# Patient Record
Sex: FEMALE | Race: WHITE | NOT HISPANIC OR LATINO | Employment: OTHER | URBAN - METROPOLITAN AREA
[De-identification: names, ages, dates, MRNs, and addresses within clinical notes are randomized per-mention and may not be internally consistent; named-entity substitution may affect disease eponyms.]

---

## 2017-01-06 ENCOUNTER — OFFICE VISIT (OUTPATIENT)
Dept: MEDICAL GROUP | Facility: MEDICAL CENTER | Age: 67
End: 2017-01-06
Payer: MEDICARE

## 2017-01-06 VITALS
BODY MASS INDEX: 27.46 KG/M2 | OXYGEN SATURATION: 94 % | HEIGHT: 63 IN | HEART RATE: 68 BPM | SYSTOLIC BLOOD PRESSURE: 132 MMHG | RESPIRATION RATE: 14 BRPM | WEIGHT: 155 LBS | TEMPERATURE: 98.2 F | DIASTOLIC BLOOD PRESSURE: 72 MMHG

## 2017-01-06 DIAGNOSIS — F32.89 OTHER DEPRESSION: ICD-10-CM

## 2017-01-06 DIAGNOSIS — E11.9 CONTROLLED TYPE 2 DIABETES MELLITUS WITHOUT COMPLICATION, WITHOUT LONG-TERM CURRENT USE OF INSULIN (HCC): ICD-10-CM

## 2017-01-06 DIAGNOSIS — E78.5 HYPERLIPIDEMIA, UNSPECIFIED HYPERLIPIDEMIA TYPE: ICD-10-CM

## 2017-01-06 DIAGNOSIS — K44.9 HIATAL HERNIA: ICD-10-CM

## 2017-01-06 DIAGNOSIS — Z00.00 HEALTH CARE MAINTENANCE: ICD-10-CM

## 2017-01-06 DIAGNOSIS — G35 MULTIPLE SCLEROSIS (HCC): ICD-10-CM

## 2017-01-06 DIAGNOSIS — K22.2 SCHATZKI'S RING: ICD-10-CM

## 2017-01-06 PROBLEM — F32.A DEPRESSION: Status: ACTIVE | Noted: 2017-01-06

## 2017-01-06 LAB
HBA1C MFR BLD: 6.1 % (ref ?–5.8)
INT CON NEG: NORMAL
INT CON POS: NORMAL

## 2017-01-06 PROCEDURE — 83036 HEMOGLOBIN GLYCOSYLATED A1C: CPT | Performed by: FAMILY MEDICINE

## 2017-01-06 PROCEDURE — 99204 OFFICE O/P NEW MOD 45 MIN: CPT | Mod: 25 | Performed by: FAMILY MEDICINE

## 2017-01-06 RX ORDER — CYANOCOBALAMIN (VITAMIN B-12) 2000 MCG
1 TABLET ORAL DAILY
COMMUNITY
End: 2022-05-20

## 2017-01-06 RX ORDER — LANCETS
1 EACH MISCELLANEOUS DAILY
COMMUNITY
End: 2017-02-02 | Stop reason: SDUPTHER

## 2017-01-06 RX ORDER — SPIRONOLACT/HYDROCHLOROTHIAZID 25 MG-25MG
1 TABLET ORAL DAILY
COMMUNITY
End: 2022-05-20

## 2017-01-06 RX ORDER — PAROXETINE 10 MG/1
10 TABLET, FILM COATED ORAL DAILY
COMMUNITY
End: 2017-02-02 | Stop reason: SDUPTHER

## 2017-01-06 RX ORDER — GLATIRAMER 40 MG/ML
1 INJECTION, SOLUTION SUBCUTANEOUS
COMMUNITY
End: 2021-09-22

## 2017-01-06 RX ORDER — SIMVASTATIN 40 MG
40 TABLET ORAL NIGHTLY
COMMUNITY
End: 2017-02-02 | Stop reason: SDUPTHER

## 2017-01-06 ASSESSMENT — PATIENT HEALTH QUESTIONNAIRE - PHQ9: CLINICAL INTERPRETATION OF PHQ2 SCORE: 3

## 2017-01-06 NOTE — PROGRESS NOTES
CC: Establish care    HPI:  Melody presents today to establish a new primary care relationship. Was a patient of Dr Dona Vela.    Lives alone, active, and independent with all ADLs. Has the following medical issues:    Type 2 diabetes mellitus , has been adequately controlled. A1C today is 6.1( was 6.9 in the past).Denies excessive urination, and drinking of water. Has been on metformin 500 mg bid. No side effects.    Hyperlipidemia, unspece has been tolerating the statin. Denies muscle pain LFTs has been normal  Continue on Simvastatin 40 mg daily.    Multiple sclerosis , was diagnosed in 2006. Has been stable.and physical active. However has been complaining of chronic fatigability, has been trying the vit b12 but has not been working well.. Discussed with the patient that she should try Amantadine for that but she have to talk to her neurologist Dr Oral Guzman first.She is currently on disease modifying drug, Capaxone injection 40 mg 3 times weekly.Has had double vision on and off, last eye exam was 10/31/2016 was normal.    Depression, which is probably MS induced.Has been generally stable. No suicidal ideation.However has been having an episodes of bad moods.Has been on Paxil to 10 mg daily. No side effects.    Patient  had EGD and colonoscopy in 2/9/2016.EGD showed both Schatzki's ring, and Hiatal hernia, however has been asymptomatic.    Pneumonia vaccine, flu shot, and colonoscopy are UTD.Last mammogram was in 2/2014, due for one, and due for bone density, will talk about them next visit.      Patient Active Problem List    Diagnosis Date Noted   • Controlled type 2 diabetes mellitus without complication, without long-term current use of insulin (HCC) 01/06/2017   • Hyperlipidemia 01/06/2017   • Multiple sclerosis (HCC) 01/06/2017   • Depression 01/06/2017   • Schatzki's ring 01/06/2017   • Hiatal hernia 01/06/2017       Current Outpatient Prescriptions   Medication Sig Dispense Refill   •  "Glatiramer Acetate 40 MG/ML Solution Prefilled Syringe Inject 1 Dose as instructed. 3 times a week     • metformin (GLUCOPHAGE) 500 MG Tab Take 500 mg by mouth 2 times a day, with meals.     • paroxetine (PAXIL) 10 MG Tab Take 10 mg by mouth every day.     • simvastatin (ZOCOR) 40 MG Tab Take 40 mg by mouth every evening.     • ACCU-CHEK SOFTCLIX LANCETS Misc 1 Units by Does not apply route every day.     • glucose blood strip 1 Each by Other route every day. vadim plus test strips     • aspirin 81 MG tablet Take 81 mg by mouth every day.     • Cyanocobalamin (B-12) 2000 MCG Tab Take 1 Tab by mouth every day.     • Calcium Citrate-Vitamin D (CALCIUM + D PO) Take 1 Tab by mouth 2 times a day.     • Coenzyme Q10 (CO Q-10) 300 MG Cap Take 1 Cap by mouth every day.     • Cholecalciferol (D3 ADULT PO) Take 2 Tabs by mouth every day.       No current facility-administered medications for this visit.         Allergies as of 01/06/2017   • (No Known Allergies)        Social History     Social History   • Marital Status:      Spouse Name: N/A   • Number of Children: N/A   • Years of Education: N/A     Occupational History   • Not on file.     Social History Main Topics   • Smoking status: Never Smoker    • Smokeless tobacco: Never Used   • Alcohol Use: Yes      Comment: ocassionally   • Drug Use: No   • Sexual Activity: Not on file     Other Topics Concern   • Not on file     Social History Narrative   • No narrative on file       Family History   Problem Relation Age of Onset   • Cancer Mother    • Heart Disease Father        History reviewed. No pertinent past surgical history.    ROS:  Denies any Headache, Blurred Vision, Confusion Chest pain,  Shortness of breath,  Abdominal pain, Changes of bowel or bladder, Lower ext edema, Fevers, Nights sweats, Weight Changes, Focal weakness or numbness.  All other systems are negative.    /72 mmHg  Pulse 68  Temp(Src) 36.8 °C (98.2 °F)  Resp 14  Ht 1.6 m (5' 2.99\") "  Wt 70.308 kg (155 lb)  BMI 27.46 kg/m2  SpO2 94%    Physical Exam:  Gen:         Alert and oriented, No apparent distress.  HEENT:   Perrla, TM clear,  Oralpharynx no erythema or exudates.  Neck:       No Jugular venous distension, Lymphadenopathy, Thyromegaly, Bruits.  Lungs:     Clear to auscultation bilaterally  CV:          Regular rate and rhythm. No murmurs, rubs or gallops.  Abd:         Soft non tender, non distended. Normal active bowel sounds. No hepatosplenomegaly, No pulsatile masses.  Ext:          No clubbing, cyanosis, edema.      Assessment and Plan.   66 y.o. female     1. Controlled type 2 diabetes mellitus without complication, without long-term current use of insulin (HCC)  Adequately controlled. A1C today is 6.1.  Continue on metformin 500 mg bid.    - POCT Hemoglobin A1c    2. Hyperlipidemia, unspecified hyperlipidemia type  He has been tolerating the statin. Denies muscle pain LFTs has been normal  Continue on Simvastatin 40 mg daily.    3. Multiple sclerosis (HCC)  Diagnosed in [address]. Has been stable.However has been complaining of chronic fatigability. I advised patient to try Amantadine for that but tod tim to talk to her neurologist Dr Oral Guzman.  Continue on Capaxone injection 40 mg 3 times weekly.    - REFERRAL TO NEUROLOGY    4. Depression  Probably MS induced.Has been generally stable. No suicidal ideation.However has been having an episodes of bad moods. Advised to increase Paxil to 20 mg daily  Advised to     5. Schatzki's ring/ Hiatal hernia  Has had EGD and colonoscopy in 2/9/2016.  Stable and asymptomatic.    7. Health care maintenance  Pneumonia vaccine, flu shot, and colonoscopy are UTD.  Last mammogram was in 2/2014, due for one, and due for bone density, will talk about them next visit.

## 2017-01-06 NOTE — PATIENT INSTRUCTIONS
Diabetes Education    • Take your Diabetes medicine as directed, as well as all other prescribed medication, even if you feel good. Tell your provider if you cannot afford your medications or if you are experiencing any side effects.  • Keep simple sugars or glucose tabs with you at all times in the event of low blood sugar. Carry or wear a medical alert card or bracelet/necklace with your diabetic information.   • Check your blood sugar levels daily and according to your providers' recommendations. Keep a log of your blood sugar levels and bring with you to all your appointments. Follow the correctional scale prescribed by your provider, if you were instructed to use rapid acting insulin before meals and before bed. Be sure to account for the carbs in your meal as directed by your provider. Rapid acting insulin: Humulog (lispro), Novolog (aspart), Apidra (glulisine).  • Schedule and keep follow up appointments as indicated by your provider for: diabetes follow ups (bring your glucose meter and blood sugar log), annual dilated eye exam with a qualified ophthalmologist/optometrist, lab tests as requested by your provider (includes blood work and urine test) and semi-annual dental exam.  • Check your feet using a hand held mirror every day for cuts, cracks, sores, bumps, and or red spots. Avoid walking on bare feet. Only use clippers for toenails. See a podiatrist for callous care. Call or schedule an appointment with your provider if you notice sores that are not healing.  • Eat a small meal or healthy snack every 3 - 4 hours. Don't skip meals. Include protein with carbohydrate at most meals. Drink water, plain or flavored with fresh fruit, instead of juice or soda. An ideal plate is made up of half non-starchy vegetables, one quarter protein and one quarter starch.  • Stay or get to a healthy weight by using your meal plan and moving more. Set a goal to be more active most days of the week. Start slow by taking 10  minute walks 3 times a day. Take the stairs instead of an elevator or escalator. Twice a week, work to increase your muscle strength. Use stretch bands, do yoga, heavy gardening (digging and planting with tools), or use dumb bells to strengthen your arms.  • When to call your Endocrinologist or Primary Care Provider   -If your blood glucose stays over 300 mg/dL for 24 hours    -If you are having frequent low blood sugars    -If you experience any one or all of the following symptoms: blurry    vision, fast breathing, weakness, dizziness or shakiness,     headache, rapid heartbeat, confusion, or irritability.

## 2017-01-06 NOTE — MR AVS SNAPSHOT
"        Melody Rodriguez   2017 9:00 AM   Office Visit   MRN: [id number]    Department:  [address] [place] [address]   Dept Phone:  [phone]    Description:  Female : 1950   Provider:  Laya Cornelius M.D.           Reason for Visit     New Patient establish      Allergies as of 2017     No Known Allergies      You were diagnosed with     Controlled type 2 diabetes mellitus without complication, without long-term current use of insulin (HCC)   [2772584]       Hyperlipidemia, unspecified hyperlipidemia type   [0525275]       Multiple sclerosis (HCC)   [340.ICD-9-CM]       Other depression   [3683875]       Schatzki's ring   [499690]       Hiatal hernia   [563947]       Health care maintenance   [536750]         Vital Signs     Blood Pressure Pulse Temperature Respirations Height Weight    132/72 mmHg 68 36.8 °C (98.2 °F) 14 1.6 m (5' 2.99\") 70.308 kg (155 lb)    Body Mass Index Oxygen Saturation Smoking Status             27.46 kg/m2 94% Never Smoker          Basic Information     Date Of Birth Sex Race Ethnicity Preferred Language    1950 Female White Non- English      Problem List              ICD-10-CM Priority Class Noted - Resolved    Controlled type 2 diabetes mellitus without complication, without long-term current use of insulin (HCC) E11.9   2017 - Present    Hyperlipidemia E78.5   2017 - Present    Multiple sclerosis (HCC) G35   2017 - Present    Depression F32.9   2017 - Present    Schatzki's ring K22.2   2017 - Present    Hiatal hernia K44.9   2017 - Present      Health Maintenance        Date Due Completion Dates    A1C SCREENING 1950 ---    DIABETES MONOFILAMTENT / LE EXAM 1950 ---    FASTING LIPID PROFILE 1968 ---    URINE ACR / MICROALBUMIN 1968 ---    SERUM CREATININE 1968 ---    IMM DTaP/Tdap/Td Vaccine (1 - Tdap) 1969 ---    PAP SMEAR 1971 ---    MAMMOGRAM 2015 (Done)    Override on 2014: " Done    BONE DENSITY 2/25/2015 ---    RETINAL SCREENING 10/31/2017 10/31/2016 (Done)    Override on 10/31/2016: Done    IMM PNEUMOCOCCAL 65+ (ADULT) LOW/MEDIUM RISK SERIES (2 of 2 - PPSV23) 1/1/2020 9/8/2016, 1/1/2015    COLONOSCOPY 2/9/2026 2/9/2016 (Done)    Override on 2/9/2016: Done            Results     POCT Hemoglobin A1c      Component    Glycohemoglobin    6.1    Internal Control Negative    Internal Control Positive                        Current Immunizations     13-VALENT PCV PREVNAR 9/8/2016    Influenza TIV (IM) 9/8/2016    Pneumococcal polysaccharide vaccine (PPSV-23) 1/1/2015    SHINGLES VACCINE 1/1/2014      Below and/or attached are the medications your provider expects you to take. Review all of your home medications and newly ordered medications with your provider and/or pharmacist. Follow medication instructions as directed by your provider and/or pharmacist. Please keep your medication list with you and share with your provider. Update the information when medications are discontinued, doses are changed, or new medications (including over-the-counter products) are added; and carry medication information at all times in the event of emergency situations     Allergies:  No Known Allergies          Medications  Valid as of: January 06, 2017 - 10:03 AM    Generic Name Brand Name Tablet Size Instructions for use    Aspirin (Tab) aspirin 81 MG Take 81 mg by mouth every day.        Calcium Citrate-Vitamin D   Take 1 Tab by mouth 2 times a day.        Cholecalciferol   Take 2 Tabs by mouth every day.        Coenzyme Q10 (Cap) Co Q-10 300 MG Take 1 Cap by mouth every day.        Cyanocobalamin (Tab) B-12 2000 MCG Take 1 Tab by mouth every day.        Glatiramer Acetate (Solution Prefilled Syringe) Glatiramer Acetate 40 MG/ML Inject 1 Dose as instructed. 3 times a week        Glucose Blood (Strip) glucose blood  1 Each by Other route every day. vadim plus test strips        Lancets (Misc) ACCU-CHEK  SOFTCLIX LANCETS  1 Units by Does not apply route every day.        MetFORMIN HCl (Tab) GLUCOPHAGE 500 MG Take 500 mg by mouth 2 times a day, with meals.        PARoxetine HCl (Tab) PAXIL 10 MG Take 10 mg by mouth every day.        Simvastatin (Tab) ZOCOR 40 MG Take 40 mg by mouth every evening.        .                 Medicines prescribed today were sent to:     [facility] - [place], [place] - [address]    [address] [place] [address]    Phone: [phone] Fax: [phone]    Open 24 Hours?: No      Medication refill instructions:       If your prescription bottle indicates you have medication refills left, it is not necessary to call your provider’s office. Please contact your pharmacy and they will refill your medication.    If your prescription bottle indicates you do not have any refills left, you may request refills at any time through one of the following ways: The online [employer] system (except Urgent Care), by calling your provider’s office, or by asking your pharmacy to contact your provider’s office with a refill request. Medication refills are processed only during regular business hours and may not be available until the next business day. Your provider may request additional information or to have a follow-up visit with you prior to refilling your medication.   *Please Note: Medication refills are assigned a new Rx number when refilled electronically. Your pharmacy may indicate that no refills were authorized even though a new prescription for the same medication is available at the pharmacy. Please request the medicine by name with the pharmacy before contacting your provider for a refill.        Referral     A referral request has been sent to our patient care coordination department. Please allow 3-5 business days for us to process this request and contact you either by phone or mail. If you do not hear from us by the 5th business day, please call us at [phone].        Instructions           Diabetes Education    • Take your Diabetes medicine as directed, as well as all other prescribed medication, even if you feel good. Tell your provider if you cannot afford your medications or if you are experiencing any side effects.  • Keep simple sugars or glucose tabs with you at all times in the event of low blood sugar. Carry or wear a medical alert card or bracelet/necklace with your diabetic information.   • Check your blood sugar levels daily and according to your providers' recommendations. Keep a log of your blood sugar levels and bring with you to all your appointments. Follow the correctional scale prescribed by your provider, if you were instructed to use rapid acting insulin before meals and before bed. Be sure to account for the carbs in your meal as directed by your provider. Rapid acting insulin: Humulog (lispro), Novolog (aspart), Apidra (glulisine).  • Schedule and keep follow up appointments as indicated by your provider for: diabetes follow ups (bring your glucose meter and blood sugar log), annual dilated eye exam with a qualified ophthalmologist/optometrist, lab tests as requested by your provider (includes blood work and urine test) and semi-annual dental exam.  • Check your feet using a hand held mirror every day for cuts, cracks, sores, bumps, and or red spots. Avoid walking on bare feet. Only use clippers for toenails. See a podiatrist for callous care. Call or schedule an appointment with your provider if you notice sores that are not healing.  • Eat a small meal or healthy snack every 3 - 4 hours. Don't skip meals. Include protein with carbohydrate at most meals. Drink water, plain or flavored with fresh fruit, instead of juice or soda. An ideal plate is made up of half non-starchy vegetables, one quarter protein and one quarter starch.  • Stay or get to a healthy weight by using your meal plan and moving more. Set a goal to be more active most days of the week. Start slow by taking  10 minute walks 3 times a day. Take the stairs instead of an elevator or escalator. Twice a week, work to increase your muscle strength. Use stretch bands, do yoga, heavy gardening (digging and planting with tools), or use dumb bells to strengthen your arms.  • When to call your Endocrinologist or Primary Care Provider   -If your blood glucose stays over 300 mg/dL for 24 hours    -If you are having frequent low blood sugars    -If you experience any one or all of the following symptoms: blurry    vision, fast breathing, weakness, dizziness or shakiness,     headache, rapid heartbeat, confusion, or irritability.                     [employer] Access Code: Activation code not generated  Current [employer] Status: Active

## 2017-02-02 RX ORDER — PAROXETINE HYDROCHLORIDE 20 MG/1
20 TABLET, FILM COATED ORAL DAILY
Qty: 90 TAB | Refills: 1 | Status: SHIPPED | OUTPATIENT
Start: 2017-02-02 | End: 2017-09-04 | Stop reason: SDUPTHER

## 2017-02-02 RX ORDER — LANCETS
1 EACH MISCELLANEOUS DAILY
Qty: 90 EACH | Refills: 2 | Status: SHIPPED | OUTPATIENT
Start: 2017-02-02 | End: 2018-01-25

## 2017-02-02 RX ORDER — SIMVASTATIN 40 MG
40 TABLET ORAL EVERY EVENING
Qty: 90 TAB | Refills: 1 | Status: SHIPPED | OUTPATIENT
Start: 2017-02-02 | End: 2017-10-16 | Stop reason: SDUPTHER

## 2017-05-18 ENCOUNTER — PATIENT MESSAGE (OUTPATIENT)
Dept: MEDICAL GROUP | Facility: MEDICAL CENTER | Age: 67
End: 2017-05-18

## 2017-05-18 DIAGNOSIS — E11.9 CONTROLLED TYPE 2 DIABETES MELLITUS WITHOUT COMPLICATION, WITHOUT LONG-TERM CURRENT USE OF INSULIN (HCC): ICD-10-CM

## 2017-05-18 RX ORDER — LANCETS 30 GAUGE
EACH MISCELLANEOUS
Qty: 100 EACH | Refills: 1 | Status: SHIPPED | OUTPATIENT
Start: 2017-05-18 | End: 2018-11-20 | Stop reason: SDUPTHER

## 2017-05-18 NOTE — TELEPHONE ENCOUNTER
From: Melody Rodriguez  To: Laya Cornelius M.D.  Sent: 5/18/2017 9:17 AM PDT  Subject: Prescription Question    My [employer] plan does not cover the diabetes testing meter I have been using. They require the use of the [employer] Freestyle Lite meter and testing strips. Could you please issue a new prescription for the meter, testing strips and lancets? These are only carried by [facility] and [facility] so I'd appreciate it if you could send the new Rx to the [facility] pharmacy at [address].    Thanks for the help.    Nishi Rodriguez

## 2017-09-05 RX ORDER — PAROXETINE HYDROCHLORIDE 20 MG/1
TABLET, FILM COATED ORAL
Qty: 90 TAB | Refills: 3 | Status: SHIPPED
Start: 2017-09-05 | End: 2020-01-29

## 2017-10-12 ENCOUNTER — HOSPITAL ENCOUNTER (OUTPATIENT)
Dept: LAB | Facility: MEDICAL CENTER | Age: 67
End: 2017-10-12
Attending: PSYCHIATRY & NEUROLOGY
Payer: MEDICARE

## 2017-10-12 LAB
25(OH)D3 SERPL-MCNC: 36 NG/ML (ref 30–100)
ALBUMIN SERPL BCP-MCNC: 4.1 G/DL (ref 3.2–4.9)
ALBUMIN/GLOB SERPL: 1.4 G/DL
ALP SERPL-CCNC: 90 U/L (ref 30–99)
ALT SERPL-CCNC: 17 U/L (ref 2–50)
ANION GAP SERPL CALC-SCNC: 6 MMOL/L (ref 0–11.9)
AST SERPL-CCNC: 19 U/L (ref 12–45)
BASOPHILS # BLD AUTO: 0.8 % (ref 0–1.8)
BASOPHILS # BLD: 0.06 K/UL (ref 0–0.12)
BILIRUB SERPL-MCNC: 0.3 MG/DL (ref 0.1–1.5)
BUN SERPL-MCNC: 10 MG/DL (ref 8–22)
CALCIUM SERPL-MCNC: 9.6 MG/DL (ref 8.5–10.5)
CHLORIDE SERPL-SCNC: 105 MMOL/L (ref 96–112)
CO2 SERPL-SCNC: 26 MMOL/L (ref 20–33)
CREAT SERPL-MCNC: 0.83 MG/DL (ref 0.5–1.4)
EOSINOPHIL # BLD AUTO: 0.42 K/UL (ref 0–0.51)
EOSINOPHIL NFR BLD: 5.6 % (ref 0–6.9)
ERYTHROCYTE [DISTWIDTH] IN BLOOD BY AUTOMATED COUNT: 43.2 FL (ref 35.9–50)
GFR SERPL CREATININE-BSD FRML MDRD: >60 ML/MIN/1.73 M 2
GLOBULIN SER CALC-MCNC: 2.9 G/DL (ref 1.9–3.5)
GLUCOSE SERPL-MCNC: 100 MG/DL (ref 65–99)
HCT VFR BLD AUTO: 43.3 % (ref 37–47)
HGB BLD-MCNC: 14.1 G/DL (ref 12–16)
IMM GRANULOCYTES # BLD AUTO: 0.06 K/UL (ref 0–0.11)
IMM GRANULOCYTES NFR BLD AUTO: 0.8 % (ref 0–0.9)
LYMPHOCYTES # BLD AUTO: 1.74 K/UL (ref 1–4.8)
LYMPHOCYTES NFR BLD: 23 % (ref 22–41)
MCH RBC QN AUTO: 30.4 PG (ref 27–33)
MCHC RBC AUTO-ENTMCNC: 32.6 G/DL (ref 33.6–35)
MCV RBC AUTO: 93.3 FL (ref 81.4–97.8)
MONOCYTES # BLD AUTO: 0.53 K/UL (ref 0–0.85)
MONOCYTES NFR BLD AUTO: 7 % (ref 0–13.4)
NEUTROPHILS # BLD AUTO: 4.75 K/UL (ref 2–7.15)
NEUTROPHILS NFR BLD: 62.8 % (ref 44–72)
NRBC # BLD AUTO: 0 K/UL
NRBC BLD AUTO-RTO: 0 /100 WBC
PLATELET # BLD AUTO: 252 K/UL (ref 164–446)
PMV BLD AUTO: 9.5 FL (ref 9–12.9)
POTASSIUM SERPL-SCNC: 4.1 MMOL/L (ref 3.6–5.5)
PROT SERPL-MCNC: 7 G/DL (ref 6–8.2)
RBC # BLD AUTO: 4.64 M/UL (ref 4.2–5.4)
SODIUM SERPL-SCNC: 137 MMOL/L (ref 135–145)
T3 SERPL-MCNC: 111.8 NG/DL (ref 60–181)
T4 FREE SERPL-MCNC: 0.79 NG/DL (ref 0.53–1.43)
T4 SERPL-MCNC: 6.6 UG/DL (ref 4–12)
TSH SERPL DL<=0.005 MIU/L-ACNC: 3.05 UIU/ML (ref 0.3–3.7)
WBC # BLD AUTO: 7.6 K/UL (ref 4.8–10.8)

## 2017-10-12 PROCEDURE — 84480 ASSAY TRIIODOTHYRONINE (T3): CPT

## 2017-10-12 PROCEDURE — 36415 COLL VENOUS BLD VENIPUNCTURE: CPT

## 2017-10-12 PROCEDURE — 85025 COMPLETE CBC W/AUTO DIFF WBC: CPT

## 2017-10-12 PROCEDURE — 80053 COMPREHEN METABOLIC PANEL: CPT

## 2017-10-12 PROCEDURE — 82306 VITAMIN D 25 HYDROXY: CPT

## 2017-10-12 PROCEDURE — 84443 ASSAY THYROID STIM HORMONE: CPT

## 2017-10-12 PROCEDURE — 84439 ASSAY OF FREE THYROXINE: CPT

## 2017-10-17 RX ORDER — SIMVASTATIN 40 MG
TABLET ORAL
Qty: 90 TAB | Refills: 3 | Status: SHIPPED | OUTPATIENT
Start: 2017-10-17 | End: 2018-10-25 | Stop reason: SDUPTHER

## 2017-12-17 ENCOUNTER — PATIENT MESSAGE (OUTPATIENT)
Dept: MEDICAL GROUP | Facility: MEDICAL CENTER | Age: 67
End: 2017-12-17

## 2018-01-11 ENCOUNTER — TELEPHONE (OUTPATIENT)
Dept: HEALTH INFORMATION MANAGEMENT | Facility: OTHER | Age: 68
End: 2018-01-11

## 2018-01-11 ENCOUNTER — PATIENT OUTREACH (OUTPATIENT)
Dept: HEALTH INFORMATION MANAGEMENT | Facility: OTHER | Age: 68
End: 2018-01-11

## 2018-01-11 NOTE — PROGRESS NOTES
1. Attempt #: 1    2. [facility] Verified: yes    3. Verify PCP: yes    4. Care Team Updated:       •   DME Company (gait device, O2, CPAP, etc.): YES       •   Other Specialists (eye doctor, derm, GYN, cardiology, endo, etc): YES    5.  Reviewed/Updated the following with patient:       •   Communication Preference Obtained? YES       •   Preferred Pharmacy? YES       •   Preferred Lab? YES       •   Family History (document living status of immediate family members and if + hx of cancer, diabetes, hypertension, hyperlipidemia, heart attack, stroke) YES. Was Abstract Encounter opened and chart updated? YES    6. [employer] Activation: already active    7. [employer] Kaden: no    8. Annual Wellness Visit Scheduling  Scheduling Status:Scheduled      9. Care Gap Scheduling (Attempt to Schedule EACH Overdue Care Gap!)     Health Maintenance Due   Topic Date Due   • Annual Wellness Visit  1950   • DIABETES MONOFILAMENT / LE EXAM  1950   • FASTING LIPID PROFILE  02/25/1968   • URINE ACR / MICROALBUMIN  02/25/1968   • IMM DTaP/Tdap/Td Vaccine (1 - Tdap) 02/25/1969   • PAP SMEAR  02/25/1971   • MAMMOGRAM  02/12/2015   • BONE DENSITY  02/25/2015   • A1C SCREENING  07/06/2017   • IMM INFLUENZA (1) 09/01/2017   • RETINAL SCREENING  10/31/2017        Scheduled patient for Annual Wellness Visit/ DM/MAMMO/  TO DISCUSS RETINAL/ LAB ORDERS REQUESTED/ COMPLETED DEXA-LOCATION UNKNOWN      10. Patient was advised: “This is a free wellness visit. The provider will screen for medical conditions to help you stay healthy. If you have other concerns to address you may be asked to discuss these at a separate visit or there may be an additional fee.”     11. Patient was informed to arrive 15 min prior to their scheduled appointment and bring in their medication bottles.

## 2018-01-18 ENCOUNTER — HOSPITAL ENCOUNTER (OUTPATIENT)
Dept: RADIOLOGY | Facility: MEDICAL CENTER | Age: 68
End: 2018-01-18
Attending: FAMILY MEDICINE
Payer: MEDICARE

## 2018-01-18 DIAGNOSIS — Z12.31 SCREENING MAMMOGRAM, ENCOUNTER FOR: ICD-10-CM

## 2018-01-18 PROCEDURE — 77067 SCR MAMMO BI INCL CAD: CPT

## 2018-01-25 ENCOUNTER — OFFICE VISIT (OUTPATIENT)
Dept: MEDICAL GROUP | Facility: MEDICAL CENTER | Age: 68
End: 2018-01-25
Payer: MEDICARE

## 2018-01-25 VITALS
HEART RATE: 75 BPM | WEIGHT: 158 LBS | SYSTOLIC BLOOD PRESSURE: 118 MMHG | TEMPERATURE: 97.4 F | OXYGEN SATURATION: 95 % | DIASTOLIC BLOOD PRESSURE: 70 MMHG | HEIGHT: 63 IN | BODY MASS INDEX: 28 KG/M2

## 2018-01-25 DIAGNOSIS — E11.9 CONTROLLED TYPE 2 DIABETES MELLITUS WITHOUT COMPLICATION, WITHOUT LONG-TERM CURRENT USE OF INSULIN (HCC): ICD-10-CM

## 2018-01-25 DIAGNOSIS — K22.2 SCHATZKI'S RING: ICD-10-CM

## 2018-01-25 DIAGNOSIS — F32.0 MILD SINGLE CURRENT EPISODE OF MAJOR DEPRESSIVE DISORDER (HCC): ICD-10-CM

## 2018-01-25 DIAGNOSIS — E78.2 MIXED HYPERLIPIDEMIA: ICD-10-CM

## 2018-01-25 DIAGNOSIS — G35 MULTIPLE SCLEROSIS (HCC): ICD-10-CM

## 2018-01-25 DIAGNOSIS — R01.1 HEART MURMUR: ICD-10-CM

## 2018-01-25 LAB
HBA1C MFR BLD: 6.3 % (ref ?–5.8)
INT CON NEG: NORMAL
INT CON POS: NORMAL

## 2018-01-25 PROCEDURE — 83036 HEMOGLOBIN GLYCOSYLATED A1C: CPT | Performed by: FAMILY MEDICINE

## 2018-01-25 PROCEDURE — 99215 OFFICE O/P EST HI 40 MIN: CPT | Performed by: FAMILY MEDICINE

## 2018-01-25 NOTE — PROGRESS NOTES
RN-CDE Note  Type 2 Diabetes  Subjective:     Health changes since last visit/interval Hx: States health good except having fatigue.    Medications (including changes made today)  Current Outpatient Prescriptions   Medication Sig Dispense Refill   • B Complex Vitamins (B COMPLEX PO) Take  by mouth. Stress formulary     • simvastatin (ZOCOR) 40 MG Tab TAKE ONE TABLET BY MOUTH IN THE EVENING  90 Tab 3   • paroxetine (PAXIL) 20 MG Tab TAKE ONE TABLET BY MOUTH ONE TIME DAILY  90 Tab 3   • Blood Glucose Monitoring Suppl SUPPLIES Misc Test strips order: Test strips for Abott freestyle lite meter. Sig: use 3 times daily and prn ssx high or low sugar #100 RF x 3 100 Each 1   • Lancets Misc Lancets order: Lancets for [employer]ott freestyle liter meter. Sig: use 3 times daily and prn ssx high or low sugar. #100 RF x 3 100 Each 1   • metformin (GLUCOPHAGE) 500 MG Tab Take 1 Tab by mouth 2 times a day, with meals. 180 Tab 2   • Glatiramer Acetate 40 MG/ML Solution Prefilled Syringe Inject 1 Dose as instructed. 3 times a week     • aspirin 81 MG tablet Take 81 mg by mouth every day.     • Cyanocobalamin (B-12) 2000 MCG Tab Take 1 Tab by mouth every day.     • Calcium Citrate-Vitamin D (CALCIUM + D PO) Take 1 Tab by mouth 2 times a day.     • Coenzyme Q10 (CO Q-10) 300 MG Cap Take 1 Cap by mouth every day.     • Cholecalciferol (D3 ADULT PO) Take 2 Tabs by mouth every day.       No current facility-administered medications for this visit.          Taking daily ASA: Yes  Taking above medications as prescribed: Yes   Patient Denies side effects of medication.    Exercise: no regular exercise, sedentary  Diet:  Decreased appetite Tea in the morning.  Lunch is cheese burger.  Dinner is meat, vegetable, and starch.    Health Maintenance:   Health Maintenance Topics with due status: Overdue       Topic Date Due    Annual Wellness Visit 1950    DIABETES MONOFILAMENT / LE EXAM 1950    FASTING LIPID PROFILE 02/25/1968    URINE ACR /  MICROALBUMIN 02/25/1968    IMM DTaP/Tdap/Td Vaccine 02/25/1969    PAP SMEAR 02/25/1971    MAMMOGRAM 02/12/2015    BONE DENSITY 02/25/2015    A1C SCREENING 07/06/2017    IMM INFLUENZA 09/01/2017    RETINAL SCREENING 10/31/2017         DM:   Last A1c:   Lab Results   Component Value Date/Time    HBA1C 6.1 01/06/2017 09:24 AM      A1c goal: < 6.5    Glucose monitoring frequency: Testing in the morning  trend: low 100's  Hypoglycemic episodes: no     Last Retinal Exam: a little over a year agp Provider: Does not remember  Daily Foot Exam: yes  Routine Dental Exams: yes    No results found for: MICROALBCALC, MALBCRT, MALBEXCR, WTFZHP50, MICROALBUR, MICRALB, UMICROALBUM, MICROALBTIM     ACR Albumin/Creatinine Ratio goal <30     Diabetic complications: none    HTN:   Blood pressure goal <140/<80 .   Currently Rx ACE/ARB: NO    Dyslipidemia:    No results found for: CHOLSTRLTOT, LDL, HDL, TRIGLYCERIDE    Lab Results   Component Value Date/Time    SODIUM 137 10/12/2017 10:54 AM    POTASSIUM 4.1 10/12/2017 10:54 AM    CHLORIDE 105 10/12/2017 10:54 AM    CO2 26 10/12/2017 10:54 AM    GLUCOSE 100 (H) 10/12/2017 10:54 AM    BUN 10 10/12/2017 10:54 AM    CREATININE 0.83 10/12/2017 10:54 AM     Lab Results   Component Value Date/Time    ALKPHOSPHAT 90 10/12/2017 10:54 AM    ASTSGOT 19 10/12/2017 10:54 AM    ALTSGPT 17 10/12/2017 10:54 AM    TBILIRUBIN 0.3 10/12/2017 10:54 AM        Currently Rx Statin: Yes      She  reports that she has never smoked. She has never used smokeless tobacco.    Objective:     Exam:  Monofilament: done  Monofilament testing with a 10 gram force: sensation intact: intact bilaterally  Visual Inspection: Feet without maceration, ulcers, fissures.  Pedal pulses: intact bilaterally      Plan:     - Diabetic diet discussed in detail-plate method.  - Home glucose monitoring.  - She will test and log.    - She will walk for 20-30 minutes daily as tolerated.   - Reviewed medications and advised to take  metformin after meals to decrease   G.I.upset.   - Discussed importance of immunizations and yearly eye exams.    -Educational material distributed.   - Advised daily foot exams. Educated on signs of infection.       Recommended medication changes: No changes in diabetes medications.  She would like a referral to a cardiologist to follow up on a heart murmur she has.      CC: Diabetes, others    HPI:   Melody presents today to discuss diabetes and other problem:    Controlled type 2 diabetes mellitus without complication, without long-term current use of insulin (CMS-HCC)  Has been adequately controlled. A1C today is 6.3.has been on metformin 500 mg bid.Was seen today with diabetic nurse . I agree with her recommendstion    Multiple sclerosis (CMS-HCC)  Was diagnosed in 2006. Has been stable.However has been complaining of chronic fatigability.Advised to take amntadine, but it is not covered by her insurance. She has been on Capaxone injection 40 mg 3 times a week.She follows up periodically with neurologist Dr Oral Guzman.     Mixed hyperlipidemia  He has been tolerating the statin. Denies muscle pain LFTs has been normal, has been on e on Simvastatin 40 mg daily.    Mild single current episode of major depressive disorder (CMS-HCC)  Probably MS induced.Has been generally stable. No suicidal ideation.Has been on Paxil to 20 mg daily    Schatzki's ring/ Hiatal hernia  Patient has had EGD and colonoscopy in 2/9/2016. Has been asymptomaticsince then.    Patient Active Problem List    Diagnosis Date Noted   • Controlled type 2 diabetes mellitus without complication, without long-term current use of insulin (CMS-HCC) 01/06/2017   • Hyperlipidemia 01/06/2017   • Multiple sclerosis (CMS-HCC) 01/06/2017   • Depression 01/06/2017   • Schatzki's ring 01/06/2017   • Hiatal hernia 01/06/2017       Current Outpatient Prescriptions   Medication Sig Dispense Refill   • B Complex Vitamins (B COMPLEX PO) Take  by mouth.  "Stress formulary     • simvastatin (ZOCOR) 40 MG Tab TAKE ONE TABLET BY MOUTH IN THE EVENING  90 Tab 3   • paroxetine (PAXIL) 20 MG Tab TAKE ONE TABLET BY MOUTH ONE TIME DAILY  90 Tab 3   • Blood Glucose Monitoring Suppl SUPPLIES Misc Test strips order: Test strips for Abott freestyle lite meter. Sig: use 3 times daily and prn ssx high or low sugar #100 RF x 3 100 Each 1   • Lancets Misc Lancets order: Lancets for Abott freestyle liter meter. Sig: use 3 times daily and prn ssx high or low sugar. #100 RF x 3 100 Each 1   • metformin (GLUCOPHAGE) 500 MG Tab Take 1 Tab by mouth 2 times a day, with meals. 180 Tab 2   • Glatiramer Acetate 40 MG/ML Solution Prefilled Syringe Inject 1 Dose as instructed. 3 times a week     • aspirin 81 MG tablet Take 81 mg by mouth every day.     • Cyanocobalamin (B-12) 2000 MCG Tab Take 1 Tab by mouth every day.     • Calcium Citrate-Vitamin D (CALCIUM + D PO) Take 1 Tab by mouth 2 times a day.     • Coenzyme Q10 (CO Q-10) 300 MG Cap Take 1 Cap by mouth every day.     • Cholecalciferol (D3 ADULT PO) Take 2 Tabs by mouth every day.       No current facility-administered medications for this visit.          Allergies as of 01/25/2018   • (No Known Allergies)        ROS: Denies any chest pain, Shortness of breath, Changes bowel or bladder, Lower extremity edema.    Physical Exam:  /70   Pulse 75   Temp 36.3 °C (97.4 °F)   Ht 1.6 m (5' 3\")   Wt 71.7 kg (158 lb)   SpO2 95%   BMI 27.99 kg/m²   Gen.: Well-developed, well-nourished, no apparent distress,pleasant and cooperative with the examination  Skin:  Warm and dry with good turgor. No rashes or suspicious lesions in visible areas  HEENT:Sinuses nontender with palpation, TMs clear, nares patent with pink mucosa and clear rhinorrhea,no septal deviation ,polyps or lesions. lips without lesions, oropharynx clear.  Neck: Trachea midline,no masses or adenopathy. No JVD.  Cor: Regular rate and rhythm without murmur, gallop or " rub.  Lungs: Respirations unlabored.Clear to auscultation with equal breath sounds bilaterally. No wheezes, rhonchi.  Extremities: No cyanosis, clubbing or edema.        Assessment and Plan.   67 y.o. female     1. Controlled type 2 diabetes mellitus without complication, without long-term current use of insulin (CMS-HCC)  Adequately controlled. A1C today is 6.3.  Continue on metformin 500 mg bid.    - Diabetic Monofilament LE Exam  - POCT Hemoglobin A1C    2. Multiple sclerosis (CMS-HCC)  Diagnosed in 2006. Has been stable.However has been complaining of chronic fatigability.  Continue follow up neurologist Dr Oral Guzman.  Continue on Capaxone injection 40 mg 3 times a week.    3. Mixed hyperlipidemia  He has been tolerating the statin. Denies muscle pain LFTs has been normal  Continue on Simvastatin 40 mg daily.    4. Mild single current episode of major depressive disorder (CMS-HCC)  Probably MS induced.Has been generally stable. No suicidal ideation.However has been having an episodes of bad moods.   Continue on Paxil to 20 mg daily    5. Schatzki's ring/ Hiatal hernia  Has had EGD and colonoscopy in 2/9/2016.   Stable and asymptomatic.    6. Heart murmur ( systolic murmur)  Asymptomatic.  Concerned wants to see any progression in her leaking valves.    - ECHOCARDIOGRAM-COMP W/ CONT; Future      I spent 40 minutes with patient, at least half of this time was spent in counseling and education.

## 2018-02-08 ENCOUNTER — HOSPITAL ENCOUNTER (OUTPATIENT)
Dept: RADIOLOGY | Facility: MEDICAL CENTER | Age: 68
End: 2018-02-08

## 2018-02-26 ENCOUNTER — OFFICE VISIT (OUTPATIENT)
Dept: URGENT CARE | Facility: CLINIC | Age: 68
End: 2018-02-26
Payer: MEDICARE

## 2018-02-26 VITALS
HEIGHT: 63 IN | WEIGHT: 151.01 LBS | OXYGEN SATURATION: 97 % | TEMPERATURE: 97.4 F | BODY MASS INDEX: 26.76 KG/M2 | HEART RATE: 109 BPM | SYSTOLIC BLOOD PRESSURE: 110 MMHG | DIASTOLIC BLOOD PRESSURE: 80 MMHG

## 2018-02-26 DIAGNOSIS — J20.9 ACUTE BRONCHITIS, UNSPECIFIED ORGANISM: ICD-10-CM

## 2018-02-26 DIAGNOSIS — L73.9 FOLLICULITIS: ICD-10-CM

## 2018-02-26 DIAGNOSIS — E11.9 CONTROLLED TYPE 2 DIABETES MELLITUS WITHOUT COMPLICATION, WITHOUT LONG-TERM CURRENT USE OF INSULIN (HCC): ICD-10-CM

## 2018-02-26 LAB — GLUCOSE BLD-MCNC: 169 MG/DL (ref 70–100)

## 2018-02-26 PROCEDURE — 82962 GLUCOSE BLOOD TEST: CPT | Performed by: FAMILY MEDICINE

## 2018-02-26 PROCEDURE — 99204 OFFICE O/P NEW MOD 45 MIN: CPT | Performed by: FAMILY MEDICINE

## 2018-02-26 RX ORDER — DOXYCYCLINE HYCLATE 100 MG
100 TABLET ORAL 2 TIMES DAILY
Qty: 20 TAB | Refills: 0 | Status: SHIPPED | OUTPATIENT
Start: 2018-02-26 | End: 2018-03-08

## 2018-02-26 ASSESSMENT — ENCOUNTER SYMPTOMS
SHORTNESS OF BREATH: 0
SORE THROAT: 1
EYE PAIN: 0
EYE DISCHARGE: 0
NAUSEA: 0
CHILLS: 1
WHEEZING: 0
PSYCHIATRIC NEGATIVE: 1
BRUISES/BLEEDS EASILY: 0
FEVER: 1
SPUTUM PRODUCTION: 1
PALPITATIONS: 0
COUGH: 1
HEADACHES: 1
EYE REDNESS: 0
MYALGIAS: 1
DIZZINESS: 0
VOMITING: 0

## 2018-02-26 NOTE — PROGRESS NOTES
"Subjective:      Melody Rodriguez is a 68 y.o. female who presents with Cough (\"chest congestion,rash on chest,back pain\"x6days )    Patient presents urgent care with a week and a half of a productive cough fevers chills body aches no nausea vomiting or diarrhea. She did receive a flu vaccination this season she has history of diabetes mellitus type 2 is on oral medication and states that she has not been eating much the past several days. She suspended in bed. Also states that she has developed this itchy rash on her chest wall. That started yesterday. Denies feeling lightheaded or faint. He does have malaise and fatigue    No antipyretics today or yesterday  HPI  Review of Systems   Constitutional: Positive for chills and fever.   HENT: Positive for congestion and sore throat.    Eyes: Negative for pain, discharge and redness.   Respiratory: Positive for cough and sputum production. Negative for shortness of breath and wheezing.    Cardiovascular: Negative for chest pain and palpitations.   Gastrointestinal: Negative for nausea and vomiting.   Genitourinary: Negative.    Musculoskeletal: Positive for myalgias.   Skin: Positive for itching and rash.   Neurological: Positive for headaches. Negative for dizziness.   Endo/Heme/Allergies: Does not bruise/bleed easily.   Psychiatric/Behavioral: Negative.      PMH:  has a past medical history of Depression; Diabetes (CMS-[facility]); Hyperlipidemia; and Muscle disorder.  MEDS:   Current Outpatient Prescriptions:   •  doxycycline (VIBRAMYCIN) 100 MG Tab, Take 1 Tab by mouth 2 times a day for 10 days., Disp: 20 Tab, Rfl: 0  •  Hydrocod Polst-CPM Polst ER (TUSSIONEX) 10-8 MG/5ML Suspension Extended Release, Take 5 mL by mouth 2 times a day as needed for Cough or Rhinitis for up to 6 days. Will cause sedation, avoid driving, operating heavy machinery, and drinking alcohol, Disp: 60 mL, Rfl: 0  •  metformin (GLUCOPHAGE) 500 MG Tab, TAKE ONE TABLET BY MOUTH TWICE DAILY WITH MEALS , " "Disp: 180 Tab, Rfl: 3  •  B Complex Vitamins (B COMPLEX PO), Take  by mouth. Stress formulary, Disp: , Rfl:   •  simvastatin (ZOCOR) 40 MG Tab, TAKE ONE TABLET BY MOUTH IN THE EVENING , Disp: 90 Tab, Rfl: 3  •  paroxetine (PAXIL) 20 MG Tab, TAKE ONE TABLET BY MOUTH ONE TIME DAILY , Disp: 90 Tab, Rfl: 3  •  Blood Glucose Monitoring Suppl SUPPLIES Misc, Test strips order: Test strips for Abott freestyle lite meter. Sig: use 3 times daily and prn ssx high or low sugar #100 RF x 3, Disp: 100 Each, Rfl: 1  •  Lancets Misc, Lancets order: Lancets for Abott freestyle liter meter. Sig: use 3 times daily and prn ssx high or low sugar. #100 RF x 3, Disp: 100 Each, Rfl: 1  •  Glatiramer Acetate 40 MG/ML Solution Prefilled Syringe, Inject 1 Dose as instructed. 3 times a week, Disp: , Rfl:   •  aspirin 81 MG tablet, Take 81 mg by mouth every day., Disp: , Rfl:   •  Cyanocobalamin (B-12) 2000 MCG Tab, Take 1 Tab by mouth every day., Disp: , Rfl:   •  Calcium Citrate-Vitamin D (CALCIUM + D PO), Take 1 Tab by mouth 2 times a day., Disp: , Rfl:   •  Coenzyme Q10 (CO Q-10) 300 MG Cap, Take 1 Cap by mouth every day., Disp: , Rfl:   •  Cholecalciferol (D3 ADULT PO), Take 2 Tabs by mouth every day., Disp: , Rfl:   ALLERGIES: No Known Allergies  SURGHX: No past surgical history on file.  SOCHX:  reports that she has never smoked. She has never used smokeless tobacco. She reports that she drinks alcohol. She reports that she does not use drugs.  FH: Family history was reviewed, no pertinent findings to report     Objective:     /80   Pulse (!) 109   Temp 36.3 °C (97.4 °F)   Ht 1.6 m (5' 3\")   Wt 68.5 kg (151 lb 0.2 oz)   SpO2 97%   BMI 26.75 kg/m²      Physical Exam   Constitutional: She is oriented to person, place, and time. She appears well-developed and well-nourished. No distress.   HENT:   Mouth/Throat: Oropharynx is clear and moist.   Eyes: Conjunctivae are normal.   Neck: Normal range of motion.   Cardiovascular: " Regular rhythm.  Exam reveals no gallop.    No murmur heard.  Mildly tachy   Pulmonary/Chest: Effort normal. No respiratory distress. She has no wheezes.   ronchi are present, deep cough   Abdominal: Soft.   Musculoskeletal: Normal range of motion.   Neurological: She is alert and oriented to person, place, and time.   Skin: Skin is warm and dry. Rash noted. Rash is pustular. She is not diaphoretic.        Discrete pustular lesions on chest wall characteristic of follilculitis   Psychiatric: She has a normal mood and affect. Her behavior is normal. Judgment and thought content normal.         Diagnostics:   fsbs 116  Assessment/Plan:     1. Controlled type 2 diabetes mellitus without complication, without long-term current use of insulin (CMS-[facility])  con't crhonic meds as this is stable  - POCT glucose    2. Acute bronchitis, unspecified organism    - doxycycline (VIBRAMYCIN) 100 MG Tab; Take 1 Tab by mouth 2 times a day for 10 days.  Dispense: 20 Tab; Refill: 0  - Hydrocod Polst-CPM Polst ER (TUSSIONEX) 10-8 MG/5ML Suspension Extended Release; Take 5 mL by mouth 2 times a day as needed for Cough or Rhinitis for up to 6 days. Will cause sedation, avoid driving, operating heavy machinery, and drinking alcohol  Dispense: 60 mL; Refill: 0    3. Folliculitis    - doxycycline (VIBRAMYCIN) 100 MG Tab; Take 1 Tab by mouth 2 times a day for 10 days.  Dispense: 20 Tab; Refill: 0

## 2018-05-28 ENCOUNTER — TELEPHONE (OUTPATIENT)
Dept: MEDICAL GROUP | Facility: MEDICAL CENTER | Age: 68
End: 2018-05-28

## 2018-05-28 DIAGNOSIS — E11.9 CONTROLLED TYPE 2 DIABETES MELLITUS WITHOUT COMPLICATION, WITHOUT LONG-TERM CURRENT USE OF INSULIN (HCC): ICD-10-CM

## 2018-05-28 NOTE — PROGRESS NOTES
Attempt #:Final  [facility] Verified: yes  Verify PCP: yes  Annual Wellness Visit Scheduling  1. Scheduling Status:Scheduled // Pt already completed pre-planning.      Care Gap Scheduling (Attempt to Schedule EACH Overdue Care Gap!) // Requested orders for labs  Health Maintenance Due   Topic Date Due   • Annual Wellness Visit  1950   • FASTING LIPID PROFILE  02/25/1968   • URINE ACR / MICROALBUMIN  02/25/1968   • IMM DTaP/Tdap/Td Vaccine (1 - Tdap) 02/25/1969   • PAP SMEAR  02/25/1971 // Declined   • BONE DENSITY  02/25/2015 / Will schedule later    RETINAL SCREENING  10/31/2017 // Will schedule at later time     MyChart Activation: already active

## 2018-06-04 ENCOUNTER — PATIENT MESSAGE (OUTPATIENT)
Dept: MEDICAL GROUP | Facility: MEDICAL CENTER | Age: 68
End: 2018-06-04

## 2018-06-04 DIAGNOSIS — E11.9 CONTROLLED TYPE 2 DIABETES MELLITUS WITHOUT COMPLICATION, WITHOUT LONG-TERM CURRENT USE OF INSULIN (HCC): ICD-10-CM

## 2018-06-05 ENCOUNTER — PATIENT MESSAGE (OUTPATIENT)
Dept: MEDICAL GROUP | Facility: MEDICAL CENTER | Age: 68
End: 2018-06-05

## 2018-06-05 NOTE — TELEPHONE ENCOUNTER
From: Melody Rodriguez  To: Laya Cornelius M.D.  Sent: 6/5/2018 11:47 AM PDT  Subject: Test Result Question    Not really a question. I just wanted to advise you that I had a bone density exam on 10/31/16 through [facility], [id number] [id number] -- tel:[phone]. I checked my old chart through [facility] but there is nothing in there. I previously signed a health records release so they should have sent the info to you but who knows.    The only thing I was told was that I had osteopenia and they recommended increased calcium and Vitamin D.    Nishi Rodriguez

## 2018-06-11 ENCOUNTER — HOSPITAL ENCOUNTER (OUTPATIENT)
Dept: LAB | Facility: MEDICAL CENTER | Age: 68
End: 2018-06-11
Attending: FAMILY MEDICINE
Payer: MEDICARE

## 2018-06-11 DIAGNOSIS — E11.9 CONTROLLED TYPE 2 DIABETES MELLITUS WITHOUT COMPLICATION, WITHOUT LONG-TERM CURRENT USE OF INSULIN (HCC): ICD-10-CM

## 2018-06-11 LAB
CHOLEST SERPL-MCNC: 150 MG/DL (ref 100–199)
CREAT UR-MCNC: 62.8 MG/DL
EST. AVERAGE GLUCOSE BLD GHB EST-MCNC: 143 MG/DL
HBA1C MFR BLD: 6.6 % (ref 0–5.6)
HDLC SERPL-MCNC: 39 MG/DL
LDLC SERPL CALC-MCNC: 80 MG/DL
MICROALBUMIN UR-MCNC: <0.7 MG/DL
MICROALBUMIN/CREAT UR: NORMAL MG/G (ref 0–30)
TRIGL SERPL-MCNC: 155 MG/DL (ref 0–149)

## 2018-06-11 PROCEDURE — 82570 ASSAY OF URINE CREATININE: CPT

## 2018-06-11 PROCEDURE — 36415 COLL VENOUS BLD VENIPUNCTURE: CPT

## 2018-06-11 PROCEDURE — 82043 UR ALBUMIN QUANTITATIVE: CPT

## 2018-06-11 PROCEDURE — 80061 LIPID PANEL: CPT

## 2018-06-11 PROCEDURE — 83036 HEMOGLOBIN GLYCOSYLATED A1C: CPT

## 2018-06-14 ENCOUNTER — HOSPITAL ENCOUNTER (OUTPATIENT)
Dept: CARDIOLOGY | Facility: MEDICAL CENTER | Age: 68
End: 2018-06-14
Attending: FAMILY MEDICINE
Payer: MEDICARE

## 2018-06-14 DIAGNOSIS — R01.1 HEART MURMUR: ICD-10-CM

## 2018-06-14 LAB
LV EJECT FRACT  99904: 60
LV EJECT FRACT MOD 2C 99903: 70.16
LV EJECT FRACT MOD 4C 99902: 61.2
LV EJECT FRACT MOD BP 99901: 65.55

## 2018-06-14 PROCEDURE — 93306 TTE W/DOPPLER COMPLETE: CPT | Mod: 26 | Performed by: INTERNAL MEDICINE

## 2018-06-14 PROCEDURE — 93306 TTE W/DOPPLER COMPLETE: CPT

## 2018-06-26 ENCOUNTER — OFFICE VISIT (OUTPATIENT)
Dept: MEDICAL GROUP | Facility: MEDICAL CENTER | Age: 68
End: 2018-06-26
Payer: MEDICARE

## 2018-06-26 VITALS
OXYGEN SATURATION: 93 % | DIASTOLIC BLOOD PRESSURE: 80 MMHG | BODY MASS INDEX: 29.83 KG/M2 | RESPIRATION RATE: 16 BRPM | TEMPERATURE: 96.8 F | SYSTOLIC BLOOD PRESSURE: 120 MMHG | HEIGHT: 61 IN | HEART RATE: 82 BPM | WEIGHT: 158 LBS

## 2018-06-26 DIAGNOSIS — K44.9 HIATAL HERNIA: ICD-10-CM

## 2018-06-26 DIAGNOSIS — Z23 NEED FOR VACCINATION: ICD-10-CM

## 2018-06-26 DIAGNOSIS — G35 MULTIPLE SCLEROSIS (HCC): ICD-10-CM

## 2018-06-26 DIAGNOSIS — E78.2 MIXED HYPERLIPIDEMIA: ICD-10-CM

## 2018-06-26 DIAGNOSIS — F32.4 MAJOR DEPRESSIVE DISORDER WITH SINGLE EPISODE, IN PARTIAL REMISSION (HCC): ICD-10-CM

## 2018-06-26 DIAGNOSIS — E11.9 CONTROLLED TYPE 2 DIABETES MELLITUS WITHOUT COMPLICATION, WITHOUT LONG-TERM CURRENT USE OF INSULIN (HCC): ICD-10-CM

## 2018-06-26 DIAGNOSIS — K22.2 SCHATZKI'S RING: ICD-10-CM

## 2018-06-26 PROCEDURE — G0438 PPPS, INITIAL VISIT: HCPCS | Performed by: FAMILY MEDICINE

## 2018-06-26 PROCEDURE — 90750 HZV VACC RECOMBINANT IM: CPT | Performed by: FAMILY MEDICINE

## 2018-06-26 PROCEDURE — 90472 IMMUNIZATION ADMIN EACH ADD: CPT | Performed by: FAMILY MEDICINE

## 2018-06-26 PROCEDURE — 90471 IMMUNIZATION ADMIN: CPT | Performed by: FAMILY MEDICINE

## 2018-06-26 PROCEDURE — 90715 TDAP VACCINE 7 YRS/> IM: CPT | Performed by: FAMILY MEDICINE

## 2018-06-26 PROCEDURE — 99999 PR NO CHARGE: CPT | Performed by: FAMILY MEDICINE

## 2018-06-26 RX ORDER — PAROXETINE HYDROCHLORIDE 40 MG/1
40 TABLET, FILM COATED ORAL DAILY
Qty: 30 TAB | Refills: 3 | Status: SHIPPED | OUTPATIENT
Start: 2018-06-26 | End: 2022-06-19 | Stop reason: SDUPTHER

## 2018-06-26 ASSESSMENT — ACTIVITIES OF DAILY LIVING (ADL): BATHING_REQUIRES_ASSISTANCE: 0

## 2018-06-26 ASSESSMENT — PATIENT HEALTH QUESTIONNAIRE - PHQ9
CLINICAL INTERPRETATION OF PHQ2 SCORE: 6
5. POOR APPETITE OR OVEREATING: 3 - NEARLY EVERY DAY
SUM OF ALL RESPONSES TO PHQ QUESTIONS 1-9: 18

## 2018-06-26 ASSESSMENT — ENCOUNTER SYMPTOMS: GENERAL WELL-BEING: GOOD

## 2018-06-26 NOTE — PROGRESS NOTES
Chief Complaint   Patient presents with   • Annual Wellness Visit         HPI:  Melody is a 68 y.o. here for Medicare Annual Wellness Visit    Type 2 diabetes mellitus ,  It has been adequately controlled. Last A1C was 6.6.Denies excessive urination, and drinking of water. Has been on metformin 500 mg bid. No side effects.Due for eye exam has eye exam scheduled on 7/24/2018.     Hyperlipidemia,   She has been tolerating the statin. Denies muscle pain LFTs has been normal, she is on Simvastatin 40 mg daily.Last LDL was 80     Multiple sclerosis ,   She was diagnosed in 2006. Has been stable.and physical active. She has been following up with neurologist Dr Oral souza.She is currently on disease modifying drug, Capaxone injection 40 mg 3 times weekly, but she stated that she will be taking it only for the coming one and a half month, after that she has to pay form her pocket ( 6000 a year), and she can not afford this.Has had double vision on and off, last eye exam was 10/31/2016 was normal, has eye exam scheduled on 7/24/2018     Depression,   Patient stated that Paxil 20 mg has been helping her anxiety but not the depression, she has feels that her mood has been fluctuating and bad, however denies suicidal ideation. She has been on Paxil to 20 mg daily. Wants to see a psychiatrist.    Patient  had EGD and colonoscopy in 2/9/2016.EGD showed both Schatzki's ring, and Hiatal hernia, however has been asymptomatic.    Patient Active Problem List    Diagnosis Date Noted   • Controlled type 2 diabetes mellitus without complication, without long-term current use of insulin ([facility]) 01/06/2017   • Hyperlipidemia 01/06/2017   • Multiple sclerosis ([facility]) 01/06/2017   • Depression 01/06/2017   • Schatzki's ring 01/06/2017   • Hiatal hernia 01/06/2017       Current Outpatient Prescriptions   Medication Sig Dispense Refill   • metformin (GLUCOPHAGE) 500 MG Tab TAKE ONE TABLET BY MOUTH TWICE DAILY WITH MEALS  180 Tab 3    • B Complex Vitamins (B COMPLEX PO) Take  by mouth. Stress formulary     • simvastatin (ZOCOR) 40 MG Tab TAKE ONE TABLET BY MOUTH IN THE EVENING  90 Tab 3   • paroxetine (PAXIL) 20 MG Tab TAKE ONE TABLET BY MOUTH ONE TIME DAILY  90 Tab 3   • Blood Glucose Monitoring Suppl SUPPLIES Misc Test strips order: Test strips for Abott freestyle lite meter. Sig: use 3 times daily and prn ssx high or low sugar #100 RF x 3 100 Each 1   • Lancets Misc Lancets order: Lancets for [employer]ott freestyle liter meter. Sig: use 3 times daily and prn ssx high or low sugar. #100 RF x 3 100 Each 1   • Glatiramer Acetate 40 MG/ML Solution Prefilled Syringe Inject 1 Dose as instructed. 3 times a week     • Calcium Citrate-Vitamin D (CALCIUM + D PO) Take 1 Tab by mouth 2 times a day.     • Coenzyme Q10 (CO Q-10) 300 MG Cap Take 1 Cap by mouth every day.     • Cholecalciferol (D3 ADULT PO) Take 2 Tabs by mouth every day.     • aspirin 81 MG tablet Take 81 mg by mouth every day.     • Cyanocobalamin (B-12) 2000 MCG Tab Take 1 Tab by mouth every day.       No current facility-administered medications for this visit.         Patient is taking medications as noted in medication list.  Current supplements as per medication list.     Allergies: Patient has no known allergies.     Current social contact/activities: Visit with family and friend, Stanley for [employer] league 1-2x per wk      Is patient current with immunizations? No, due for TDAP and SHINGRIX (Shingles). Patient is interested in receiving TDAP and ZOSTAVAX (Shingles) today.    She  reports that she has never smoked. She has never used smokeless tobacco. She reports that she drinks about 0.6 - 1.2 oz of alcohol per week . She reports that she does not use drugs.  Counseling given: Not Answered        DPA/Advanced directive: Patient does not have an Advanced Directive.  A packet and workshop information was given on Advanced Directives.    ROS:    Gait: Uses no assistive device   Ostomy:  no   Other tubes: no   Amputations: no   Chronic oxygen use no   Last eye exam 1.5 years ago had an appt for 07/2018   Wears hearing aids: no   : Reports urinary leakage during the last 6 months that has interfered a lot with their daily activities or sleep.      Screening:    DIABETES    Has patient ever had diabetes education? Yes, and patient is interested in more. Referral pended.    Depression Screening  DEPRESSION     Is patient seeing a counselor, psychiatrist or other healthcare provider regarding their mental health? No, but interested in referral. Referral pended.       Little interest or pleasure in doing things?  3 - nearly every day  Feeling down, depressed, or hopeless? 3 - nearly every day  Trouble falling or staying asleep, or sleeping too much?  3 - nearly every day  Feeling tired or having little energy?  3 - nearly every day  Poor appetite or overeating?  3 - nearly every day  Feeling bad about yourself - or that you are a failure or have let yourself or your family down? 0 - not at all  Trouble concentrating on things, such as reading the newspaper or watching television? 3 - nearly every day  Moving or speaking so slowly that other people could have noticed.  Or the opposite - being so fidgety or restless that you have been moving around a lot more than usual?  0 - not at all  Thoughts that you would be better off dead, or of hurting yourself?  0 - not at all  Patient Health Questionnaire Score: 18      If depressive symptoms identified deferred to follow up visit unless specifically addressed in assessment and plan.    Depression Screen (PHQ-2/PHQ-9) 1/6/2017 6/26/2018   PHQ-2 Total Score 3 6   PHQ-9 Total Score - 18       Interpretation of PHQ-9 Total Score   Score Severity   1-4 No Depression   5-9 Mild Depression   10-14 Moderate Depression   15-19 Moderately Severe Depression   20-27 Severe Depression  Screening for Cognitive Impairment    Three Minute Recall (leader, season, table)  3/3     Draw clock face with all 12 numbers and set the hands to show 10 past 11.  Yes 4/5  If cognitive concerns identified, deferred for follow up unless specifically addressed in assessment and plan.    Fall Risk Assessment    Has the patient had two or more falls in the last year or any fall with injury in the last year?  No  If fall risk identified, deferred for follow up unless specifically addressed in assessment and plan.      Safety Assessment    Throw rugs on floor.  Yes  Handrails on all stairs.  Yes  Good lighting in all hallways.  Yes  Difficulty hearing.  No  Patient counseled about all safety risks that were identified.    Functional Assessment ADLs    Are there any barriers preventing you from cooking for yourself or meeting nutritional needs?  No.    Are there any barriers preventing you from driving safely or obtaining transportation?  No.    Are there any barriers preventing you from using a telephone or calling for help?  No.    Are there any barriers preventing you from shopping?  No.    Are there any barriers preventing you from taking care of your own finances?  No.    Are there any barriers preventing you from managing your medications?    No.    Are there any barriers preventing you from showering, bathing or dressing yourself?  No.    Are you currently engaging in any exercise or physical activity?  No.     What is your perception of your health?  Good.    Health Maintenance Summary                Annual Wellness Visit Overdue 1950     IMM DTaP/Tdap/Td Vaccine Overdue 2/25/1969     PAP SMEAR Overdue 2/25/1971     BONE DENSITY Overdue 2/25/2015     RETINAL SCREENING Overdue 10/31/2017      Done 10/31/2016     SERUM CREATININE Next Due 10/12/2018      Done 10/12/2017 COMP METABOLIC PANEL     A1C SCREENING Next Due 12/11/2018      Done 6/11/2018 HEMOGLOBIN A1C      Patient has more history with this topic...    MAMMOGRAM Next Due 1/18/2019      Done 1/18/2018 MA-MAMMO SCREENING BILAT  "W/TOMOSYNTHESIS W/CAD     Patient has more history with this topic...    DIABETES MONOFILAMENT / LE EXAM Next Due 1/25/2019      Done 1/25/2018 AMB DIABETIC MONOFILAMENT LOWER EXTREMITY EXAM    FASTING LIPID PROFILE Next Due 6/11/2019      Done 6/11/2018 LIPID PROFILE     URINE ACR / MICROALBUMIN Next Due 6/11/2019      Done 6/11/2018 MICROALBUMIN CREAT RATIO URINE    IMM PNEUMOCOCCAL 65+ (ADULT) LOW/MEDIUM RISK SERIES Next Due 1/1/2020      Done 9/8/2016 Imm Admin: Pneumococcal Conjugate Vaccine (Prevnar/PCV-13)     Patient has more history with this topic...    COLONOSCOPY Next Due 2/9/2026      Done 2/9/2016           Patient Care Team:  Laya Cornelius M.D. as PCP - General (Geriatrics)  Oral Guzman M.D. as Consulting Physician (Neurology)      Social History   Substance Use Topics   • Smoking status: Never Smoker   • Smokeless tobacco: Never Used   • Alcohol use 0.6 - 1.2 oz/week     1 - 2 Glasses of wine per week     Family History   Problem Relation Age of Onset   • Colon Cancer Mother    • Heart Disease Mother      Cardiomyopathy   • Heart Attack Father    • Heart Disease Father    • No Known Problems Sister    • Cancer Brother      Skin   • Other Brother      Gaulbladder issues   • Cancer Maternal Grandmother      GI cancer   • No Known Problems Maternal Grandfather    • No Known Problems Paternal Grandmother    • No Known Problems Paternal Grandfather    • Other Daughter      Fibromyalgia   • Bipolar disorder Daughter    • ADHD Son    • Depression Son    • Hypertension Son      She  has a past medical history of Depression; Diabetes (HCC); Hyperlipidemia; and Muscle disorder.   History reviewed. No pertinent surgical history.      Exam:     Blood pressure 120/80, pulse 82, temperature 36 °C (96.8 °F), resp. rate 16, height 1.56 m (5' 1.42\"), weight 71.7 kg (158 lb), SpO2 93 %. Body mass index is 29.45 kg/m².    Hearing, good .    Dentition, good   Alert, oriented in no acute distress.  Eye contact " is good, speech goal directed, affect calm      Assessment and Plan. The following treatment and monitoring plan is recommended:    68 y.o. female     1. Need for vaccination  Tdap and shingrix are given.    - TDAP VACCINE =>6YO IM  - SHINGLES VACCINE (SHINGRIX)  - Initial Wellness Visit - Includes PPPS ()    2. Controlled type 2 diabetes mellitus without complication, without long-term current use of insulin (HCC)  Adequately controlled. Last A1C was 6.6  Continue on metformin 500 mg bid.    - Initial Wellness Visit - Includes PPPS ()  - POCT Retinal Eye Exam    3. Multiple sclerosis (HCC)  She was diagnosed in 2006. Has been stable.and physical active. She has been following up with neurologist Dr Oral souza.She is currently on disease modifying drug, Capaxone injection 40 mg 3 times weekly, but she stated that she will be taking it only for the coming one and a half month, after that she has to pay form her pocket ( 6000 a year), and she can not afford this.   .     - Initial Wellness Visit - Includes PPPS ()    4. Mixed hyperlipidemia  He has been tolerating the statin. Denies muscle pain LFTs has been normal  Continue on Simvastatin 40 mg daily.     - Initial Wellness Visit - Includes PPPS ()    5. Major depressive disorder with single episode, in partial remission ([facility])  PHQ-9 score is 18,  Probably MS induced.Patient stated that Paxil 20 mg has been affecting her anxiety but not the depression, she feels that her mood has been fluctuated and bad, however denies suicidal ideation. Advised to increase Paxil to 40 mg daily  Will refer to psychiatry.    - Patient has been identified as being depressed and appropriate orders and counseling have been given  - paroxetine (PAXIL) 40 MG tablet; Take 1 Tab by mouth every day.  Dispense: 30 Tab; Refill: 3  - REFERRAL TO PSYCHIATRY    6. Schatzki's ring/ Hiatal hernia  Has had EGD and colonoscopy in 2/9/2016.  Stable and asymptomatic.    -  Initial Wellness Visit - Includes PPPS ()          Services suggested:   Health Care Screening recommendations as per orders if indicated.  Referrals offered: PT/OT/Nutrition counseling/Behavioral Health/Smoking cessation as per orders if indicated.    Discussion today about general wellness and lifestyle habits:    · Prevent falls and reduce trip hazards; Cautioned about securing or removing rugs.  · Have a working fire alarm and carbon monoxide detector;   · Engage in regular physical activity and social activities       Follow-up: No Follow-up on file.

## 2018-06-26 NOTE — LETTER
[facility]  Laya Cornelius M.D.  [address]  [place] [address]  Fax: [phone]   Authorization for Release/Disclosure of   Protected Health Information   Name: NEREYDA RODRIGUEZ : 1950 SSN: [id number]   Address: [address] [place] [address]  [place] [address] Phone:    [phone] (home)    I authorize the entity listed below to release/disclose the PHI below to:   [facility]/Laya Cornelius M.D. and Laya Cornelius M.D.   Provider or Entity Name: John Yost M.D.   Address   City, [facility]   Phone:    Fax: [phone]   Reason for request: continuity of care   Information to be released:    [  ] LAST COLONOSCOPY,  including any PATH REPORT and follow-up  [  ] LAST FIT/COLOGUARD RESULT [  ] LAST DEXA  [  ] LAST MAMMOGRAM  [  ] LAST PAP  [  ] LAST LABS [x] RETINA EXAM REPORT  [  ] IMMUNIZATION RECORDS  [  ] Release all info      [  ] Check here and initial the line next to each item to release ALL health information INCLUDING  _____ Care and treatment for drug and / or alcohol abuse  _____ HIV testing, infection status, or AIDS  _____ Genetic Testing    DATES OF SERVICE OR TIME PERIOD TO BE DISCLOSED: _____________  I understand and acknowledge that:  * This Authorization may be revoked at any time by you in writing, except if your health information has already been used or disclosed.  * Your health information that will be used or disclosed as a result of you signing this authorization could be re-disclosed by the recipient. If this occurs, your re-disclosed health information may no longer be protected by State or Federal laws.  * You may refuse to sign this Authorization. Your refusal will not affect your ability to obtain treatment.  * This Authorization becomes effective upon signing and will  on (date) __________.      If no date is indicated, this Authorization will  one (1) year from the signature date.    Name: Nereyda Rodriguez    Signature:             Date: 6/26/2018       PLEASE FAX REQUESTED RECORDS BACK TO: [phone]

## 2018-06-27 ENCOUNTER — TELEPHONE (OUTPATIENT)
Dept: MEDICAL GROUP | Facility: MEDICAL CENTER | Age: 68
End: 2018-06-27

## 2018-06-27 DIAGNOSIS — F32.1 CURRENT MODERATE EPISODE OF MAJOR DEPRESSIVE DISORDER WITHOUT PRIOR EPISODE (HCC): ICD-10-CM

## 2018-06-27 DIAGNOSIS — G35 MULTIPLE SCLEROSIS (HCC): ICD-10-CM

## 2018-06-27 NOTE — TELEPHONE ENCOUNTER
1. Caller Name: Melody Rodriguez                                           Call Back Number: [phone] (home)         Patient approves a detailed voicemail message: yes    2. SPECIFIC Action To Be Taken: Would like a referral for Dr Yost eye doctor for an eye exam and also if her psychiatrist referral to dr. baptiste    3. Diagnosis/Clinical Reason for Request: Eye exam and psychiatrist     4. Specialty & Provider Name/Lab/Imaging Location: [facility]    5. Is appointment scheduled for requested order/referral: yes -     Patient was informed they will receive a return phone call from the office ONLY if there are any questions before processing their request. Advised to call back if they haven't received a call from the referral department in 5 days.

## 2018-10-12 ENCOUNTER — HOSPITAL ENCOUNTER (OUTPATIENT)
Facility: MEDICAL CENTER | Age: 68
End: 2018-10-12
Payer: MEDICARE

## 2018-10-12 PROCEDURE — 83036 HEMOGLOBIN GLYCOSYLATED A1C: CPT

## 2018-10-13 LAB
EST. AVERAGE GLUCOSE BLD GHB EST-MCNC: 160 MG/DL
HBA1C MFR BLD: 7.2 % (ref 0–5.6)

## 2018-10-29 RX ORDER — SIMVASTATIN 40 MG
TABLET ORAL
Qty: 90 TAB | Refills: 3 | Status: SHIPPED | OUTPATIENT
Start: 2018-10-29 | End: 2019-11-27 | Stop reason: SDUPTHER

## 2018-11-08 ENCOUNTER — IMMUNIZATION (OUTPATIENT)
Dept: SOCIAL WORK | Facility: CLINIC | Age: 68
End: 2018-11-08
Payer: MEDICARE

## 2018-11-08 ENCOUNTER — HOSPITAL ENCOUNTER (OUTPATIENT)
Dept: LAB | Facility: MEDICAL CENTER | Age: 68
End: 2018-11-08
Attending: PSYCHIATRY & NEUROLOGY
Payer: MEDICARE

## 2018-11-08 DIAGNOSIS — Z23 NEED FOR VACCINATION: ICD-10-CM

## 2018-11-08 LAB
ALBUMIN SERPL BCP-MCNC: 4.7 G/DL (ref 3.2–4.9)
ALBUMIN/GLOB SERPL: 1.8 G/DL
ALP SERPL-CCNC: 98 U/L (ref 30–99)
ALT SERPL-CCNC: 19 U/L (ref 2–50)
ANION GAP SERPL CALC-SCNC: 7 MMOL/L (ref 0–11.9)
AST SERPL-CCNC: 17 U/L (ref 12–45)
BILIRUB SERPL-MCNC: 0.8 MG/DL (ref 0.1–1.5)
BUN SERPL-MCNC: 15 MG/DL (ref 8–22)
CALCIUM SERPL-MCNC: 9.7 MG/DL (ref 8.5–10.5)
CHLORIDE SERPL-SCNC: 104 MMOL/L (ref 96–112)
CO2 SERPL-SCNC: 28 MMOL/L (ref 20–33)
CREAT SERPL-MCNC: 1.02 MG/DL (ref 0.5–1.4)
FASTING STATUS PATIENT QL REPORTED: NORMAL
GLOBULIN SER CALC-MCNC: 2.6 G/DL (ref 1.9–3.5)
GLUCOSE SERPL-MCNC: 124 MG/DL (ref 65–99)
POTASSIUM SERPL-SCNC: 4.1 MMOL/L (ref 3.6–5.5)
PROT SERPL-MCNC: 7.3 G/DL (ref 6–8.2)
SODIUM SERPL-SCNC: 139 MMOL/L (ref 135–145)

## 2018-11-08 PROCEDURE — 80053 COMPREHEN METABOLIC PANEL: CPT

## 2018-11-08 PROCEDURE — G0008 ADMIN INFLUENZA VIRUS VAC: HCPCS | Performed by: REGISTERED NURSE

## 2018-11-08 PROCEDURE — 36415 COLL VENOUS BLD VENIPUNCTURE: CPT

## 2018-11-08 PROCEDURE — 90662 IIV NO PRSV INCREASED AG IM: CPT | Performed by: REGISTERED NURSE

## 2018-11-11 ENCOUNTER — HOSPITAL ENCOUNTER (OUTPATIENT)
Dept: RADIOLOGY | Facility: MEDICAL CENTER | Age: 68
End: 2018-11-11
Attending: PSYCHIATRY & NEUROLOGY
Payer: MEDICARE

## 2018-11-11 DIAGNOSIS — G35 MULTIPLE SCLEROSIS (HCC): ICD-10-CM

## 2018-11-11 PROCEDURE — A9585 GADOBUTROL INJECTION: HCPCS | Performed by: PSYCHIATRY & NEUROLOGY

## 2018-11-11 PROCEDURE — 72156 MRI NECK SPINE W/O & W/DYE: CPT

## 2018-11-11 PROCEDURE — 700117 HCHG RX CONTRAST REV CODE 255: Performed by: PSYCHIATRY & NEUROLOGY

## 2018-11-11 PROCEDURE — 70553 MRI BRAIN STEM W/O & W/DYE: CPT

## 2018-11-11 RX ORDER — GADOBUTROL 604.72 MG/ML
7.5 INJECTION INTRAVENOUS ONCE
Status: COMPLETED | OUTPATIENT
Start: 2018-11-11 | End: 2018-11-11

## 2018-11-11 RX ADMIN — GADOBUTROL 7.5 ML: 604.72 INJECTION INTRAVENOUS at 13:58

## 2018-11-20 DIAGNOSIS — E11.9 CONTROLLED TYPE 2 DIABETES MELLITUS WITHOUT COMPLICATION, WITHOUT LONG-TERM CURRENT USE OF INSULIN (HCC): ICD-10-CM

## 2018-11-20 RX ORDER — LANCETS 28 GAUGE
EACH MISCELLANEOUS
Qty: 100 EACH | Refills: 3 | Status: SHIPPED | OUTPATIENT
Start: 2018-11-20 | End: 2020-04-30 | Stop reason: SDUPTHER

## 2018-11-20 RX ORDER — BLOOD SUGAR DIAGNOSTIC
STRIP MISCELLANEOUS
Qty: 100 STRIP | Refills: 0 | Status: SHIPPED | OUTPATIENT
Start: 2018-11-20 | End: 2018-12-27 | Stop reason: SDUPTHER

## 2018-11-21 ENCOUNTER — HOSPITAL ENCOUNTER (OUTPATIENT)
Dept: RADIOLOGY | Facility: MEDICAL CENTER | Age: 68
End: 2018-11-21
Attending: PSYCHIATRY & NEUROLOGY
Payer: MEDICARE

## 2018-11-21 DIAGNOSIS — E04.1 NONTOXIC UNINODULAR GOITER: ICD-10-CM

## 2018-11-21 PROCEDURE — 76536 US EXAM OF HEAD AND NECK: CPT

## 2018-12-27 DIAGNOSIS — E11.9 CONTROLLED TYPE 2 DIABETES MELLITUS WITHOUT COMPLICATION, WITHOUT LONG-TERM CURRENT USE OF INSULIN (HCC): ICD-10-CM

## 2018-12-27 RX ORDER — BLOOD SUGAR DIAGNOSTIC
STRIP MISCELLANEOUS
Qty: 100 STRIP | Refills: 0 | Status: SHIPPED | OUTPATIENT
Start: 2018-12-27 | End: 2020-06-13 | Stop reason: SDUPTHER

## 2019-03-28 ENCOUNTER — OFFICE VISIT (OUTPATIENT)
Dept: ENDOCRINOLOGY | Facility: MEDICAL CENTER | Age: 69
End: 2019-03-28
Payer: MEDICARE

## 2019-03-28 VITALS
BODY MASS INDEX: 28 KG/M2 | SYSTOLIC BLOOD PRESSURE: 110 MMHG | HEIGHT: 63 IN | HEART RATE: 87 BPM | OXYGEN SATURATION: 91 % | DIASTOLIC BLOOD PRESSURE: 60 MMHG | WEIGHT: 158 LBS

## 2019-03-28 DIAGNOSIS — E11.9 CONTROLLED TYPE 2 DIABETES MELLITUS WITHOUT COMPLICATION, WITHOUT LONG-TERM CURRENT USE OF INSULIN (HCC): ICD-10-CM

## 2019-03-28 DIAGNOSIS — E04.2 MULTIPLE THYROID NODULES: ICD-10-CM

## 2019-03-28 DIAGNOSIS — R13.10 DYSPHAGIA, UNSPECIFIED TYPE: ICD-10-CM

## 2019-03-28 DIAGNOSIS — E78.5 DYSLIPIDEMIA: ICD-10-CM

## 2019-03-28 LAB
HBA1C MFR BLD: 6.6 % (ref 0–5.6)
INT CON NEG: NEGATIVE
INT CON POS: POSITIVE

## 2019-03-28 PROCEDURE — 83036 HEMOGLOBIN GLYCOSYLATED A1C: CPT | Performed by: PHYSICIAN ASSISTANT

## 2019-03-28 PROCEDURE — 99204 OFFICE O/P NEW MOD 45 MIN: CPT | Performed by: PHYSICIAN ASSISTANT

## 2019-03-28 RX ORDER — LAMOTRIGINE 100 MG/1
200 TABLET ORAL DAILY
COMMUNITY
End: 2022-11-23

## 2019-03-28 NOTE — PROGRESS NOTES
"New Patient Consult Note  Referred by: Laya Cornelius M.D.    HPI:  Melody Rodriguez is a  69 y.o. old patient who comes in today for evaluation and management of the followin. Controlled type 2 diabetes mellitus without complication, without long-term current use of insulin (HCC)  Dx:    Metformin 500 mg BID - no side effects     Preventative care:   Podiatry: no numbness tinglng no cane   Ophthalmology: glasses 10/2018- no retinopathy   Diabetes education:  Previously .     Check Blood sugars 2 times a month.   BG      No hypoglycemia     Labs:    Ref. Range 2018 09:40   Glucose Latest Ref Range: 65 - 99 mg/dL 124 (H)        Ref. Range 2018 09:40   GFR If Non  Latest Ref Range: >60 mL/min/1.73 m 2 54 (A)        Ref. Range 2018 09:33 10/12/2018 13:57 2018 09:40 2018 09:42 3/28/2019 16:07   Glycohemoglobin Latest Ref Range: 0.0 - 5.6 % 6.6 (H) 7.2 (H)   6.6 (A)     2. Multiple thyroid nodules  Patient 2006 had MRI secondary to radicular pain in the hands. Radiologist commented on thyroid nodules.   Repeat MRI again displayed thyroid nodules and she was underwent thyroid US and refer to endocrinology.     Patients history is difficult to differentiate symptoms secondary to hx of MS, Shatski ring, etc.     She complains of difficulty swallowing but has had this for years. She follows with GI. Last evaluation was  2 years ago but states symptoms are becoming more apparent over the last 2 months.     Patient denies symptoms of sensation of a throat mass, voice changes, hoarseness, neck pain,  dysphonia, cough, enlarged cervical lymph nodes, tremors.      She has \"terrible\" heat intolerance related to MS and this unchanged for years. She admits to having a difficulty with temperature regulation.       Patient denies any history of child head or neck radiation, No family history of  thryroid cancer      Thyroid Ultrasound 2018- "   Circumscribed mixed solid/cystic circumscribed thyroid mass in the midportion of the thyroid gland occupying the majority the right lobe measuring 4.0 x 2.8 x 2.5 cm.    2.  Mixed circumscribed solid and cystic complex nodule in the lower pole the left lobe of the thyroid gland measuring 1.7 x 1.7 x 1.2 cm.    3.  No extrathyroid mass is present.    4.  Correlation with thyroid uptake and scan may be of value.    MRI of the neck: 11/11/2018-  2.2 x 2.8 semierect thyroid mass for which dedicated thyroid ultrasound is recommended.    3. Dyslipidemia - stable   Patient is on Zocor since 2014.   Takes at night   No side effects.   Patient also takes Co Q 10 to prevent muscle cramping      Ref. Range 6/11/2018 09:33   Cholesterol,Tot Latest Ref Range: 100 - 199 mg/dL 150   Triglycerides Latest Ref Range: 0 - 149 mg/dL 155 (H)   HDL Latest Ref Range: >=40 mg/dL 39 (A)   LDL Latest Ref Range: <100 mg/dL 80       ROS:  Constitutional: No weight loss or gain,  fever  HEENT: see above   Cardiac: No chest pain, palpitations, or racing heart  Resp: No shortness of breath  GI: No abdominal pain, nausea, vomiting, or diarrhea   Neuro: No numbness or tinging in feet  Endo: No polyuria or polydipsia  All other systems were reviewed and were negative.    Past Medical History:  Patient Active Problem List    Diagnosis Date Noted   • Controlled type 2 diabetes mellitus without complication, without long-term current use of insulin (HCC) 01/06/2017   • Hyperlipidemia 01/06/2017   • Multiple sclerosis (HCC) 01/06/2017   • Depression 01/06/2017   • Schatzki's ring 01/06/2017   • Hiatal hernia 01/06/2017       Past Surgical History:  History reviewed. No pertinent surgical history.    Allergies:  Patient has no known allergies.    Social History:  Social History     Social History   • Marital status:      Spouse name: N/A   • Number of children: N/A   • Years of education: N/A     Occupational History   • Not on file.     Social  History Main Topics   • Smoking status: Never Smoker   • Smokeless tobacco: Never Used   • Alcohol use 0.6 - 1.2 oz/week     1 - 2 Glasses of wine per week   • Drug use: No      Comment: CBD stick no THC   • Sexual activity: No     Other Topics Concern   • Not on file     Social History Narrative   • No narrative on file       Family History:  Family History   Problem Relation Age of Onset   • Colon Cancer Mother    • Heart Disease Mother         Cardiomyopathy   • Heart Attack Father    • Heart Disease Father    • No Known Problems Sister    • Cancer Brother         Skin   • Other Brother         Gaulbladder issues   • Cancer Maternal Grandmother         GI cancer   • No Known Problems Maternal Grandfather    • No Known Problems Paternal Grandmother    • No Known Problems Paternal Grandfather    • Other Daughter         Fibromyalgia   • Bipolar disorder Daughter    • ADHD Son    • Depression Son    • Hypertension Son        Medications:    Current Outpatient Prescriptions:   •  lamoTRIgine (LAMICTAL) 100 MG Tab, Take 100 mg by mouth every day., Disp: , Rfl:   •  FREESTYLE TEST STRIPS strip, USE 3 TIMES A DAY AS NEEDED FOR SYMMPTOMS OF HIGH OR LOW BLOOD SUGAR, Disp: 100 Strip, Rfl: 0  •  FREESTYLE LANCETS Misc, USE 3 TIMES A DAY AS NEEDED FOR SYMPTOMS OF HIGH OR LOW BLOOD SUGAR, Disp: 100 Each, Rfl: 3  •  simvastatin (ZOCOR) 40 MG Tab, TAKE ONE TABLET BY MOUTH IN THE EVENING , Disp: 90 Tab, Rfl: 3  •  paroxetine (PAXIL) 40 MG tablet, Take 1 Tab by mouth every day., Disp: 30 Tab, Rfl: 3  •  metformin (GLUCOPHAGE) 500 MG Tab, TAKE ONE TABLET BY MOUTH TWICE DAILY WITH MEALS , Disp: 180 Tab, Rfl: 3  •  B Complex Vitamins (B COMPLEX PO), Take  by mouth. Stress formulary, Disp: , Rfl:   •  Glatiramer Acetate 40 MG/ML Solution Prefilled Syringe, Inject 1 Dose as instructed. 3 times a week, Disp: , Rfl:   •  aspirin 81 MG tablet, Take 81 mg by mouth every day., Disp: , Rfl:   •  Cyanocobalamin (B-12) 2000 MCG Tab, Take 1  "Tab by mouth every day., Disp: , Rfl:   •  Calcium Citrate-Vitamin D (CALCIUM + D PO), Take 1 Tab by mouth 2 times a day., Disp: , Rfl:   •  Coenzyme Q10 (CO Q-10) 300 MG Cap, Take 1 Cap by mouth every day., Disp: , Rfl:   •  Cholecalciferol (D3 ADULT PO), Take 2 Tabs by mouth every day., Disp: , Rfl:   •  paroxetine (PAXIL) 20 MG Tab, TAKE ONE TABLET BY MOUTH ONE TIME DAILY  (Patient not taking: Reported on 3/28/2019), Disp: 90 Tab, Rfl: 3    Labs: Reviewed (as above)     Physical Examination:  Vital signs: /60 (BP Location: Right arm, Patient Position: Sitting, BP Cuff Size: Adult)   Pulse 87   Ht 1.588 m (5' 2.5\")   Wt 71.7 kg (158 lb)   SpO2 91%   BMI 28.44 kg/m²  Body mass index is 28.44 kg/m².  General: No apparent distress, cooperative,     Eyes: No scleral icterus or discharge, no nystagmus  ENMT: Normal on external inspection of nose, lips, enlarged with nodules bilateral on  thyroid exam  Neck: No abnormal masses on inspection or palpations. no bruits noted   Resp: Normal effort, clear to auscultation bilaterally without wheezes, rales or rhonchi  CVS: Regular rate and rhythm, S1 S2 normal,+2/6 murmur without radiation   Extremities: No edema.   Abdomen: abdominal soft and nontender  Neuro: Alert and oriented  Skin: No rash  Psych: Normal mood and affect, intact memory and able to make informed decisions    Assessment and Plan:  1. Controlled type 2 diabetes mellitus without complication, without long-term current use of insulin (HCC)  Discussed patient is well controlled on current regimen continue Metformin   Stable/chronic    - HEMOGLOBIN A1C; Future  - MICROALBUMIN CREAT RATIO URINE; Future  - Comp Metabolic Panel; Future  - POCT Hemoglobin A1C    2. Multiple thyroid nodules - chronic but unstable. Will continue to follow   Discussed etiology and pathophysiology associated with thyroid nodules.  We do not have previous ultrasound for comparison.    We discussed options re: repeat US/biopsy. "   Patient would like to wait and US in 6 months.     - TSH; Future  - FREE THYROXINE; Future  - US-SOFT TISSUES OF HEAD - NECK; Future    3. Dyslipidemia  Stable. Managed with current medical regimen   Continue Zocor   - Lipid Profile; Future  - Comp Metabolic Panel; Future    4. Dysphagia: encouraged patient to follow up with GI as symptoms are worsening.     Return in about 4 months (around 7/28/2019).     Thank you for allowing me to participate in the care of this patient.  If you have any questions or concerns please do not hesitate to contact me.    Soledad Chan P.A.-C.  03/28/19    CC:   Laya Cornelius M.D.    This note was created using voice recognition software (Dragon). The accuracy of the dictation is limited by the abilities of the software. I have reviewed the note prior to signing, however some errors in grammar and context are still possible. If you have any questions related to this note please do not hesitate to contact our office.

## 2019-03-29 PROBLEM — R13.10 DYSPHAGIA: Status: ACTIVE | Noted: 2019-03-29

## 2019-04-09 DIAGNOSIS — R53.83 FATIGUE, UNSPECIFIED TYPE: ICD-10-CM

## 2019-04-19 ENCOUNTER — PATIENT MESSAGE (OUTPATIENT)
Dept: MEDICAL GROUP | Facility: MEDICAL CENTER | Age: 69
End: 2019-04-19

## 2019-04-22 DIAGNOSIS — K22.2 SCHATZKI'S RING: ICD-10-CM

## 2019-04-22 NOTE — TELEPHONE ENCOUNTER
From: Melody Rodriguez  To: Laya Cornelius M.D.  Sent: 4/19/2019 4:53 PM PDT  Subject: Non-Urgent Medical Question    In 2014, when I was with [facility], I saw Dr. Darrell Ramirez of [facility] for a colonoscopy and endoscopy. I was diagnosed with a Schatzki ring, which was expanded during the procedure. I am again having difficulty with swallowing and food sticking in my esophagus. I would like to make an appointment to see Dr. Ramirez to decide what I should do at this point. Dr. Ramirez is a [facility] provider as well as a [facility] provider.     I was hoping you could provide a referral so that I could schedule an appointment as soon as possible. His information is below:    Dr. Darrell Ramirez  [facility]  [address] [place] [id number]    [phone]  [email or web address]    Thanks,  Nishi Rodriguez

## 2019-04-23 ENCOUNTER — PATIENT OUTREACH (OUTPATIENT)
Dept: HEALTH INFORMATION MANAGEMENT | Facility: OTHER | Age: 69
End: 2019-04-23

## 2019-04-23 NOTE — PROGRESS NOTES
1. HealthConnect Verified: yes    2. Verify PCP: yes    3. Review and add  to Care Team: yes    4. WebIZ Checked & Epic Updated: Yes  WebIZ Recommendations: Patient is up to date on all vaccines  Is patient due for Tdap? NO  Is patient due for Shingles? NO    5. Reviewed/Updated the following with patient:       •   Communication Preference Obtained? YES  • [employer] Activation: already active       •   E-Mail Address Obtained? YES       •   Appointment Day and Time Preferences? YES       •   Preferred Pharmacy? YES       •   Preferred Lab? YES    6. Care Gap Scheduling (Attempt to Schedule EACH Overdue Care Gap!)

## 2019-04-23 NOTE — PROGRESS NOTES
Outcome: Left Message    Please transfer to Patient Outreach Team at [phone] when patient returns call.    WebIZ Checked & Epic Updated:  yes    HealthConnect Verified: yes    Attempt # 1

## 2019-06-12 ENCOUNTER — HOSPITAL ENCOUNTER (OUTPATIENT)
Dept: LAB | Facility: MEDICAL CENTER | Age: 69
End: 2019-06-12
Attending: PHYSICIAN ASSISTANT
Payer: MEDICARE

## 2019-06-12 DIAGNOSIS — E78.5 DYSLIPIDEMIA: ICD-10-CM

## 2019-06-12 DIAGNOSIS — E11.9 CONTROLLED TYPE 2 DIABETES MELLITUS WITHOUT COMPLICATION, WITHOUT LONG-TERM CURRENT USE OF INSULIN (HCC): ICD-10-CM

## 2019-06-12 DIAGNOSIS — R53.83 FATIGUE, UNSPECIFIED TYPE: ICD-10-CM

## 2019-06-12 DIAGNOSIS — E04.2 MULTIPLE THYROID NODULES: ICD-10-CM

## 2019-06-12 LAB
ALBUMIN SERPL BCP-MCNC: 4.5 G/DL (ref 3.2–4.9)
ALBUMIN/GLOB SERPL: 1.5 G/DL
ALP SERPL-CCNC: 133 U/L (ref 30–99)
ALT SERPL-CCNC: 19 U/L (ref 2–50)
ANION GAP SERPL CALC-SCNC: 8 MMOL/L (ref 0–11.9)
AST SERPL-CCNC: 17 U/L (ref 12–45)
BILIRUB SERPL-MCNC: 0.7 MG/DL (ref 0.1–1.5)
BUN SERPL-MCNC: 16 MG/DL (ref 8–22)
CALCIUM SERPL-MCNC: 9.8 MG/DL (ref 8.5–10.5)
CHLORIDE SERPL-SCNC: 106 MMOL/L (ref 96–112)
CHOLEST SERPL-MCNC: 179 MG/DL (ref 100–199)
CO2 SERPL-SCNC: 26 MMOL/L (ref 20–33)
CREAT SERPL-MCNC: 1.01 MG/DL (ref 0.5–1.4)
CREAT UR-MCNC: 63.8 MG/DL
EST. AVERAGE GLUCOSE BLD GHB EST-MCNC: 151 MG/DL
FASTING STATUS PATIENT QL REPORTED: NORMAL
GLOBULIN SER CALC-MCNC: 3 G/DL (ref 1.9–3.5)
GLUCOSE SERPL-MCNC: 154 MG/DL (ref 65–99)
HBA1C MFR BLD: 6.9 % (ref 0–5.6)
HDLC SERPL-MCNC: 39 MG/DL
LDLC SERPL CALC-MCNC: 111 MG/DL
MICROALBUMIN UR-MCNC: <0.7 MG/DL
MICROALBUMIN/CREAT UR: NORMAL MG/G (ref 0–30)
POTASSIUM SERPL-SCNC: 4.1 MMOL/L (ref 3.6–5.5)
PROT SERPL-MCNC: 7.5 G/DL (ref 6–8.2)
SODIUM SERPL-SCNC: 140 MMOL/L (ref 135–145)
TRIGL SERPL-MCNC: 145 MG/DL (ref 0–149)

## 2019-06-12 PROCEDURE — 84439 ASSAY OF FREE THYROXINE: CPT

## 2019-06-12 PROCEDURE — 36415 COLL VENOUS BLD VENIPUNCTURE: CPT

## 2019-06-12 PROCEDURE — 83036 HEMOGLOBIN GLYCOSYLATED A1C: CPT

## 2019-06-12 PROCEDURE — 82043 UR ALBUMIN QUANTITATIVE: CPT

## 2019-06-12 PROCEDURE — 82607 VITAMIN B-12: CPT

## 2019-06-12 PROCEDURE — 80061 LIPID PANEL: CPT

## 2019-06-12 PROCEDURE — 82306 VITAMIN D 25 HYDROXY: CPT

## 2019-06-12 PROCEDURE — 82570 ASSAY OF URINE CREATININE: CPT

## 2019-06-12 PROCEDURE — 80053 COMPREHEN METABOLIC PANEL: CPT

## 2019-06-12 PROCEDURE — 84443 ASSAY THYROID STIM HORMONE: CPT

## 2019-06-13 LAB
25(OH)D3 SERPL-MCNC: 21 NG/ML (ref 30–100)
T4 FREE SERPL-MCNC: 0.7 NG/DL (ref 0.53–1.43)
TSH SERPL DL<=0.005 MIU/L-ACNC: 3.68 UIU/ML (ref 0.38–5.33)
VIT B12 SERPL-MCNC: 327 PG/ML (ref 211–911)

## 2019-06-17 ENCOUNTER — HOSPITAL ENCOUNTER (OUTPATIENT)
Dept: RADIOLOGY | Facility: MEDICAL CENTER | Age: 69
End: 2019-06-17
Attending: PHYSICIAN ASSISTANT
Payer: MEDICARE

## 2019-06-17 DIAGNOSIS — E04.2 MULTIPLE THYROID NODULES: ICD-10-CM

## 2019-06-17 PROCEDURE — 76536 US EXAM OF HEAD AND NECK: CPT

## 2019-07-01 NOTE — PROGRESS NOTES
Outcome: Left Message    Please transfer to Patient Outreach Team at [phone]  when patient returns call.    Attempt # 3

## 2019-07-02 ENCOUNTER — TELEPHONE (OUTPATIENT)
Dept: ENDOCRINOLOGY | Facility: MEDICAL CENTER | Age: 69
End: 2019-07-02

## 2019-07-02 NOTE — TELEPHONE ENCOUNTER
Phone Number Called: [phone] (home)       Call outcome: spoke to patient regarding message below    Message: Was able to get patient in for sooner apt

## 2019-07-02 NOTE — TELEPHONE ENCOUNTER
----- Message from Soledad Chan P.A.-C. sent at 7/2/2019 12:43 AM PDT -----  Please contact the patient abnormal labs. Please make sure they have upcoming appointment to review ultrasound

## 2019-07-03 ENCOUNTER — OFFICE VISIT (OUTPATIENT)
Dept: ENDOCRINOLOGY | Facility: MEDICAL CENTER | Age: 69
End: 2019-07-03
Payer: MEDICARE

## 2019-07-03 VITALS
HEART RATE: 103 BPM | SYSTOLIC BLOOD PRESSURE: 130 MMHG | BODY MASS INDEX: 28.17 KG/M2 | DIASTOLIC BLOOD PRESSURE: 62 MMHG | HEIGHT: 63 IN | WEIGHT: 159 LBS | OXYGEN SATURATION: 97 %

## 2019-07-03 DIAGNOSIS — E53.8 VITAMIN B 12 DEFICIENCY: ICD-10-CM

## 2019-07-03 DIAGNOSIS — E78.2 MIXED HYPERLIPIDEMIA: ICD-10-CM

## 2019-07-03 DIAGNOSIS — E04.2 MULTIPLE THYROID NODULES: ICD-10-CM

## 2019-07-03 DIAGNOSIS — E55.9 VITAMIN D DEFICIENCY: ICD-10-CM

## 2019-07-03 DIAGNOSIS — E11.10 DM (DIABETES MELLITUS) TYPE 2, UNCONTROLLED, WITH KETOACIDOSIS (HCC): ICD-10-CM

## 2019-07-03 DIAGNOSIS — E03.8 SUBCLINICAL HYPOTHYROIDISM: ICD-10-CM

## 2019-07-03 PROCEDURE — 99214 OFFICE O/P EST MOD 30 MIN: CPT | Performed by: PHYSICIAN ASSISTANT

## 2019-07-03 NOTE — PROGRESS NOTES
"New Patient Consult Note  Referred by: Laya Cornelius M.D.    HPI:  Melody Rodriguez is a  69 y.o. old patient who comes in today for evaluation and management of the followin. Controlled type 2 diabetes mellitus without complication, without long-term current use of insulin (HCC)  Dx:    Metformin 500 mg BID - no side effects   2019- A1c 6.9     Patietn has been \"less than stellar\" she has been taking care of a grandchild at home and \"eating\" what she has been able to find.     Preventative care:   Podiatry: no numbness tinglng no cane   Ophthalmology: glasses 10/2018- no retinopathy   Diabetes education:  Previously .     Check Blood sugars 2 times a month.   BG      No hypoglycemia     Labs:   19- Glucose 154, EGFR 54, A1c 6.9,     2. Multiple thyroid nodules  Patient had ultrasound and here to review.     2019- Nodule #1  Location:  Right  mid  Size:  4.02 x 2.36 x 2.87 cm, previous measurements were 4.0 x 2.8 x 2.5 cm.  Composition:  Mixed-1  Echogenicity:  Isoechoic-1  Shape:  Wider than tall-0  Margins:  Irregular-2  Echogenic Foci:  None-0    ACR TIRADS points/category:  4 - TR4 - Moderately Suspicious    Nodule #2  Location:  Left  mid  Size:  1.85 x 1.0 x 0.68 cm  Composition:  Mixed-1  Echogenicity:  Hypoechoic-2  Shape:  Wider than tall-0  Margins:  Ill defined-0  Echogenic Foci:  None-0    ACR TIRADS points/category:  3 - TR3 - Mildly Suspicious    Nodule #3  Location:  Left  lower  Size:  1.92 x 0.98 x 1.45 cm, previous measurements were 1.7 x 1.7 x 1.2 cm.  Composition:  Mixed-1  Echogenicity:  Isoechoic-1  Shape:  Wider than tall-0  Margins:  Ill defined-0  Echogenic Foci:  None-0    ACR TIRADS points/category:  3 - TR3 - Mildly Suspicious    She continues to have difficulty swallowing but has had this for years. She is waiting to see GI till September when she is less busy with taking care of her grandchild.     Patient denies symptoms of sensation " of a throat mass, voice changes, hoarseness, neck pain,  dysphonia, cough, enlarged cervical lymph nodes, tremors.      She continues with heat intolerancebut attributes this to her  MS.     3. Dyslipidemia - stable   Patient is on Zocor since 2014.   Takes at night   No side effects.   Patient also takes Co Q 10 to prevent muscle cramping      Ref. Range 6/11/2018 09:33   Cholesterol,Tot Latest Ref Range: 100 - 199 mg/dL 150   Triglycerides Latest Ref Range: 0 - 149 mg/dL 155 (H)   HDL Latest Ref Range: >=40 mg/dL 39 (A)   LDL Latest Ref Range: <100 mg/dL 80     Vitamin D -   6/12/2019- 21 noncompliant with vitamin D supplementation      ROS:  Constitutional: No weight loss or gain,  fever  HEENT: see above   Cardiac: No chest pain, palpitations, or racing heart  Resp: No shortness of breath  GI: No abdominal pain, nausea, vomiting, or diarrhea   Neuro: No numbness or tinging in feet  Endo: No polyuria or polydipsia  All other systems were reviewed and were negative.    Past Medical History:  Patient Active Problem List    Diagnosis Date Noted   • Dysphagia 03/29/2019   • Controlled type 2 diabetes mellitus without complication, without long-term current use of insulin (HCC) 01/06/2017   • Hyperlipidemia 01/06/2017   • Multiple sclerosis (HCC) 01/06/2017   • Depression 01/06/2017   • Schatzki's ring 01/06/2017   • Hiatal hernia 01/06/2017       Past Surgical History:  History reviewed. No pertinent surgical history.    Allergies:  Patient has no known allergies.    Social History:  Social History     Social History   • Marital status:      Spouse name: N/A   • Number of children: N/A   • Years of education: N/A     Occupational History   • Not on file.     Social History Main Topics   • Smoking status: Never Smoker   • Smokeless tobacco: Never Used   • Alcohol use 0.6 - 1.2 oz/week     1 - 2 Glasses of wine per week   • Drug use: No      Comment: CBD stick no THC   • Sexual activity: No     Other Topics  Concern   • Not on file     Social History Narrative   • No narrative on file       Family History:  Family History   Problem Relation Age of Onset   • Colon Cancer Mother    • Heart Disease Mother         Cardiomyopathy   • Heart Attack Father    • Heart Disease Father    • No Known Problems Sister    • Cancer Brother         Skin   • Other Brother         Gaulbladder issues   • Cancer Maternal Grandmother         GI cancer   • No Known Problems Maternal Grandfather    • No Known Problems Paternal Grandmother    • No Known Problems Paternal Grandfather    • Other Daughter         Fibromyalgia   • Bipolar disorder Daughter    • ADHD Son    • Depression Son    • Hypertension Son        Medications:    Current Outpatient Prescriptions:   •  Empagliflozin-metFORMIN HCl (SYNJARDY) 12.5-1000 MG Tab, Take 1 tablet by mouth every day., Disp: 30 Tab, Rfl: 3  •  lamoTRIgine (LAMICTAL) 100 MG Tab, Take 100 mg by mouth every day., Disp: , Rfl:   •  FREESTYLE TEST STRIPS strip, USE 3 TIMES A DAY AS NEEDED FOR SYMMPTOMS OF HIGH OR LOW BLOOD SUGAR, Disp: 100 Strip, Rfl: 0  •  FREESTYLE LANCETS Misc, USE 3 TIMES A DAY AS NEEDED FOR SYMPTOMS OF HIGH OR LOW BLOOD SUGAR, Disp: 100 Each, Rfl: 3  •  simvastatin (ZOCOR) 40 MG Tab, TAKE ONE TABLET BY MOUTH IN THE EVENING , Disp: 90 Tab, Rfl: 3  •  paroxetine (PAXIL) 40 MG tablet, Take 1 Tab by mouth every day., Disp: 30 Tab, Rfl: 3  •  B Complex Vitamins (B COMPLEX PO), Take  by mouth. Stress formulary, Disp: , Rfl:   •  paroxetine (PAXIL) 20 MG Tab, TAKE ONE TABLET BY MOUTH ONE TIME DAILY , Disp: 90 Tab, Rfl: 3  •  Glatiramer Acetate 40 MG/ML Solution Prefilled Syringe, Inject 1 Dose as instructed. 3 times a week, Disp: , Rfl:   •  aspirin 81 MG tablet, Take 81 mg by mouth every day., Disp: , Rfl:   •  Cyanocobalamin (B-12) 2000 MCG Tab, Take 1 Tab by mouth every day., Disp: , Rfl:   •  Calcium Citrate-Vitamin D (CALCIUM + D PO), Take 1 Tab by mouth 2 times a day., Disp: , Rfl:   •   "Coenzyme Q10 (CO Q-10) 300 MG Cap, Take 1 Cap by mouth every day., Disp: , Rfl:   •  Cholecalciferol (D3 ADULT PO), Take 2 Tabs by mouth every day., Disp: , Rfl:     Labs: Reviewed (as above)     Physical Examination:  Vital signs: /62 (BP Location: Right arm, Patient Position: Sitting, BP Cuff Size: Adult)   Pulse (!) 103   Ht 1.588 m (5' 2.5\")   Wt 72.1 kg (159 lb)   SpO2 97%   BMI 28.62 kg/m²  Body mass index is 28.62 kg/m².  General: No apparent distress, cooperative,     Eyes: No scleral icterus or discharge, no nystagmus  ENMT: Normal on external inspection of nose, lips, enlarged  bilateral on  thyroid exam negative bruits  Neck: No abnormal masses on inspection or palpations. no bruits noted   Resp: Normal effort, clear to auscultation bilaterally without wheezes, rales or rhonchi  CVS: Regular rate and rhythm, S1 S2 normal,+2/6 murmur without radiation   Extremities: No edema.   Neuro: Alert and oriented  Skin: No rash  Psych: Normal mood and affect, intact memory and able to make informed decisions    Assessment and Plan:  1. DM (diabetes mellitus) type 1, uncontrolled, with ketoacidosis (HCC)  Start synjardy 12.5 mg/1000 mg daily   Stop Metformin.   Discussed side effect profile including but not limited to dehydration, polyuria, candidiasis etc Patient is agreeable to start new regimen as her A1c has been getting worse over the last couple of months.     - FRUCTOSAMINE; Future  - Empagliflozin-metFORMIN HCl (SYNJARDY) 12.5-1000 MG Tab; Take 1 tablet by mouth every day.  Dispense: 30 Tab; Refill: 3    2. Mixed hyperlipidemia  Chronic stable condition managed with current medical regimen   Continue current regimen     3. Vitamin D deficiency  Start 5000 IU daily with meal    4. Vitamin B 12 deficiency  Start 2000 mg daily     5. Subclinical hypothyroidism  Discussed and reviewed labs   Discussed potential start of thyroid replacement in the future if labs continue to be consistent   - TSH; " Future  - FREE THYROXINE; Future    6. Multiple thyroid nodules  Will refer for bx and reflux to [facility]   - TSH; Future  - FREE THYROXINE; Future  - US-NEEDLE BX-THYROID; Future      Return in about 3 months (around 10/3/2019).     Thank you for allowing me to participate in the care of this patient.  If you have any questions or concerns please do not hesitate to contact me.    Soledad Chan P.A.-C.  03/28/19    CC:   Laya Cornelius M.D.    This note was created using voice recognition software (Dragon). The accuracy of the dictation is limited by the abilities of the software. I have reviewed the note prior to signing, however some errors in grammar and context are still possible. If you have any questions related to this note please do not hesitate to contact our office.

## 2019-07-03 NOTE — PATIENT INSTRUCTIONS
1. DM (diabetes mellitus) type 1, uncontrolled, with ketoacidosis (HCC)  Start synjardy 12.5 mg/1000 mg daily     - FRUCTOSAMINE; Future  - Empagliflozin-metFORMIN HCl (SYNJARDY) 12.5-1000 MG Tab; Take 1 tablet by mouth every day.  Dispense: 30 Tab; Refill: 3    2. Mixed hyperlipidemia  Continue to monitor       3. Vitamin D deficiency  Start 5000 IU daily with meal    4. Vitamin B 12 deficiency  Start 2000 mg daily     5. Subclinical hypothyroidism  Discussed and reviewed labs   Discussed potential start of thyroid replacement in the future   - TSH; Future  - FREE THYROXINE; Future    6. Multiple thyroid nodules  Will refer for bx and reflux to [facility]   - TSH; Future  - FREE THYROXINE; Future  - US-NEEDLE BX-THYROID; Future

## 2019-07-11 ENCOUNTER — HOSPITAL ENCOUNTER (OUTPATIENT)
Dept: RADIOLOGY | Facility: MEDICAL CENTER | Age: 69
End: 2019-07-11
Attending: PHYSICIAN ASSISTANT | Admitting: RADIOLOGY
Payer: MEDICARE

## 2019-07-11 DIAGNOSIS — E04.2 MULTIPLE THYROID NODULES: ICD-10-CM

## 2019-07-11 LAB — CYTOLOGY REG CYTOL: NORMAL

## 2019-07-11 PROCEDURE — 88112 CYTOPATH CELL ENHANCE TECH: CPT

## 2019-07-11 PROCEDURE — 10006 FNA BX W/US GDN EA ADDL: CPT

## 2019-07-11 NOTE — PROGRESS NOTES
"Patient given [facility] \"Preventing the Spread of Infection\" brochure upon arrival.     US guided Bilateral thyroid nodule fine needle aspiration done by Dr. Briones; left and right anterior aspect of neck access site; x 2 jar of cytolyt obtained and sent to pathology lab; pt tolerated the procedure well; pt hemodynamically stable pre/intra/post procedure; all questions and concerns answered prior to being d/c; patient provided with appropriate education for procedure; pt d/c home.  "

## 2019-07-12 ENCOUNTER — OFFICE VISIT (OUTPATIENT)
Dept: MEDICAL GROUP | Facility: MEDICAL CENTER | Age: 69
End: 2019-07-12
Payer: MEDICARE

## 2019-07-12 VITALS
HEIGHT: 63 IN | OXYGEN SATURATION: 96 % | BODY MASS INDEX: 28.12 KG/M2 | DIASTOLIC BLOOD PRESSURE: 68 MMHG | HEART RATE: 94 BPM | TEMPERATURE: 96.7 F | SYSTOLIC BLOOD PRESSURE: 134 MMHG | WEIGHT: 158.73 LBS

## 2019-07-12 DIAGNOSIS — E11.9 CONTROLLED TYPE 2 DIABETES MELLITUS WITHOUT COMPLICATION, WITHOUT LONG-TERM CURRENT USE OF INSULIN (HCC): ICD-10-CM

## 2019-07-12 DIAGNOSIS — Z12.39 BREAST CANCER SCREENING: ICD-10-CM

## 2019-07-12 DIAGNOSIS — K44.9 HIATAL HERNIA: ICD-10-CM

## 2019-07-12 DIAGNOSIS — E78.2 MIXED HYPERLIPIDEMIA: ICD-10-CM

## 2019-07-12 DIAGNOSIS — G35 MULTIPLE SCLEROSIS (HCC): ICD-10-CM

## 2019-07-12 DIAGNOSIS — Z00.00 HEALTH CARE MAINTENANCE: ICD-10-CM

## 2019-07-12 DIAGNOSIS — H91.93 HEARING DIFFICULTY OF BOTH EARS: ICD-10-CM

## 2019-07-12 DIAGNOSIS — K21.9 GASTROESOPHAGEAL REFLUX DISEASE WITHOUT ESOPHAGITIS: ICD-10-CM

## 2019-07-12 DIAGNOSIS — Z91.81 RISK FOR FALLS: ICD-10-CM

## 2019-07-12 DIAGNOSIS — K22.2 SCHATZKI'S RING: ICD-10-CM

## 2019-07-12 DIAGNOSIS — Z00.00 MEDICARE ANNUAL WELLNESS VISIT, SUBSEQUENT: ICD-10-CM

## 2019-07-12 DIAGNOSIS — F32.4 MAJOR DEPRESSIVE DISORDER WITH SINGLE EPISODE, IN PARTIAL REMISSION (HCC): ICD-10-CM

## 2019-07-12 DIAGNOSIS — M81.0 POSTMENOPAUSAL BONE LOSS: ICD-10-CM

## 2019-07-12 PROCEDURE — G0439 PPPS, SUBSEQ VISIT: HCPCS | Performed by: FAMILY MEDICINE

## 2019-07-12 RX ORDER — RANITIDINE 150 MG/1
150 TABLET ORAL 2 TIMES DAILY
Qty: 180 TAB | Refills: 3 | Status: SHIPPED
Start: 2019-07-12 | End: 2020-01-29

## 2019-07-12 ASSESSMENT — PATIENT HEALTH QUESTIONNAIRE - PHQ9
6. FEELING BAD ABOUT YOURSELF - OR THAT YOU ARE A FAILURE OR HAVE LET YOURSELF OR YOUR FAMILY DOWN: NOT AL ALL
SUM OF ALL RESPONSES TO PHQ9 QUESTIONS 1 AND 2: 2
1. LITTLE INTEREST OR PLEASURE IN DOING THINGS: NOT AT ALL
SUM OF ALL RESPONSES TO PHQ QUESTIONS 1-9: 10
4. FEELING TIRED OR HAVING LITTLE ENERGY: NEARLY EVERY DAY
2. FEELING DOWN, DEPRESSED, IRRITABLE, OR HOPELESS: MORE THAN HALF THE DAYS
5. POOR APPETITE OR OVEREATING: 3 - NEARLY EVERY DAY
SUM OF ALL RESPONSES TO PHQ QUESTIONS 1-9: 8
CLINICAL INTERPRETATION OF PHQ2 SCORE: 1
1. LITTLE INTEREST OR PLEASURE IN DOING THINGS: NOT AT ALL
4. FEELING TIRED OR HAVING LITTLE ENERGY: NEARLY EVERY DAY
SUM OF ALL RESPONSES TO PHQ9 QUESTIONS 1 AND 2: 2
7. TROUBLE CONCENTRATING ON THINGS, SUCH AS READING THE NEWSPAPER OR WATCHING TELEVISION: NOT AT ALL
5. POOR APPETITE OR OVEREATING: NEARLY EVERY DAY
3. TROUBLE FALLING OR STAYING ASLEEP OR SLEEPING TOO MUCH: MORE THAN HALF THE DAYS
2. FEELING DOWN, DEPRESSED, IRRITABLE, OR HOPELESS: MORE THAN HALF THE DAYS
9. THOUGHTS THAT YOU WOULD BE BETTER OFF DEAD, OR OF HURTING YOURSELF: NOT AT ALL
3. TROUBLE FALLING OR STAYING ASLEEP OR SLEEPING TOO MUCH: MORE THAN HALF THE DAYS
8. MOVING OR SPEAKING SO SLOWLY THAT OTHER PEOPLE COULD HAVE NOTICED. OR THE OPPOSITE, BEING SO FIGETY OR RESTLESS THAT YOU HAVE BEEN MOVING AROUND A LOT MORE THAN USUAL: NOT AT ALL

## 2019-07-12 ASSESSMENT — ENCOUNTER SYMPTOMS: GENERAL WELL-BEING: GOOD

## 2019-07-12 ASSESSMENT — ACTIVITIES OF DAILY LIVING (ADL): BATHING_REQUIRES_ASSISTANCE: 0

## 2019-07-12 NOTE — PROGRESS NOTES
Chief Complaint   Patient presents with   • Annual Wellness Visit         HPI:  Melody is a 69 y.o. here for Medicare Annual Wellness Visit    Medicare annual wellness visit, subsequent  Preventive measures and chronic medical issues reviewed.    Controlled type 2 diabetes mellitus without complication, without long-term current use of insulin ([facility])  Adequately controlled.  Last A1c was 6.9.  Denies polyuria and polydipsia, and hypoglycemic episodes..  She has been on Synjardy 12.5- 1000 mg daily.    Mixed hyperlipidemia  She has been tolerating the statin. Denies muscle pain LFTs has been normal, currently on simvastatin 40 mg daily.  No side effects or complication.    Multiple sclerosis (HCC)  Patient was diagnosed in 2006.  She has been physically active. She has been following up with neurologist Dr Oral souza.She is currently on disease modifying drug, Capaxone injection 40 mg 3 times weekly    Major depressive disorder with single episode, in partial remission ([facility])  Patient stated her mood has been fluctuating, but she has been doing fine on Paxil 40 mg daily, no suicidal ideation.    Schatzki's ring/ Gastroesophageal reflux disease without esophagitis/hiatal hernia  Patient has history of Schatzki 's ring, and hiatal hernia, has been having intermittent symptoms of acid reflux in the form of heartburn, and sometimes nausea.  She has been taking omeprazole as needed.  Discussed side effects of PPI issues for long-term.  Patient advised to try ranitidine 150 mg twice a day.    Risk for falls  Discussed with patient falls precautions.  Advised to try using a cane.  Patient stated that she does not think that she needs to use a cane her balance is not that bad he can manage it without using assistive device for ambulation.    Hearing difficulty of both ears  Patient lately has been having a problem with hearing.  Denies earache, or discharge.  Denies any trauma to the ears.  Denies any dizziness,  nausea, or vomiting.      Pneumonia shot and colonoscopy are up-to-date.  She is due for breast cancer screening, and bone density scan.    Patient Active Problem List    Diagnosis Date Noted   • Dysphagia 03/29/2019   • Controlled type 2 diabetes mellitus without complication, without long-term current use of insulin ([facility]) 01/06/2017   • Hyperlipidemia 01/06/2017   • Multiple sclerosis (HCC) 01/06/2017   • Depression 01/06/2017   • Schatzki's ring 01/06/2017   • Hiatal hernia 01/06/2017       Current Outpatient Prescriptions   Medication Sig Dispense Refill   • Empagliflozin-metFORMIN HCl (SYNJARDY) 12.5-1000 MG Tab Take 1 tablet by mouth every day. 30 Tab 3   • lamoTRIgine (LAMICTAL) 100 MG Tab Take 100 mg by mouth every day.     • FREESTYLE TEST STRIPS strip USE 3 TIMES A DAY AS NEEDED FOR SYMMPTOMS OF HIGH OR LOW BLOOD SUGAR 100 Strip 0   • FREESTYLE LANCETS Misc USE 3 TIMES A DAY AS NEEDED FOR SYMPTOMS OF HIGH OR LOW BLOOD SUGAR 100 Each 3   • simvastatin (ZOCOR) 40 MG Tab TAKE ONE TABLET BY MOUTH IN THE EVENING  90 Tab 3   • paroxetine (PAXIL) 40 MG tablet Take 1 Tab by mouth every day. 30 Tab 3   • Glatiramer Acetate 40 MG/ML Solution Prefilled Syringe Inject 1 Dose as instructed. 3 times a week     • B Complex Vitamins (B COMPLEX PO) Take  by mouth. Stress formulary     • paroxetine (PAXIL) 20 MG Tab TAKE ONE TABLET BY MOUTH ONE TIME DAILY  (Patient not taking: Reported on 7/12/2019) 90 Tab 3   • aspirin 81 MG tablet Take 81 mg by mouth every day.     • Cyanocobalamin (B-12) 2000 MCG Tab Take 1 Tab by mouth every day.     • Calcium Citrate-Vitamin D (CALCIUM + D PO) Take 1 Tab by mouth 2 times a day.     • Coenzyme Q10 (CO Q-10) 300 MG Cap Take 1 Cap by mouth every day.     • Cholecalciferol (D3 ADULT PO) Take 2 Tabs by mouth every day.       No current facility-administered medications for this visit.         Patient is taking medications as noted in medication list.  Current supplements as per medication  list.     Allergies: Patient has no known allergies.    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     She  reports that she has never smoked. She has never used smokeless tobacco. She reports that she drinks about 0.6 - 1.2 oz of alcohol per week . She reports that she does not use drugs.  Counseling given: Not Answered        DPA/Advanced directive: Patient does not have an Advanced Directive.  A packet and workshop information was given on Advanced Directives.    ROS:    Gait: Uses no assistive device   Ostomy: No   Other tubes: No   Amputations: No   Chronic oxygen use No   Last eye exam 1 year ago   Wears hearing aids: No   : Reports urinary leakage during the last 6 months that has somewhat interfered with their daily activities or sleep.    Screening:    DIABETES    Has patient ever had diabetes education? Yes, and is NOT interested in more at this time.    DEPRESSION     Is patient seeing a counselor, psychiatrist or other healthcare provider regarding their mental health? Yes, CareTeam was updated.    Depression Screen (PHQ-2/PHQ-9) 6/26/2018 7/12/2019 7/12/2019   PHQ-2 Total Score - 2 2   PHQ-2 Total Score 6 1 -   PHQ-9 Total Score - - 10   PHQ-9 Total Score 18 8 -     Little interest or pleasure in doing things?  0 - not at all  Feeling down, depressed, or hopeless? 1 - several days  Trouble falling or staying asleep, or sleeping too much?  1 - several days  Feeling tired or having little energy?  3 - nearly every day  Poor appetite or overeating?  3 - nearly every day  Feeling bad about yourself - or that you are a failure or have let yourself or your family down? 0 - not at all  Trouble concentrating on things, such as reading the newspaper or watching television? 0 - not at all  Moving or speaking so  slowly that other people could have noticed.  Or the opposite - being so fidgety or restless that you have been moving around a lot more than usual?  0 - not at all  Thoughts that you would be better off dead, or of hurting yourself?  0 - not at all  Patient Health Questionnaire Score: 8      If depressive symptoms identified deferred to follow up visit unless specifically addressed in assessment and plan.    Interpretation of PHQ-9 Total Score   Score Severity   1-4 No Depression   5-9 Mild Depression   10-14 Moderate Depression   15-19 Moderately Severe Depression   20-27 Severe Depression      Screening for Cognitive Impairment    Three Minute Recall (village, kitchen, baby)  3/3    Draw clock face with all 12 numbers and set the hands to show 10 past 10.  Yes 5/5  If cognitive concerns identified, deferred for follow up unless specifically addressed in assessment and plan.    Fall Risk Assessment    Has the patient had two or more falls in the last year or any fall with injury in the last year?  Yes  If fall risk identified, deferred for follow up unless specifically addressed in assessment and plan.      Safety Assessment    Throw rugs on floor.  No  Handrails on all stairs.  Yes  Good lighting in all hallways.  Yes  Difficulty hearing.  No  Patient counseled about all safety risks that were identified.    Functional Assessment ADLs    Are there any barriers preventing you from cooking for yourself or meeting nutritional needs?  No.    Are there any barriers preventing you from driving safely or obtaining transportation?  No.    Are there any barriers preventing you from using a telephone or calling for help?  No.    Are there any barriers preventing you from shopping?  No.    Are there any barriers preventing you from taking care of your own finances?  No.    Are there any barriers preventing you from managing your medications?    No.    Are there any barriers preventing you from showering, bathing or dressing  yourself?  No.    Are you currently engaging in any exercise or physical activity?  No.     What is your perception of your health?  Good.    Health Maintenance Summary                Annual Wellness Visit Overdue 1950     HEPATITIS C SCREENING Overdue 1950     IMM HEP B VACCINE Overdue 2/25/1969     PAP SMEAR Overdue 2/25/1971     BONE DENSITY Overdue 2/25/2015     IMM ZOSTER VACCINES Overdue 8/21/2018      Done 6/26/2018 Imm Admin: Recombinant Zoster Vaccine (RZV) (Shing[employer])    MAMMOGRAM Overdue 1/18/2019      Done 1/18/2018 MA-MAMMO SCREENING BILAT W/TOMOSYNTHESIS W/CAD     Patient has more history with this topic...    DIABETES MONOFILAMENT / LE EXAM Overdue 1/25/2019      Done 1/25/2018 AMB DIABETIC MONOFILAMENT LOWER EXTREMITY EXAM    RETINAL SCREENING Next Due 7/24/2019      Done 7/24/2018 REFERRAL FOR RETINAL SCREENING EXAM    IMM INFLUENZA Next Due 9/1/2019      Done 11/8/2018 Imm Admin: Influenza Vaccine Adult HD     Patient has more history with this topic...    A1C SCREENING Next Due 12/12/2019      Done 6/12/2019 HEMOGLOBIN A1C      Patient has more history with this topic...    FASTING LIPID PROFILE Next Due 6/12/2020      Done 6/12/2019 LIPID PROFILE      Patient has more history with this topic...    URINE ACR / MICROALBUMIN Next Due 6/12/2020      Done 6/12/2019 MICROALBUMIN CREAT RATIO URINE     Patient has more history with this topic...    SERUM CREATININE Next Due 6/12/2020      Done 6/12/2019 COMP METABOLIC PANEL      Patient has more history with this topic...    COLONOSCOPY Next Due 2/9/2026      Done 2/9/2016     IMM DTaP/Tdap/Td Vaccine Next Due 6/26/2028      Done 6/26/2018 Imm Admin: Tdap Vaccine          Patient Care Team:  Laya Cornelius M.D. as PCP - General (Geriatrics)  Oral Guzman as Consulting Physician (Neurology)  John Yost M.D. as Consulting Physician (Ophthalmology)  Lux Mcintosh Ass't as   "      Social History   Substance Use Topics   • Smoking status: Never Smoker   • Smokeless tobacco: Never Used   • Alcohol use 0.6 - 1.2 oz/week     1 - 2 Glasses of wine per week     Family History   Problem Relation Age of Onset   • Colon Cancer Mother    • Heart Disease Mother         Cardiomyopathy   • Heart Attack Father    • Heart Disease Father    • No Known Problems Sister    • Cancer Brother         Skin   • Other Brother         Gaulbladder issues   • Cancer Maternal Grandmother         GI cancer   • No Known Problems Maternal Grandfather    • No Known Problems Paternal Grandmother    • No Known Problems Paternal Grandfather    • Other Daughter         Fibromyalgia   • Bipolar disorder Daughter    • ADHD Son    • Depression Son    • Hypertension Son      She  has a past medical history of Depression; Diabetes (HCC); Hyperlipidemia; and Muscle disorder.   History reviewed. No pertinent surgical history.      Exam:     /68   Pulse 94   Temp 35.9 °C (96.7 °F) (Temporal)   Ht 1.588 m (5' 2.5\")   Wt 72 kg (158 lb 11.7 oz)   SpO2 96%  Body mass index is 28.57 kg/m².    Hearing, she has difficulty hearing, requesting referral for evaluation.  Dentition, good  Alert, oriented in no acute distress.  Eye contact is good, speech goal directed, affect calm      Monofilament: done  Monofilament testing with a 10 gram force: sensation intact: intact bilaterally  Visual Inspection: Feet without maceration, ulcers, fissures.  Pedal pulses: intact bilaterally    Assessment and Plan. The following treatment and monitoring plan is recommended:   69 y.o. female     1. Medicare annual wellness visit, subsequent  Preventive measures and chronic medical issues reviewed.    - Subsequent Annual Wellness Visit - Includes PPPS ()    2. Controlled type 2 diabetes mellitus without complication, without long-term current use of insulin (HCC)  Adequately controlled.  Last A1c was 6.9.  Continue on Synjardy 12.5- 1000 mg " daily.    - Diabetic Monofilament Lower Extremity Exam  - Subsequent Annual Wellness Visit - Includes PPPS ()    3. Mixed hyperlipidemia  He has been tolerating the statin. Denies muscle pain LFTs has been normal  Continue on simvastatin 40 mg daily.    - Subsequent Annual Wellness Visit - Includes PPPS ()    4. Multiple sclerosis (HCC)  Patient was diagnosed in 2006.  Physically active.   She has been following up with neurologist Dr Oral souza.She is currently on disease modifying drug, Capaxone injection 40 mg 3 times weekly, but she stated that she will be taking it only for the coming one and a half month, after that she has to pay form her pocket ( 6000 a year), and she can not afford this.   .  - Subsequent Annual Wellness Visit - Includes PPPS ()    5. Major depressive disorder with single episode, in partial remission (HCC)  Patient has been doing fine on Paxil 40 mg daily, no suicidal ideation.    - Subsequent Annual Wellness Visit - Includes PPPS ()    6. Schatzki's ring/ 11. Gastroesophageal reflux disease without esophagitis      - raNITidine (ZANTAC) 150 MG Tab; Take 1 Tab by mouth 2 times a day.  Dispense: 180 Tab; Refill: 3  - Subsequent Annual Wellness Visit - Includes PPPS ()    7. Hiatal hernia/ GERD  Stable.  Asymptomatic.    - Subsequent Annual Wellness Visit - Includes PPPS ()    8. Health care maintenance    - Subsequent Annual Wellness Visit - Includes PPPS ()    9. Risk for falls  Falls precautions discussed.    - Patient identified as fall risk.  Appropriate orders and counseling given.  - Subsequent Annual Wellness Visit - Includes PPPS ()    10. Breast cancer screening    - MA-SCREEN MAMMO W/CAD-BILAT; Future  - Subsequent Annual Wellness Visit - Includes PPPS ()    12. Postmenopausal bone loss    - DS-BONE DENSITY STUDY (DEXA); Future  - Subsequent Annual Wellness Visit - Includes PPPS ()    13. Hearing difficulty of both  ears    - REFERRAL TO AUDIOLOGY          Services suggested:   Health Care Screening recommendations as per orders if indicated.  Referrals offered: PT/OT/Nutrition counseling/Behavioral Health/Smoking cessation as per orders if indicated.    Discussion today about general wellness and lifestyle habits:    · Prevent falls and reduce trip hazards; Cautioned about securing or removing rugs.  · Have a working fire alarm and carbon monoxide detector;   · Engage in regular physical activity and social activities       Follow-up: No Follow-up on file.

## 2019-07-15 NOTE — PROGRESS NOTES
Discharge call placed on 7/15/2019 at 1101. Spoke with patient, there are no questions or concerns at this time. Phone number provided if patient would like to call at a later time.

## 2019-07-16 ENCOUNTER — HOSPITAL ENCOUNTER (OUTPATIENT)
Dept: RADIOLOGY | Facility: MEDICAL CENTER | Age: 69
End: 2019-07-16

## 2019-07-16 ENCOUNTER — HOSPITAL ENCOUNTER (OUTPATIENT)
Dept: RADIOLOGY | Facility: MEDICAL CENTER | Age: 69
End: 2019-07-16
Attending: FAMILY MEDICINE
Payer: MEDICARE

## 2019-07-16 DIAGNOSIS — Z12.31 VISIT FOR SCREENING MAMMOGRAM: ICD-10-CM

## 2019-07-16 PROCEDURE — 77063 BREAST TOMOSYNTHESIS BI: CPT

## 2019-07-17 ENCOUNTER — HOSPITAL ENCOUNTER (OUTPATIENT)
Dept: RADIOLOGY | Facility: MEDICAL CENTER | Age: 69
End: 2019-07-17
Attending: FAMILY MEDICINE
Payer: MEDICARE

## 2019-07-17 DIAGNOSIS — M81.0 POSTMENOPAUSAL BONE LOSS: ICD-10-CM

## 2019-07-17 PROCEDURE — 77080 DXA BONE DENSITY AXIAL: CPT

## 2019-09-09 ENCOUNTER — TELEPHONE (OUTPATIENT)
Dept: ENDOCRINOLOGY | Facility: MEDICAL CENTER | Age: 69
End: 2019-09-09

## 2019-09-10 NOTE — TELEPHONE ENCOUNTER
1. Caller Name: Nawaf                                             Call Back Number: [phone]      Patient approves a detailed voicemail message: N\A    Nawaf called and left 2 VM asking for a call back to discuss 2 medications for patient ordered by Soledad Chan PA-C.     I called [facility] back and spoke with Meka who said patient has a script for Synjardy & Metformin and wants to know which once she is to be on. I let her know she is on the Synjardy and can d/c the Metformin.

## 2019-10-08 ENCOUNTER — APPOINTMENT (OUTPATIENT)
Dept: ENDOCRINOLOGY | Facility: MEDICAL CENTER | Age: 69
End: 2019-10-08
Payer: MEDICARE

## 2019-10-22 DIAGNOSIS — E11.10 DM (DIABETES MELLITUS) TYPE 2, UNCONTROLLED, WITH KETOACIDOSIS (HCC): ICD-10-CM

## 2019-10-22 NOTE — TELEPHONE ENCOUNTER
Was the patient seen in the last year in this department? Yes    Does patient have an active prescription for medications requested? No     Received Request Via: Pharmacy       Last seen 07/03/2019

## 2019-10-23 ENCOUNTER — OFFICE VISIT (OUTPATIENT)
Dept: MEDICAL GROUP | Facility: MEDICAL CENTER | Age: 69
End: 2019-10-23
Payer: MEDICARE

## 2019-10-23 VITALS
SYSTOLIC BLOOD PRESSURE: 146 MMHG | HEART RATE: 87 BPM | OXYGEN SATURATION: 99 % | DIASTOLIC BLOOD PRESSURE: 80 MMHG | HEIGHT: 62 IN | BODY MASS INDEX: 27.05 KG/M2 | RESPIRATION RATE: 16 BRPM | WEIGHT: 147 LBS | TEMPERATURE: 97.2 F

## 2019-10-23 DIAGNOSIS — J01.40 ACUTE NON-RECURRENT PANSINUSITIS: ICD-10-CM

## 2019-10-23 DIAGNOSIS — R11.2 NON-INTRACTABLE VOMITING WITH NAUSEA, UNSPECIFIED VOMITING TYPE: ICD-10-CM

## 2019-10-23 LAB
FLUAV+FLUBV AG SPEC QL IA: NEGATIVE
INT CON NEG: NEGATIVE
INT CON POS: POSITIVE

## 2019-10-23 PROCEDURE — 99214 OFFICE O/P EST MOD 30 MIN: CPT | Performed by: FAMILY MEDICINE

## 2019-10-23 PROCEDURE — 87804 INFLUENZA ASSAY W/OPTIC: CPT | Performed by: FAMILY MEDICINE

## 2019-10-23 RX ORDER — AMOXICILLIN AND CLAVULANATE POTASSIUM 875; 125 MG/1; MG/1
1 TABLET, FILM COATED ORAL 2 TIMES DAILY
Qty: 14 TAB | Refills: 0 | Status: SHIPPED | OUTPATIENT
Start: 2019-10-23 | End: 2020-10-21

## 2019-10-23 RX ORDER — EMPAGLIFLOZIN AND METFORMIN HYDROCHLORIDE 12.5; 1 MG/1; MG/1
TABLET ORAL
Qty: 30 TAB | Refills: 3 | Status: SHIPPED | OUTPATIENT
Start: 2019-10-23 | End: 2020-09-21 | Stop reason: SDUPTHER

## 2019-10-23 RX ORDER — ONDANSETRON 4 MG/1
4 TABLET, FILM COATED ORAL EVERY 4 HOURS PRN
Qty: 20 TAB | Refills: 0 | Status: SHIPPED | OUTPATIENT
Start: 2019-10-23 | End: 2020-09-21

## 2019-10-24 NOTE — PROGRESS NOTES
CC: Headache, cough, congested nose, nausea/vomiting    HPI:   Melody presents today because she has been having headache, nasal congestion, cough, that started 3 weeks ago, diarrhea gradually gets better but for the past 3 days it has worsened again, it has been associated with postnasal drip, nausea, and vomiting.  Patient stated that she has not been able to eat or drink anything however she has been trying to drink as possible as she can but very little at a time to avoid vomiting.  Denies any abdominal pain however sometimes she has discomfort.  Denies any fever, chills, chest pain, or shortness of breath.        Patient Active Problem List    Diagnosis Date Noted   • Risk for falls 07/12/2019   • Dysphagia 03/29/2019   • Controlled type 2 diabetes mellitus without complication, without long-term current use of insulin ([facility]) 01/06/2017   • Hyperlipidemia 01/06/2017   • Multiple sclerosis ([facility]) 01/06/2017   • Depression 01/06/2017   • Schatzki's ring 01/06/2017   • Hiatal hernia 01/06/2017       Current Outpatient Medications   Medication Sig Dispense Refill   • amoxicillin-clavulanate (AUGMENTIN) 875-125 MG Tab Take 1 Tab by mouth 2 times a day. 14 Tab 0   • ondansetron (ZOFRAN) 4 MG Tab tablet Take 1 Tab by mouth every four hours as needed for Nausea/Vomiting. 20 Tab 0   • SYNJARDY 12.5-1000 MG Tab TAKE ONE TABLET BY MOUTH ONE TIME DAILY  30 Tab 3   • raNITidine (ZANTAC) 150 MG Tab Take 1 Tab by mouth 2 times a day. 180 Tab 3   • lamoTRIgine (LAMICTAL) 100 MG Tab Take 100 mg by mouth every day.     • FREESTYLE TEST STRIPS strip USE 3 TIMES A DAY AS NEEDED FOR SYMMPTOMS OF HIGH OR LOW BLOOD SUGAR 100 Strip 0   • FREESTYLE LANCETS Misc USE 3 TIMES A DAY AS NEEDED FOR SYMPTOMS OF HIGH OR LOW BLOOD SUGAR 100 Each 3   • simvastatin (ZOCOR) 40 MG Tab TAKE ONE TABLET BY MOUTH IN THE EVENING  90 Tab 3   • paroxetine (PAXIL) 40 MG tablet Take 1 Tab by mouth every day. 30 Tab 3   • B Complex Vitamins (B COMPLEX PO)  "Take  by mouth. Stress formulary     • paroxetine (PAXIL) 20 MG Tab TAKE ONE TABLET BY MOUTH ONE TIME DAILY  (Patient not taking: Reported on 7/12/2019) 90 Tab 3   • Glatiramer Acetate 40 MG/ML Solution Prefilled Syringe Inject 1 Dose as instructed. 3 times a week     • aspirin 81 MG tablet Take 81 mg by mouth every day.     • Cyanocobalamin (B-12) 2000 MCG Tab Take 1 Tab by mouth every day.     • Calcium Citrate-Vitamin D (CALCIUM + D PO) Take 1 Tab by mouth 2 times a day.     • Coenzyme Q10 (CO Q-10) 300 MG Cap Take 1 Cap by mouth every day.     • Cholecalciferol (D3 ADULT PO) Take 2 Tabs by mouth every day.       No current facility-administered medications for this visit.          Allergies as of 10/23/2019   • (No Known Allergies)        ROS: Denies any chest pain, Shortness of breath, Changes bowel or bladder, Lower extremity edema.    Physical Exam:  /80 (BP Location: Right arm, Patient Position: Sitting, BP Cuff Size: Adult)   Pulse 87   Temp 36.2 °C (97.2 °F) (Temporal)   Resp 16   Ht 1.575 m (5' 2\")   Wt 66.7 kg (147 lb)   SpO2 99%   BMI 26.89 kg/m²   Gen.: Well-developed, well-nourished, no apparent distress,pleasant and cooperative with the examination  Skin: Dry skin, with abnormal turgor  HEENT:Sinuses nontender with palpation, TMs clear, nares patent with pink mucosa and clear rhinorrhea,no septal deviation ,polyps or lesions. lips without lesions, oropharynx clear.  Neck: Trachea midline,no masses or adenopathy. No JVD.  Cor: Regular rate and rhythm without murmur, gallop or rub.  Lungs: Respirations unlabored.Clear to auscultation with equal breath sounds bilaterally. No wheezes, rhonchi.  Extremities: No cyanosis, clubbing or edema.      Assessment and Plan.   69 y.o. female     1. Acute non-recurrent pansinusitis  Possibly viral sinusitis associated with secondary infection.  Will start patient on Augmentin twice a day for a week.  Patient is negative for influenza A/B virus    - " amoxicillin-clavulanate (AUGMENTIN) 875-125 MG Tab; Take 1 Tab by mouth 2 times a day.  Dispense: 14 Tab; Refill: 0  - POCT Influenza A/B    2. Non-intractable vomiting with nausea, unspecified vomiting type  Probably viral gastroenteritis.  Start patient on antinausea/vomiting medication.  Patient seemed dehydrated, however  advised to drink water small amount but more frequently, and if the condition continues advised to go to ER for IV hydration.    - ondansetron (ZOFRAN) 4 MG Tab tablet; Take 1 Tab by mouth every four hours as needed for Nausea/Vomiting.  Dispense: 20 Tab; Refill: 0  - POCT Influenza A/B

## 2019-11-27 RX ORDER — SIMVASTATIN 40 MG
TABLET ORAL
Qty: 90 TAB | Refills: 2 | Status: SHIPPED | OUTPATIENT
Start: 2019-11-27 | End: 2020-07-29 | Stop reason: SDUPTHER

## 2019-12-18 ENCOUNTER — IMMUNIZATION (OUTPATIENT)
Dept: SOCIAL WORK | Facility: CLINIC | Age: 69
End: 2019-12-18
Payer: MEDICARE

## 2019-12-18 ENCOUNTER — HOSPITAL ENCOUNTER (OUTPATIENT)
Facility: MEDICAL CENTER | Age: 69
End: 2019-12-18
Payer: COMMERCIAL

## 2019-12-18 DIAGNOSIS — Z23 NEED FOR VACCINATION: ICD-10-CM

## 2019-12-18 LAB
EST. AVERAGE GLUCOSE BLD GHB EST-MCNC: 140 MG/DL
HBA1C MFR BLD: 6.5 % (ref 0–5.6)

## 2019-12-18 PROCEDURE — G0008 ADMIN INFLUENZA VIRUS VAC: HCPCS | Performed by: REGISTERED NURSE

## 2019-12-18 PROCEDURE — 90662 IIV NO PRSV INCREASED AG IM: CPT | Performed by: REGISTERED NURSE

## 2020-01-28 ENCOUNTER — PATIENT MESSAGE (OUTPATIENT)
Dept: MEDICAL GROUP | Facility: MEDICAL CENTER | Age: 70
End: 2020-01-28

## 2020-01-28 DIAGNOSIS — L98.9 SKIN LESION: ICD-10-CM

## 2020-01-29 ENCOUNTER — OFFICE VISIT (OUTPATIENT)
Dept: ENDOCRINOLOGY | Facility: MEDICAL CENTER | Age: 70
End: 2020-01-29
Payer: MEDICARE

## 2020-01-29 ENCOUNTER — HOSPITAL ENCOUNTER (OUTPATIENT)
Dept: LAB | Facility: MEDICAL CENTER | Age: 70
End: 2020-01-29
Attending: INTERNAL MEDICINE
Payer: MEDICARE

## 2020-01-29 VITALS
HEIGHT: 63 IN | SYSTOLIC BLOOD PRESSURE: 136 MMHG | OXYGEN SATURATION: 95 % | DIASTOLIC BLOOD PRESSURE: 68 MMHG | BODY MASS INDEX: 26.33 KG/M2 | WEIGHT: 148.6 LBS | HEART RATE: 91 BPM

## 2020-01-29 DIAGNOSIS — E04.2 NON-TOXIC MULTINODULAR GOITER: ICD-10-CM

## 2020-01-29 DIAGNOSIS — E55.9 VITAMIN D DEFICIENCY: ICD-10-CM

## 2020-01-29 DIAGNOSIS — E11.9 CONTROLLED TYPE 2 DIABETES MELLITUS WITHOUT COMPLICATION, WITHOUT LONG-TERM CURRENT USE OF INSULIN (HCC): ICD-10-CM

## 2020-01-29 DIAGNOSIS — E78.2 MIXED HYPERLIPIDEMIA: ICD-10-CM

## 2020-01-29 LAB
25(OH)D3 SERPL-MCNC: 37 NG/ML (ref 30–100)
ALBUMIN SERPL BCP-MCNC: 4.7 G/DL (ref 3.2–4.9)
ALBUMIN/GLOB SERPL: 1.5 G/DL
ALP SERPL-CCNC: 128 U/L (ref 30–99)
ALT SERPL-CCNC: 18 U/L (ref 2–50)
ANION GAP SERPL CALC-SCNC: 8 MMOL/L (ref 0–11.9)
AST SERPL-CCNC: 17 U/L (ref 12–45)
BASOPHILS # BLD AUTO: 0.6 % (ref 0–1.8)
BASOPHILS # BLD: 0.05 K/UL (ref 0–0.12)
BILIRUB SERPL-MCNC: 0.8 MG/DL (ref 0.1–1.5)
BUN SERPL-MCNC: 17 MG/DL (ref 8–22)
CALCIUM SERPL-MCNC: 10.4 MG/DL (ref 8.5–10.5)
CHLORIDE SERPL-SCNC: 103 MMOL/L (ref 96–112)
CO2 SERPL-SCNC: 27 MMOL/L (ref 20–33)
CREAT SERPL-MCNC: 0.96 MG/DL (ref 0.5–1.4)
CREAT UR-MCNC: 24.4 MG/DL
EOSINOPHIL # BLD AUTO: 0.27 K/UL (ref 0–0.51)
EOSINOPHIL NFR BLD: 3.3 % (ref 0–6.9)
ERYTHROCYTE [DISTWIDTH] IN BLOOD BY AUTOMATED COUNT: 43.7 FL (ref 35.9–50)
FASTING STATUS PATIENT QL REPORTED: NORMAL
GLOBULIN SER CALC-MCNC: 3.1 G/DL (ref 1.9–3.5)
GLUCOSE SERPL-MCNC: 109 MG/DL (ref 65–99)
HCT VFR BLD AUTO: 45.9 % (ref 37–47)
HGB BLD-MCNC: 15.1 G/DL (ref 12–16)
IMM GRANULOCYTES # BLD AUTO: 0.05 K/UL (ref 0–0.11)
IMM GRANULOCYTES NFR BLD AUTO: 0.6 % (ref 0–0.9)
LYMPHOCYTES # BLD AUTO: 2.32 K/UL (ref 1–4.8)
LYMPHOCYTES NFR BLD: 28.2 % (ref 22–41)
MCH RBC QN AUTO: 30.2 PG (ref 27–33)
MCHC RBC AUTO-ENTMCNC: 32.9 G/DL (ref 33.6–35)
MCV RBC AUTO: 91.8 FL (ref 81.4–97.8)
MICROALBUMIN UR-MCNC: 1 MG/DL
MICROALBUMIN/CREAT UR: 41 MG/G (ref 0–30)
MONOCYTES # BLD AUTO: 0.61 K/UL (ref 0–0.85)
MONOCYTES NFR BLD AUTO: 7.4 % (ref 0–13.4)
NEUTROPHILS # BLD AUTO: 4.94 K/UL (ref 2–7.15)
NEUTROPHILS NFR BLD: 59.9 % (ref 44–72)
NRBC # BLD AUTO: 0.02 K/UL
NRBC BLD-RTO: 0.2 /100 WBC
PLATELET # BLD AUTO: 264 K/UL (ref 164–446)
PMV BLD AUTO: 9.2 FL (ref 9–12.9)
POTASSIUM SERPL-SCNC: 4.2 MMOL/L (ref 3.6–5.5)
PROT SERPL-MCNC: 7.8 G/DL (ref 6–8.2)
RBC # BLD AUTO: 5 M/UL (ref 4.2–5.4)
SODIUM SERPL-SCNC: 138 MMOL/L (ref 135–145)
WBC # BLD AUTO: 8.2 K/UL (ref 4.8–10.8)

## 2020-01-29 PROCEDURE — 99214 OFFICE O/P EST MOD 30 MIN: CPT | Performed by: INTERNAL MEDICINE

## 2020-01-29 PROCEDURE — 82043 UR ALBUMIN QUANTITATIVE: CPT

## 2020-01-29 PROCEDURE — 85025 COMPLETE CBC W/AUTO DIFF WBC: CPT

## 2020-01-29 PROCEDURE — 80053 COMPREHEN METABOLIC PANEL: CPT

## 2020-01-29 PROCEDURE — 36415 COLL VENOUS BLD VENIPUNCTURE: CPT

## 2020-01-29 PROCEDURE — 86376 MICROSOMAL ANTIBODY EACH: CPT

## 2020-01-29 PROCEDURE — 84439 ASSAY OF FREE THYROXINE: CPT

## 2020-01-29 PROCEDURE — 84443 ASSAY THYROID STIM HORMONE: CPT

## 2020-01-29 PROCEDURE — 82570 ASSAY OF URINE CREATININE: CPT

## 2020-01-29 PROCEDURE — 82306 VITAMIN D 25 HYDROXY: CPT

## 2020-01-29 RX ORDER — FLUCONAZOLE 150 MG/1
150 TABLET ORAL DAILY
Qty: 3 TAB | Refills: 2 | Status: SHIPPED | OUTPATIENT
Start: 2020-01-29 | End: 2021-03-24

## 2020-01-29 NOTE — PROGRESS NOTES
CHIEF COMPLAINT: Patient is here for follow up of Type 2 Diabetes Mellitus    HPI:     Melody Rodriguez is a 69 y.o. female with Type 2 Diabetes Mellitus here for follow up.    Labs from December 18, 2019 showed a well-controlled A1c of 6.5%.    She was previously a patient of KALEB Brock and this is my first time seeing her today    She is currently on Metformin 500 mg twice a day and she stopped taking her Synjardy 12.5/1000 mg twice a day pill because she developed a yeast infection.  She claims that she try to contact the office but could not get through.   She also read online about necrotizing fasciitis and got worried and scared about this.    She now reports that she is still experiencing some itching in the genital region but denies having any skin discoloration, pain, tenderness in the genital region, bullae formation, fever and other signs of severe infection.  She denies hyperglycemia and hypoglycemia.      She also has a history of nontoxic multinodular goiter with last ultrasound completed on June 17, 2019 showing:   A dominant 4 cm solid nodule in the right lower lobe and   2 additional solid nodules more measuring more than 1.5 cm located on the left mid lobe and left lower lobe TR 3    Biopsy of the dominant 4 cm right mid lobe nodule on July 11, 2019 came back as nondiagnostic due to inadequate cellularity  Biopsy of the left mid lobe on July 11, 2019 nodule came back as benign    Her thyroid function at baseline is normal last TSH was 3.6 with a normal free T4 0.70 on June 12, 2019.  Thyroid function tests have not been repeated.  She does report difficulty swallowing and occasional hoarseness.  She admits to a history of a Schatzi ring in her esophagus      She has hyperlipidemia and is taking simvastatin 40 mg daily.  She is tolerating this well denies muscle aches and cramps.  Last LDL cholesterol was suboptimal 111 on June 12, 2019.    She does have a history of vitamin D  deficiency and her levels have not been repeated.  She is taking unrecalled over-the-counter supplements      BG Diary:01/29/20  Patient did not bring glucose meter despite request to do so    Weight has been stable    Diabetes Complications   Retinopathy: No known retinopathy.  Last eye exam: She is overdue for an eye exam point-of-care retinal exam was completed in the office today  Neuropathy: Denies paresthesias or numbness in hands or feet. Denies any foot wounds.  Exercise: Minimal.  Diet: Fair.  Patient's medications, allergies, and social histories were reviewed and updated as appropriate.    ROS:     CONS:     No fever, no chills   EYES:     No diplopia, no blurry vision   CV:           No chest pain, no palpitations   PULM:     No SOB, no cough, no hemoptysis.   GI:            No nausea, no vomiting, no diarrhea, no constipation   ENDO:     No polyuria, no polydipsia, no heat intolerance, no cold intolerance       Past Medical History:  Problem List:  2020-01: Non-toxic multinodular goiter  2019-07: Risk for falls  2019-03: Dysphagia  2017-01: Controlled type 2 diabetes mellitus without complication,   without long-term current use of insulin ([facility])  2017-01: Hyperlipidemia  2017-01: Multiple sclerosis ([facility])  2017-01: Depression  2017-01: Schatzki's ring  2017-01: Hiatal hernia      Past Surgical History:  History reviewed. No pertinent surgical history.     Allergies:  Patient has no known allergies.     Social History:  Social History     Tobacco Use   • Smoking status: Never Smoker   • Smokeless tobacco: Never Used   Substance Use Topics   • Alcohol use: Yes     Alcohol/week: 0.6 - 1.2 oz     Types: 1 - 2 Glasses of wine per week   • Drug use: No     Comment: CBD stick no THC        Family History:   family history includes ADHD in her son; Bipolar disorder in her daughter; Cancer in her brother and maternal grandmother; Colon Cancer in her mother; Depression in her son; Heart Attack in her father; Heart  "Disease in her father and mother; Hypertension in her son; No Known Problems in her maternal grandfather, paternal grandfather, paternal grandmother, and sister; Other in her brother and daughter.      PHYSICAL EXAM:   OBJECTIVE:  Vital signs: /68 (BP Location: Left arm, Patient Position: Sitting)   Pulse 91   Ht 1.588 m (5' 2.5\")   Wt 67.4 kg (148 lb 9.6 oz)   SpO2 95%   BMI 26.75 kg/m²   GENERAL: Well-developed, well-nourished in no apparent distress.   EYE:  No ocular asymmetry, PERRLA  HENT: Pink, moist mucous membranes.    NECK: No thyromegaly.   CARDIOVASCULAR:  No murmurs  LUNGS: Clear breath sounds  ABDOMEN: Soft, nontender   EXTREMITIES: No clubbing, cyanosis, or edema.   NEUROLOGICAL: No gross focal motor abnormalities   LYMPH: No cervical adenopathy palpated.   SKIN: No rashes, lesions.     Labs:  Lab Results   Component Value Date/Time    HBA1C 6.5 (H) 12/18/2019 09:20 AM        Lab Results   Component Value Date/Time    WBC 7.6 10/12/2017 10:54 AM    RBC 4.64 10/12/2017 10:54 AM    HEMOGLOBIN 14.1 10/12/2017 10:54 AM    MCV 93.3 10/12/2017 10:54 AM    MCH 30.4 10/12/2017 10:54 AM    MCHC 32.6 (L) 10/12/2017 10:54 AM    RDW 43.2 10/12/2017 10:54 AM    MPV 9.5 10/12/2017 10:54 AM       Lab Results   Component Value Date/Time    SODIUM 140 06/12/2019 08:13 AM    POTASSIUM 4.1 06/12/2019 08:13 AM    CHLORIDE 106 06/12/2019 08:13 AM    CO2 26 06/12/2019 08:13 AM    ANION 8.0 06/12/2019 08:13 AM    GLUCOSE 154 (H) 06/12/2019 08:13 AM    BUN 16 06/12/2019 08:13 AM    CREATININE 1.01 06/12/2019 08:13 AM    CALCIUM 9.8 06/12/2019 08:13 AM    ASTSGOT 17 06/12/2019 08:13 AM    ALTSGPT 19 06/12/2019 08:13 AM    TBILIRUBIN 0.7 06/12/2019 08:13 AM    ALBUMIN 4.5 06/12/2019 08:13 AM    TOTPROTEIN 7.5 06/12/2019 08:13 AM    GLOBULIN 3.0 06/12/2019 08:13 AM    AGRATIO 1.5 06/12/2019 08:13 AM       Lab Results   Component Value Date/Time    CHOLSTRLTOT 179 06/12/2019 0813    TRIGLYCERIDE 145 06/12/2019 0813    " HDL 39 (A) 06/12/2019 0813     (H) 06/12/2019 0813       Lab Results   Component Value Date/Time    MALBCRT see below 06/12/2019 08:13 AM    MICROALBUR <0.7 06/12/2019 08:13 AM        Lab Results   Component Value Date/Time    TSHULTRASEN 3.680 06/12/2019 0813     No results found for: FREEDIR  No results found for: FREET3  No results found for: THYSTIMIG        ASSESSMENT/PLAN:     1. Controlled type 2 diabetes mellitus without complication, without long-term current use of insulin (HCC)  Well-controlled at baseline  Reassured patient about the low risk of complications with metformin plus SGLT2 inhibitor therapy  Explained the cardiovascular benefits of SGLT2 inhibitor therapy  I am giving her fluconazole for a yeast infection  She is willing to restart Synjardy XR 12.5/1000 mg twice a day  I recommended she call me if she has any complications from the medication    2. Non-toxic multinodular goiter  Unstable  She is reporting compressive symptoms such as dysphagia  Discussed thyroid surgery as a treatment and she is going to think about this  Previous biopsy of dominant nodule was nondiagnostic  She is going to think about repeat biopsy  Recommend checking thyroid function today and TSH levels to help reassure the thyroid function is normal and stable.    3. Mixed hyperlipidemia  Uncontrolled at baseline  Continue simvastatin  Follow low-fat diet  Repeat fasting lipids with next labs in 3 months    4. Vitamin D deficiency  Uncontrolled at baseline  Recommend she start vitamin D3 5000 units daily  We will repeat calcium 25-hydroxy vitamin D levels with next labs in 3 months      Return in about 3 months (around 4/29/2020).      This patient during there office visit today was started on a new medication.  Side effects of the new medication were discussed with the patient today in the office.     Thank you kindly for allowing me to participate in the diabetes care plan for this patient.    Swapnil Cat  MD, [facility], [facility], [facility]–1983  01/29/20    CC:   Laya Cornelius M.D.

## 2020-01-30 ENCOUNTER — TELEPHONE (OUTPATIENT)
Dept: ENDOCRINOLOGY | Facility: MEDICAL CENTER | Age: 70
End: 2020-01-30

## 2020-01-30 LAB
T4 FREE SERPL-MCNC: 1.06 NG/DL (ref 0.53–1.43)
THYROPEROXIDASE AB SERPL-ACNC: 12.7 IU/ML (ref 0–9)
TSH SERPL DL<=0.005 MIU/L-ACNC: 4.18 UIU/ML (ref 0.38–5.33)

## 2020-01-30 NOTE — TELEPHONE ENCOUNTER
Phone Number Called: [phone]    Call outcome: Left detailed message for patient. Informed to call back with any additional questions.    Message: Contacted patient to ensure patient had time to review the [employer] message Dr. Cat sent them. If they had any questions to please contact us via phone or [employer] message.

## 2020-01-30 NOTE — TELEPHONE ENCOUNTER
----- Message from Swapnil Cat M.D. sent at 1/30/2020 12:56 PM PST -----  To Ms. Rodriguez    Good news your thyroid labs are still normal     Your vitamin D is better.   I do want you to take vitamin D3 5000u daily to boost your levels    Please follow the plan we discussed regarding your diabetes    Follow up as planned    Thank you    Dr. Cat

## 2020-01-31 ENCOUNTER — TELEPHONE (OUTPATIENT)
Dept: ENDOCRINOLOGY | Facility: MEDICAL CENTER | Age: 70
End: 2020-01-31

## 2020-01-31 NOTE — TELEPHONE ENCOUNTER
Phone Number Called: [phone]    Call outcome: Left detailed message for patient. Informed to call back with any additional questions.    Message: Contacted patient to notify her that her retinal exam showed no diabetic retinopathy and to follow up in 12 mo. If she had any questions to contact us.

## 2020-04-29 ENCOUNTER — APPOINTMENT (OUTPATIENT)
Dept: ENDOCRINOLOGY | Facility: MEDICAL CENTER | Age: 70
End: 2020-04-29
Payer: MEDICARE

## 2020-04-30 DIAGNOSIS — E11.9 CONTROLLED TYPE 2 DIABETES MELLITUS WITHOUT COMPLICATION, WITHOUT LONG-TERM CURRENT USE OF INSULIN (HCC): ICD-10-CM

## 2020-04-30 RX ORDER — LANCETS 28 GAUGE
EACH MISCELLANEOUS
Qty: 100 EACH | Refills: 3 | Status: SHIPPED | OUTPATIENT
Start: 2020-04-30 | End: 2021-09-02 | Stop reason: SDUPTHER

## 2020-05-26 ENCOUNTER — OFFICE VISIT (OUTPATIENT)
Dept: MEDICAL GROUP | Facility: MEDICAL CENTER | Age: 70
End: 2020-05-26
Payer: MEDICARE

## 2020-05-26 VITALS
HEIGHT: 62 IN | BODY MASS INDEX: 27.05 KG/M2 | OXYGEN SATURATION: 96 % | DIASTOLIC BLOOD PRESSURE: 94 MMHG | SYSTOLIC BLOOD PRESSURE: 146 MMHG | HEART RATE: 84 BPM | WEIGHT: 147 LBS

## 2020-05-26 DIAGNOSIS — G35 MULTIPLE SCLEROSIS (HCC): ICD-10-CM

## 2020-05-26 DIAGNOSIS — F32.4 MAJOR DEPRESSIVE DISORDER WITH SINGLE EPISODE, IN PARTIAL REMISSION (HCC): ICD-10-CM

## 2020-05-26 DIAGNOSIS — E78.2 MIXED HYPERLIPIDEMIA: ICD-10-CM

## 2020-05-26 DIAGNOSIS — K44.9 HIATAL HERNIA: ICD-10-CM

## 2020-05-26 DIAGNOSIS — E11.9 CONTROLLED TYPE 2 DIABETES MELLITUS WITHOUT COMPLICATION, WITHOUT LONG-TERM CURRENT USE OF INSULIN (HCC): ICD-10-CM

## 2020-05-26 DIAGNOSIS — Z23 NEED FOR VACCINATION: ICD-10-CM

## 2020-05-26 DIAGNOSIS — Z00.00 MEDICARE ANNUAL WELLNESS VISIT, SUBSEQUENT: ICD-10-CM

## 2020-05-26 DIAGNOSIS — K22.2 SCHATZKI'S RING: ICD-10-CM

## 2020-05-26 DIAGNOSIS — Z00.00 HEALTHCARE MAINTENANCE: ICD-10-CM

## 2020-05-26 LAB
HBA1C MFR BLD: 6.3 % (ref 0–5.6)
INT CON NEG: NEGATIVE
INT CON POS: POSITIVE

## 2020-05-26 PROCEDURE — G0439 PPPS, SUBSEQ VISIT: HCPCS | Performed by: FAMILY MEDICINE

## 2020-05-26 PROCEDURE — 83036 HEMOGLOBIN GLYCOSYLATED A1C: CPT | Performed by: FAMILY MEDICINE

## 2020-05-26 PROCEDURE — 90471 IMMUNIZATION ADMIN: CPT | Performed by: FAMILY MEDICINE

## 2020-05-26 PROCEDURE — 90750 HZV VACC RECOMBINANT IM: CPT | Performed by: FAMILY MEDICINE

## 2020-05-26 SDOH — ECONOMIC STABILITY: TRANSPORTATION INSECURITY
IN THE PAST 12 MONTHS, HAS LACK OF TRANSPORTATION KEPT YOU FROM MEETINGS, WORK, OR FROM GETTING THINGS NEEDED FOR DAILY LIVING?: NO

## 2020-05-26 SDOH — HEALTH STABILITY: MENTAL HEALTH: HOW OFTEN DO YOU HAVE 6 OR MORE DRINKS ON ONE OCCASION?: NEVER

## 2020-05-26 SDOH — ECONOMIC STABILITY: INCOME INSECURITY: IN THE LAST 12 MONTHS, WAS THERE A TIME WHEN YOU WERE NOT ABLE TO PAY THE MORTGAGE OR RENT ON TIME?: NO

## 2020-05-26 SDOH — SOCIAL STABILITY: SOCIAL NETWORK: HOW OFTEN DO YOU ATTEND CHURCH OR RELIGIOUS SERVICES?: NEVER

## 2020-05-26 SDOH — ECONOMIC STABILITY: HOUSING INSECURITY
IN THE LAST 12 MONTHS, WAS THERE A TIME WHEN YOU DID NOT HAVE A STEADY PLACE TO SLEEP OR SLEPT IN A SHELTER (INCLUDING NOW)?: NO

## 2020-05-26 SDOH — ECONOMIC STABILITY: FOOD INSECURITY: WITHIN THE PAST 12 MONTHS, YOU WORRIED THAT YOUR FOOD WOULD RUN OUT BEFORE YOU GOT MONEY TO BUY MORE.: NEVER TRUE

## 2020-05-26 SDOH — ECONOMIC STABILITY: FOOD INSECURITY: WITHIN THE PAST 12 MONTHS, THE FOOD YOU BOUGHT JUST DIDN'T LAST AND YOU DIDN'T HAVE MONEY TO GET MORE.: NEVER TRUE

## 2020-05-26 SDOH — SOCIAL STABILITY: SOCIAL NETWORK: ARE YOU MARRIED, WIDOWED, DIVORCED, SEPARATED, NEVER MARRIED, OR LIVING WITH A PARTNER?: WIDOWED

## 2020-05-26 SDOH — SOCIAL STABILITY: SOCIAL NETWORK
DO YOU BELONG TO ANY CLUBS OR ORGANIZATIONS SUCH AS CHURCH GROUPS UNIONS, FRATERNAL OR ATHLETIC GROUPS, OR SCHOOL GROUPS?: YES

## 2020-05-26 SDOH — ECONOMIC STABILITY: TRANSPORTATION INSECURITY
IN THE PAST 12 MONTHS, HAS THE LACK OF TRANSPORTATION KEPT YOU FROM MEDICAL APPOINTMENTS OR FROM GETTING MEDICATIONS?: NO

## 2020-05-26 SDOH — ECONOMIC STABILITY: INCOME INSECURITY: HOW HARD IS IT FOR YOU TO PAY FOR THE VERY BASICS LIKE FOOD, HOUSING, MEDICAL CARE, AND HEATING?: NOT HARD AT ALL

## 2020-05-26 SDOH — ECONOMIC STABILITY: HOUSING INSECURITY: IN THE LAST 12 MONTHS, HOW MANY PLACES HAVE YOU LIVED?: 1

## 2020-05-26 SDOH — ECONOMIC STABILITY: TRANSPORTATION INSECURITY
IN THE PAST 12 MONTHS, HAS LACK OF RELIABLE TRANSPORTATION KEPT YOU FROM MEDICAL APPOINTMENTS, MEETINGS, WORK OR FROM GETTING THINGS NEEDED FOR DAILY LIVING?: NO

## 2020-05-26 SDOH — SOCIAL STABILITY: SOCIAL NETWORK
IN A TYPICAL WEEK, HOW MANY TIMES DO YOU TALK ON THE PHONE WITH FAMILY, FRIENDS, OR NEIGHBORS?: MORE THAN THREE TIMES A WEEK

## 2020-05-26 SDOH — HEALTH STABILITY: MENTAL HEALTH: HOW OFTEN DO YOU HAVE A DRINK CONTAINING ALCOHOL?: 2-4 TIMES A MONTH

## 2020-05-26 SDOH — HEALTH STABILITY: MENTAL HEALTH: HOW MANY STANDARD DRINKS CONTAINING ALCOHOL DO YOU HAVE ON A TYPICAL DAY?: 1 OR 2

## 2020-05-26 SDOH — SOCIAL STABILITY: SOCIAL NETWORK: HOW OFTEN DO YOU GET TOGETHER WITH FRIENDS OR RELATIVES?: MORE THAN THREE TIMES A WEEK

## 2020-05-26 SDOH — HEALTH STABILITY: PHYSICAL HEALTH: ON AVERAGE, HOW MANY DAYS PER WEEK DO YOU ENGAGE IN MODERATE TO STRENUOUS EXERCISE (LIKE A BRISK WALK)?: 0 DAYS

## 2020-05-26 SDOH — HEALTH STABILITY: MENTAL HEALTH
STRESS IS WHEN SOMEONE FEELS TENSE, NERVOUS, ANXIOUS, OR CAN'T SLEEP AT NIGHT BECAUSE THEIR MIND IS TROUBLED. HOW STRESSED ARE YOU?: TO SOME EXTENT

## 2020-05-26 SDOH — HEALTH STABILITY: PHYSICAL HEALTH: ON AVERAGE, HOW MANY MINUTES DO YOU ENGAGE IN EXERCISE AT THIS LEVEL?: 0 MINUTES

## 2020-05-26 SDOH — SOCIAL STABILITY: SOCIAL NETWORK: HOW OFTEN DO YOU ATTENT MEETINGS OF THE CLUB OR ORGANIZATION YOU BELONG TO?: MORE THAN 4 TIMES PER YEAR

## 2020-05-26 SDOH — HEALTH STABILITY: PHYSICAL HEALTH: ON AVERAGE, HOW MANY MINUTES DO YOU ENGAGE IN EXERCISE AT THIS LEVEL?: 0 MIN

## 2020-05-26 ASSESSMENT — ACTIVITIES OF DAILY LIVING (ADL): BATHING_REQUIRES_ASSISTANCE: 0

## 2020-05-26 ASSESSMENT — SOCIAL DETERMINANTS OF HEALTH (SDOH)
HOW OFTEN DO YOU GET TOGETHER WITH FRIENDS OR RELATIVES?: MORE THAN THREE TIMES A WEEK
HOW OFTEN DO YOU HAVE A DRINK CONTAINING ALCOHOL: 2-4 TIMES A MONTH
DO YOU BELONG TO ANY CLUBS OR ORGANIZATIONS SUCH AS CHURCH GROUPS UNIONS, FRATERNAL OR ATHLETIC GROUPS, OR SCHOOL GROUPS?: YES
HOW OFTEN DO YOU HAVE SIX OR MORE DRINKS ON ONE OCCASION: NEVER
IN A TYPICAL WEEK, HOW MANY TIMES DO YOU TALK ON THE PHONE WITH FAMILY, FRIENDS, OR NEIGHBORS?: MORE THAN THREE TIMES A WEEK
WITHIN THE PAST 12 MONTHS, YOU WORRIED THAT YOUR FOOD WOULD RUN OUT BEFORE YOU GOT THE MONEY TO BUY MORE: NEVER TRUE
HOW MANY DRINKS CONTAINING ALCOHOL DO YOU HAVE ON A TYPICAL DAY WHEN YOU ARE DRINKING: 1 OR 2
HOW OFTEN DO YOU ATTEND CHURCH OR RELIGIOUS SERVICES?: NEVER
WITHIN THE PAST 12 MONTHS, THE FOOD YOU BOUGHT JUST DIDN'T LAST AND YOU DIDN'T HAVE MONEY TO GET MORE: NEVER TRUE
HOW HARD IS IT FOR YOU TO PAY FOR THE VERY BASICS LIKE FOOD, HOUSING, MEDICAL CARE, AND HEATING?: NOT HARD AT ALL
HOW OFTEN DO YOU ATTENT MEETINGS OF THE CLUB OR ORGANIZATION YOU BELONG TO?: MORE THAN 4 TIMES PER YEAR

## 2020-05-26 ASSESSMENT — PATIENT HEALTH QUESTIONNAIRE - PHQ9: CLINICAL INTERPRETATION OF PHQ2 SCORE: 0

## 2020-05-26 ASSESSMENT — FIBROSIS 4 INDEX: FIB4 SCORE: 1.06

## 2020-05-26 ASSESSMENT — ENCOUNTER SYMPTOMS: GENERAL WELL-BEING: GOOD

## 2020-05-26 NOTE — PROGRESS NOTES
No chief complaint on file.        HPI:  RACHEL is a 70 y.o. here for Medicare Annual Wellness Visit    Medicare annual wellness visit, subsequent  Preventive measures and chronic medical issues reviewed.     Controlled type 2 diabetes mellitus without complication, without long-term current use of insulin ([facility])  Adequately controlled.  Last A1c was 6.9.  Denies polyuria and polydipsia, and hypoglycemic episodes..  She has been on Synjardy 12.5- 1000 mg daily.     Mixed hyperlipidemia  She has been tolerating the statin. Denies muscle pain LFTs has been normal, currently on simvastatin 40 mg daily.  No side effects or complication.     Multiple sclerosis ([facility])  Patient was diagnosed in 2006.  She has been physically active. She has been following up with neurologist Dr Oral souza.She is currently on disease modifying drug, Capaxone injection 40 mg 3 times weekly     Major depressive disorder with single episode, in partial remission ([facility])  Patient stated her mood has been fluctuating, but she has been doing fine on Paxil 40 mg daily, no suicidal ideation.     Schatzki's ring/ Gastroesophageal reflux disease without esophagitis/hiatal hernia  Patient has history of Schatzki 's ring, and hiatal hernia, has been having intermittent symptoms of acid reflux in the form of heartburn, and sometimes nausea.  She has been taking omeprazole 20 mg daily.No side effects.    Due for Shingles.    Patient Active Problem List    Diagnosis Date Noted   • Non-toxic multinodular goiter 01/29/2020   • Risk for falls 07/12/2019   • Dysphagia 03/29/2019   • Controlled type 2 diabetes mellitus without complication, without long-term current use of insulin ([facility]) 01/06/2017   • Hyperlipidemia 01/06/2017   • Multiple sclerosis ([facility]) 01/06/2017   • Depression 01/06/2017   • Schatzki's ring 01/06/2017   • Hiatal hernia 01/06/2017       Current Outpatient Medications   Medication Sig Dispense Refill   • FreeStyle Lancets Misc Use 3  times a day. 100 Each 3   • fluconazole (DIFLUCAN) 150 MG tablet Take 1 Tab by mouth every day. 3 Tab 2   • simvastatin (ZOCOR) 40 MG Tab take one tablet by mouth every evening  90 Tab 2   • lamoTRIgine (LAMICTAL) 100 MG Tab Take 100 mg by mouth every day.     • FREESTYLE TEST STRIPS strip USE 3 TIMES A DAY AS NEEDED FOR SYMMPTOMS OF HIGH OR LOW BLOOD SUGAR 100 Strip 0   • paroxetine (PAXIL) 40 MG tablet Take 1 Tab by mouth every day. 30 Tab 3   • B Complex Vitamins (B COMPLEX PO) Take  by mouth. Stress formulary     • Glatiramer Acetate 40 MG/ML Solution Prefilled Syringe Inject 1 Dose as instructed. 3 times a week     • aspirin 81 MG tablet Take 81 mg by mouth every day.     • Calcium Citrate-Vitamin D (CALCIUM + D PO) Take 1 Tab by mouth 2 times a day.     • Coenzyme Q10 (CO Q-10) 300 MG Cap Take 1 Cap by mouth every day.     • Cholecalciferol (D3 ADULT PO) Take 2 Tabs by mouth every day.     • metFORMIN (GLUCOPHAGE) 500 MG Tab Take 500 mg by mouth 2 times a day, with meals.     • SYNJARDY 12.5-1000 MG Tab TAKE ONE TABLET BY MOUTH ONE TIME DAILY  (Patient not taking: Reported on 1/29/2020) 30 Tab 3   • amoxicillin-clavulanate (AUGMENTIN) 875-125 MG Tab Take 1 Tab by mouth 2 times a day. 14 Tab 0   • ondansetron (ZOFRAN) 4 MG Tab tablet Take 1 Tab by mouth every four hours as needed for Nausea/Vomiting. 20 Tab 0   • Cyanocobalamin (B-12) 2000 MCG Tab Take 1 Tab by mouth every day.       No current facility-administered medications for this visit.         Patient is taking medications as noted in medication list.  Current supplements as per medication list.     Allergies: Patient has no known allergies.    Current social contact/activities:     Is patient current with immunizations? Yes.    She  reports that she has never smoked. She has never used smokeless tobacco. She reports current alcohol use of about 0.6 - 1.2 oz of alcohol per week. She reports that she does not use drugs.  Counseling given: Not  Answered        DPA/Advanced directive: Patient does not have an Advanced Directive.  A packet and workshop information was given on Advanced Directives.    ROS:    Gait: Uses no assistive device   Ostomy: No   Other tubes: No   Amputations: No   Chronic oxygen use No   Last eye exam January 2020  Wears hearing aids: No   : Reports urinary leakage during the last 6 months that has not interfered at all with their daily activities or sleep.      Screening:        Depression Screening    Little interest or pleasure in doing things?  0 - not at all  Feeling down, depressed, or hopeless? 0 - not at all  Patient Health Questionnaire Score: 0    If depressive symptoms identified deferred to follow up visit unless specifically addressed in assessment and plan.    Interpretation of PHQ-9 Total Score   Score Severity   1-4 No Depression   5-9 Mild Depression   10-14 Moderate Depression   15-19 Moderately Severe Depression   20-27 Severe Depression    Screening for Cognitive Impairment    Three Minute Recall (village, kitchen, baby)  3/3    Tobi clock face with all 12 numbers and set the hands to show 10 past 10.  Yes    If cognitive concerns identified, deferred for follow up unless specifically addressed in assessment and plan.    Fall Risk Assessment    Has the patient had two or more falls in the last year or any fall with injury in the last year?  No  If fall risk identified, deferred for follow up unless specifically addressed in assessment and plan.    Safety Assessment    Throw rugs on floor.  No  Handrails on all stairs.  Yes  Good lighting in all hallways.  Yes  Difficulty hearing.  No  Patient counseled about all safety risks that were identified.    Functional Assessment ADLs    Are there any barriers preventing you from cooking for yourself or meeting nutritional needs?  No.    Are there any barriers preventing you from driving safely or obtaining transportation?  No.    Are there any barriers preventing you  from using a telephone or calling for help?  No.    Are there any barriers preventing you from shopping?  No.    Are there any barriers preventing you from taking care of your own finances?  No.    Are there any barriers preventing you from managing your medications?  No.    Are there any barriers preventing you from showering, bathing or dressing yourself?  No.    Are you currently engaging in any exercise or physical activity?  No.     What is your perception of your health?  Good.    Health Maintenance Summary                HEPATITIS C SCREENING Overdue 1950     IMM HEP B VACCINE Overdue 2/25/1969     PAP SMEAR Overdue 2/25/1971     IMM ZOSTER VACCINES Overdue 8/21/2018      Done 6/26/2018 Imm Admin: Recombinant Zoster Vaccine (RZV) (Shingrix)    FASTING LIPID PROFILE Next Due 6/12/2020      Done 6/12/2019 LIPID PROFILE     Patient has more history with this topic...    A1C SCREENING Next Due 6/18/2020      Done 12/18/2019 HEMOGLOBIN A1C     Patient has more history with this topic...    Annual Wellness Visit Next Due 7/12/2020      Done 7/12/2019 SUBSEQUENT ANNUAL WELLNESS VISIT-INCLUDES PPPS ()     Patient has more history with this topic...    DIABETES MONOFILAMENT / LE EXAM Next Due 7/12/2020      Done 7/12/2019 AMB DIABETIC MONOFILAMENT LOWER EXTREMITY EXAM     Patient has more history with this topic...    MAMMOGRAM Next Due 7/16/2020      Done 7/16/2019 MA-SCREENING MAMMO BILAT W/TOMOSYNTHESIS W/CAD     Patient has more history with this topic...    URINE ACR / MICROALBUMIN Next Due 1/29/2021      Done 1/29/2020 MICROALBUMIN CREAT RATIO URINE     Patient has more history with this topic...    SERUM CREATININE Next Due 1/29/2021      Done 1/29/2020 COMP METABOLIC PANEL     Patient has more history with this topic...    RETINAL SCREENING Next Due 1/30/2021      Done 1/30/2020 REFERRAL FOR RETINAL SCREENING EXAM     Patient has more history with this topic...    BONE DENSITY Next Due 7/17/2024       "Done 7/17/2019 DS-BONE DENSITY STUDY (DEXA)    COLONOSCOPY Next Due 2/9/2026      Done 2/9/2016     IMM DTaP/Tdap/Td Vaccine Next Due 6/26/2028      Done 6/26/2018 Imm Admin: Tdap Vaccine          Patient Care Team:  Laya Cornelius M.D. as PCP - General (Geriatrics)  Oral Guzman M.D. as Consulting Physician (Neurology)  John Yost M.D. as Consulting Physician (Ophthalmology)  Zainab Ferreira [facility] as      Social History     Tobacco Use   • Smoking status: Never Smoker   • Smokeless tobacco: Never Used   Substance Use Topics   • Alcohol use: Yes     Alcohol/week: 0.6 - 1.2 oz     Types: 1 - 2 Glasses of wine per week     Frequency: 2-4 times a month     Drinks per session: 1 or 2     Binge frequency: Never   • Drug use: No     Comment: CBD stick no THC     Family History   Problem Relation Age of Onset   • Colon Cancer Mother    • Heart Disease Mother         Cardiomyopathy   • Heart Attack Father    • Heart Disease Father    • No Known Problems Sister    • Cancer Brother         Skin   • Other Brother         Gaulbladder issues   • Cancer Maternal Grandmother         GI cancer   • No Known Problems Maternal Grandfather    • No Known Problems Paternal Grandmother    • No Known Problems Paternal Grandfather    • Other Daughter         Fibromyalgia   • Bipolar disorder Daughter    • ADHD Son    • Depression Son    • Hypertension Son      She  has a past medical history of Depression, Diabetes (HCC), Hyperlipidemia, and Muscle disorder.   No past surgical history on file.        Exam:     /94 (BP Location: Right arm, Patient Position: Sitting, BP Cuff Size: Adult)   Pulse 84   Ht 1.575 m (5' 2\")   Wt 66.7 kg (147 lb)   SpO2 96%  Body mass index is 26.89 kg/m².    Hearing .    Dentition   Alert, oriented in no acute distress.  Eye contact is good, speech goal directed, affect calm      Assessment and Plan. The following treatment and monitoring " plan is recommended:    70 y.o. female     1. Medicare annual wellness visit, subsequent  Preventive measures and chronic medical issues reviewed.    - Subsequent Annual Wellness Visit - Includes PPPS ()    2. Controlled type 2 diabetes mellitus without complication, without long-term current use of insulin (HCC)  A1c today is 6.3  Continue on Synjardy 12.5- 1000 mg daily.    - POCT A1C  - Subsequent Annual Wellness Visit - Includes PPPS ()    3. Mixed hyperlipidemia  He has been tolerating the statin. Denies muscle pain LFTs has been normal  Continue on simvastatin 40 mg daily    - Subsequent Annual Wellness Visit - Includes PPPS ()    4. Multiple sclerosis ([facility])     Continue fu WITH neurologist Dr Oral Guzman .  on disease modifying drug, Capaxone injection 40 mg 3 times weekly.    - Subsequent Annual Wellness Visit - Includes PPPS ()    5. Major depressive disorder with single episode, in partial remission ([facility])  Continue on Wellbutrin 150 mg daily, Lamictal 100 mg daily, and Paxil 40 mg daily,    - Subsequent Annual Wellness Visit - Includes PPPS ()    6. Schatzki's ring  Stable, asymptomatic.  Continue fu with GI every year.    - Subsequent Annual Wellness Visit - Includes PPPS ()    7. Hiatal hernia  Has been doing fine on omeprazole.    - Subsequent Annual Wellness Visit - Includes PPPS ()    8. Healthcare maintenance  Due for Shingles.Given today.    - Subsequent Annual Wellness Visit - Includes PPPS ()  - Shingles Vaccine (Shingrix)    9. Need for vaccination    - Shingles Vaccine (Shingrix)        Services suggested: Health Care Screening recommendations as per orders if indicated.  Referrals offered: PT/OT/Nutrition counseling/Behavioral Health/Smoking cessation as per orders if indicated.    Discussion today about general wellness and lifestyle habits:    · Prevent falls and reduce trip hazards; Cautioned about securing or removing rugs.  · Have a working fire  alarm and carbon monoxide detector;   · Engage in regular physical activity and social activities       Follow-up: No follow-ups on file.

## 2020-06-13 DIAGNOSIS — E11.9 CONTROLLED TYPE 2 DIABETES MELLITUS WITHOUT COMPLICATION, WITHOUT LONG-TERM CURRENT USE OF INSULIN (HCC): ICD-10-CM

## 2020-06-15 RX ORDER — BLOOD SUGAR DIAGNOSTIC
STRIP MISCELLANEOUS
Qty: 100 STRIP | Refills: 2 | Status: SHIPPED | OUTPATIENT
Start: 2020-06-15 | End: 2021-09-02 | Stop reason: SDUPTHER

## 2020-07-30 RX ORDER — SIMVASTATIN 40 MG
TABLET ORAL
Qty: 100 TAB | Refills: 2 | Status: SHIPPED | OUTPATIENT
Start: 2020-07-30 | End: 2020-10-21 | Stop reason: SDUPTHER

## 2020-08-14 ENCOUNTER — PATIENT MESSAGE (OUTPATIENT)
Dept: MEDICAL GROUP | Facility: MEDICAL CENTER | Age: 70
End: 2020-08-14

## 2020-08-14 NOTE — TELEPHONE ENCOUNTER
From: Melody Rodriguez  To: Laya Cornelius M.D.  Sent: 8/14/2020 8:44 AM PDT  Subject: Non-Urgent Medical Question    Hi,    For years I've had problems with my right knee. Now it is swollen all the way around even in the back.     I was wondering if this is something I should see you for or if you'd prefer to refer me to someone else. My right foot is slightly swollen as well. Being diabetic I didn't want to let this go for too long.     Thanks for any information you can provide.     Nishi Rodriguez

## 2020-08-18 ENCOUNTER — OFFICE VISIT (OUTPATIENT)
Dept: MEDICAL GROUP | Facility: MEDICAL CENTER | Age: 70
End: 2020-08-18
Payer: MEDICARE

## 2020-08-18 ENCOUNTER — HOSPITAL ENCOUNTER (OUTPATIENT)
Dept: RADIOLOGY | Facility: MEDICAL CENTER | Age: 70
End: 2020-08-18
Attending: FAMILY MEDICINE
Payer: MEDICARE

## 2020-08-18 VITALS
BODY MASS INDEX: 28.52 KG/M2 | WEIGHT: 155 LBS | TEMPERATURE: 96.6 F | DIASTOLIC BLOOD PRESSURE: 60 MMHG | OXYGEN SATURATION: 95 % | HEIGHT: 62 IN | SYSTOLIC BLOOD PRESSURE: 126 MMHG | HEART RATE: 79 BPM | RESPIRATION RATE: 16 BRPM

## 2020-08-18 DIAGNOSIS — M25.561 CHRONIC PAIN OF RIGHT KNEE: ICD-10-CM

## 2020-08-18 DIAGNOSIS — G89.29 CHRONIC PAIN OF RIGHT KNEE: ICD-10-CM

## 2020-08-18 PROCEDURE — 99214 OFFICE O/P EST MOD 30 MIN: CPT | Performed by: FAMILY MEDICINE

## 2020-08-18 PROCEDURE — 73562 X-RAY EXAM OF KNEE 3: CPT | Mod: RT

## 2020-08-18 ASSESSMENT — PATIENT HEALTH QUESTIONNAIRE - PHQ9
2. FEELING DOWN, DEPRESSED, IRRITABLE, OR HOPELESS: NOT AT ALL
5. POOR APPETITE OR OVEREATING: NOT AT ALL
1. LITTLE INTEREST OR PLEASURE IN DOING THINGS: NOT AT ALL
SUM OF ALL RESPONSES TO PHQ9 QUESTIONS 1 AND 2: 0
4. FEELING TIRED OR HAVING LITTLE ENERGY: NOT AT ALL
6. FEELING BAD ABOUT YOURSELF - OR THAT YOU ARE A FAILURE OR HAVE LET YOURSELF OR YOUR FAMILY DOWN: NOT AL ALL
8. MOVING OR SPEAKING SO SLOWLY THAT OTHER PEOPLE COULD HAVE NOTICED. OR THE OPPOSITE, BEING SO FIGETY OR RESTLESS THAT YOU HAVE BEEN MOVING AROUND A LOT MORE THAN USUAL: NOT AT ALL
7. TROUBLE CONCENTRATING ON THINGS, SUCH AS READING THE NEWSPAPER OR WATCHING TELEVISION: NOT AT ALL
3. TROUBLE FALLING OR STAYING ASLEEP OR SLEEPING TOO MUCH: NOT AT ALL

## 2020-08-18 ASSESSMENT — FIBROSIS 4 INDEX: FIB4 SCORE: 1.06

## 2020-08-18 NOTE — PROGRESS NOTES
CC: Chronic right knee pain    HPI:   Melody presents today because she has been having chronic right knee pain.  The pain comes and goes, usually mild to moderate, but lately it has worsened and becomes more frequent.  Sometimes associated with knee swelling anteriorly and posteriorly.  Swelling usually resolves spontaneously.  Patient has been over-the-counter pain medication, sometimes does not help, most of the time does.      Patient Active Problem List    Diagnosis Date Noted   • Non-toxic multinodular goiter 01/29/2020   • Risk for falls 07/12/2019   • Dysphagia 03/29/2019   • Controlled type 2 diabetes mellitus without complication, without long-term current use of insulin ([facility]) 01/06/2017   • Hyperlipidemia 01/06/2017   • Multiple sclerosis (HCC) 01/06/2017   • Depression 01/06/2017   • Schatzki's ring 01/06/2017   • Hiatal hernia 01/06/2017       Current Outpatient Medications   Medication Sig Dispense Refill   • simvastatin (ZOCOR) 40 MG Tab take one tablet by mouth every evening 100 Tab 2   • glucose blood (FREESTYLE TEST STRIPS) strip Use 3 times daily 100 Strip 2   • FreeStyle Lancets Misc Use 3 times a day. 100 Each 3   • metFORMIN (GLUCOPHAGE) 500 MG Tab Take 500 mg by mouth 2 times a day, with meals.     • fluconazole (DIFLUCAN) 150 MG tablet Take 1 Tab by mouth every day. 3 Tab 2   • SYNJARDY 12.5-1000 MG Tab TAKE ONE TABLET BY MOUTH ONE TIME DAILY  (Patient not taking: Reported on 1/29/2020) 30 Tab 3   • amoxicillin-clavulanate (AUGMENTIN) 875-125 MG Tab Take 1 Tab by mouth 2 times a day. 14 Tab 0   • ondansetron (ZOFRAN) 4 MG Tab tablet Take 1 Tab by mouth every four hours as needed for Nausea/Vomiting. 20 Tab 0   • lamoTRIgine (LAMICTAL) 100 MG Tab Take 100 mg by mouth every day.     • paroxetine (PAXIL) 40 MG tablet Take 1 Tab by mouth every day. 30 Tab 3   • B Complex Vitamins (B COMPLEX PO) Take  by mouth. Stress formulary     • Glatiramer Acetate 40 MG/ML Solution Prefilled Syringe Inject  "1 Dose as instructed. 3 times a week     • aspirin 81 MG tablet Take 81 mg by mouth every day.     • Cyanocobalamin (B-12) 2000 MCG Tab Take 1 Tab by mouth every day.     • Calcium Citrate-Vitamin D (CALCIUM + D PO) Take 1 Tab by mouth 2 times a day.     • Coenzyme Q10 (CO Q-10) 300 MG Cap Take 1 Cap by mouth every day.     • Cholecalciferol (D3 ADULT PO) Take 2 Tabs by mouth every day.       No current facility-administered medications for this visit.          Allergies as of 08/18/2020   • (No Known Allergies)        ROS: Denies any chest pain, Shortness of breath, Changes bowel or bladder, Lower extremity edema.    Physical Exam:  /60 (BP Location: Right arm, Patient Position: Sitting, BP Cuff Size: Adult)   Pulse 79   Temp 35.9 °C (96.6 °F) (Temporal)   Resp 16   Ht 1.575 m (5' 2\")   Wt 70.3 kg (155 lb)   SpO2 95%   BMI 28.35 kg/m²   Gen.: Well-developed, well-nourished, no apparent distress,pleasant and cooperative with the examination  Skin: Skin over the right knee slightly red but no tenderness  Neck: Trachea midline,no masses or adenopathy. No JVD.  Cor: Regular rate and rhythm without murmur, gallop or rub.  Right knee:?  Effusion, restricted knee movement, no tenderness.      Assessment and Plan.   70 y.o. female     1. Chronic pain of right knee  Probably osteoarthritis.  Will send patient for an x-ray.  Will update the patient with the result  Continue over-the-counter pain medication as needed.    - DX-KNEE 3 VIEWS RIGHT; Future      "

## 2020-08-19 DIAGNOSIS — M17.11 PRIMARY OSTEOARTHRITIS OF RIGHT KNEE: ICD-10-CM

## 2020-09-21 DIAGNOSIS — E11.9 CONTROLLED TYPE 2 DIABETES MELLITUS WITHOUT COMPLICATION, WITHOUT LONG-TERM CURRENT USE OF INSULIN (HCC): ICD-10-CM

## 2020-09-21 DIAGNOSIS — E78.2 MIXED HYPERLIPIDEMIA: ICD-10-CM

## 2020-09-21 DIAGNOSIS — E11.10 DM (DIABETES MELLITUS) TYPE 2, UNCONTROLLED, WITH KETOACIDOSIS (HCC): ICD-10-CM

## 2020-09-21 DIAGNOSIS — E55.9 VITAMIN D DEFICIENCY: ICD-10-CM

## 2020-09-21 DIAGNOSIS — E04.2 NON-TOXIC MULTINODULAR GOITER: ICD-10-CM

## 2020-09-21 RX ORDER — EMPAGLIFLOZIN AND METFORMIN HYDROCHLORIDE 12.5; 1 MG/1; MG/1
1 TABLET ORAL 2 TIMES DAILY
Qty: 60 TAB | Refills: 3 | Status: SHIPPED | OUTPATIENT
Start: 2020-09-21 | End: 2020-10-21 | Stop reason: SDUPTHER

## 2020-10-01 ENCOUNTER — PATIENT MESSAGE (OUTPATIENT)
Dept: MEDICAL GROUP | Facility: MEDICAL CENTER | Age: 70
End: 2020-10-01

## 2020-10-01 ENCOUNTER — NON-PROVIDER VISIT (OUTPATIENT)
Dept: MEDICAL GROUP | Facility: MEDICAL CENTER | Age: 70
End: 2020-10-01
Payer: MEDICARE

## 2020-10-01 DIAGNOSIS — Z23 NEED FOR VACCINATION: ICD-10-CM

## 2020-10-01 PROCEDURE — 90662 IIV NO PRSV INCREASED AG IM: CPT | Performed by: NURSE PRACTITIONER

## 2020-10-01 PROCEDURE — G0008 ADMIN INFLUENZA VIRUS VAC: HCPCS | Performed by: NURSE PRACTITIONER

## 2020-10-03 ENCOUNTER — HOSPITAL ENCOUNTER (OUTPATIENT)
Dept: RADIOLOGY | Facility: MEDICAL CENTER | Age: 70
End: 2020-10-03
Attending: FAMILY MEDICINE
Payer: MEDICARE

## 2020-10-03 DIAGNOSIS — Z12.31 VISIT FOR SCREENING MAMMOGRAM: ICD-10-CM

## 2020-10-03 PROCEDURE — 77067 SCR MAMMO BI INCL CAD: CPT

## 2020-10-09 ENCOUNTER — HOSPITAL ENCOUNTER (OUTPATIENT)
Dept: LAB | Facility: MEDICAL CENTER | Age: 70
End: 2020-10-09
Attending: INTERNAL MEDICINE
Payer: MEDICARE

## 2020-10-09 DIAGNOSIS — E55.9 VITAMIN D DEFICIENCY: ICD-10-CM

## 2020-10-09 DIAGNOSIS — E04.2 NON-TOXIC MULTINODULAR GOITER: ICD-10-CM

## 2020-10-09 DIAGNOSIS — E11.9 CONTROLLED TYPE 2 DIABETES MELLITUS WITHOUT COMPLICATION, WITHOUT LONG-TERM CURRENT USE OF INSULIN (HCC): ICD-10-CM

## 2020-10-09 DIAGNOSIS — E78.2 MIXED HYPERLIPIDEMIA: ICD-10-CM

## 2020-10-09 PROCEDURE — 84439 ASSAY OF FREE THYROXINE: CPT

## 2020-10-09 PROCEDURE — 80061 LIPID PANEL: CPT

## 2020-10-09 PROCEDURE — 82043 UR ALBUMIN QUANTITATIVE: CPT

## 2020-10-09 PROCEDURE — 80053 COMPREHEN METABOLIC PANEL: CPT

## 2020-10-09 PROCEDURE — 82306 VITAMIN D 25 HYDROXY: CPT

## 2020-10-09 PROCEDURE — 84443 ASSAY THYROID STIM HORMONE: CPT

## 2020-10-09 PROCEDURE — 82570 ASSAY OF URINE CREATININE: CPT

## 2020-10-09 PROCEDURE — 83036 HEMOGLOBIN GLYCOSYLATED A1C: CPT

## 2020-10-09 PROCEDURE — 36415 COLL VENOUS BLD VENIPUNCTURE: CPT

## 2020-10-10 LAB
25(OH)D3 SERPL-MCNC: 32 NG/ML (ref 30–100)
ALBUMIN SERPL BCP-MCNC: 4.7 G/DL (ref 3.2–4.9)
ALBUMIN/GLOB SERPL: 1.9 G/DL
ALP SERPL-CCNC: 146 U/L (ref 30–99)
ALT SERPL-CCNC: 19 U/L (ref 2–50)
ANION GAP SERPL CALC-SCNC: 11 MMOL/L (ref 7–16)
AST SERPL-CCNC: 16 U/L (ref 12–45)
BILIRUB SERPL-MCNC: 0.7 MG/DL (ref 0.1–1.5)
BUN SERPL-MCNC: 23 MG/DL (ref 8–22)
CALCIUM SERPL-MCNC: 9.2 MG/DL (ref 8.5–10.5)
CHLORIDE SERPL-SCNC: 101 MMOL/L (ref 96–112)
CHOLEST SERPL-MCNC: 142 MG/DL (ref 100–199)
CO2 SERPL-SCNC: 25 MMOL/L (ref 20–33)
CREAT SERPL-MCNC: 1.06 MG/DL (ref 0.5–1.4)
CREAT UR-MCNC: 82.91 MG/DL
EST. AVERAGE GLUCOSE BLD GHB EST-MCNC: 148 MG/DL
FASTING STATUS PATIENT QL REPORTED: NORMAL
GLOBULIN SER CALC-MCNC: 2.5 G/DL (ref 1.9–3.5)
GLUCOSE SERPL-MCNC: 123 MG/DL (ref 65–99)
HBA1C MFR BLD: 6.8 % (ref 0–5.6)
HDLC SERPL-MCNC: 39 MG/DL
LDLC SERPL CALC-MCNC: 76 MG/DL
MICROALBUMIN UR-MCNC: <1.2 MG/DL
MICROALBUMIN/CREAT UR: NORMAL MG/G (ref 0–30)
POTASSIUM SERPL-SCNC: 4.4 MMOL/L (ref 3.6–5.5)
PROT SERPL-MCNC: 7.2 G/DL (ref 6–8.2)
SODIUM SERPL-SCNC: 137 MMOL/L (ref 135–145)
T4 FREE SERPL-MCNC: 1.24 NG/DL (ref 0.93–1.7)
TRIGL SERPL-MCNC: 137 MG/DL (ref 0–149)
TSH SERPL DL<=0.005 MIU/L-ACNC: 3.99 UIU/ML (ref 0.38–5.33)

## 2020-10-21 ENCOUNTER — TELEMEDICINE (OUTPATIENT)
Dept: ENDOCRINOLOGY | Facility: MEDICAL CENTER | Age: 70
End: 2020-10-21
Attending: INTERNAL MEDICINE
Payer: MEDICARE

## 2020-10-21 DIAGNOSIS — E04.2 NON-TOXIC MULTINODULAR GOITER: ICD-10-CM

## 2020-10-21 DIAGNOSIS — Z79.84 LONG TERM (CURRENT) USE OF ORAL HYPOGLYCEMIC DRUGS: ICD-10-CM

## 2020-10-21 DIAGNOSIS — E11.9 CONTROLLED TYPE 2 DIABETES MELLITUS WITHOUT COMPLICATION, WITHOUT LONG-TERM CURRENT USE OF INSULIN (HCC): ICD-10-CM

## 2020-10-21 DIAGNOSIS — E78.2 MIXED HYPERLIPIDEMIA: ICD-10-CM

## 2020-10-21 PROCEDURE — 99214 OFFICE O/P EST MOD 30 MIN: CPT | Mod: 95,CR | Performed by: INTERNAL MEDICINE

## 2020-10-21 RX ORDER — SIMVASTATIN 40 MG
TABLET ORAL
Qty: 100 TAB | Refills: 2 | Status: SHIPPED | OUTPATIENT
Start: 2020-10-21 | End: 2021-09-22 | Stop reason: SDUPTHER

## 2020-10-21 RX ORDER — EMPAGLIFLOZIN AND METFORMIN HYDROCHLORIDE 12.5; 1 MG/1; MG/1
1 TABLET ORAL 2 TIMES DAILY
Qty: 60 TAB | Refills: 6 | Status: SHIPPED | OUTPATIENT
Start: 2020-10-21 | End: 2021-02-22 | Stop reason: SDUPTHER

## 2020-10-22 NOTE — PROGRESS NOTES
CHIEF COMPLAINT: Patient is here for follow up of Type 2 Diabetes Mellitus.  Patient was presented for a telehealth consultation via secure and encrypted videoconferencing technology. This encounter was conducted via Zoom . Verbal consent was obtained. Patient's identity was verified.      HPI:     Melody Rodriguez is a 69 y.o. female with Type 2 Diabetes Mellitus here for follow up.    Labs from Oct 9, 2020 show a1c is 6.8%  Labs from December 18, 2019 showed a well-controlled A1c of 6.5%.    She was previously a patient of KALEB Brock and this is my first time seeing her today      She is currently on Synjardy 12.5/1000 mg twice a day   She denies hyperglycemia and hypoglycemia.  She is doing well.  She denies having any yeast infections.        She also has a history of nontoxic multinodular goiter with last ultrasound completed on June 17, 2019 showing:   A dominant 4 cm solid nodule in the right lower lobe and   2 additional solid nodules more measuring more than 1.5 cm located on the left mid lobe and left lower lobe TR 3    Biopsy of the dominant 4 cm right mid lobe nodule on July 11, 2019 came back as nondiagnostic due to inadequate cellularity  Biopsy of the left mid lobe on July 11, 2019 nodule came back as benign      Her thyroid function at baseline is normal.   Last TSH was 3.9 with a normal free T4 1.2 on Oct 2020.  She does report difficulty swallowing and occasional hoarseness.  She admits to a history of a Schatzi ring in her esophagus      She has hyperlipidemia and is taking simvastatin 40 mg daily.  She is tolerating this well denies muscle aches and cramps.  Last LDL cholesterol was controlled at 76 on Oct 2020        BG Diary:  Patient did not bring glucose meter despite request to do so    Weight has been stable    Diabetes Complications   Retinopathy: No known retinopathy.  Last eye exam:Jan 2020 POC eye exam  Neuropathy: Denies paresthesias or numbness in hands or feet. Denies  any foot wounds.  Exercise: Minimal.  Diet: Fair.  Patient's medications, allergies, and social histories were reviewed and updated as appropriate.    ROS:     CONS:     No fever, no chills   EYES:     No diplopia, no blurry vision   CV:           No chest pain, no palpitations   PULM:     No SOB, no cough, no hemoptysis.   GI:            No nausea, no vomiting, no diarrhea, no constipation   ENDO:     No polyuria, no polydipsia, no heat intolerance, no cold intolerance       Past Medical History:  Problem List:  2020-01: Non-toxic multinodular goiter  2019-07: Risk for falls  2019-03: Dysphagia  2017-01: Controlled type 2 diabetes mellitus without complication,   without long-term current use of insulin ([facility])  2017-01: Hyperlipidemia  2017-01: Multiple sclerosis ([facility])  2017-01: Depression  2017-01: Schatzki's ring  2017-01: Hiatal hernia      Past Surgical History:  No past surgical history on file.     Allergies:  Patient has no known allergies.     Social History:  Social History     Tobacco Use   • Smoking status: Never Smoker   • Smokeless tobacco: Never Used   Substance Use Topics   • Alcohol use: Yes     Alcohol/week: 0.6 - 1.2 oz     Types: 1 - 2 Glasses of wine per week     Frequency: 2-4 times a month     Drinks per session: 1 or 2     Binge frequency: Never   • Drug use: No     Comment: CBD stick no THC        Family History:   family history includes ADHD in her son; Bipolar disorder in her daughter; Cancer in her brother and maternal grandmother; Colon Cancer in her mother; Depression in her son; Heart Attack in her father; Heart Disease in her father and mother; Hypertension in her son; No Known Problems in her maternal grandfather, paternal grandfather, paternal grandmother, and sister; Other in her brother and daughter.      PHYSICAL EXAM:   OBJECTIVE:  Vital signs: There were no vitals taken for this visit.  GENERAL: Well-developed, well-nourished in no apparent distress.   EYE:  No ocular  asymmetry, PERRLA  HENT: Pink, moist mucous membranes.    NECK: No thyromegaly.   CARDIOVASCULAR:  No murmurs  LUNGS: Clear breath sounds  ABDOMEN: Soft, nontender   EXTREMITIES: No clubbing, cyanosis, or edema.   NEUROLOGICAL: No gross focal motor abnormalities   LYMPH: No cervical adenopathy seen  SKIN: No rashes, lesions.   Visual Inspection: Feet without maceration, ulcers, or fissures.    Labs:  Lab Results   Component Value Date/Time    HBA1C 6.8 (H) 10/09/2020 06:26 AM        Lab Results   Component Value Date/Time    WBC 8.2 01/29/2020 02:01 PM    RBC 5.00 01/29/2020 02:01 PM    HEMOGLOBIN 15.1 01/29/2020 02:01 PM    MCV 91.8 01/29/2020 02:01 PM    MCH 30.2 01/29/2020 02:01 PM    MCHC 32.9 (L) 01/29/2020 02:01 PM    RDW 43.7 01/29/2020 02:01 PM    MPV 9.2 01/29/2020 02:01 PM       Lab Results   Component Value Date/Time    SODIUM 137 10/09/2020 06:26 AM    POTASSIUM 4.4 10/09/2020 06:26 AM    CHLORIDE 101 10/09/2020 06:26 AM    CO2 25 10/09/2020 06:26 AM    ANION 11.0 10/09/2020 06:26 AM    GLUCOSE 123 (H) 10/09/2020 06:26 AM    BUN 23 (H) 10/09/2020 06:26 AM    CREATININE 1.06 10/09/2020 06:26 AM    CALCIUM 9.2 10/09/2020 06:26 AM    ASTSGOT 16 10/09/2020 06:26 AM    ALTSGPT 19 10/09/2020 06:26 AM    TBILIRUBIN 0.7 10/09/2020 06:26 AM    ALBUMIN 4.7 10/09/2020 06:26 AM    TOTPROTEIN 7.2 10/09/2020 06:26 AM    GLOBULIN 2.5 10/09/2020 06:26 AM    AGRATIO 1.9 10/09/2020 06:26 AM       Lab Results   Component Value Date/Time    CHOLSTRLTOT 179 06/12/2019 0813    TRIGLYCERIDE 145 06/12/2019 0813    HDL 39 (A) 06/12/2019 0813     (H) 06/12/2019 0813       Lab Results   Component Value Date/Time    MALBCRT see below 10/09/2020 06:26 AM    MICROALBUR <1.2 10/09/2020 06:26 AM        Lab Results   Component Value Date/Time    TSHULTRASEN 3.680 06/12/2019 0813     No results found for: FREEDIR  No results found for: FREET3  No results found for: THYSTIMIG        ASSESSMENT/PLAN:     1. Controlled type 2  diabetes mellitus without complication, without long-term current use of insulin (HCC)  Well-controlled at baseline  continue Synjardy XR 12.5/1000 mg twice a day  Follow low carb diet  Follow up in 3 months    2. Non-toxic multinodular goiter  Stable  She is still reporting intermittent compressive symptoms such as dysphagia  She did a nondiagnostic biopsy for the R lobe 4 cm nodule  I am referring for repeat US first  We will discuss test results on follow-up      3. Mixed hyperlipidemia  Controlled   Continue simvastatin  Follow low-fat diet  Repeat fasting lipids with next labs in 12 months      4. Long term (current) use of oral hypoglycemic drugs  Patient is oral agents for t2dm mgt      Return in about 3 months (around 1/21/2021).      Thank you kindly for allowing me to participate in the diabetes care plan for this patient.    Swapnil Cat MD, FACE, [facility]  01/29/20    CC:   Laya Cornelius M.D.

## 2020-11-21 ENCOUNTER — HOSPITAL ENCOUNTER (OUTPATIENT)
Dept: RADIOLOGY | Facility: MEDICAL CENTER | Age: 70
End: 2020-11-21
Attending: INTERNAL MEDICINE
Payer: MEDICARE

## 2020-11-21 DIAGNOSIS — E04.2 NON-TOXIC MULTINODULAR GOITER: ICD-10-CM

## 2020-11-21 PROCEDURE — 76536 US EXAM OF HEAD AND NECK: CPT

## 2020-11-25 ENCOUNTER — OFFICE VISIT (OUTPATIENT)
Dept: MEDICAL GROUP | Facility: MEDICAL CENTER | Age: 70
End: 2020-11-25
Payer: MEDICARE

## 2020-11-25 VITALS
BODY MASS INDEX: 27.42 KG/M2 | OXYGEN SATURATION: 96 % | SYSTOLIC BLOOD PRESSURE: 130 MMHG | HEART RATE: 80 BPM | WEIGHT: 149 LBS | HEIGHT: 62 IN | DIASTOLIC BLOOD PRESSURE: 70 MMHG | TEMPERATURE: 96.4 F | RESPIRATION RATE: 16 BRPM

## 2020-11-25 DIAGNOSIS — N18.31 STAGE 3A CHRONIC KIDNEY DISEASE: ICD-10-CM

## 2020-11-25 DIAGNOSIS — G35 MULTIPLE SCLEROSIS (HCC): ICD-10-CM

## 2020-11-25 DIAGNOSIS — E78.2 MIXED HYPERLIPIDEMIA: ICD-10-CM

## 2020-11-25 DIAGNOSIS — E11.9 CONTROLLED TYPE 2 DIABETES MELLITUS WITHOUT COMPLICATION, WITHOUT LONG-TERM CURRENT USE OF INSULIN (HCC): ICD-10-CM

## 2020-11-25 DIAGNOSIS — F33.41 RECURRENT MAJOR DEPRESSIVE DISORDER, IN PARTIAL REMISSION (HCC): ICD-10-CM

## 2020-11-25 PROCEDURE — 99214 OFFICE O/P EST MOD 30 MIN: CPT | Performed by: FAMILY MEDICINE

## 2020-11-25 ASSESSMENT — PATIENT HEALTH QUESTIONNAIRE - PHQ9
SUM OF ALL RESPONSES TO PHQ QUESTIONS 1-9: 0
6. FEELING BAD ABOUT YOURSELF - OR THAT YOU ARE A FAILURE OR HAVE LET YOURSELF OR YOUR FAMILY DOWN: NOT AL ALL
8. MOVING OR SPEAKING SO SLOWLY THAT OTHER PEOPLE COULD HAVE NOTICED. OR THE OPPOSITE, BEING SO FIGETY OR RESTLESS THAT YOU HAVE BEEN MOVING AROUND A LOT MORE THAN USUAL: NOT AT ALL
3. TROUBLE FALLING OR STAYING ASLEEP OR SLEEPING TOO MUCH: NOT AT ALL
SUM OF ALL RESPONSES TO PHQ9 QUESTIONS 1 AND 2: 0
4. FEELING TIRED OR HAVING LITTLE ENERGY: NOT AT ALL
9. THOUGHTS THAT YOU WOULD BE BETTER OFF DEAD, OR OF HURTING YOURSELF: NOT AT ALL
1. LITTLE INTEREST OR PLEASURE IN DOING THINGS: NOT AT ALL
2. FEELING DOWN, DEPRESSED, IRRITABLE, OR HOPELESS: NOT AT ALL
7. TROUBLE CONCENTRATING ON THINGS, SUCH AS READING THE NEWSPAPER OR WATCHING TELEVISION: NOT AT ALL
5. POOR APPETITE OR OVEREATING: NOT AT ALL

## 2020-11-25 ASSESSMENT — FIBROSIS 4 INDEX: FIB4 SCORE: 0.97

## 2020-11-25 NOTE — PROGRESS NOTES
CC: Diabetes, MS, hyperlipidemia, depression    HPI:   Melody presents today to discuss the following medical issues:    Controlled type 2 diabetes mellitus without complication, without long-term current use of insulin ([facility])  Adequately controlled.  Most recent A1c was 6.8.  Denies polyuria and polydipsia, and hypoglycemic episodes..  She has been on Synjardy 12.5- 1000 mg daily.     Multiple sclerosis (HCC)  Patient was diagnosed in 2006.  She has been physically active.Patient used to be on disease modifying drug, Capaxone injection 40 mg 3 times weekly, that currently she is not a good insurance does not cover it.She has been following up with neurologist Dr Oral Guzman .    Mixed hyperlipidemia  She has been tolerating the statin. Denies muscle pain LFTs has been normal, currently on simvastatin 40 mg daily.  No side effects or complication.     Major depressive disorder with single episode, in partial remission ([facility])  Patient stated her mood has been fluctuating, but she has been doing fine on Paxil 40 mg daily, no suicidal ideation.    Stage 3a chronic kidney disease  Patient has been asymptomatic.  GFR is 51, normal creatinine        Patient Active Problem List    Diagnosis Date Noted   • Long term (current) use of oral hypoglycemic drugs 10/21/2020   • Non-toxic multinodular goiter 01/29/2020   • Risk for falls 07/12/2019   • Dysphagia 03/29/2019   • Controlled type 2 diabetes mellitus without complication, without long-term current use of insulin ([facility]) 01/06/2017   • Mixed hyperlipidemia 01/06/2017   • Multiple sclerosis ([facility]) 01/06/2017   • Depression 01/06/2017   • Schatzki's ring 01/06/2017   • Hiatal hernia 01/06/2017       Current Outpatient Medications   Medication Sig Dispense Refill   • simvastatin (ZOCOR) 40 MG Tab take one tablet by mouth every evening 100 Tab 2   • glucose blood (FREESTYLE TEST STRIPS) strip Use 3 times daily 100 Strip 2   • FreeStyle Lancets Misc Use 3 times a day. 100  "Each 3   • fluconazole (DIFLUCAN) 150 MG tablet Take 1 Tab by mouth every day. 3 Tab 2   • lamoTRIgine (LAMICTAL) 100 MG Tab Take 100 mg by mouth every day.     • paroxetine (PAXIL) 40 MG tablet Take 1 Tab by mouth every day. 30 Tab 3   • B Complex Vitamins (B COMPLEX PO) Take  by mouth. Stress formulary     • Glatiramer Acetate 40 MG/ML Solution Prefilled Syringe Inject 1 Dose as instructed. 3 times a week     • aspirin 81 MG tablet Take 81 mg by mouth every day.     • Cyanocobalamin (B-12) 2000 MCG Tab Take 1 Tab by mouth every day.     • Calcium Citrate-Vitamin D (CALCIUM + D PO) Take 1 Tab by mouth 2 times a day.     • Coenzyme Q10 (CO Q-10) 300 MG Cap Take 1 Cap by mouth every day.     • Cholecalciferol (D3 ADULT PO) Take 2 Tabs by mouth every day.       No current facility-administered medications for this visit.          Allergies as of 11/25/2020   • (No Known Allergies)        ROS: Denies any chest pain, Shortness of breath, Changes bowel or bladder, Lower extremity edema.    Physical Exam:  /70 (BP Location: Right arm, Patient Position: Sitting, BP Cuff Size: Adult)   Pulse 80   Temp (!) 35.8 °C (96.4 °F) (Temporal)   Resp 16   Ht 1.575 m (5' 2\")   Wt 67.6 kg (149 lb)   SpO2 96%   BMI 27.25 kg/m²   Gen.: Well-developed, well-nourished, no apparent distress,pleasant and cooperative with the examination  Skin:  Warm and dry with good turgor. No rashes or suspicious lesions in visible areas  HEENT:Sinuses nontender with palpation, TMs clear, nares patent with pink mucosa and clear rhinorrhea,no septal deviation ,polyps or lesions. lips without lesions, oropharynx clear.  Neck: Trachea midline,no masses or adenopathy. No JVD.  Cor: Regular rate and rhythm without murmur, gallop or rub.  Lungs: Respirations unlabored.Clear to auscultation with equal breath sounds bilaterally. No wheezes, rhonchi.  Extremities: No cyanosis, clubbing or edema.        Assessment and Plan.   70 y.o. female     1. " Controlled type 2 diabetes mellitus without complication, without long-term current use of insulin (HCC)  Most recent A1c was 6.8  Continue on Synjardy 12.5-1000 mg daily    2. Multiple sclerosis (HCC)  Stable.  Asymptomatic.  Currently not on medication(used to take Copaxone injection 40 mg 3 times weekly but she stopped it because it is not covered by insurance)  Continue follow-up with neurology    3. Mixed hyperlipidemia  He has been tolerating the statin. Denies muscle pain LFTs has been normal  Continue on simvastatin 40 mg daily    4. Recurrent major depressive disorder, in partial remission (HCC)  Stable.  Continue on Paxil 40 mg daily.    5. Stage 3a chronic kidney disease  GFR is 51, normal creatinine  Recommend hydration and avoiding nephrotoxic medication  Continue to monitor kidney function

## 2020-11-30 ENCOUNTER — TELEPHONE (OUTPATIENT)
Dept: MEDICAL GROUP | Facility: MEDICAL CENTER | Age: 70
End: 2020-11-30

## 2020-11-30 DIAGNOSIS — M17.11 PRIMARY OSTEOARTHRITIS OF RIGHT KNEE: ICD-10-CM

## 2020-12-10 ENCOUNTER — PATIENT MESSAGE (OUTPATIENT)
Dept: ENDOCRINOLOGY | Facility: MEDICAL CENTER | Age: 70
End: 2020-12-10

## 2020-12-11 NOTE — TELEPHONE ENCOUNTER
From: Melody Rodriguez  To: Swapnil Cat M.D.  Sent: 12/10/2020 10:05 AM PST  Subject: Test Result Question    I called the office 10 days ago to schedule an appointment to review my latest thyroid ultrasound. No one has called back. The message says not to leave multiple messages but I think perhaps mine was lost.     My primary care physician told if we hadn't discussed the test within 2 weeks he would schedule a biopsy. I'm wondering if I should just have him do that.     [place]

## 2020-12-15 PROBLEM — N18.31 STAGE 3A CHRONIC KIDNEY DISEASE: Status: ACTIVE | Noted: 2020-12-15

## 2020-12-21 ENCOUNTER — APPOINTMENT (OUTPATIENT)
Dept: PHYSICAL THERAPY | Facility: MEDICAL CENTER | Age: 70
End: 2020-12-21
Attending: FAMILY MEDICINE
Payer: MEDICARE

## 2020-12-23 ENCOUNTER — APPOINTMENT (OUTPATIENT)
Dept: PHYSICAL THERAPY | Facility: MEDICAL CENTER | Age: 70
End: 2020-12-23
Payer: MEDICARE

## 2020-12-28 ENCOUNTER — APPOINTMENT (OUTPATIENT)
Dept: PHYSICAL THERAPY | Facility: MEDICAL CENTER | Age: 70
End: 2020-12-28
Attending: FAMILY MEDICINE
Payer: MEDICARE

## 2020-12-29 ENCOUNTER — TELEMEDICINE (OUTPATIENT)
Dept: ENDOCRINOLOGY | Facility: MEDICAL CENTER | Age: 70
End: 2020-12-29
Attending: INTERNAL MEDICINE
Payer: MEDICARE

## 2020-12-29 DIAGNOSIS — Z79.84 LONG TERM (CURRENT) USE OF ORAL HYPOGLYCEMIC DRUGS: ICD-10-CM

## 2020-12-29 DIAGNOSIS — E04.2 NON-TOXIC MULTINODULAR GOITER: ICD-10-CM

## 2020-12-29 DIAGNOSIS — E11.9 CONTROLLED TYPE 2 DIABETES MELLITUS WITHOUT COMPLICATION, WITHOUT LONG-TERM CURRENT USE OF INSULIN (HCC): ICD-10-CM

## 2020-12-29 DIAGNOSIS — E78.2 MIXED HYPERLIPIDEMIA: ICD-10-CM

## 2020-12-29 PROCEDURE — 99214 OFFICE O/P EST MOD 30 MIN: CPT | Mod: 95,CR | Performed by: INTERNAL MEDICINE

## 2020-12-29 PROCEDURE — 999999 HB NO CHARGE: Mod: 95 | Performed by: INTERNAL MEDICINE

## 2020-12-29 NOTE — PROGRESS NOTES
CHIEF COMPLAINT: Patient is here for follow up of Type 2 Diabetes Mellitus.  Patient was presented for a telehealth consultation via secure and encrypted videoconferencing technology. This encounter was conducted via Zoom . Verbal consent was obtained. Patient's identity was verified.      HPI:     Melody Rodriguez is a 69 y.o. female with Type 2 Diabetes Mellitus here for follow up.    Labs from Oct 9, 2020 show a1c is 6.8%  Labs from December 18, 2019 showed a well-controlled A1c of 6.5%.    She was previously a patient of KALEB Brock and this is my first time seeing her today    In summary the patient has well-controlled type 2 diabetes on Synjardy 12.5/1000 mg twice a day I was supposed to see her again next year but we schedule her appointment today to go over her thyroid ultrasound        She also has a history of nontoxic multinodular goiter with  A dominant 4 cm solid nodule in the right lower lobe and   2 additional solid nodules more measuring more than 1.5 cm located on the left mid lobe and left lower lobe TR 3    Biopsy of the dominant 4 cm right mid lobe nodule on July 11, 2019 came back as nondiagnostic due to inadequate cellularity  Biopsy of the left mid lobe on July 11, 2019 nodule came back as benign      Recent thyroid ultrasound on November 21, 2020 showed stability of the previously biopsied but nondiagnostic 4 cm right mid lobe nodule and there is an additional finding of a new 2 cm nodule in the left upper lobe that was not detected on the previous ultrasound        Her thyroid function at baseline is normal.   Last TSH was 3.9 with a normal free T4 1.2 on Oct 2020.  She does report difficulty swallowing and occasional hoarseness.  She admits to a history of a Schatzi ring in her esophagus      She has hyperlipidemia and is taking simvastatin 40 mg daily.  She is tolerating this well denies muscle aches and cramps.  Last LDL cholesterol was controlled at 76 on Oct 2020        BG  Diary:  Patient did not bring glucose meter despite request to do so    Weight has been stable    Diabetes Complications   Retinopathy: No known retinopathy.  Last eye exam:Jan 2020 POC eye exam  Neuropathy: Denies paresthesias or numbness in hands or feet. Denies any foot wounds.  Exercise: Minimal.  Diet: Fair.  Patient's medications, allergies, and social histories were reviewed and updated as appropriate.    ROS:     CONS:     No fever, no chills   EYES:     No diplopia, no blurry vision   CV:           No chest pain, no palpitations   PULM:     No SOB, no cough, no hemoptysis.   GI:            No nausea, no vomiting, no diarrhea, no constipation   ENDO:     No polyuria, no polydipsia, no heat intolerance, no cold intolerance       Past Medical History:  Problem List:  2020-12: Stage 3a chronic kidney disease  2020-10: Long term (current) use of oral hypoglycemic drugs  2020-01: Non-toxic multinodular goiter  2019-07: Risk for falls  2019-03: Dysphagia  2017-01: Controlled type 2 diabetes mellitus without complication,   without long-term current use of insulin ([facility])  2017-01: Mixed hyperlipidemia  2017-01: Multiple sclerosis (HCC)  2017-01: Depression  2017-01: Schatzki's ring  2017-01: Hiatal hernia      Past Surgical History:  No past surgical history on file.     Allergies:  Patient has no known allergies.     Social History:  Social History     Tobacco Use   • Smoking status: Never Smoker   • Smokeless tobacco: Never Used   Substance Use Topics   • Alcohol use: Yes     Alcohol/week: 0.6 - 1.2 oz     Types: 1 - 2 Glasses of wine per week     Frequency: 2-4 times a month     Drinks per session: 1 or 2     Binge frequency: Never   • Drug use: No     Comment: CBD stick no THC        Family History:   family history includes ADHD in her son; Bipolar disorder in her daughter; Cancer in her brother and maternal grandmother; Colon Cancer in her mother; Depression in her son; Heart Attack in her father; Heart  Disease in her father and mother; Hypertension in her son; No Known Problems in her maternal grandfather, paternal grandfather, paternal grandmother, and sister; Other in her brother and daughter.      PHYSICAL EXAM:   OBJECTIVE:  Vital signs: There were no vitals taken for this visit.  GENERAL: Well-developed, well-nourished in no apparent distress.   EYE:  No ocular asymmetry, PERRLA  HENT: Pink, moist mucous membranes.    NECK: No thyromegaly.   CARDIOVASCULAR:  No murmurs  LUNGS: Clear breath sounds  ABDOMEN: Soft, nontender   EXTREMITIES: No clubbing, cyanosis, or edema.   NEUROLOGICAL: No gross focal motor abnormalities   LYMPH: No cervical adenopathy seen  SKIN: No rashes, lesions.   Visual Inspection: Feet without maceration, ulcers, or fissures.    Labs:  Lab Results   Component Value Date/Time    HBA1C 6.8 (H) 10/09/2020 06:26 AM        Lab Results   Component Value Date/Time    WBC 8.2 01/29/2020 02:01 PM    RBC 5.00 01/29/2020 02:01 PM    HEMOGLOBIN 15.1 01/29/2020 02:01 PM    MCV 91.8 01/29/2020 02:01 PM    MCH 30.2 01/29/2020 02:01 PM    MCHC 32.9 (L) 01/29/2020 02:01 PM    RDW 43.7 01/29/2020 02:01 PM    MPV 9.2 01/29/2020 02:01 PM       Lab Results   Component Value Date/Time    SODIUM 137 10/09/2020 06:26 AM    POTASSIUM 4.4 10/09/2020 06:26 AM    CHLORIDE 101 10/09/2020 06:26 AM    CO2 25 10/09/2020 06:26 AM    ANION 11.0 10/09/2020 06:26 AM    GLUCOSE 123 (H) 10/09/2020 06:26 AM    BUN 23 (H) 10/09/2020 06:26 AM    CREATININE 1.06 10/09/2020 06:26 AM    CALCIUM 9.2 10/09/2020 06:26 AM    ASTSGOT 16 10/09/2020 06:26 AM    ALTSGPT 19 10/09/2020 06:26 AM    TBILIRUBIN 0.7 10/09/2020 06:26 AM    ALBUMIN 4.7 10/09/2020 06:26 AM    TOTPROTEIN 7.2 10/09/2020 06:26 AM    GLOBULIN 2.5 10/09/2020 06:26 AM    AGRATIO 1.9 10/09/2020 06:26 AM       Lab Results   Component Value Date/Time    CHOLSTRLTOT 179 06/12/2019 0813    TRIGLYCERIDE 145 06/12/2019 0813    HDL 39 (A) 06/12/2019 0813     (H)  06/12/2019 0813       Lab Results   Component Value Date/Time    MALBCRT see below 10/09/2020 06:26 AM    MICROALBUR <1.2 10/09/2020 06:26 AM        Lab Results   Component Value Date/Time    TSHULTRASEN 3.680 06/12/2019 0813     No results found for: FREEDIR  No results found for: FREET3  No results found for: THYSTIMIG        ASSESSMENT/PLAN:     1. Controlled type 2 diabetes mellitus without complication, without long-term current use of insulin (HCC)  Well-controlled at baseline  continue Synjardy XR 12.5/1000 mg twice a day  Follow low carb diet  Follow-up as scheduled next year for diabetes    2. Non-toxic multinodular goiter  Stable  She is still reporting intermittent compressive symptoms such as dysphagia  She did a nondiagnostic biopsy for the R lobe 4 cm nodule  I am scheduling her for repeat biopsy of the 4 cm nodule in the right lobe because it was nondiagnostic  I am also scheduling her for a biopsy of the 2 cm left upper lobe nodule which is a new finding on her new ultrasound  If her biopsy comes back as benign we will continue observation of her nontoxic multinodular goiter  If her biopsy comes back as malignant then I will schedule her for thyroid surgery and refer her to a good thyroid surgeon      3. Mixed hyperlipidemia  Controlled   Continue simvastatin  Follow low-fat diet  Repeat fasting lipids with next labs in 12 months      4. Long term (current) use of oral hypoglycemic drugs  Patient is oral agents for t2dm mgt      Return in about 3 months (around 3/29/2021).      Thank you kindly for allowing me to participate in the diabetes care plan for this patient.    Swapnil Cat MD, FACE, [facility]  01/29/20    CC:   Laya Cornelius M.D.

## 2020-12-30 ENCOUNTER — APPOINTMENT (OUTPATIENT)
Dept: PHYSICAL THERAPY | Facility: MEDICAL CENTER | Age: 70
End: 2020-12-30
Attending: FAMILY MEDICINE
Payer: MEDICARE

## 2021-01-05 ENCOUNTER — APPOINTMENT (OUTPATIENT)
Dept: PHYSICAL THERAPY | Facility: MEDICAL CENTER | Age: 71
End: 2021-01-05
Payer: MEDICARE

## 2021-01-12 ENCOUNTER — HOSPITAL ENCOUNTER (OUTPATIENT)
Dept: RADIOLOGY | Facility: MEDICAL CENTER | Age: 71
End: 2021-01-12
Attending: INTERNAL MEDICINE
Payer: MEDICARE

## 2021-01-12 LAB — CYTOLOGY REG CYTOL: NORMAL

## 2021-01-12 PROCEDURE — 10006 FNA BX W/US GDN EA ADDL: CPT

## 2021-01-12 PROCEDURE — 88112 CYTOPATH CELL ENHANCE TECH: CPT | Mod: 91

## 2021-01-12 NOTE — PROGRESS NOTES
IR RN note:     Bilateral thyroid FNA biopsy by MD Oliveros       No sedation given for procedure. Patient appeared to tolerate procedure, patient awake and talking post procedure.    All questions answered, pt discharged in stable condition.    Cytolyt x 2, afirma x2 sample sent to lab

## 2021-01-15 ENCOUNTER — TELEPHONE (OUTPATIENT)
Dept: ENDOCRINOLOGY | Facility: MEDICAL CENTER | Age: 71
End: 2021-01-15

## 2021-01-15 DIAGNOSIS — Z23 NEED FOR VACCINATION: ICD-10-CM

## 2021-01-15 NOTE — TELEPHONE ENCOUNTER
----- Message from Swapnil Cat M.D. sent at 1/14/2021  1:03 PM PST -----  Patient needs a follow-up in 3 months for diabetes management please contact patient

## 2021-01-15 NOTE — TELEPHONE ENCOUNTER
Phone Number Called: [phone] (home)     Call outcome: Spoke to patient regarding message below.    Message: Contacted patient and I scheduled her for the end of march for her 3mo follow up.

## 2021-01-23 ENCOUNTER — IMMUNIZATION (OUTPATIENT)
Dept: FAMILY PLANNING/WOMEN'S HEALTH CLINIC | Facility: IMMUNIZATION CENTER | Age: 71
End: 2021-01-23
Attending: INTERNAL MEDICINE
Payer: MEDICARE

## 2021-01-23 DIAGNOSIS — Z23 NEED FOR VACCINATION: ICD-10-CM

## 2021-01-23 DIAGNOSIS — Z23 ENCOUNTER FOR VACCINATION: Primary | ICD-10-CM

## 2021-01-23 PROCEDURE — 0001A PFIZER SARS-COV-2 VACCINE: CPT

## 2021-01-23 PROCEDURE — 91300 PFIZER SARS-COV-2 VACCINE: CPT

## 2021-02-13 ENCOUNTER — APPOINTMENT (OUTPATIENT)
Dept: FAMILY PLANNING/WOMEN'S HEALTH CLINIC | Facility: IMMUNIZATION CENTER | Age: 71
End: 2021-02-13
Attending: INTERNAL MEDICINE
Payer: MEDICARE

## 2021-02-13 ENCOUNTER — IMMUNIZATION (OUTPATIENT)
Dept: FAMILY PLANNING/WOMEN'S HEALTH CLINIC | Facility: IMMUNIZATION CENTER | Age: 71
End: 2021-02-13
Payer: MEDICARE

## 2021-02-13 DIAGNOSIS — Z23 ENCOUNTER FOR VACCINATION: Primary | ICD-10-CM

## 2021-02-13 PROCEDURE — 0002A PFIZER SARS-COV-2 VACCINE: CPT

## 2021-02-13 PROCEDURE — 91300 PFIZER SARS-COV-2 VACCINE: CPT

## 2021-02-22 DIAGNOSIS — E11.9 CONTROLLED TYPE 2 DIABETES MELLITUS WITHOUT COMPLICATION, WITHOUT LONG-TERM CURRENT USE OF INSULIN (HCC): ICD-10-CM

## 2021-02-22 RX ORDER — EMPAGLIFLOZIN AND METFORMIN HYDROCHLORIDE 12.5; 1 MG/1; MG/1
1 TABLET ORAL 2 TIMES DAILY
Qty: 200 TABLET | Refills: 3 | Status: SHIPPED | OUTPATIENT
Start: 2021-02-22 | End: 2021-03-24

## 2021-03-10 DIAGNOSIS — E11.9 CONTROLLED TYPE 2 DIABETES MELLITUS WITHOUT COMPLICATION, WITHOUT LONG-TERM CURRENT USE OF INSULIN (HCC): ICD-10-CM

## 2021-03-10 DIAGNOSIS — E04.2 NON-TOXIC MULTINODULAR GOITER: ICD-10-CM

## 2021-03-10 DIAGNOSIS — Z79.84 LONG TERM (CURRENT) USE OF ORAL HYPOGLYCEMIC DRUGS: ICD-10-CM

## 2021-03-10 DIAGNOSIS — E78.2 MIXED HYPERLIPIDEMIA: ICD-10-CM

## 2021-03-16 ENCOUNTER — HOSPITAL ENCOUNTER (OUTPATIENT)
Dept: LAB | Facility: MEDICAL CENTER | Age: 71
End: 2021-03-16
Attending: INTERNAL MEDICINE
Payer: MEDICARE

## 2021-03-16 DIAGNOSIS — E04.2 NON-TOXIC MULTINODULAR GOITER: ICD-10-CM

## 2021-03-16 DIAGNOSIS — Z79.84 LONG TERM (CURRENT) USE OF ORAL HYPOGLYCEMIC DRUGS: ICD-10-CM

## 2021-03-16 DIAGNOSIS — E78.2 MIXED HYPERLIPIDEMIA: ICD-10-CM

## 2021-03-16 DIAGNOSIS — E11.9 CONTROLLED TYPE 2 DIABETES MELLITUS WITHOUT COMPLICATION, WITHOUT LONG-TERM CURRENT USE OF INSULIN (HCC): ICD-10-CM

## 2021-03-16 LAB
ALBUMIN SERPL BCP-MCNC: 4.6 G/DL (ref 3.2–4.9)
ALBUMIN/GLOB SERPL: 1.6 G/DL
ALP SERPL-CCNC: 136 U/L (ref 30–99)
ALT SERPL-CCNC: 23 U/L (ref 2–50)
ANION GAP SERPL CALC-SCNC: 13 MMOL/L (ref 7–16)
AST SERPL-CCNC: 19 U/L (ref 12–45)
BASOPHILS # BLD AUTO: 0.5 % (ref 0–1.8)
BASOPHILS # BLD: 0.04 K/UL (ref 0–0.12)
BILIRUB SERPL-MCNC: 0.6 MG/DL (ref 0.1–1.5)
BUN SERPL-MCNC: 20 MG/DL (ref 8–22)
CALCIUM SERPL-MCNC: 9.3 MG/DL (ref 8.5–10.5)
CHLORIDE SERPL-SCNC: 102 MMOL/L (ref 96–112)
CHOLEST SERPL-MCNC: 152 MG/DL (ref 100–199)
CO2 SERPL-SCNC: 24 MMOL/L (ref 20–33)
CREAT SERPL-MCNC: 0.95 MG/DL (ref 0.5–1.4)
CREAT UR-MCNC: 113.61 MG/DL
EOSINOPHIL # BLD AUTO: 0.19 K/UL (ref 0–0.51)
EOSINOPHIL NFR BLD: 2.4 % (ref 0–6.9)
ERYTHROCYTE [DISTWIDTH] IN BLOOD BY AUTOMATED COUNT: 44.1 FL (ref 35.9–50)
EST. AVERAGE GLUCOSE BLD GHB EST-MCNC: 151 MG/DL
FASTING STATUS PATIENT QL REPORTED: NORMAL
GLOBULIN SER CALC-MCNC: 2.9 G/DL (ref 1.9–3.5)
GLUCOSE SERPL-MCNC: 137 MG/DL (ref 65–99)
HBA1C MFR BLD: 6.9 % (ref 4–5.6)
HCT VFR BLD AUTO: 48.9 % (ref 37–47)
HDLC SERPL-MCNC: 38 MG/DL
HGB BLD-MCNC: 16 G/DL (ref 12–16)
IMM GRANULOCYTES # BLD AUTO: 0.05 K/UL (ref 0–0.11)
IMM GRANULOCYTES NFR BLD AUTO: 0.6 % (ref 0–0.9)
LDLC SERPL CALC-MCNC: 86 MG/DL
LYMPHOCYTES # BLD AUTO: 1.83 K/UL (ref 1–4.8)
LYMPHOCYTES NFR BLD: 23.5 % (ref 22–41)
MCH RBC QN AUTO: 30.9 PG (ref 27–33)
MCHC RBC AUTO-ENTMCNC: 32.7 G/DL (ref 33.6–35)
MCV RBC AUTO: 94.4 FL (ref 81.4–97.8)
MICROALBUMIN UR-MCNC: <1.2 MG/DL
MICROALBUMIN/CREAT UR: NORMAL MG/G (ref 0–30)
MONOCYTES # BLD AUTO: 0.56 K/UL (ref 0–0.85)
MONOCYTES NFR BLD AUTO: 7.2 % (ref 0–13.4)
NEUTROPHILS # BLD AUTO: 5.13 K/UL (ref 2–7.15)
NEUTROPHILS NFR BLD: 65.8 % (ref 44–72)
NRBC # BLD AUTO: 0 K/UL
NRBC BLD-RTO: 0 /100 WBC
PLATELET # BLD AUTO: 270 K/UL (ref 164–446)
PMV BLD AUTO: 9.1 FL (ref 9–12.9)
POTASSIUM SERPL-SCNC: 4.2 MMOL/L (ref 3.6–5.5)
PROT SERPL-MCNC: 7.5 G/DL (ref 6–8.2)
RBC # BLD AUTO: 5.18 M/UL (ref 4.2–5.4)
SODIUM SERPL-SCNC: 139 MMOL/L (ref 135–145)
T4 FREE SERPL-MCNC: 1.04 NG/DL (ref 0.93–1.7)
TRIGL SERPL-MCNC: 140 MG/DL (ref 0–149)
TSH SERPL DL<=0.005 MIU/L-ACNC: 2.4 UIU/ML (ref 0.38–5.33)
WBC # BLD AUTO: 7.8 K/UL (ref 4.8–10.8)

## 2021-03-16 PROCEDURE — 82043 UR ALBUMIN QUANTITATIVE: CPT

## 2021-03-16 PROCEDURE — 80061 LIPID PANEL: CPT

## 2021-03-16 PROCEDURE — 84439 ASSAY OF FREE THYROXINE: CPT

## 2021-03-16 PROCEDURE — 84443 ASSAY THYROID STIM HORMONE: CPT

## 2021-03-16 PROCEDURE — 82570 ASSAY OF URINE CREATININE: CPT

## 2021-03-16 PROCEDURE — 85025 COMPLETE CBC W/AUTO DIFF WBC: CPT

## 2021-03-16 PROCEDURE — 86341 ISLET CELL ANTIBODY: CPT

## 2021-03-16 PROCEDURE — 80053 COMPREHEN METABOLIC PANEL: CPT

## 2021-03-16 PROCEDURE — 83036 HEMOGLOBIN GLYCOSYLATED A1C: CPT

## 2021-03-16 PROCEDURE — 36415 COLL VENOUS BLD VENIPUNCTURE: CPT

## 2021-03-16 PROCEDURE — 84681 ASSAY OF C-PEPTIDE: CPT

## 2021-03-18 LAB — C PEPTIDE SERPL-MCNC: 3.6 NG/ML (ref 0.8–3.5)

## 2021-03-20 LAB — GAD65 AB SER IA-ACNC: <5 IU/ML (ref 0–5)

## 2021-03-24 ENCOUNTER — OFFICE VISIT (OUTPATIENT)
Dept: ENDOCRINOLOGY | Facility: MEDICAL CENTER | Age: 71
End: 2021-03-24
Attending: INTERNAL MEDICINE
Payer: MEDICARE

## 2021-03-24 VITALS
HEART RATE: 83 BPM | BODY MASS INDEX: 26.83 KG/M2 | WEIGHT: 145.8 LBS | RESPIRATION RATE: 14 BRPM | DIASTOLIC BLOOD PRESSURE: 62 MMHG | OXYGEN SATURATION: 97 % | SYSTOLIC BLOOD PRESSURE: 108 MMHG | HEIGHT: 62 IN

## 2021-03-24 DIAGNOSIS — E11.9 CONTROLLED TYPE 2 DIABETES MELLITUS WITHOUT COMPLICATION, WITHOUT LONG-TERM CURRENT USE OF INSULIN (HCC): ICD-10-CM

## 2021-03-24 DIAGNOSIS — E04.2 NON-TOXIC MULTINODULAR GOITER: ICD-10-CM

## 2021-03-24 DIAGNOSIS — Z79.84 LONG TERM (CURRENT) USE OF ORAL HYPOGLYCEMIC DRUGS: ICD-10-CM

## 2021-03-24 DIAGNOSIS — E78.2 MIXED HYPERLIPIDEMIA: ICD-10-CM

## 2021-03-24 PROCEDURE — 99211 OFF/OP EST MAY X REQ PHY/QHP: CPT | Performed by: INTERNAL MEDICINE

## 2021-03-24 PROCEDURE — 99214 OFFICE O/P EST MOD 30 MIN: CPT | Performed by: INTERNAL MEDICINE

## 2021-03-24 RX ORDER — AMOXICILLIN 500 MG/1
TABLET, FILM COATED ORAL
COMMUNITY
Start: 2021-01-14 | End: 2021-03-24

## 2021-03-24 RX ORDER — OMEPRAZOLE 20 MG/1
20 CAPSULE, DELAYED RELEASE ORAL DAILY
COMMUNITY
Start: 2019-10-08 | End: 2022-07-19

## 2021-03-24 RX ORDER — BUPROPION HYDROCHLORIDE 150 MG/1
TABLET, EXTENDED RELEASE ORAL
COMMUNITY
Start: 2021-02-27 | End: 2022-02-23

## 2021-03-24 RX ORDER — BUPROPION HYDROCHLORIDE 150 MG/1
TABLET, EXTENDED RELEASE ORAL
COMMUNITY
End: 2021-03-24

## 2021-03-24 RX ORDER — ZOLPIDEM TARTRATE 5 MG/1
TABLET ORAL
COMMUNITY
Start: 2021-03-19 | End: 2022-02-23

## 2021-03-24 ASSESSMENT — FIBROSIS 4 INDEX: FIB4 SCORE: 1.04

## 2021-03-24 NOTE — PROGRESS NOTES
CHIEF COMPLAINT: Patient is here for follow up of Type 2 Diabetes Mellitus.  Patient was presented for a telehealth consultation via secure and encrypted videoconferencing technology. This encounter was conducted via Zoom . Verbal consent was obtained. Patient's identity was verified.      HPI:     Melody Rodriguez is a 69 y.o. female with Type 2 Diabetes Mellitus here for follow up.    Labs from March 16, 2021 show A1c is 6.9%  Labs from Oct 9, 2020 show a1c was 6.8%  Labs from December 18, 2019 show A1c was 6.5%.    She was previously a patient of [facility]      For her type 2 diabetes she remains on Synjardy 12.5/1000 mg twice a day which has been her medication for over 12 months.  Glucose control has been stable  She denies side effects with her medications and denies severe hyperglycemia and hypoglycemia        She also has a history of nontoxic multinodular goiter with a dominant 4 cm solid nodule in the right lower lobe;  a 1.5 cm solid nodule on the left mid lobe and a 2.0 cm solid nodule in the left upper lobe and another solid nodule in the left lower lobe measuring 1.6 cm    Last ultrasound was on November 21, 2020 showed stable nodules    She has been euthyroid on serial monitoring.    TSH was normal at 2.4 on March 16, 2021  She has reported difficulty with swallowing on occasion  but this is likely secondary to Schatzi ring in her esophagus that she independently reported      Biopsy of the dominant 4 cm RML nodule on July 11, 2019 was nondiagnostic. But repeat biopsy on January 12, 2021 came back benign    Biopsy of the LML on July 11, 2019 nodule came back as benign    Biopsy of the 2 cm ISA nodule on January 2021 was benign        She has hyperlipidemia and is taking simvastatin 40 mg daily.    She denies a history of coronary artery disease and cerebrovascular disease  She denies myalgias and cramps  LDL cholesterol was fair at 86 with triglycerides of 140 on March 16,  2021      She does not have signs of diabetic kidney disease her urine microalbumin was normal on March 16, 2021        BG Diary:  Patient did not bring glucose meter despite request to do so    Weight has been stable    Diabetes Complications   Retinopathy: No known retinopathy.  Last eye exam:June 2020 eye exam Dr. Jeanine Sanchez at [facility]  Neuropathy: Denies paresthesias or numbness in hands or feet. Denies any foot wounds.  Exercise: Minimal.  Diet: Fair.  Patient's medications, allergies, and social histories were reviewed and updated as appropriate.    ROS:     CONS:     No fever, no chills   EYES:     No diplopia, no blurry vision   CV:           No chest pain, no palpitations   PULM:     No SOB, no cough, no hemoptysis.   GI:            No nausea, no vomiting, no diarrhea, no constipation   ENDO:     No polyuria, no polydipsia, no heat intolerance, no cold intolerance       Past Medical History:  Problem List:  2020-12: Stage 3a chronic kidney disease  2020-10: Long term (current) use of oral hypoglycemic drugs  2020-01: Non-toxic multinodular goiter  2019-07: Risk for falls  2019-03: Dysphagia  2017-01: Controlled type 2 diabetes mellitus without complication,   without long-term current use of insulin (HCC)  2017-01: Mixed hyperlipidemia  2017-01: Multiple sclerosis (HCC)  2017-01: Depression  2017-01: Schatzki's ring  2017-01: Hiatal hernia      Past Surgical History:  No past surgical history on file.     Allergies:  Patient has no known allergies.     Social History:  Social History     Tobacco Use   • Smoking status: Never Smoker   • Smokeless tobacco: Never Used   Substance Use Topics   • Alcohol use: Yes     Alcohol/week: 0.6 - 1.2 oz     Types: 1 - 2 Glasses of wine per week   • Drug use: No     Comment: CBD stick no THC        Family History:   family history includes ADHD in her son; Bipolar disorder in her daughter; Cancer in her brother and maternal grandmother; Colon Cancer in her  "mother; Depression in her son; Heart Attack in her father; Heart Disease in her father and mother; Hypertension in her son; No Known Problems in her maternal grandfather, paternal grandfather, paternal grandmother, and sister; Other in her brother and daughter.      PHYSICAL EXAM:   OBJECTIVE:  Vital signs: /62 (BP Location: Left arm, Patient Position: Sitting, BP Cuff Size: Adult)   Pulse 83   Resp 14   Ht 1.575 m (5' 2\")   Wt 66.1 kg (145 lb 12.8 oz)   SpO2 97%   BMI 26.67 kg/m²   GENERAL: Well-developed, well-nourished in no apparent distress.   EYE:  No ocular asymmetry, PERRLA  HENT: Pink, moist mucous membranes.    NECK: No thyromegaly.   CARDIOVASCULAR:  No murmurs  LUNGS: Clear breath sounds  ABDOMEN: Soft, nontender   EXTREMITIES: No clubbing, cyanosis, or edema.   NEUROLOGICAL: No gross focal motor abnormalities   LYMPH: No cervical adenopathy seen  SKIN: No rashes, lesions.   Visual Inspection: Feet without maceration, ulcers, or fissures.    Labs:  Lab Results   Component Value Date/Time    HBA1C 6.9 (H) 03/16/2021 07:18 AM        Lab Results   Component Value Date/Time    WBC 7.8 03/16/2021 07:18 AM    RBC 5.18 03/16/2021 07:18 AM    HEMOGLOBIN 16.0 03/16/2021 07:18 AM    MCV 94.4 03/16/2021 07:18 AM    MCH 30.9 03/16/2021 07:18 AM    MCHC 32.7 (L) 03/16/2021 07:18 AM    RDW 44.1 03/16/2021 07:18 AM    MPV 9.1 03/16/2021 07:18 AM       Lab Results   Component Value Date/Time    SODIUM 139 03/16/2021 07:18 AM    POTASSIUM 4.2 03/16/2021 07:18 AM    CHLORIDE 102 03/16/2021 07:18 AM    CO2 24 03/16/2021 07:18 AM    ANION 13.0 03/16/2021 07:18 AM    GLUCOSE 137 (H) 03/16/2021 07:18 AM    BUN 20 03/16/2021 07:18 AM    CREATININE 0.95 03/16/2021 07:18 AM    CALCIUM 9.3 03/16/2021 07:18 AM    ASTSGOT 19 03/16/2021 07:18 AM    ALTSGPT 23 03/16/2021 07:18 AM    TBILIRUBIN 0.6 03/16/2021 07:18 AM    ALBUMIN 4.6 03/16/2021 07:18 AM    TOTPROTEIN 7.5 03/16/2021 07:18 AM    GLOBULIN 2.9 03/16/2021 07:18 " AM    AGRATIO 1.6 03/16/2021 07:18 AM       Lab Results   Component Value Date/Time    CHOLSTRLTOT 179 06/12/2019 0813    TRIGLYCERIDE 145 06/12/2019 0813    HDL 39 (A) 06/12/2019 0813     (H) 06/12/2019 0813       Lab Results   Component Value Date/Time    MALBCRT see below 03/16/2021 07:18 AM    MICROALBUR <1.2 03/16/2021 07:18 AM        Lab Results   Component Value Date/Time    TSHULTRASEN 3.680 06/12/2019 0813     No results found for: FREEDIR  No results found for: FREET3  No results found for: THYSTIMIG        ASSESSMENT/PLAN:     1. Controlled type 2 diabetes mellitus without complication, without long-term current use of insulin (HCC)  Stable controlled  Continue Synjardy XR 12.5/1000 mg twice a day  Follow low carb diet  I will see her again in 6 months with repeat of her A1c    2. Non-toxic multinodular goiter  Stable  Reviewed biopsy results in detail  Recommend observation  I plan to repeat her ultrasound later this year in the office    3. Mixed hyperlipidemia  Controlled   Continue simvastatin  Follow low-fat diet  Repeat fasting lipids with next labs in 12 months    4. Long term (current) use of oral hypoglycemic drugs  Patient is oral agents for t2dm mgt      Return in about 6 months (around 9/24/2021).      Thank you kindly for allowing me to participate in the diabetes care plan for this patient.    Swapnil Cat MD, FACE, [facility]  01/29/20    CC:   Laya Cornelius M.D.

## 2021-04-19 RX ORDER — EMPAGLIFLOZIN AND METFORMIN HYDROCHLORIDE 12.5; 1 MG/1; MG/1
TABLET ORAL
Qty: 60 TABLET | Refills: 3 | Status: SHIPPED | OUTPATIENT
Start: 2021-04-19 | End: 2021-08-01 | Stop reason: SDUPTHER

## 2021-06-15 ENCOUNTER — OFFICE VISIT (OUTPATIENT)
Dept: MEDICAL GROUP | Facility: PHYSICIAN GROUP | Age: 71
End: 2021-06-15
Payer: MEDICARE

## 2021-06-15 VITALS
OXYGEN SATURATION: 96 % | HEART RATE: 81 BPM | DIASTOLIC BLOOD PRESSURE: 62 MMHG | WEIGHT: 147.7 LBS | RESPIRATION RATE: 12 BRPM | HEIGHT: 62 IN | TEMPERATURE: 97.5 F | BODY MASS INDEX: 27.18 KG/M2 | SYSTOLIC BLOOD PRESSURE: 122 MMHG

## 2021-06-15 DIAGNOSIS — M17.11 OSTEOARTHRITIS OF RIGHT KNEE, UNSPECIFIED OSTEOARTHRITIS TYPE: ICD-10-CM

## 2021-06-15 DIAGNOSIS — N18.31 STAGE 3A CHRONIC KIDNEY DISEASE: ICD-10-CM

## 2021-06-15 DIAGNOSIS — E11.22 TYPE 2 DIABETES MELLITUS WITH STAGE 3 CHRONIC KIDNEY DISEASE, WITHOUT LONG-TERM CURRENT USE OF INSULIN, UNSPECIFIED WHETHER STAGE 3A OR 3B CKD (HCC): ICD-10-CM

## 2021-06-15 DIAGNOSIS — K44.9 HIATAL HERNIA: ICD-10-CM

## 2021-06-15 DIAGNOSIS — E04.2 NON-TOXIC MULTINODULAR GOITER: ICD-10-CM

## 2021-06-15 DIAGNOSIS — G35 MULTIPLE SCLEROSIS (HCC): ICD-10-CM

## 2021-06-15 DIAGNOSIS — N18.30 TYPE 2 DIABETES MELLITUS WITH STAGE 3 CHRONIC KIDNEY DISEASE, WITHOUT LONG-TERM CURRENT USE OF INSULIN, UNSPECIFIED WHETHER STAGE 3A OR 3B CKD (HCC): ICD-10-CM

## 2021-06-15 DIAGNOSIS — F32.0 CURRENT MILD EPISODE OF MAJOR DEPRESSIVE DISORDER, UNSPECIFIED WHETHER RECURRENT (HCC): ICD-10-CM

## 2021-06-15 PROCEDURE — 99204 OFFICE O/P NEW MOD 45 MIN: CPT | Performed by: INTERNAL MEDICINE

## 2021-06-15 ASSESSMENT — FIBROSIS 4 INDEX: FIB4 SCORE: 1.04

## 2021-06-15 NOTE — PROGRESS NOTES
CC: Establish medical care.  Previous patient of Dr. Cornelius.    HPI:  Melody presents with the following    1. Current mild episode of major depressive disorder, unspecified whether recurrent (HCC)  Chronic.  Stable.  Patient treated and followed by Dr. Casillas her psychiatrist.  Patient is very stable on Wellbutrin, Lamictal and Paxil.  Patient's  was an alcoholic, passed away 11 years ago.    2. Type 2 diabetes mellitus with stage 3 chronic kidney disease, without long-term current use of insulin, unspecified whether stage 3a or 3b CKD (HCC)  Stable.  Chronic.  Ongoing.  Patient is followed by endocrinology.  Patient treated with Synjardy.  Hemoglobin A1c 6.9 in March.  Patient is due for retinal exam.  Up-to-date with urine microalbumin, creatinine ratio.  Up-to-date with lipid panel.  Patient has an appointment with endocrinology in September.  Current GFR is 58.    3. Hiatal hernia  Patient sees a gastroenterologist only yearly.  Patient requesting that her Prilosec be filled through primary care.  Stable on Prilosec 20 mg tablets daily.    4. Multiple sclerosis (HCC)  Chronic.  Stable.  Patient's neurologist is Dr. Guzman.  Patient was last seen October 2019 before the pandemic.  Patient was treated with Copaxone however this was not covered by her insurance.  Her symptoms have quite improved over the last few months.  Patient's fatigue, balance, torso pain and incontinence have all improved.  Patient believes this is due to decreased stress of her  and his alcoholism.  On occasion, patient will treat her fatigue with modafinil 100 mg as needed    5. Non-toxic multinodular goiter  Chronic.  Followed by her endocrinologist.  Last ultrasound of the thyroid was 11/2020.  Biopsies of her thyroid nodules have been negative.    6. Osteoarthritis of right knee, unspecified osteoarthritis type  Patient has an ongoing chronic right knee pain with inflammation/swelling.  Intermittent pain that  she describes as throbbing.  Occasionally her right knee will give way.  Patient completed x-rays of her right knee which showed moderate medial compartment osteoarthritis.  Patient has not been seen by orthopedics.  Uses Tylenol arthritis over-the-counter and a knee brace.  No injury / no fall.      Patient Active Problem List    Diagnosis Date Noted   • Osteoarthritis of right knee 06/15/2021   • Type 2 diabetes mellitus with stage 3 chronic kidney disease ([facility]) 06/15/2021   • Stage 3a chronic kidney disease ([facility]) 12/15/2020   • Long term (current) use of oral hypoglycemic drugs 10/21/2020   • Non-toxic multinodular goiter 01/29/2020   • Risk for falls 07/12/2019   • Dysphagia 03/29/2019   • Controlled type 2 diabetes mellitus without complication, without long-term current use of insulin ([facility]) 01/06/2017   • Mixed hyperlipidemia 01/06/2017   • Multiple sclerosis ([facility]) 01/06/2017   • Depression 01/06/2017   • Schatzki's ring 01/06/2017   • Hiatal hernia 01/06/2017       Current Outpatient Medications   Medication Sig Dispense Refill   • SYNJARDY 12.5-1000 MG Tab TAKE 1 TABLET BY MOUTH TWICE DAILY 60 tablet 3   • omeprazole (PRILOSEC) 20 MG delayed-release capsule      • buPROPion SR (WELLBUTRIN-SR) 150 MG TABLET SR 12 HR sustained-release tablet      • zolpidem (AMBIEN) 5 MG Tab      • simvastatin (ZOCOR) 40 MG Tab take one tablet by mouth every evening 100 Tab 2   • glucose blood (FREESTYLE TEST STRIPS) strip Use 3 times daily 100 Strip 2   • FreeStyle Lancets Misc Use 3 times a day. 100 Each 3   • lamoTRIgine (LAMICTAL) 100 MG Tab Take 100 mg by mouth every day.     • paroxetine (PAXIL) 40 MG tablet Take 1 Tab by mouth every day. 30 Tab 3   • B Complex Vitamins (B COMPLEX PO) Take  by mouth. Stress formulary     • Glatiramer Acetate 40 MG/ML Solution Prefilled Syringe Inject 1 Dose as instructed. 3 times a week     • aspirin 81 MG tablet Take 81 mg by mouth every day.     • Cyanocobalamin (B-12) 2000 MCG Tab  Take 1 Tab by mouth every day.     • Calcium Citrate-Vitamin D (CALCIUM + D PO) Take 1 Tab by mouth 2 times a day.     • Coenzyme Q10 (CO Q-10) 300 MG Cap Take 1 Cap by mouth every day.     • Cholecalciferol (D3 ADULT PO) Take 2 Tabs by mouth every day.       No current facility-administered medications for this visit.         Allergies as of 06/15/2021   • (No Known Allergies)        Social History     Socioeconomic History   • Marital status:      Spouse name: Not on file   • Number of children: Not on file   • Years of education: Not on file   • Highest education level: 12th grade   Occupational History   • Not on file   Tobacco Use   • Smoking status: Never Smoker   • Smokeless tobacco: Never Used   Vaping Use   • Vaping Use: Never used   Substance and Sexual Activity   • Alcohol use: Yes     Alcohol/week: 0.6 - 1.2 oz     Types: 1 - 2 Glasses of wine per week   • Drug use: No     Comment: CBD stick no THC   • Sexual activity: Never     Partners: Male   Other Topics Concern   • Not on file   Social History Narrative   • Not on file     Social Determinants of Health     Financial Resource Strain:    • Difficulty of Paying Living Expenses:    Food Insecurity:    • Worried About Running Out of Food in the Last Year:    • Ran Out of Food in the Last Year:    Transportation Needs:    • Lack of Transportation (Medical):    • Lack of Transportation (Non-Medical):    Physical Activity:    • Days of Exercise per Week:    • Minutes of Exercise per Session:    Stress:    • Feeling of Stress :    Social Connections:    • Frequency of Communication with Friends and Family:    • Frequency of Social Gatherings with Friends and Family:    • Attends [employer] Services:    • Active Member of Clubs or Organizations:    • Attends Club or Organization Meetings:    • Marital Status:    Intimate Partner Violence:    • Fear of Current or Ex-Partner:    • Emotionally Abused:    • Physically Abused:    • Sexually Abused:   "      Family History   Problem Relation Age of Onset   • Colon Cancer Mother    • Heart Disease Mother         Cardiomyopathy   • Heart Attack Father    • Heart Disease Father    • No Known Problems Sister    • Cancer Brother         Skin   • Other Brother         Gaulbladder issues   • Cancer Maternal Grandmother         GI cancer   • No Known Problems Maternal Grandfather    • No Known Problems Paternal Grandmother    • No Known Problems Paternal Grandfather    • Other Daughter         Fibromyalgia   • Bipolar disorder Daughter    • ADHD Son    • Depression Son    • Hypertension Son        History reviewed. No pertinent surgical history.    ROS: Positive ROS per HPI   Right knee with mild denies any Headache,Chest pain,  Shortness of breath,  Abdominal pain, Changes of bowel or bladder, Lower ext edema, Fevers, Nights sweats, Weight Changes, Focal weakness or numbness.  All other systems are negative.    /62 (BP Location: Right arm, Patient Position: Sitting, BP Cuff Size: Adult)   Pulse 81   Temp 36.4 °C (97.5 °F) (Temporal)   Resp 12   Ht 1.575 m (5' 2\")   Wt 67 kg (147 lb 11.2 oz)   SpO2 96%   BMI 27.01 kg/m²      Constitutional: Alert, no distress, well-groomed.  Skin: Warm, dry, good turgor, no rashes in visible areas.  Eye: Equal, round and reactive, conjunctiva clear, lids normal.  ENMT: Lips without lesions, good dentition, moist mucous membranes.  Neck: Trachea midline, no masses, no thyromegaly.  Respiratory: Unlabored respiratory effort, no cough.  Abdomen: Soft, no gross masses.  MSK: Normal gait, moves all extremities.  Joint effusion noted.  Good range of motion.  Minimal crepitus.  Neuro: Grossly non-focal. No cranial nerve deficit. Strength and sensation intact.   Psych: Alert and oriented x3, normal affect and mood.      Assessment and Plan.   71 y.o. female presenting with the following.     1. Current mild episode of major depressive disorder, unspecified whether recurrent " (HCC)  Chronic.  Stable.  Patient encouraged to keep follow-up appointments with her psychiatrist and continue current plan of care with Wellbutrin, Lipitor and Paxil at current dose.    2. Type 2 diabetes mellitus with stage 3 chronic kidney disease, without long-term current use of insulin, unspecified whether stage 3a or 3b CKD (HCC)  Chronic.  Stable.  Keep follow-up appointment with endocrinology in September.  Continue current dose of Synjardy and monitoring of blood sugars.  Patient will make an appointment with her ophthalmologist for retinal screening exam.    3. Hiatal hernia  Patient will receive her Prilosec prescription through primary care when refills needed.  Stable.    4. Multiple sclerosis (HCC)  Chronic.  Stable.  Off Copaxone.  Patient will see her neurologist in the fall.    5. Non-toxic multinodular goiter  Chronic.  Stable.  Continue follow-up appointments with endocrinology.    6. Osteoarthritis of right knee, unspecified osteoarthritis type    - REFERRAL TO ORTHOPEDICS

## 2021-06-15 NOTE — PROGRESS NOTES
Annual Health Assessment Questions:    1.  Are you currently engaging in any exercise or physical activity? Yes Playing with grandchildren    2.  How would you describe your mood or emotional well-being today? good    3.  Have you had any falls in the last year? Yes    4.  Have you noticed any problems with your balance or had difficulty walking? Yes    5.  In the last six months have you experienced any leakage of urine? Yes previous incontinence, states better    6. DPA/Advanced Directive: Patient does not have an Advanced Directive.  A packet and workshop information was given on Advanced Directives.

## 2021-08-16 ENCOUNTER — PATIENT MESSAGE (OUTPATIENT)
Dept: HEALTH INFORMATION MANAGEMENT | Facility: OTHER | Age: 71
End: 2021-08-16

## 2021-08-30 DIAGNOSIS — E11.9 CONTROLLED TYPE 2 DIABETES MELLITUS WITHOUT COMPLICATION, WITHOUT LONG-TERM CURRENT USE OF INSULIN (HCC): ICD-10-CM

## 2021-08-30 RX ORDER — EMPAGLIFLOZIN AND METFORMIN HYDROCHLORIDE 12.5; 1 MG/1; MG/1
TABLET ORAL
Qty: 180 TABLET | Refills: 3 | Status: SHIPPED | OUTPATIENT
Start: 2021-08-30 | End: 2021-09-22 | Stop reason: SDUPTHER

## 2021-08-30 NOTE — TELEPHONE ENCOUNTER
Received request via: Pharmacy    Was the patient seen in the last year in this department? Yes    Does the patient have an active prescription (recently filled or refills available) for medication(s) requested? No       SYNJARDY 12.5-1000 MG Tab    Sig: TAKE 1 TABLET BY MOUTH TWICE DAILY

## 2021-09-10 ENCOUNTER — HOSPITAL ENCOUNTER (OUTPATIENT)
Dept: LAB | Facility: MEDICAL CENTER | Age: 71
End: 2021-09-10
Attending: INTERNAL MEDICINE
Payer: MEDICARE

## 2021-09-10 DIAGNOSIS — E78.2 MIXED HYPERLIPIDEMIA: ICD-10-CM

## 2021-09-10 DIAGNOSIS — E04.2 NON-TOXIC MULTINODULAR GOITER: ICD-10-CM

## 2021-09-10 DIAGNOSIS — Z79.84 LONG TERM (CURRENT) USE OF ORAL HYPOGLYCEMIC DRUGS: ICD-10-CM

## 2021-09-10 DIAGNOSIS — E11.9 CONTROLLED TYPE 2 DIABETES MELLITUS WITHOUT COMPLICATION, WITHOUT LONG-TERM CURRENT USE OF INSULIN (HCC): ICD-10-CM

## 2021-09-10 LAB
ALBUMIN SERPL BCP-MCNC: 4.7 G/DL (ref 3.2–4.9)
ALBUMIN/GLOB SERPL: 2.4 G/DL
ALP SERPL-CCNC: 110 U/L (ref 30–99)
ALT SERPL-CCNC: 16 U/L (ref 2–50)
ANION GAP SERPL CALC-SCNC: 12 MMOL/L (ref 7–16)
AST SERPL-CCNC: 15 U/L (ref 12–45)
BILIRUB SERPL-MCNC: 0.6 MG/DL (ref 0.1–1.5)
BUN SERPL-MCNC: 18 MG/DL (ref 8–22)
CALCIUM SERPL-MCNC: 9.4 MG/DL (ref 8.4–10.2)
CHLORIDE SERPL-SCNC: 103 MMOL/L (ref 96–112)
CO2 SERPL-SCNC: 24 MMOL/L (ref 20–33)
CREAT SERPL-MCNC: 0.86 MG/DL (ref 0.5–1.4)
EST. AVERAGE GLUCOSE BLD GHB EST-MCNC: 131 MG/DL
FASTING STATUS PATIENT QL REPORTED: NORMAL
GLOBULIN SER CALC-MCNC: 2 G/DL (ref 1.9–3.5)
GLUCOSE SERPL-MCNC: 120 MG/DL (ref 65–99)
HBA1C MFR BLD: 6.2 % (ref 4–5.6)
POTASSIUM SERPL-SCNC: 4.5 MMOL/L (ref 3.6–5.5)
PROT SERPL-MCNC: 6.7 G/DL (ref 6–8.2)
SODIUM SERPL-SCNC: 139 MMOL/L (ref 135–145)
T4 FREE SERPL-MCNC: 0.97 NG/DL (ref 0.93–1.7)
TSH SERPL DL<=0.005 MIU/L-ACNC: 2.5 UIU/ML (ref 0.38–5.33)

## 2021-09-10 PROCEDURE — 36415 COLL VENOUS BLD VENIPUNCTURE: CPT

## 2021-09-10 PROCEDURE — 83036 HEMOGLOBIN GLYCOSYLATED A1C: CPT

## 2021-09-10 PROCEDURE — 84439 ASSAY OF FREE THYROXINE: CPT

## 2021-09-10 PROCEDURE — 84443 ASSAY THYROID STIM HORMONE: CPT

## 2021-09-10 PROCEDURE — 80053 COMPREHEN METABOLIC PANEL: CPT

## 2021-09-22 ENCOUNTER — OFFICE VISIT (OUTPATIENT)
Dept: ENDOCRINOLOGY | Facility: MEDICAL CENTER | Age: 71
End: 2021-09-22
Attending: INTERNAL MEDICINE
Payer: MEDICARE

## 2021-09-22 ENCOUNTER — TELEPHONE (OUTPATIENT)
Dept: ENDOCRINOLOGY | Facility: MEDICAL CENTER | Age: 71
End: 2021-09-22

## 2021-09-22 VITALS
SYSTOLIC BLOOD PRESSURE: 120 MMHG | DIASTOLIC BLOOD PRESSURE: 68 MMHG | BODY MASS INDEX: 26.52 KG/M2 | WEIGHT: 144.1 LBS | HEART RATE: 69 BPM | HEIGHT: 62 IN | OXYGEN SATURATION: 95 %

## 2021-09-22 DIAGNOSIS — E11.9 CONTROLLED TYPE 2 DIABETES MELLITUS WITHOUT COMPLICATION, WITHOUT LONG-TERM CURRENT USE OF INSULIN (HCC): ICD-10-CM

## 2021-09-22 DIAGNOSIS — Z79.84 LONG TERM (CURRENT) USE OF ORAL HYPOGLYCEMIC DRUGS: ICD-10-CM

## 2021-09-22 DIAGNOSIS — E78.2 MIXED HYPERLIPIDEMIA: ICD-10-CM

## 2021-09-22 DIAGNOSIS — E04.2 NON-TOXIC MULTINODULAR GOITER: ICD-10-CM

## 2021-09-22 PROCEDURE — 99214 OFFICE O/P EST MOD 30 MIN: CPT | Performed by: INTERNAL MEDICINE

## 2021-09-22 PROCEDURE — 99211 OFF/OP EST MAY X REQ PHY/QHP: CPT | Performed by: INTERNAL MEDICINE

## 2021-09-22 RX ORDER — IBUPROFEN 800 MG/1
TABLET ORAL
COMMUNITY
End: 2022-02-23

## 2021-09-22 RX ORDER — SIMVASTATIN 40 MG
TABLET ORAL
Qty: 100 TABLET | Refills: 2 | Status: SHIPPED | OUTPATIENT
Start: 2021-09-22 | End: 2021-11-09

## 2021-09-22 RX ORDER — MELOXICAM 15 MG/1
TABLET ORAL
COMMUNITY
Start: 2021-07-05 | End: 2022-11-23

## 2021-09-22 RX ORDER — MODAFINIL 200 MG/1
200 TABLET ORAL
COMMUNITY
Start: 2021-09-10 | End: 2022-02-23

## 2021-09-22 RX ORDER — MODAFINIL 200 MG/1
200 TABLET ORAL
COMMUNITY

## 2021-09-22 RX ORDER — MELOXICAM 15 MG/1
15 TABLET ORAL
COMMUNITY
Start: 2021-07-05 | End: 2022-02-23

## 2021-09-22 RX ORDER — EMPAGLIFLOZIN AND METFORMIN HYDROCHLORIDE 12.5; 1 MG/1; MG/1
TABLET ORAL
Qty: 180 TABLET | Refills: 3 | Status: SHIPPED | OUTPATIENT
Start: 2021-09-22 | End: 2022-02-23

## 2021-09-22 ASSESSMENT — FIBROSIS 4 INDEX: FIB4 SCORE: 0.99

## 2021-09-22 ASSESSMENT — PATIENT HEALTH QUESTIONNAIRE - PHQ9: CLINICAL INTERPRETATION OF PHQ2 SCORE: 0

## 2021-09-23 NOTE — PROGRESS NOTES
CHIEF COMPLAINT: Patient is here for follow up of Type 2 Diabetes Mellitus.      HPI:     Melody Rodriguez is a 69 y.o. female with Type 2 Diabetes Mellitus here for follow up.    Labs from September 10, 2021 show A1c is 6.2%  Labs from March 16, 2021 show A1c was 6.9%  Labs from Oct 9, 2020 show a1c was 6.8%  Labs from December 18, 2019 show A1c was 6.5%.    She was previously a patient of [facility] Brock      For her type 2 diabetes she remains on Synjardy 12.5/1000 mg twice a day which has been her medication for over 12 months.     She denies side effects with her medications      She has hyperlipidemia and is taking simvastatin 40 mg daily.    She denies a history of coronary artery disease and cerebrovascular disease  She denies myalgias and cramps  LDL cholesterol was fair at 86 with triglycerides of 140 on March 16, 2021      She does not have signs of diabetic kidney disease her urine microalbumin was normal on March 16, 2021      She is up-to-date with her eye exam which was completed on July 22, 2021        She also has a history of nontoxic multinodular goiter with a dominant 4 cm solid nodule in the right lower lobe;  a 1.5 cm solid nodule on the left mid lobe and a 2.0 cm solid nodule in the left upper lobe and another solid nodule in the left lower lobe measuring 1.6 cm    Last ultrasound was on November 21, 2020 showed stable nodules    Biopsy of the dominant 4 cm RML nodule on July 11, 2019 was nondiagnostic. But repeat biopsy on January 12, 2021 came back benign  Biopsy of the LML on July 11, 2019 nodule came back as benign  Biopsy of the 2 cm ISA nodule on January 2021 was benign      She has been euthyroid on serial monitoring.    She reports that sometimes she has difficulty swallowing but is not consistent  However she has a history of a  Schatzi ring in her esophagus   That can also cause dysphagia  I reminded her about this  TSH was normal 2.5 on September 2021        BG  Diary:  Patient did not bring glucose meter despite request to do so    Weight has been stable    Diabetes Complications   Retinopathy: No known retinopathy.  Last eye exam: July 22, 2021  Neuropathy: Denies paresthesias or numbness in hands or feet. Denies any foot wounds.  Exercise: Minimal.  Diet: Fair.  Patient's medications, allergies, and social histories were reviewed and updated as appropriate.    ROS:     CONS:     No fever, no chills   EYES:     No diplopia, no blurry vision   CV:           No chest pain, no palpitations   PULM:     No SOB, no cough, no hemoptysis.   GI:            No nausea, no vomiting, no diarrhea, no constipation   ENDO:     No polyuria, no polydipsia, no heat intolerance, no cold intolerance       Past Medical History:  Problem List:  2021-06: Osteoarthritis of right knee  2021-06: Type 2 diabetes mellitus with stage 3 chronic kidney disease   ([facility])  2020-12: Stage 3a chronic kidney disease ([facility])  2020-10: Long term (current) use of oral hypoglycemic drugs  2020-01: Non-toxic multinodular goiter  2019-07: Risk for falls  2019-03: Dysphagia  2017-01: Controlled type 2 diabetes mellitus without complication,   without long-term current use of insulin ([facility])  2017-01: Mixed hyperlipidemia  2017-01: Multiple sclerosis ([facility])  2017-01: Depression  2017-01: Schatzki's ring  2017-01: Hiatal hernia      Past Surgical History:  History reviewed. No pertinent surgical history.     Allergies:  Patient has no known allergies.     Social History:  Social History     Tobacco Use   • Smoking status: Never Smoker   • Smokeless tobacco: Never Used   Vaping Use   • Vaping Use: Never used   Substance Use Topics   • Alcohol use: Yes     Alcohol/week: 0.6 - 1.2 oz     Types: 1 - 2 Glasses of wine per week   • Drug use: No     Comment: CBD stick no THC        Family History:   family history includes ADHD in her son; Bipolar disorder in her daughter; Cancer in her brother and maternal grandmother; Colon  "Cancer in her mother; Depression in her son; Heart Attack in her father; Heart Disease in her father and mother; Hypertension in her son; No Known Problems in her maternal grandfather, paternal grandfather, paternal grandmother, and sister; Other in her brother and daughter.      PHYSICAL EXAM:   OBJECTIVE:  Vital signs: /68 (BP Location: Left arm, Patient Position: Sitting, BP Cuff Size: Adult)   Pulse 69   Ht 1.575 m (5' 2\")   Wt 65.4 kg (144 lb 1.6 oz)   SpO2 95%   BMI 26.36 kg/m²   GENERAL: Well-developed, well-nourished in no apparent distress.   EYE:  No ocular asymmetry, PERRLA  HENT: Pink, moist mucous membranes.    NECK: No thyromegaly.   CARDIOVASCULAR:  No murmurs  LUNGS: Clear breath sounds  ABDOMEN: Soft, nontender   EXTREMITIES: No clubbing, cyanosis, or edema.   NEUROLOGICAL: No gross focal motor abnormalities   LYMPH: No cervical adenopathy seen  SKIN: No rashes, lesions.       Labs:  Lab Results   Component Value Date/Time    HBA1C 6.2 (H) 09/10/2021 07:06 AM        Lab Results   Component Value Date/Time    WBC 7.8 03/16/2021 07:18 AM    RBC 5.18 03/16/2021 07:18 AM    HEMOGLOBIN 16.0 03/16/2021 07:18 AM    MCV 94.4 03/16/2021 07:18 AM    MCH 30.9 03/16/2021 07:18 AM    MCHC 32.7 (L) 03/16/2021 07:18 AM    RDW 44.1 03/16/2021 07:18 AM    MPV 9.1 03/16/2021 07:18 AM       Lab Results   Component Value Date/Time    SODIUM 139 09/10/2021 07:06 AM    POTASSIUM 4.5 09/10/2021 07:06 AM    CHLORIDE 103 09/10/2021 07:06 AM    CO2 24 09/10/2021 07:06 AM    ANION 12.0 09/10/2021 07:06 AM    GLUCOSE 120 (H) 09/10/2021 07:06 AM    BUN 18 09/10/2021 07:06 AM    CREATININE 0.86 09/10/2021 07:06 AM    CALCIUM 9.4 09/10/2021 07:06 AM    ASTSGOT 15 09/10/2021 07:06 AM    ALTSGPT 16 09/10/2021 07:06 AM    TBILIRUBIN 0.6 09/10/2021 07:06 AM    ALBUMIN 4.7 09/10/2021 07:06 AM    TOTPROTEIN 6.7 09/10/2021 07:06 AM    GLOBULIN 2.0 09/10/2021 07:06 AM    AGRATIO 2.4 09/10/2021 07:06 AM       Lab Results "   Component Value Date/Time    CHOLSTRLTOT 179 06/12/2019 0813    TRIGLYCERIDE 145 06/12/2019 0813    HDL 39 (A) 06/12/2019 0813     (H) 06/12/2019 0813       Lab Results   Component Value Date/Time    MALBCRT see below 03/16/2021 07:18 AM    MICROALBUR <1.2 03/16/2021 07:18 AM        Lab Results   Component Value Date/Time    TSHULTRASEN 3.680 06/12/2019 0813     No results found for: FREEDIR  No results found for: FREET3  No results found for: THYSTIMIG        ASSESSMENT/PLAN:     1. Controlled type 2 diabetes mellitus without complication, without long-term current use of insulin (HCC)  Stable controlled  Continue Synjardy XR 12.5/1000 mg twice a day  She is up-to-date with her labs and foot exam and eye exam  Recommend follow-up in 6 months    2. Non-toxic multinodular goiter  Stable  She is euthyroid  She denies clear compressive symptoms  Recommend continued observation  I want her to schedule thyroid ultrasound  I will update her on the findings    3. Mixed hyperlipidemia  Controlled   Continue simvastatin  Follow low-fat diet  Repeat fasting lipids in 6 to 12 months    4. Long term (current) use of oral hypoglycemic drugs  Patient is oral agents for t2dm mgt      Return in about 6 months (around 3/22/2022).      Thank you kindly for allowing me to participate in the diabetes care plan for this patient.    Swapnil Cat MD, FACE, [facility]  01/29/20    CC:   Laya Cornelius M.D.

## 2021-09-23 NOTE — TELEPHONE ENCOUNTER
Pt will call to schedule fv with regan and Dr SPARROW, she has to check with her kids schedule first .. js

## 2021-09-30 ENCOUNTER — TELEPHONE (OUTPATIENT)
Dept: MEDICAL GROUP | Facility: PHYSICIAN GROUP | Age: 71
End: 2021-09-30

## 2021-09-30 NOTE — TELEPHONE ENCOUNTER
ESTABLISHED PATIENT PRE-VISIT PLANNING     Patient was NOT contacted to complete PVP.     Note: Patient will not be contacted if there is no indication to call.     1.  Reviewed notes from the last few office visits within the medical group: Yes    2.  If any orders were placed at last visit or intended to be done for this visit (i.e. 6 mos follow-up), do we have Results/Consult Notes?         •  Labs - Labs were not ordered at last office visit.  Note: If patient appointment is for lab review and patient did not complete labs, check with provider if OK to reschedule patient until labs completed.       •  Imaging - Imaging was not ordered at last office visit.       •  Referrals - Referral ordered, patient was seen and consult notes are in chart. Care Teams updated  YES.    3. Is this appointment scheduled as a Hospital Follow-Up? No    4.  Immunizations were updated in Epic using Reconcile Outside Information activity? Yes    5.  Patient is due for the following Health Maintenance Topics:   Health Maintenance Due   Topic Date Due   • HEPATITIS C SCREENING  Never done   • IMM INFLUENZA (1) 09/01/2021   • MAMMOGRAM  10/03/2021         6.  AHA (Pulse8) form printed for Provider? No, already completed

## 2021-10-04 ENCOUNTER — HOSPITAL ENCOUNTER (OUTPATIENT)
Dept: RADIOLOGY | Facility: MEDICAL CENTER | Age: 71
End: 2021-10-04
Attending: INTERNAL MEDICINE
Payer: MEDICARE

## 2021-10-04 DIAGNOSIS — Z12.31 VISIT FOR SCREENING MAMMOGRAM: ICD-10-CM

## 2021-10-04 PROCEDURE — 77063 BREAST TOMOSYNTHESIS BI: CPT

## 2021-10-05 ENCOUNTER — OFFICE VISIT (OUTPATIENT)
Dept: MEDICAL GROUP | Facility: PHYSICIAN GROUP | Age: 71
End: 2021-10-05
Payer: MEDICARE

## 2021-10-05 VITALS
WEIGHT: 142.5 LBS | OXYGEN SATURATION: 97 % | DIASTOLIC BLOOD PRESSURE: 60 MMHG | HEART RATE: 74 BPM | BODY MASS INDEX: 26.22 KG/M2 | HEIGHT: 62 IN | RESPIRATION RATE: 16 BRPM | TEMPERATURE: 97 F | SYSTOLIC BLOOD PRESSURE: 110 MMHG

## 2021-10-05 DIAGNOSIS — G35 MULTIPLE SCLEROSIS (HCC): ICD-10-CM

## 2021-10-05 DIAGNOSIS — K22.2 SCHATZKI'S RING: ICD-10-CM

## 2021-10-05 DIAGNOSIS — Z23 NEED FOR VACCINATION: ICD-10-CM

## 2021-10-05 DIAGNOSIS — E04.2 NON-TOXIC MULTINODULAR GOITER: ICD-10-CM

## 2021-10-05 DIAGNOSIS — M85.80 OSTEOPENIA, UNSPECIFIED LOCATION: ICD-10-CM

## 2021-10-05 DIAGNOSIS — E78.2 MIXED HYPERLIPIDEMIA: ICD-10-CM

## 2021-10-05 DIAGNOSIS — E11.9 CONTROLLED TYPE 2 DIABETES MELLITUS WITHOUT COMPLICATION, WITHOUT LONG-TERM CURRENT USE OF INSULIN (HCC): ICD-10-CM

## 2021-10-05 DIAGNOSIS — F32.0 CURRENT MILD EPISODE OF MAJOR DEPRESSIVE DISORDER, UNSPECIFIED WHETHER RECURRENT (HCC): ICD-10-CM

## 2021-10-05 DIAGNOSIS — Z00.00 ENCOUNTER FOR ANNUAL WELLNESS VISIT (AWV) IN MEDICARE PATIENT: ICD-10-CM

## 2021-10-05 PROCEDURE — G0008 ADMIN INFLUENZA VIRUS VAC: HCPCS | Performed by: INTERNAL MEDICINE

## 2021-10-05 PROCEDURE — 90662 IIV NO PRSV INCREASED AG IM: CPT | Performed by: INTERNAL MEDICINE

## 2021-10-05 PROCEDURE — G0439 PPPS, SUBSEQ VISIT: HCPCS | Performed by: INTERNAL MEDICINE

## 2021-10-05 ASSESSMENT — FIBROSIS 4 INDEX: FIB4 SCORE: 0.99

## 2021-10-05 ASSESSMENT — ENCOUNTER SYMPTOMS: GENERAL WELL-BEING: GOOD

## 2021-10-05 ASSESSMENT — PATIENT HEALTH QUESTIONNAIRE - PHQ9: CLINICAL INTERPRETATION OF PHQ2 SCORE: 0

## 2021-10-05 ASSESSMENT — ACTIVITIES OF DAILY LIVING (ADL): BATHING_REQUIRES_ASSISTANCE: 0

## 2021-10-05 NOTE — PROGRESS NOTES
Chief Complaint   Patient presents with   • Annual Wellness Visit         HPI:  RACHEL is a 71 y.o. here for Medicare Annual Wellness Visit    1. Need for vaccination  Due for flu shot    2. Encounter for annual wellness visit (AWV) in Medicare patient  Here for annual wellness exam    3. Controlled type 2 diabetes mellitus without complication, without long-term current use of insulin (HCC  Chronic.  Stable.  Well-controlled.  Patient is currently being followed by endocrinology.  Patient is doing extremely well with Synjardy.  Hemoglobin A1c 6.2.  Patient treating lipid with Zocor.  Up-to-date with retinal eye scan, urine microalbumin, serum creatinine.  Due for monofilament.    4. Multiple sclerosis (HCC)  Chronic.  Stable.  Ongoing.  Patient is followed by neurology every 6 months.  Patient has a follow-up brain MRI scheduled for October 16.  In remission.  Provigil as needed.    5. Current mild episode of major depressive disorder, unspecified whether recurrent (HCC)  Chronic.  Stable.  Treated with Wellbutrin, Lamictal and Paxil.  Followed by psychiatry when needed.    6. Mixed hyperlipidemia  Up-to-date with lipid panel.  Taking Zocor 40 mg daily.    7. Non-toxic multinodular goiter  Patient has a history of goiter.  TSH 2.5.  Thyroid ultrasound was completed in 2019 and has a follow-up ultrasound scheduled in the near future.  FNA completed January 2021.    8. Schatzki's ring  Patient is followed by GI consultants.  Patient has been dilated twice for Schatzki's ring.  Patient takes omeprazole for symptomatic relief.  Colonoscopy up-to-date.    9. Osteopenia, unspecified location  DEXA scan completed July 2019.  Left femur T score -1.8.  Osteopenia.  Patient taking calcium and vitamin D supplementation.  Weightbearing exercise.      Patient Active Problem List    Diagnosis Date Noted   • Osteoarthritis of right knee 06/15/2021   • Type 2 diabetes mellitus with stage 3 chronic kidney disease (HCC) 06/15/2021   •  Stage 3a chronic kidney disease ([facility]) 12/15/2020   • Long term (current) use of oral hypoglycemic drugs 10/21/2020   • Non-toxic multinodular goiter 01/29/2020   • Risk for falls 07/12/2019   • Dysphagia 03/29/2019   • Controlled type 2 diabetes mellitus without complication, without long-term current use of insulin ([facility]) 01/06/2017   • Mixed hyperlipidemia 01/06/2017   • Multiple sclerosis ([facility]) 01/06/2017   • Depression 01/06/2017   • Schatzki's ring 01/06/2017   • Hiatal hernia 01/06/2017       Current Outpatient Medications   Medication Sig Dispense Refill   • meloxicam (MOBIC) 15 MG tablet meloxicam 15 mg tablet   TAKE 1 TABLET BY MOUTH EVERY DAY     • modafinil (PROVIGIL) 200 MG Tab modafinil 200 mg tablet   TAKE ONE TABLET BY MOUTH ONE TIME DAILY     • simvastatin (ZOCOR) 40 MG Tab take one tablet by mouth every evening 100 Tablet 2   • Empagliflozin-metFORMIN HCl (SYNJARDY) 12.5-1000 MG Tab TAKE 1 TABLET BY MOUTH TWICE DAILY 180 Tablet 3   • FreeStyle Lancets Misc Use 3 times a day. 100 Each 3   • glucose blood (FREESTYLE TEST STRIPS) strip Use 3 times daily 100 Strip 2   • omeprazole (PRILOSEC) 20 MG delayed-release capsule      • buPROPion SR (WELLBUTRIN-SR) 150 MG TABLET SR 12 HR sustained-release tablet      • zolpidem (AMBIEN) 5 MG Tab      • lamoTRIgine (LAMICTAL) 100 MG Tab Take 100 mg by mouth every day.     • paroxetine (PAXIL) 40 MG tablet Take 1 Tab by mouth every day. 30 Tab 3   • B Complex Vitamins (B COMPLEX PO) Take  by mouth. Stress formulary     • aspirin 81 MG tablet Take 81 mg by mouth every day.     • Cyanocobalamin (B-12) 2000 MCG Tab Take 1 Tab by mouth every day.     • Calcium Citrate-Vitamin D (CALCIUM + D PO) Take 1 Tab by mouth 2 times a day.     • Coenzyme Q10 (CO Q-10) 300 MG Cap Take 1 Cap by mouth every day.     • Cholecalciferol (D3 ADULT PO) Take 2 Tabs by mouth every day.     • ibuprofen (MOTRIN) 800 MG Tab ibuprofen 800 mg tablet     • meloxicam (MOBIC) 15 MG tablet Take 15  mg by mouth every day.     • modafinil (PROVIGIL) 200 MG Tab Take 200 mg by mouth.       No current facility-administered medications for this visit.        Patient is taking medications as noted in medication list.  Current supplements as per medication list.     Allergies: Patient has no known allergies.    Current social contact/activities:  Takes Care of her Grandchild     Is patient current with immunizations? No, due for FLU. Patient is interested in receiving FLU today.    She  reports that she has never smoked. She has never used smokeless tobacco. She reports current alcohol use of about 0.6 - 1.2 oz of alcohol per week. She reports that she does not use drugs.  Counseling given: Not Answered        DPA/Advanced directive: Patient does not have an Advanced Directive.  A packet and workshop information was given on Advanced Directives.    ROS:    Gait: Uses no assistive device   Ostomy: No   Other tubes: No   Amputations: No   Chronic oxygen use No   Last eye exam  June 2021  Wears hearing aids: No   : Reports urinary leakage during the last 6 months that has somewhat interfered with their daily activities or sleep.      Screening:  Colonoscopy up-to-date.  Bone density up-to-date.  Mammogram completed October 2021.    Depression Screening    Little interest or pleasure in doing things?  0 - not at all  Feeling down, depressed, or hopeless? 0 - not at all  Patient Health Questionnaire Score: 0    If depressive symptoms identified deferred to follow up visit unless specifically addressed in assessment and plan.    Interpretation of PHQ-9 Total Score   Score Severity   1-4 No Depression   5-9 Mild Depression   10-14 Moderate Depression   15-19 Moderately Severe Depression   20-27 Severe Depression    Screening for Cognitive Impairment    Three Minute Recall (captain, garden, picture)  3/3    Tobi clock face with all 12 numbers and set the hands to show 5 past 8.  Yes    If cognitive concerns identified,  deferred for follow up unless specifically addressed in assessment and plan.    Fall Risk Assessment    Has the patient had two or more falls in the last year or any fall with injury in the last year?  No  If fall risk identified, deferred for follow up unless specifically addressed in assessment and plan.    Safety Assessment    Throw rugs on floor.  Yes  Handrails on all stairs.  Yes  Good lighting in all hallways.  Yes  Difficulty hearing.  No  Patient counseled about all safety risks that were identified.    Functional Assessment ADLs    Are there any barriers preventing you from cooking for yourself or meeting nutritional needs?  No.    Are there any barriers preventing you from driving safely or obtaining transportation?  No.    Are there any barriers preventing you from using a telephone or calling for help?  No.    Are there any barriers preventing you from shopping?  No.    Are there any barriers preventing you from taking care of your own finances?  No.    Are there any barriers preventing you from managing your medications?  No.    Are there any barriers preventing you from showering, bathing or dressing yourself?  No.    Are you currently engaging in any exercise or physical activity?  Yes.  Watches Grandchild   What is your perception of your health?  Good.    Health Maintenance Summary                HEPATITIS C SCREENING Overdue 1950     IMM INFLUENZA Overdue 9/1/2021      Done 10/1/2020 Imm Admin: Influenza Vaccine Adult HD     Patient has more history with this topic...    A1C SCREENING Next Due 3/10/2022      Done 9/10/2021 HEMOGLOBIN A1C     Patient has more history with this topic...    FASTING LIPID PROFILE Next Due 3/16/2022      Done 3/16/2021 LIPID PROFILE     Patient has more history with this topic...    URINE ACR / MICROALBUMIN Next Due 3/16/2022      Done 3/16/2021 MICROALBUMIN CREAT RATIO URINE     Patient has more history with this topic...    RETINAL SCREENING Next Due 7/22/2022       Done 7/22/2021 REFERRAL FOR RETINAL SCREENING EXAM     Patient has more history with this topic...    SERUM CREATININE Next Due 9/10/2022      Done 9/10/2021 COMP METABOLIC PANEL     Patient has more history with this topic...    DIABETES MONOFILAMENT / LE EXAM Next Due 9/22/2022      Done 9/22/2021 [employer]: WORKFLOW - DIABETES - DIABETIC FOOT EXAM PERFORMED     Patient has more history with this topic...    MAMMOGRAM Next Due 10/4/2022      Done 10/4/2021 MA-SCREENING MAMMO BILAT W/TOMOSYNTHESIS W/CAD     Patient has more history with this topic...    BONE DENSITY Next Due 7/17/2024      Done 7/17/2019 DS-BONE DENSITY STUDY (DEXA)    COLORECTAL CANCER SCREENING Next Due 2/9/2026     IMM DTaP/Tdap/Td Vaccine Next Due 6/26/2028      Done 6/26/2018 Imm Admin: Tdap Vaccine     Patient has more history with this topic...          Patient Care Team:  Danae Pritchett M.D. as PCP - General (Internal Medicine)  Oral Guzman M.D. as Consulting Physician (Neurology)  John Yost M.D. as Consulting Physician (Ophthalmology)  Zainab Ferreira [facility] as    Lisa Guevara, PT, DPT as Physical Therapist (Physical Therapy)  [facility] as Attending Team Physician    Social History     Tobacco Use   • Smoking status: Never Smoker   • Smokeless tobacco: Never Used   Vaping Use   • Vaping Use: Never used   Substance Use Topics   • Alcohol use: Yes     Alcohol/week: 0.6 - 1.2 oz     Types: 1 - 2 Glasses of wine per week   • Drug use: No     Comment: CBD stick no THC     Family History   Problem Relation Age of Onset   • Colon Cancer Mother    • Heart Disease Mother         Cardiomyopathy   • Heart Attack Father    • Heart Disease Father    • No Known Problems Sister    • Cancer Brother         Skin   • Other Brother         Gaulbladder issues   • Cancer Maternal Grandmother         GI cancer   • No Known Problems Maternal Grandfather    • No Known Problems Paternal  "Grandmother    • No Known Problems Paternal Grandfather    • Other Daughter         Fibromyalgia   • Bipolar disorder Daughter    • ADHD Son    • Depression Son    • Hypertension Son      She  has a past medical history of Depression, Diabetes (HCC), Hyperlipidemia, Muscle disorder, Osteoarthritis of right knee (6/15/2021), and Type 2 diabetes mellitus with stage 3 chronic kidney disease (HCC) (6/15/2021).   History reviewed. No pertinent surgical history.        Exam:     /60 (BP Location: Left arm, Patient Position: Sitting, BP Cuff Size: Adult)   Pulse 74   Temp 36.1 °C (97 °F) (Temporal)   Resp 16   Ht 1.575 m (5' 2\")   Wt 64.6 kg (142 lb 8 oz)   SpO2 97%  Body mass index is 26.06 kg/m².    Hearing good.    Dentition good  Alert, oriented in no acute distress.  Eye contact is good, speech goal directed, affect calm    Constitutional: Alert, no distress.  Skin: Warm, dry, good turgor, no rashes in visible areas.  Eye: Equal, round and reactive, conjunctiva clear, lids normal.  ENMT: Lips without lesions, good dentition, oropharynx clear.  Neck: Trachea midline, no masses, no thyromegaly. No cervical or supraclavicular lymphadenopathy  Respiratory: Unlabored respiratory effort, lungs clear to auscultation, no wheezes, no ronchi.  Cardiovascular: Normal S1, S2,  positive murmur, no edema.  Abdomen: Soft, non-tender, no masses, no hepatosplenomegaly.  Psych: Alert and oriented x3, normal affect and mood.  Declines breast exam.  Monofilament bilateral lower extremity sensations intact.:     Assessment and Plan. The following treatment and monitoring plan is recommended:    1. Need for vaccination  Influenza Vaccine, High Dose (65+ Only)     1. Need for vaccination    - Influenza Vaccine, High Dose (65+ Only)  - Subsequent Annual Wellness Visit - Includes PPPS ()    2. Encounter for annual wellness visit (AWV) in Medicare patient    - Subsequent Annual Wellness Visit - Includes PPPS ()    3. " Controlled type 2 diabetes mellitus without complication, without long-term current use of insulin (HCC)  Patient to continue current dose of Synjardy  Follow-up lab work 6 months to include hemoglobin A1c, CMP, urine microalbumin    - Subsequent Annual Wellness Visit - Includes PPPS ()    4. Multiple sclerosis (HCC)  Chronic.  Stable.  Keep follow-up appointment with neurology.  Complete scheduled brain MRI.    - Subsequent Annual Wellness Visit - Includes PPPS ()    5. Current mild episode of major depressive disorder, unspecified whether recurrent (HCC)  Chronic.  Stable.  Continue current antidepressants.  Follow-up with psychiatry as needed    - Subsequent Annual Wellness Visit - Includes PPPS ()    6. Mixed hyperlipidemia  Lipid panel up-to-date.  Continue current dose of Zocor.    - Subsequent Annual Wellness Visit - Includes PPPS ()    7. Non-toxic multinodular goiter  Patient to complete thyroid ultrasound    - Subsequent Annual Wellness Visit - Includes PPPS ()    8. Schatzki's ring  Stable.  Follow-up with GI as needed.  Continue omeprazole    - Subsequent Annual Wellness Visit - Includes PPPS ()    9. Osteopenia, unspecified location  Continue supplementation with calcium and vitamin D.  Weightbearing exercise.  Declines bisphosphonates at this time.    - Subsequent Annual Wellness Visit - Includes PPPS ()      Services suggested: No services needed at this time  Health Care Screening recommendations as per orders if indicated.  Referrals offered: PT/OT/Nutrition counseling/Behavioral Health/Smoking cessation as per orders if indicated.    Discussion today about general wellness and lifestyle habits:    · Prevent falls and reduce trip hazards; Cautioned about securing or removing rugs.  · Have a working fire alarm and carbon monoxide detector;   · Engage in regular physical activity and social activities       Follow-up: No follow-ups on file.

## 2021-10-16 ENCOUNTER — HOSPITAL ENCOUNTER (OUTPATIENT)
Dept: RADIOLOGY | Facility: MEDICAL CENTER | Age: 71
End: 2021-10-16
Attending: PSYCHIATRY & NEUROLOGY
Payer: MEDICARE

## 2021-10-16 DIAGNOSIS — G35 MULTIPLE SCLEROSIS (HCC): ICD-10-CM

## 2021-10-16 PROCEDURE — A9576 INJ PROHANCE MULTIPACK: HCPCS | Performed by: PSYCHIATRY & NEUROLOGY

## 2021-10-16 PROCEDURE — 70553 MRI BRAIN STEM W/O & W/DYE: CPT | Mod: MH

## 2021-10-16 PROCEDURE — 700117 HCHG RX CONTRAST REV CODE 255: Performed by: PSYCHIATRY & NEUROLOGY

## 2021-10-16 RX ADMIN — GADOTERIDOL 13 ML: 279.3 INJECTION, SOLUTION INTRAVENOUS at 14:06

## 2021-11-09 DIAGNOSIS — E78.2 MIXED HYPERLIPIDEMIA: ICD-10-CM

## 2021-11-09 RX ORDER — SIMVASTATIN 40 MG
TABLET ORAL
Qty: 100 TABLET | Refills: 2 | Status: SHIPPED | OUTPATIENT
Start: 2021-11-09 | End: 2022-02-23

## 2021-12-09 ENCOUNTER — HOSPITAL ENCOUNTER (OUTPATIENT)
Dept: LAB | Facility: MEDICAL CENTER | Age: 71
End: 2021-12-09
Attending: PSYCHIATRY & NEUROLOGY
Payer: MEDICARE

## 2021-12-09 LAB
ALBUMIN SERPL BCP-MCNC: 4.7 G/DL (ref 3.2–4.9)
ALBUMIN/GLOB SERPL: 1.9 G/DL
ALP SERPL-CCNC: 120 U/L (ref 30–99)
ALT SERPL-CCNC: 16 U/L (ref 2–50)
ANION GAP SERPL CALC-SCNC: 12 MMOL/L (ref 7–16)
AST SERPL-CCNC: 18 U/L (ref 12–45)
BASOPHILS # BLD AUTO: 0.6 % (ref 0–1.8)
BASOPHILS # BLD: 0.05 K/UL (ref 0–0.12)
BILIRUB SERPL-MCNC: 0.4 MG/DL (ref 0.1–1.5)
BUN SERPL-MCNC: 14 MG/DL (ref 8–22)
CALCIUM SERPL-MCNC: 9.2 MG/DL (ref 8.4–10.2)
CHLORIDE SERPL-SCNC: 105 MMOL/L (ref 96–112)
CO2 SERPL-SCNC: 24 MMOL/L (ref 20–33)
CREAT SERPL-MCNC: 1.08 MG/DL (ref 0.5–1.4)
EOSINOPHIL # BLD AUTO: 0.18 K/UL (ref 0–0.51)
EOSINOPHIL NFR BLD: 2.3 % (ref 0–6.9)
ERYTHROCYTE [DISTWIDTH] IN BLOOD BY AUTOMATED COUNT: 44.1 FL (ref 35.9–50)
FASTING STATUS PATIENT QL REPORTED: NORMAL
GLOBULIN SER CALC-MCNC: 2.5 G/DL (ref 1.9–3.5)
GLUCOSE SERPL-MCNC: 119 MG/DL (ref 65–99)
HCT VFR BLD AUTO: 45.8 % (ref 37–47)
HGB BLD-MCNC: 14.9 G/DL (ref 12–16)
IMM GRANULOCYTES # BLD AUTO: 0.06 K/UL (ref 0–0.11)
IMM GRANULOCYTES NFR BLD AUTO: 0.8 % (ref 0–0.9)
LYMPHOCYTES # BLD AUTO: 1.79 K/UL (ref 1–4.8)
LYMPHOCYTES NFR BLD: 22.9 % (ref 22–41)
MCH RBC QN AUTO: 30.5 PG (ref 27–33)
MCHC RBC AUTO-ENTMCNC: 32.5 G/DL (ref 33.6–35)
MCV RBC AUTO: 93.9 FL (ref 81.4–97.8)
MONOCYTES # BLD AUTO: 0.69 K/UL (ref 0–0.85)
MONOCYTES NFR BLD AUTO: 8.8 % (ref 0–13.4)
NEUTROPHILS # BLD AUTO: 5.03 K/UL (ref 2–7.15)
NEUTROPHILS NFR BLD: 64.6 % (ref 44–72)
NRBC # BLD AUTO: 0 K/UL
NRBC BLD-RTO: 0 /100 WBC
PLATELET # BLD AUTO: 242 K/UL (ref 164–446)
PMV BLD AUTO: 8.4 FL (ref 9–12.9)
POTASSIUM SERPL-SCNC: 3.6 MMOL/L (ref 3.6–5.5)
PROT SERPL-MCNC: 7.2 G/DL (ref 6–8.2)
RBC # BLD AUTO: 4.88 M/UL (ref 4.2–5.4)
SODIUM SERPL-SCNC: 141 MMOL/L (ref 135–145)
T3 SERPL-MCNC: 101 NG/DL (ref 60–181)
T4 SERPL-MCNC: 7 UG/DL (ref 4–12)
TSH SERPL DL<=0.005 MIU/L-ACNC: 2.73 UIU/ML (ref 0.38–5.33)
WBC # BLD AUTO: 7.8 K/UL (ref 4.8–10.8)

## 2021-12-09 PROCEDURE — 84443 ASSAY THYROID STIM HORMONE: CPT

## 2021-12-09 PROCEDURE — 85025 COMPLETE CBC W/AUTO DIFF WBC: CPT

## 2021-12-09 PROCEDURE — 36415 COLL VENOUS BLD VENIPUNCTURE: CPT

## 2021-12-09 PROCEDURE — 84436 ASSAY OF TOTAL THYROXINE: CPT

## 2021-12-09 PROCEDURE — 86480 TB TEST CELL IMMUN MEASURE: CPT

## 2021-12-09 PROCEDURE — 80053 COMPREHEN METABOLIC PANEL: CPT

## 2021-12-09 PROCEDURE — 84480 ASSAY TRIIODOTHYRONINE (T3): CPT

## 2021-12-13 LAB
GAMMA INTERFERON BACKGROUND BLD IA-ACNC: 0.04 IU/ML
M TB IFN-G BLD-IMP: NEGATIVE
M TB IFN-G CD4+ BCKGRND COR BLD-ACNC: -0.01 IU/ML
MITOGEN IGNF BCKGRD COR BLD-ACNC: >10 IU/ML
QFT TB2 - NIL TBQ2: 0.02 IU/ML

## 2021-12-16 ENCOUNTER — PATIENT MESSAGE (OUTPATIENT)
Dept: ENDOCRINOLOGY | Facility: MEDICAL CENTER | Age: 71
End: 2021-12-16

## 2022-01-19 ENCOUNTER — TELEPHONE (OUTPATIENT)
Dept: ENDOCRINOLOGY | Facility: MEDICAL CENTER | Age: 72
End: 2022-01-19

## 2022-01-19 NOTE — TELEPHONE ENCOUNTER
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 She stated she feels better when she takes that one. I don't see anything in her charts that she has taken it before, could you send an prescription to the [facility] if you think that would be an appropriate change?    Thanks,  Sindy

## 2022-02-07 PROCEDURE — RXMED WILLOW AMBULATORY MEDICATION CHARGE: Performed by: PSYCHIATRY & NEUROLOGY

## 2022-02-15 PROCEDURE — RXMED WILLOW AMBULATORY MEDICATION CHARGE: Performed by: PSYCHIATRY & NEUROLOGY

## 2022-02-15 PROCEDURE — RXMED WILLOW AMBULATORY MEDICATION CHARGE: Performed by: INTERNAL MEDICINE

## 2022-02-16 ENCOUNTER — PHARMACY VISIT (OUTPATIENT)
Dept: PHARMACY | Facility: MEDICAL CENTER | Age: 72
End: 2022-02-16
Payer: MEDICARE

## 2022-02-23 ENCOUNTER — HOSPITAL ENCOUNTER (OUTPATIENT)
Facility: MEDICAL CENTER | Age: 72
End: 2022-02-23
Attending: INTERNAL MEDICINE
Payer: MEDICARE

## 2022-02-23 ENCOUNTER — OFFICE VISIT (OUTPATIENT)
Dept: MEDICAL GROUP | Facility: PHYSICIAN GROUP | Age: 72
End: 2022-02-23
Payer: MEDICARE

## 2022-02-23 VITALS
WEIGHT: 139 LBS | DIASTOLIC BLOOD PRESSURE: 90 MMHG | HEIGHT: 62 IN | RESPIRATION RATE: 14 BRPM | OXYGEN SATURATION: 98 % | SYSTOLIC BLOOD PRESSURE: 140 MMHG | BODY MASS INDEX: 25.58 KG/M2 | HEART RATE: 103 BPM | TEMPERATURE: 97.3 F

## 2022-02-23 DIAGNOSIS — G35 MULTIPLE SCLEROSIS (HCC): ICD-10-CM

## 2022-02-23 DIAGNOSIS — R06.02 SOB (SHORTNESS OF BREATH): ICD-10-CM

## 2022-02-23 PROCEDURE — 99213 OFFICE O/P EST LOW 20 MIN: CPT | Performed by: INTERNAL MEDICINE

## 2022-02-23 PROCEDURE — 0240U HCHG SARS-COV-2 COVID-19 NFCT DS RESP RNA 3 TRGT MIC: CPT

## 2022-02-23 RX ORDER — RENAGEL 800 MG/1
14 TABLET ORAL DAILY
COMMUNITY
Start: 2022-01-08

## 2022-02-23 ASSESSMENT — FIBROSIS 4 INDEX: FIB4 SCORE: 1.32

## 2022-02-23 ASSESSMENT — PATIENT HEALTH QUESTIONNAIRE - PHQ9: CLINICAL INTERPRETATION OF PHQ2 SCORE: 0

## 2022-02-24 LAB
FLUAV RNA SPEC QL NAA+PROBE: NEGATIVE
FLUBV RNA SPEC QL NAA+PROBE: NEGATIVE
SARS-COV-2 RNA RESP QL NAA+PROBE: NOTDETECTED
SPECIMEN SOURCE: NORMAL

## 2022-02-24 NOTE — PROGRESS NOTES
CC: Shortness of breath, viral syndrome.    HPI:  Melody presents with the following    1. SOB (shortness of breath)  Patient presents with shortness of breath and fatigue that started about 2 weeks ago.  Her daughter-in-law tested positive for Covid 2 weeks ago however patient was not directly in contact.  Her granddaughter who is 3 years old was diagnosed with a viral syndrome 2 weeks ago and patient was in contact with her.  Granddaughter tested negative for COVID-19.  Patient is fully vaccinated for COVID-19 and up-to-date with booster.  Patient received her flu vaccine.  Patient completed to Covid home tests which were negative.  Patient complains of intermittent chills, ongoing dry cough, fatigue and occasional nausea.  Patient denies chest pain, palpitations, lightheaded, myalgia and other GI symptoms.  Patient states that she is not recovering as quickly as she thought she would.  She is keeping well-hydrated however feels thirsty.    2. Multiple sclerosis (HCC)  Chronic.  Stable.  Followed by neurologist, Dr. Guzman.  Patient was recently started on Aubagio 14 mg tablets.      Patient Active Problem List    Diagnosis Date Noted   • Osteoarthritis of right knee 06/15/2021   • Type 2 diabetes mellitus with stage 3 chronic kidney disease (HCC) 06/15/2021   • Stage 3a chronic kidney disease (HCC) 12/15/2020   • Long term (current) use of oral hypoglycemic drugs 10/21/2020   • Non-toxic multinodular goiter 01/29/2020   • Risk for falls 07/12/2019   • Dysphagia 03/29/2019   • Controlled type 2 diabetes mellitus without complication, without long-term current use of insulin ([facility]) 01/06/2017   • Mixed hyperlipidemia 01/06/2017   • Multiple sclerosis (HCC) 01/06/2017   • Depression 01/06/2017   • Schatzki's ring 01/06/2017   • Hiatal hernia 01/06/2017       Current Outpatient Medications   Medication Sig Dispense Refill   • Teriflunomide (AUBAGIO) 14 MG Tab      • zolpidem (AMBIEN) 5 MG Tab Take 1 tablet by  mouth every night at bedtime as needed for insomnia fir 30 days. 30 Tablet 1   • buPROPion SR (WELLBUTRIN-SR) 150 MG TABLET SR 12 HR sustained-release tablet Take 1 tablet by mouth every morning. 90 Tablet 2   • simvastatin (ZOCOR) 40 MG Tab Take 1 tablet by mouth every evening. (Patient taking differently: Take 1 tablet by mouth every evening.) 100 Tablet 2   • meloxicam (MOBIC) 15 MG tablet meloxicam 15 mg tablet   TAKE 1 TABLET BY MOUTH EVERY DAY     • modafinil (PROVIGIL) 200 MG Tab modafinil 200 mg tablet   TAKE ONE TABLET BY MOUTH ONE TIME DAILY     • Empagliflozin-metFORMIN HCl (SYNJARDY) 12.5-1000 MG Tab Take 1 tablet by mouth 2 times a day. (Patient taking differently: Take 1 tablet by mouth 2 times a day.) 180 Tablet 3   • FreeStyle Lancets Misc Use to test blood sugar 3 times a day. (Patient taking differently: Use to test blood sugar 3 times a day.) 100 Each 3   • glucose blood (FREESTYLE INSULINX TEST) strip Use to test blood sugar 3 times a day. (Patient taking differently: Use to test blood sugar 3 times a day.) 100 Strip 2   • omeprazole (PRILOSEC) 20 MG delayed-release capsule      • lamoTRIgine (LAMICTAL) 100 MG Tab Take 100 mg by mouth every day.     • paroxetine (PAXIL) 40 MG tablet Take 1 Tab by mouth every day. 30 Tab 3   • B Complex Vitamins (B COMPLEX PO) Take  by mouth. Stress formulary     • aspirin 81 MG tablet Take 81 mg by mouth every day.     • Cyanocobalamin (B-12) 2000 MCG Tab Take 1 Tab by mouth every day.     • Calcium Citrate-Vitamin D (CALCIUM + D PO) Take 1 Tab by mouth 2 times a day.     • Coenzyme Q10 (CO Q-10) 300 MG Cap Take 1 Cap by mouth every day.     • Cholecalciferol (D3 ADULT PO) Take 2 Tabs by mouth every day.     • lamoTRIgine (LAMICTAL) 100 MG Tab Take 1 tablet by mouth daily. 90 Tablet 2   • omeprazole (PRILOSEC) 20 MG delayed-release capsule Take 1 capsule by mouth every morning, 30 minutes before breakfast. 90 Capsule 2   • lamoTRIgine (LAMICTAL) 100 MG Tab Take 1  tablet by mouth daily. 30 Tablet 2   • paroxetine (PAXIL) 40 MG tablet Take 1 tablet by mouth every morning. 30 Tablet 2   • paroxetine (PAXIL) 40 MG tablet Take 1 tablet by mouth every morning. 90 Tablet 2   • Empagliflozin-metFORMIN HCl 12.5-1000 MG Tab Take 1 tablet by mouth 2 times a day. 180 Tablet 3   • simvastatin (ZOCOR) 40 MG Tab Take 1 tablet by mouth every evening. 100 Tablet 2   • lamoTRIgine (LAMICTAL) 100 MG Tab Take 1 tablet by mouth 2 times a day. 60 Tablet 3   • buPROPion SR (WELLBUTRIN-SR) 150 MG TABLET SR 12 HR sustained-release tablet Take 1 tablet by mouth every morning. 30 Tablet 4   • Empagliflozin-metFORMIN HCl 12.5-1000 MG Tab Take 1 tablet by mouth daily. 100 Tablet 3   • ibuprofen (MOTRIN) 800 MG Tab ibuprofen 800 mg tablet     • meloxicam (MOBIC) 15 MG tablet Take 15 mg by mouth every day.     • modafinil (PROVIGIL) 200 MG Tab Take 200 mg by mouth.     • buPROPion SR (WELLBUTRIN-SR) 150 MG TABLET SR 12 HR sustained-release tablet      • zolpidem (AMBIEN) 5 MG Tab        No current facility-administered medications for this visit.         Allergies as of 02/23/2022   • (No Known Allergies)        Social History     Socioeconomic History   • Marital status:      Spouse name: Not on file   • Number of children: Not on file   • Years of education: Not on file   • Highest education level: 12th grade   Occupational History   • Not on file   Tobacco Use   • Smoking status: Never Smoker   • Smokeless tobacco: Never Used   Vaping Use   • Vaping Use: Never used   Substance and Sexual Activity   • Alcohol use: Yes     Alcohol/week: 0.6 - 1.2 oz     Types: 1 - 2 Glasses of wine per week   • Drug use: No     Comment: CBD stick no THC   • Sexual activity: Never     Partners: Male   Other Topics Concern   • Not on file   Social History Narrative   • Not on file     Social Determinants of Health     Financial Resource Strain: Not on file   Food Insecurity: Not on file   Transportation Needs: Not  "on file   Physical Activity: Not on file   Stress: Not on file   Social Connections: Not on file   Intimate Partner Violence: Not on file   Housing Stability: Not on file       Family History   Problem Relation Age of Onset   • Colon Cancer Mother    • Heart Disease Mother         Cardiomyopathy   • Heart Attack Father    • Heart Disease Father    • No Known Problems Sister    • Cancer Brother         Skin   • Other Brother         Gaulbladder issues   • Cancer Maternal Grandmother         GI cancer   • No Known Problems Maternal Grandfather    • No Known Problems Paternal Grandmother    • No Known Problems Paternal Grandfather    • Other Daughter         Fibromyalgia   • Bipolar disorder Daughter    • ADHD Son    • Depression Son    • Hypertension Son        History reviewed. No pertinent surgical history.    ROS: Positive ROS per HPI.  Denies any Headache,Chest pain, no wheezes,  Abdominal pain, Changes of bowel or bladder, Lower ext edema, Fevers, Nights sweats, Weight Changes, Focal weakness or numbness.  All other systems are negative.    /90 (BP Location: Right arm, Patient Position: Sitting, BP Cuff Size: Adult)   Pulse (!) 103   Temp 36.3 °C (97.3 °F) (Temporal)   Resp 14   Ht 1.575 m (5' 2\")   Wt 63 kg (139 lb)   SpO2 98%   BMI 25.42 kg/m²      Physical Exam:  Gen:         Alert and oriented, No apparent distress.  HEENT:   Perrla, TM clear,  Oralpharynx no erythema or exudates.  Neck:       No Jugular venous distension, Lymphadenopathy, Thyromegaly, Bruits.  Lungs:     Clear to auscultation bilaterally, no wheezing rhonchi or crackles.  CV:          Regular rate and rhythm. No murmurs, rubs or gallops.  Abd:         Soft non tender, non distended. Normal active bowel sounds.             Ext:          No clubbing, cyanosis, edema.      Assessment and Plan.   71 y.o. female presenting with the following.     1. SOB (shortness of breath)  Advised patient on conservative treatment at home.  Keep " well-hydrated.  ER precautions provided.  - CoV-2 and Flu A/B by PCR (24 hour In-House): Collect NP swab in VTM; Future    2. Multiple sclerosis (HCC)  Chronic.  Stable.  Continue management per neurology.  Obtain medical records for review.      My total time spent caring for the patient on the day of the encounter was 20 minutes.   This does not include time spent on separately billable procedures/tests.

## 2022-03-11 ENCOUNTER — HOSPITAL ENCOUNTER (OUTPATIENT)
Dept: LAB | Facility: MEDICAL CENTER | Age: 72
End: 2022-03-11
Attending: INTERNAL MEDICINE
Payer: MEDICARE

## 2022-03-11 ENCOUNTER — HOSPITAL ENCOUNTER (OUTPATIENT)
Dept: LAB | Facility: MEDICAL CENTER | Age: 72
End: 2022-03-11
Attending: PSYCHIATRY & NEUROLOGY
Payer: MEDICARE

## 2022-03-11 DIAGNOSIS — Z79.84 LONG TERM (CURRENT) USE OF ORAL HYPOGLYCEMIC DRUGS: ICD-10-CM

## 2022-03-11 DIAGNOSIS — E78.2 MIXED HYPERLIPIDEMIA: ICD-10-CM

## 2022-03-11 DIAGNOSIS — E11.9 CONTROLLED TYPE 2 DIABETES MELLITUS WITHOUT COMPLICATION, WITHOUT LONG-TERM CURRENT USE OF INSULIN (HCC): ICD-10-CM

## 2022-03-11 DIAGNOSIS — E04.2 NON-TOXIC MULTINODULAR GOITER: ICD-10-CM

## 2022-03-11 LAB
ALBUMIN SERPL BCP-MCNC: 4.7 G/DL (ref 3.2–4.9)
ALBUMIN SERPL BCP-MCNC: 4.7 G/DL (ref 3.2–4.9)
ALBUMIN/GLOB SERPL: 2.2 G/DL
ALBUMIN/GLOB SERPL: 2.2 G/DL
ALP SERPL-CCNC: 126 U/L (ref 30–99)
ALP SERPL-CCNC: 128 U/L (ref 30–99)
ALT SERPL-CCNC: 19 U/L (ref 2–50)
ALT SERPL-CCNC: 22 U/L (ref 2–50)
ANION GAP SERPL CALC-SCNC: 14 MMOL/L (ref 7–16)
ANION GAP SERPL CALC-SCNC: 14 MMOL/L (ref 7–16)
AST SERPL-CCNC: 22 U/L (ref 12–45)
AST SERPL-CCNC: 23 U/L (ref 12–45)
BASOPHILS # BLD AUTO: 1.1 % (ref 0–1.8)
BASOPHILS # BLD: 0.07 K/UL (ref 0–0.12)
BILIRUB SERPL-MCNC: 0.5 MG/DL (ref 0.1–1.5)
BILIRUB SERPL-MCNC: 0.5 MG/DL (ref 0.1–1.5)
BUN SERPL-MCNC: 22 MG/DL (ref 8–22)
BUN SERPL-MCNC: 22 MG/DL (ref 8–22)
CALCIUM SERPL-MCNC: 9.6 MG/DL (ref 8.5–10.5)
CALCIUM SERPL-MCNC: 9.6 MG/DL (ref 8.5–10.5)
CHLORIDE SERPL-SCNC: 103 MMOL/L (ref 96–112)
CHLORIDE SERPL-SCNC: 104 MMOL/L (ref 96–112)
CHOLEST SERPL-MCNC: 168 MG/DL (ref 100–199)
CO2 SERPL-SCNC: 24 MMOL/L (ref 20–33)
CO2 SERPL-SCNC: 24 MMOL/L (ref 20–33)
CREAT SERPL-MCNC: 0.88 MG/DL (ref 0.5–1.4)
CREAT SERPL-MCNC: 0.89 MG/DL (ref 0.5–1.4)
CREAT UR-MCNC: 91.57 MG/DL
EOSINOPHIL # BLD AUTO: 0.25 K/UL (ref 0–0.51)
EOSINOPHIL NFR BLD: 3.8 % (ref 0–6.9)
ERYTHROCYTE [DISTWIDTH] IN BLOOD BY AUTOMATED COUNT: 43.8 FL (ref 35.9–50)
FASTING STATUS PATIENT QL REPORTED: NORMAL
GLOBULIN SER CALC-MCNC: 2.1 G/DL (ref 1.9–3.5)
GLOBULIN SER CALC-MCNC: 2.1 G/DL (ref 1.9–3.5)
GLUCOSE SERPL-MCNC: 110 MG/DL (ref 65–99)
GLUCOSE SERPL-MCNC: 110 MG/DL (ref 65–99)
HCT VFR BLD AUTO: 46.8 % (ref 37–47)
HDLC SERPL-MCNC: 46 MG/DL
HGB BLD-MCNC: 15.2 G/DL (ref 12–16)
IMM GRANULOCYTES # BLD AUTO: 0.03 K/UL (ref 0–0.11)
IMM GRANULOCYTES NFR BLD AUTO: 0.5 % (ref 0–0.9)
LDLC SERPL CALC-MCNC: 84 MG/DL
LYMPHOCYTES # BLD AUTO: 1.66 K/UL (ref 1–4.8)
LYMPHOCYTES NFR BLD: 25.2 % (ref 22–41)
MCH RBC QN AUTO: 30.1 PG (ref 27–33)
MCHC RBC AUTO-ENTMCNC: 32.5 G/DL (ref 33.6–35)
MCV RBC AUTO: 92.7 FL (ref 81.4–97.8)
MICROALBUMIN UR-MCNC: 1.2 MG/DL
MICROALBUMIN/CREAT UR: 13 MG/G (ref 0–30)
MONOCYTES # BLD AUTO: 0.66 K/UL (ref 0–0.85)
MONOCYTES NFR BLD AUTO: 10 % (ref 0–13.4)
NEUTROPHILS # BLD AUTO: 3.91 K/UL (ref 2–7.15)
NEUTROPHILS NFR BLD: 59.4 % (ref 44–72)
NRBC # BLD AUTO: 0 K/UL
NRBC BLD-RTO: 0 /100 WBC
PLATELET # BLD AUTO: 245 K/UL (ref 164–446)
PMV BLD AUTO: 9.2 FL (ref 9–12.9)
POTASSIUM SERPL-SCNC: 4.3 MMOL/L (ref 3.6–5.5)
POTASSIUM SERPL-SCNC: 4.4 MMOL/L (ref 3.6–5.5)
PROT SERPL-MCNC: 6.8 G/DL (ref 6–8.2)
PROT SERPL-MCNC: 6.8 G/DL (ref 6–8.2)
RBC # BLD AUTO: 5.05 M/UL (ref 4.2–5.4)
SODIUM SERPL-SCNC: 141 MMOL/L (ref 135–145)
SODIUM SERPL-SCNC: 142 MMOL/L (ref 135–145)
T4 FREE SERPL-MCNC: 1 NG/DL (ref 0.93–1.7)
TRIGL SERPL-MCNC: 189 MG/DL (ref 0–149)
TSH SERPL DL<=0.005 MIU/L-ACNC: 3.43 UIU/ML (ref 0.38–5.33)
WBC # BLD AUTO: 6.6 K/UL (ref 4.8–10.8)

## 2022-03-11 PROCEDURE — 80053 COMPREHEN METABOLIC PANEL: CPT | Mod: 91

## 2022-03-11 PROCEDURE — 82043 UR ALBUMIN QUANTITATIVE: CPT

## 2022-03-11 PROCEDURE — 84439 ASSAY OF FREE THYROXINE: CPT

## 2022-03-11 PROCEDURE — 85025 COMPLETE CBC W/AUTO DIFF WBC: CPT

## 2022-03-11 PROCEDURE — 82570 ASSAY OF URINE CREATININE: CPT

## 2022-03-11 PROCEDURE — 36415 COLL VENOUS BLD VENIPUNCTURE: CPT

## 2022-03-11 PROCEDURE — 80053 COMPREHEN METABOLIC PANEL: CPT

## 2022-03-11 PROCEDURE — 84443 ASSAY THYROID STIM HORMONE: CPT

## 2022-03-11 PROCEDURE — 80061 LIPID PANEL: CPT

## 2022-03-15 ENCOUNTER — TELEPHONE (OUTPATIENT)
Dept: MEDICAL GROUP | Facility: PHYSICIAN GROUP | Age: 72
End: 2022-03-15
Payer: MEDICARE

## 2022-03-15 NOTE — TELEPHONE ENCOUNTER
ESTABLISHED PATIENT PRE-VISIT PLANNING     Patient was NOT contacted to complete PVP.     Note: Patient will not be contacted if there is no indication to call.     1.  Reviewed notes from the last few office visits within the medical group: Yes    2.  If any orders were placed at last visit or intended to be done for this visit (i.e. 6 mos follow-up), do we have Results/Consult Notes?         •  Labs - Labs ordered, completed on 2/23/2022 and results are in chart.  Note: If patient appointment is for lab review and patient did not complete labs, check with provider if OK to reschedule patient until labs completed.       •  Imaging - Imaging was not ordered at last office visit.       •  Referrals - No referrals were ordered at last office visit.    3. Is this appointment scheduled as a Hospital Follow-Up? No    4.  Immunizations were updated in Epic using Reconcile Outside Information activity? Yes    5.  Patient is due for the following Health Maintenance Topics:   Health Maintenance Due   Topic Date Due   • HEPATITIS C SCREENING  Never done   • A1C SCREENING  03/10/2022     6.  AHA (Pulse[employer]) form printed for Provider? Email sent to [facility] requesting form

## 2022-03-16 ENCOUNTER — OFFICE VISIT (OUTPATIENT)
Dept: MEDICAL GROUP | Facility: PHYSICIAN GROUP | Age: 72
End: 2022-03-16
Payer: MEDICARE

## 2022-03-16 VITALS
RESPIRATION RATE: 15 BRPM | DIASTOLIC BLOOD PRESSURE: 60 MMHG | HEIGHT: 62 IN | HEART RATE: 80 BPM | TEMPERATURE: 97 F | OXYGEN SATURATION: 96 % | SYSTOLIC BLOOD PRESSURE: 120 MMHG | WEIGHT: 143 LBS | BODY MASS INDEX: 26.31 KG/M2

## 2022-03-16 DIAGNOSIS — E11.9 CONTROLLED TYPE 2 DIABETES MELLITUS WITHOUT COMPLICATION, WITHOUT LONG-TERM CURRENT USE OF INSULIN (HCC): ICD-10-CM

## 2022-03-16 DIAGNOSIS — E78.2 MIXED HYPERLIPIDEMIA: ICD-10-CM

## 2022-03-16 DIAGNOSIS — M17.11 PRIMARY OSTEOARTHRITIS OF RIGHT KNEE: ICD-10-CM

## 2022-03-16 DIAGNOSIS — G31.9 CEREBRAL ATROPHY (HCC): ICD-10-CM

## 2022-03-16 DIAGNOSIS — F33.40 RECURRENT MAJOR DEPRESSIVE DISORDER, IN REMISSION (HCC): ICD-10-CM

## 2022-03-16 DIAGNOSIS — G35 MULTIPLE SCLEROSIS (HCC): ICD-10-CM

## 2022-03-16 LAB
HBA1C MFR BLD: 6.6 % (ref 0–5.6)
INT CON NEG: NEGATIVE
INT CON POS: POSITIVE

## 2022-03-16 PROCEDURE — 99214 OFFICE O/P EST MOD 30 MIN: CPT | Performed by: INTERNAL MEDICINE

## 2022-03-16 PROCEDURE — 83036 HEMOGLOBIN GLYCOSYLATED A1C: CPT | Performed by: INTERNAL MEDICINE

## 2022-03-16 RX ORDER — EMPAGLIFLOZIN, METFORMIN HYDROCHLORIDE 12.5; 1 MG/1; MG/1
TABLET, EXTENDED RELEASE ORAL
Qty: 100 TABLET | Refills: 2 | Status: SHIPPED | OUTPATIENT
Start: 2022-03-16 | End: 2022-10-19 | Stop reason: SDUPTHER

## 2022-03-16 RX ORDER — SIMVASTATIN 40 MG
40 TABLET ORAL NIGHTLY
COMMUNITY
End: 2022-03-16 | Stop reason: SDUPTHER

## 2022-03-16 RX ORDER — SIMVASTATIN 40 MG
40 TABLET ORAL NIGHTLY
Qty: 100 TABLET | Refills: 2 | Status: SHIPPED | OUTPATIENT
Start: 2022-03-16 | End: 2023-02-15 | Stop reason: SDUPTHER

## 2022-03-16 ASSESSMENT — FIBROSIS 4 INDEX: FIB4 SCORE: 1.44

## 2022-03-17 ENCOUNTER — PHARMACY VISIT (OUTPATIENT)
Dept: PHARMACY | Facility: MEDICAL CENTER | Age: 72
End: 2022-03-17
Payer: COMMERCIAL

## 2022-03-17 PROCEDURE — RXMED WILLOW AMBULATORY MEDICATION CHARGE: Performed by: INTERNAL MEDICINE

## 2022-03-17 NOTE — PROGRESS NOTES
CC: Follow-up diabetes.    HPI:  Melody presents with the following    1. Controlled type 2 diabetes mellitus without complication, without long-term current use of insulin ([facility])  Chronic.  Stable.  Hemoglobin A1c 6.6, up from 6.2.  Patient is currently taking Synjardy twice daily but forgets her second dose on most days.  Patient would like to resume extended release formula once daily dosing.  CMP within normal limits.  Urine microalbumin within normal limits.    2. Cerebral atrophy (HCC)  Chronic.  Stable.  Age-related cerebral atrophy noted on brain MRI in October 2021.    3. Recurrent major depressive disorder, in remission (HCC)  Chronic.  Stable.  Treated with Wellbutrin.    4. Multiple sclerosis (HCC)  Chronic.  Stable.  Followed by neurology.  Patient continues on Aubagio 14 mg tablets.  Patient has been feeling a bit fatigued lately.  Takes half a tablet of Provigil daily as needed.  Labs within good range.    5. Mixed hyperlipidemia  Patient needs a refill on her simvastatin.    6. Primary osteoarthritis of right knee  Patient has intermittent flares of right knee osteoarthritis.  Takes meloxicam as needed.      Patient Active Problem List    Diagnosis Date Noted   • Cerebral atrophy (HCC) 03/16/2022   • Recurrent major depressive disorder, in remission ([facility]) 03/16/2022   • Osteoarthritis of right knee 06/15/2021   • Type 2 diabetes mellitus with stage 3 chronic kidney disease ([facility]) 06/15/2021   • Stage 3a chronic kidney disease ([facility]) 12/15/2020   • Long term (current) use of oral hypoglycemic drugs 10/21/2020   • Non-toxic multinodular goiter 01/29/2020   • Risk for falls 07/12/2019   • Dysphagia 03/29/2019   • Controlled type 2 diabetes mellitus without complication, without long-term current use of insulin ([facility]) 01/06/2017   • Mixed hyperlipidemia 01/06/2017   • Multiple sclerosis ([facility]) 01/06/2017   • Depression 01/06/2017   • Schatzki's ring 01/06/2017   • Hiatal hernia 01/06/2017       Current  Outpatient Medications   Medication Sig Dispense Refill   • Empagliflozin-metFORMIN HCl ER (SYNJARDY XR) 12.5-1000 MG TABLET SR 24 HR Take 1 tablet daily 100 Tablet 2   • simvastatin (ZOCOR) 40 MG Tab Take 1 Tablet by mouth every evening. 100 Tablet 2   • Teriflunomide (AUBAGIO) 14 MG Tab      • zolpidem (AMBIEN) 5 MG Tab Take 1 tablet by mouth every night at bedtime as needed for insomnia fir 30 days. 30 Tablet 1   • buPROPion SR (WELLBUTRIN-SR) 150 MG TABLET SR 12 HR sustained-release tablet Take 1 tablet by mouth every morning. 90 Tablet 2   • meloxicam (MOBIC) 15 MG tablet meloxicam 15 mg tablet   TAKE 1 TABLET BY MOUTH EVERY DAY     • modafinil (PROVIGIL) 200 MG Tab modafinil 200 mg tablet   TAKE ONE TABLET BY MOUTH ONE TIME DAILY     • omeprazole (PRILOSEC) 20 MG delayed-release capsule      • lamoTRIgine (LAMICTAL) 100 MG Tab Take 100 mg by mouth every day.     • paroxetine (PAXIL) 40 MG tablet Take 1 Tab by mouth every day. 30 Tab 3   • B Complex Vitamins (B COMPLEX PO) Take  by mouth. Stress formulary     • Calcium Citrate-Vitamin D (CALCIUM + D PO) Take 1 Tab by mouth 2 times a day.     • Coenzyme Q10 (CO Q-10) 300 MG Cap Take 1 Cap by mouth every day.     • Cholecalciferol (D3 ADULT PO) Take 2 Tabs by mouth every day.     • aspirin 81 MG tablet Take 81 mg by mouth every day. (Patient not taking: Reported on 3/16/2022)     • Cyanocobalamin (B-12) 2000 MCG Tab Take 1 Tab by mouth every day. (Patient not taking: Reported on 3/16/2022)       No current facility-administered medications for this visit.         Allergies as of 03/16/2022   • (No Known Allergies)        Social History     Socioeconomic History   • Marital status:      Spouse name: Not on file   • Number of children: Not on file   • Years of education: Not on file   • Highest education level: 12th grade   Occupational History   • Not on file   Tobacco Use   • Smoking status: Never Smoker   • Smokeless tobacco: Never Used   Vaping Use   •  "Vaping Use: Never used   Substance and Sexual Activity   • Alcohol use: Yes     Alcohol/week: 0.6 - 1.2 oz     Types: 1 - 2 Glasses of wine per week   • Drug use: No     Comment: CBD stick no THC   • Sexual activity: Never     Partners: Male   Other Topics Concern   • Not on file   Social History Narrative   • Not on file     Social Determinants of Health     Financial Resource Strain: Not on file   Food Insecurity: Not on file   Transportation Needs: Not on file   Physical Activity: Not on file   Stress: Not on file   Social Connections: Not on file   Intimate Partner Violence: Not on file   Housing Stability: Not on file       Family History   Problem Relation Age of Onset   • Colon Cancer Mother    • Heart Disease Mother         Cardiomyopathy   • Heart Attack Father    • Heart Disease Father    • No Known Problems Sister    • Cancer Brother         Skin   • Other Brother         Gaulbladder issues   • Cancer Maternal Grandmother         GI cancer   • No Known Problems Maternal Grandfather    • No Known Problems Paternal Grandmother    • No Known Problems Paternal Grandfather    • Other Daughter         Fibromyalgia   • Bipolar disorder Daughter    • ADHD Son    • Depression Son    • Hypertension Son        History reviewed. No pertinent surgical history.    ROS:  Denies any Headache,Chest pain,  Shortness of breath,  Abdominal pain, Changes of bowel or bladder, Lower ext edema, Fevers, Nights sweats, Weight Changes, Focal weakness or numbness.  All other systems are negative.    /60 (BP Location: Left arm, Patient Position: Sitting, BP Cuff Size: Adult)   Pulse 80   Temp 36.1 °C (97 °F) (Temporal)   Resp 15   Ht 1.575 m (5' 2\")   Wt 64.9 kg (143 lb)   SpO2 96%   BMI 26.16 kg/m²      Constitutional: Alert, no distress, well-groomed.  Skin: Warm, dry, good turgor, no rashes in visible areas.  Eye: Equal, round and reactive, conjunctiva clear, lids normal.  ENMT: Lips without lesions, good dentition, " moist mucous membranes.  Neck: Trachea midline, no masses, no thyromegaly.  Respiratory: Unlabored respiratory effort, no cough.  Abdomen: Soft, no gross masses.  MSK: Normal gait, moves all extremities.  Neuro: Grossly non-focal. No cranial nerve deficit. Strength and sensation intact.   Psych: Alert and oriented x3, normal affect and mood.    Assessment and Plan.   72 y.o. female presenting with the following.     1. Controlled type 2 diabetes mellitus without complication, without long-term current use of insulin (HCC)  Chronic.  Stable.  Synjardy extended release 12.5/1000 mg tablets sent to pharmacy.  - POCT A1C    2. Cerebral atrophy (HCC)  Chronic.  Stable.  Continue to monitor.  Treat risk factors.    3. Recurrent major depressive disorder, in remission (HCC)  Chronic.  Stable.  Continue with Wellbutrin.    4. Multiple sclerosis (HCC)  Chronic.  Stable.  Continue with current plan of care per neurology.  Recommend to take half a tablet of Provigil daily, 1 full tablet when granddaughter comes to visit.    5. Mixed hyperlipidemia  Refill simvastatin.  Lipid panel within good range.    6. Primary osteoarthritis of right knee  Trial with meloxicam daily not as needed.    My total time spent caring for the patient on the day of the encounter was 30 minutes.   This does not include time spent on separately billable procedures/tests.

## 2022-04-09 ENCOUNTER — HOSPITAL ENCOUNTER (OUTPATIENT)
Dept: LAB | Facility: MEDICAL CENTER | Age: 72
End: 2022-04-09
Attending: PSYCHIATRY & NEUROLOGY
Payer: MEDICARE

## 2022-04-09 LAB
ALBUMIN SERPL BCP-MCNC: 4.7 G/DL (ref 3.2–4.9)
ALBUMIN/GLOB SERPL: 1.9 G/DL
ALP SERPL-CCNC: 130 U/L (ref 30–99)
ALT SERPL-CCNC: 16 U/L (ref 2–50)
ANION GAP SERPL CALC-SCNC: 13 MMOL/L (ref 7–16)
AST SERPL-CCNC: 18 U/L (ref 12–45)
BASOPHILS # BLD AUTO: 0.6 % (ref 0–1.8)
BASOPHILS # BLD: 0.04 K/UL (ref 0–0.12)
BILIRUB SERPL-MCNC: 0.7 MG/DL (ref 0.1–1.5)
BUN SERPL-MCNC: 15 MG/DL (ref 8–22)
CALCIUM SERPL-MCNC: 9.3 MG/DL (ref 8.5–10.5)
CHLORIDE SERPL-SCNC: 105 MMOL/L (ref 96–112)
CO2 SERPL-SCNC: 23 MMOL/L (ref 20–33)
CREAT SERPL-MCNC: 0.94 MG/DL (ref 0.5–1.4)
EOSINOPHIL # BLD AUTO: 0.26 K/UL (ref 0–0.51)
EOSINOPHIL NFR BLD: 4 % (ref 0–6.9)
ERYTHROCYTE [DISTWIDTH] IN BLOOD BY AUTOMATED COUNT: 46.1 FL (ref 35.9–50)
GFR SERPLBLD CREATININE-BSD FMLA CKD-EPI: 64 ML/MIN/1.73 M 2
GLOBULIN SER CALC-MCNC: 2.5 G/DL (ref 1.9–3.5)
GLUCOSE SERPL-MCNC: 142 MG/DL (ref 65–99)
HCT VFR BLD AUTO: 47 % (ref 37–47)
HGB BLD-MCNC: 15.1 G/DL (ref 12–16)
IMM GRANULOCYTES # BLD AUTO: 0.04 K/UL (ref 0–0.11)
IMM GRANULOCYTES NFR BLD AUTO: 0.6 % (ref 0–0.9)
LYMPHOCYTES # BLD AUTO: 1.19 K/UL (ref 1–4.8)
LYMPHOCYTES NFR BLD: 18.4 % (ref 22–41)
MCH RBC QN AUTO: 30.1 PG (ref 27–33)
MCHC RBC AUTO-ENTMCNC: 32.1 G/DL (ref 33.6–35)
MCV RBC AUTO: 93.8 FL (ref 81.4–97.8)
MONOCYTES # BLD AUTO: 0.68 K/UL (ref 0–0.85)
MONOCYTES NFR BLD AUTO: 10.5 % (ref 0–13.4)
NEUTROPHILS # BLD AUTO: 4.26 K/UL (ref 2–7.15)
NEUTROPHILS NFR BLD: 65.9 % (ref 44–72)
NRBC # BLD AUTO: 0 K/UL
NRBC BLD-RTO: 0 /100 WBC
PLATELET # BLD AUTO: 236 K/UL (ref 164–446)
PMV BLD AUTO: 9.4 FL (ref 9–12.9)
POTASSIUM SERPL-SCNC: 4.4 MMOL/L (ref 3.6–5.5)
PROT SERPL-MCNC: 7.2 G/DL (ref 6–8.2)
RBC # BLD AUTO: 5.01 M/UL (ref 4.2–5.4)
SODIUM SERPL-SCNC: 141 MMOL/L (ref 135–145)
WBC # BLD AUTO: 6.5 K/UL (ref 4.8–10.8)

## 2022-04-09 PROCEDURE — 85025 COMPLETE CBC W/AUTO DIFF WBC: CPT

## 2022-04-09 PROCEDURE — 36415 COLL VENOUS BLD VENIPUNCTURE: CPT

## 2022-04-09 PROCEDURE — 80053 COMPREHEN METABOLIC PANEL: CPT

## 2022-04-11 PROCEDURE — RXMED WILLOW AMBULATORY MEDICATION CHARGE: Performed by: PSYCHIATRY & NEUROLOGY

## 2022-04-14 ENCOUNTER — PHARMACY VISIT (OUTPATIENT)
Dept: PHARMACY | Facility: MEDICAL CENTER | Age: 72
End: 2022-04-14
Payer: COMMERCIAL

## 2022-05-06 ENCOUNTER — HOSPITAL ENCOUNTER (EMERGENCY)
Facility: MEDICAL CENTER | Age: 72
End: 2022-05-06
Attending: EMERGENCY MEDICINE
Payer: MEDICARE

## 2022-05-06 ENCOUNTER — PHARMACY VISIT (OUTPATIENT)
Dept: PHARMACY | Facility: MEDICAL CENTER | Age: 72
End: 2022-05-06
Payer: COMMERCIAL

## 2022-05-06 VITALS
TEMPERATURE: 97.9 F | HEIGHT: 62 IN | OXYGEN SATURATION: 96 % | HEART RATE: 90 BPM | RESPIRATION RATE: 16 BRPM | WEIGHT: 136.91 LBS | SYSTOLIC BLOOD PRESSURE: 129 MMHG | BODY MASS INDEX: 25.19 KG/M2 | DIASTOLIC BLOOD PRESSURE: 76 MMHG

## 2022-05-06 DIAGNOSIS — R19.7 DIARRHEA, UNSPECIFIED TYPE: ICD-10-CM

## 2022-05-06 DIAGNOSIS — R11.0 NAUSEA: ICD-10-CM

## 2022-05-06 LAB
ALBUMIN SERPL BCP-MCNC: 4.4 G/DL (ref 3.2–4.9)
ALBUMIN/GLOB SERPL: 1.4 G/DL
ALP SERPL-CCNC: 122 U/L (ref 30–99)
ALT SERPL-CCNC: 27 U/L (ref 2–50)
ANION GAP SERPL CALC-SCNC: 14 MMOL/L (ref 7–16)
AST SERPL-CCNC: 30 U/L (ref 12–45)
BASOPHILS # BLD AUTO: 0.6 % (ref 0–1.8)
BASOPHILS # BLD: 0.08 K/UL (ref 0–0.12)
BILIRUB SERPL-MCNC: 0.8 MG/DL (ref 0.1–1.5)
BUN SERPL-MCNC: 19 MG/DL (ref 8–22)
CALCIUM SERPL-MCNC: 9.1 MG/DL (ref 8.4–10.2)
CHLORIDE SERPL-SCNC: 100 MMOL/L (ref 96–112)
CO2 SERPL-SCNC: 21 MMOL/L (ref 20–33)
CREAT SERPL-MCNC: 0.94 MG/DL (ref 0.5–1.4)
EOSINOPHIL # BLD AUTO: 0.14 K/UL (ref 0–0.51)
EOSINOPHIL NFR BLD: 1.1 % (ref 0–6.9)
ERYTHROCYTE [DISTWIDTH] IN BLOOD BY AUTOMATED COUNT: 44.2 FL (ref 35.9–50)
GFR SERPLBLD CREATININE-BSD FMLA CKD-EPI: 64 ML/MIN/1.73 M 2
GLOBULIN SER CALC-MCNC: 3.1 G/DL (ref 1.9–3.5)
GLUCOSE SERPL-MCNC: 162 MG/DL (ref 65–99)
HCT VFR BLD AUTO: 47.6 % (ref 37–47)
HGB BLD-MCNC: 15.7 G/DL (ref 12–16)
IMM GRANULOCYTES # BLD AUTO: 0.09 K/UL (ref 0–0.11)
IMM GRANULOCYTES NFR BLD AUTO: 0.7 % (ref 0–0.9)
LYMPHOCYTES # BLD AUTO: 1.39 K/UL (ref 1–4.8)
LYMPHOCYTES NFR BLD: 10.9 % (ref 22–41)
MCH RBC QN AUTO: 30.1 PG (ref 27–33)
MCHC RBC AUTO-ENTMCNC: 33 G/DL (ref 33.6–35)
MCV RBC AUTO: 91.4 FL (ref 81.4–97.8)
MONOCYTES # BLD AUTO: 1.72 K/UL (ref 0–0.85)
MONOCYTES NFR BLD AUTO: 13.5 % (ref 0–13.4)
NEUTROPHILS # BLD AUTO: 9.3 K/UL (ref 2–7.15)
NEUTROPHILS NFR BLD: 73.2 % (ref 44–72)
NRBC # BLD AUTO: 0 K/UL
NRBC BLD-RTO: 0 /100 WBC
PLATELET # BLD AUTO: 220 K/UL (ref 164–446)
PMV BLD AUTO: 8.5 FL (ref 9–12.9)
POTASSIUM SERPL-SCNC: 3.4 MMOL/L (ref 3.6–5.5)
PROT SERPL-MCNC: 7.5 G/DL (ref 6–8.2)
RBC # BLD AUTO: 5.21 M/UL (ref 4.2–5.4)
SODIUM SERPL-SCNC: 135 MMOL/L (ref 135–145)
WBC # BLD AUTO: 12.7 K/UL (ref 4.8–10.8)

## 2022-05-06 PROCEDURE — 80053 COMPREHEN METABOLIC PANEL: CPT

## 2022-05-06 PROCEDURE — 85025 COMPLETE CBC W/AUTO DIFF WBC: CPT

## 2022-05-06 PROCEDURE — 700105 HCHG RX REV CODE 258: Performed by: EMERGENCY MEDICINE

## 2022-05-06 PROCEDURE — 99285 EMERGENCY DEPT VISIT HI MDM: CPT

## 2022-05-06 PROCEDURE — 700111 HCHG RX REV CODE 636 W/ 250 OVERRIDE (IP): Performed by: EMERGENCY MEDICINE

## 2022-05-06 PROCEDURE — RXMED WILLOW AMBULATORY MEDICATION CHARGE: Performed by: EMERGENCY MEDICINE

## 2022-05-06 PROCEDURE — 36415 COLL VENOUS BLD VENIPUNCTURE: CPT

## 2022-05-06 PROCEDURE — 96374 THER/PROPH/DIAG INJ IV PUSH: CPT

## 2022-05-06 RX ORDER — SODIUM CHLORIDE, SODIUM LACTATE, POTASSIUM CHLORIDE, CALCIUM CHLORIDE 600; 310; 30; 20 MG/100ML; MG/100ML; MG/100ML; MG/100ML
1000 INJECTION, SOLUTION INTRAVENOUS ONCE
Status: COMPLETED | OUTPATIENT
Start: 2022-05-06 | End: 2022-05-06

## 2022-05-06 RX ORDER — ONDANSETRON 2 MG/ML
4 INJECTION INTRAMUSCULAR; INTRAVENOUS ONCE
Status: COMPLETED | OUTPATIENT
Start: 2022-05-06 | End: 2022-05-06

## 2022-05-06 RX ORDER — DIPHENOXYLATE HYDROCHLORIDE AND ATROPINE SULFATE 2.5; .025 MG/1; MG/1
1 TABLET ORAL 4 TIMES DAILY PRN
Qty: 12 TABLET | Refills: 0 | Status: SHIPPED | OUTPATIENT
Start: 2022-05-06 | End: 2022-05-09

## 2022-05-06 RX ORDER — ONDANSETRON 4 MG/1
4 TABLET, ORALLY DISINTEGRATING ORAL ONCE
Qty: 10 TABLET | Refills: 0 | Status: SHIPPED | OUTPATIENT
Start: 2022-05-06 | End: 2022-05-16

## 2022-05-06 RX ADMIN — SODIUM CHLORIDE, POTASSIUM CHLORIDE, SODIUM LACTATE AND CALCIUM CHLORIDE 1000 ML: 600; 310; 30; 20 INJECTION, SOLUTION INTRAVENOUS at 07:35

## 2022-05-06 RX ADMIN — ONDANSETRON HYDROCHLORIDE 4 MG: 2 SOLUTION INTRAMUSCULAR; INTRAVENOUS at 07:35

## 2022-05-06 ASSESSMENT — ENCOUNTER SYMPTOMS
BLOOD IN STOOL: 0
NAUSEA: 1
COUGH: 0
ABDOMINAL PAIN: 0
HEADACHES: 0
SHORTNESS OF BREATH: 0
FEVER: 0
DIZZINESS: 0

## 2022-05-06 ASSESSMENT — FIBROSIS 4 INDEX: FIB4 SCORE: 1.37

## 2022-05-06 NOTE — ED PROVIDER NOTES
ED Provider Note    ED Provider Note    Primary care provider: Danae Pritchett M.D.  Means of arrival: POV  History obtained from: patient  History limited by: None    CHIEF COMPLAINT  Chief Complaint   Patient presents with   • Diarrhea     Diarrhea and nausea started on Sunday  Denies any pain       HPI  Melody Rodriguez is a 72 y.o. female who presents to the Emergency Department accompanied by a family member with a chief complaint of diarrhea.  Patient's symptoms started on Sunday.  She has had multiple episodes.  The diarrhea is nonbloody.  She states that she believes she got it from her granddaughter who had similar symptoms.  She was helping change her on Sunday because she was having diarrhea and both she and her daughter, came down with similar symptoms.  No fever.  No abdominal pain.  She has been taking Imodium without change in symptoms.  She is nauseated and eating or drinking causes increased nausea so she has not been eating or drinking as much although she is not vomiting.  She reports decreased urine output.  No fever cough or cold symptoms.  No chest pain or shortness of breath.  She began feeling worse and her heart rate was elevated, prompting her ED visit. Her daughter and her granddaughter have improved.  She denies any recent travel.  No recent antibiotics or hospitalizations.    REVIEW OF SYSTEMS  Review of Systems   Constitutional: Negative for fever.   HENT: Negative for congestion.    Respiratory: Negative for cough and shortness of breath.    Cardiovascular: Negative for chest pain.   Gastrointestinal: Positive for nausea. Negative for abdominal pain and blood in stool.   Genitourinary: Negative for dysuria.   Neurological: Negative for dizziness and headaches.   All other systems reviewed and are negative.      PAST MEDICAL HISTORY   has a past medical history of Cerebral atrophy (HCC) (3/16/2022), Depression, Diabetes (HCC), Hyperlipidemia, Muscle disorder, Osteoarthritis of  "right knee (6/15/2021), Recurrent major depressive disorder, in remission (HCC) (3/16/2022), and Type 2 diabetes mellitus with stage 3 chronic kidney disease (HCC) (6/15/2021).    SURGICAL HISTORY  patient denies any surgical history    SOCIAL HISTORY  Social History     Tobacco Use   • Smoking status: Never Smoker   • Smokeless tobacco: Never Used   Vaping Use   • Vaping Use: Never used   Substance Use Topics   • Alcohol use: Yes     Alcohol/week: 0.6 - 1.2 oz     Types: 1 - 2 Glasses of wine per week   • Drug use: No     Comment: CBD stick no THC      Social History     Substance and Sexual Activity   Drug Use No    Comment: CBD stick no THC       FAMILY HISTORY  Family History   Problem Relation Age of Onset   • Colon Cancer Mother    • Heart Disease Mother         Cardiomyopathy   • Heart Attack Father    • Heart Disease Father    • No Known Problems Sister    • Cancer Brother         Skin   • Other Brother         Gaulbladder issues   • Cancer Maternal Grandmother         GI cancer   • No Known Problems Maternal Grandfather    • No Known Problems Paternal Grandmother    • No Known Problems Paternal Grandfather    • Other Daughter         Fibromyalgia   • Bipolar disorder Daughter    • ADHD Son    • Depression Son    • Hypertension Son        CURRENT MEDICATIONS  Home Medications    **Home medications have not yet been reviewed for this encounter**         ALLERGIES  No Known Allergies    PHYSICAL EXAM  VITAL SIGNS: /76   Pulse 90   Temp 36.6 °C (97.9 °F) (Temporal)   Resp 16   Ht 1.575 m (5' 2\")   Wt 62.1 kg (136 lb 14.5 oz)   SpO2 96%   BMI 25.04 kg/m²   Vitals reviewed.  Constitutional: Patient is oriented to person, place, and time. Appears well-developed and well-nourished. No distress.    Head: Normocephalic and atraumatic.   Ears: Normal external ears bilaterally.   Mouth/Throat: Oropharynx is clear with dry MM  Eyes: Conjunctivae are normal. Pupils are equal and round  Neck: Normal range of " motion.  Cardiovascular: Normal rate, regular rhythm and normal heart sounds. Normal peripheral pulses.  Pulmonary/Chest: Effort normal and breath sounds normal. No respiratory distress, no wheezes, rhonchi, or rales.   Abdominal: Soft. Bowel sounds are normal. There is no tenderness. No rebound or guarding, or peritoneal signs.   Musculoskeletal: No edema  Neurological: No focal deficits.   Skin: Skin is warm and dry. No erythema. No pallor.   Psychiatric: Patient has a normal mood and affect.     LABS  Results for orders placed or performed during the hospital encounter of 05/06/22   CBC WITH DIFFERENTIAL   Result Value Ref Range    WBC 12.7 (H) 4.8 - 10.8 K/uL    RBC 5.21 4.20 - 5.40 M/uL    Hemoglobin 15.7 12.0 - 16.0 g/dL    Hematocrit 47.6 (H) 37.0 - 47.0 %    MCV 91.4 81.4 - 97.8 fL    MCH 30.1 27.0 - 33.0 pg    MCHC 33.0 (L) 33.6 - 35.0 g/dL    RDW 44.2 35.9 - 50.0 fL    Platelet Count 220 164 - 446 K/uL    MPV 8.5 (L) 9.0 - 12.9 fL    Neutrophils-Polys 73.20 (H) 44.00 - 72.00 %    Lymphocytes 10.90 (L) 22.00 - 41.00 %    Monocytes 13.50 (H) 0.00 - 13.40 %    Eosinophils 1.10 0.00 - 6.90 %    Basophils 0.60 0.00 - 1.80 %    Immature Granulocytes 0.70 0.00 - 0.90 %    Nucleated RBC 0.00 /100 WBC    Neutrophils (Absolute) 9.30 (H) 2.00 - 7.15 K/uL    Lymphs (Absolute) 1.39 1.00 - 4.80 K/uL    Monos (Absolute) 1.72 (H) 0.00 - 0.85 K/uL    Eos (Absolute) 0.14 0.00 - 0.51 K/uL    Baso (Absolute) 0.08 0.00 - 0.12 K/uL    Immature Granulocytes (abs) 0.09 0.00 - 0.11 K/uL    NRBC (Absolute) 0.00 K/uL   CMP   Result Value Ref Range    Sodium 135 135 - 145 mmol/L    Potassium 3.4 (L) 3.6 - 5.5 mmol/L    Chloride 100 96 - 112 mmol/L    Co2 21 20 - 33 mmol/L    Anion Gap 14.0 7.0 - 16.0    Glucose 162 (H) 65 - 99 mg/dL    Bun 19 8 - 22 mg/dL    Creatinine 0.94 0.50 - 1.40 mg/dL    Calcium 9.1 8.4 - 10.2 mg/dL    AST(SGOT) 30 12 - 45 U/L    ALT(SGPT) 27 2 - 50 U/L    Alkaline Phosphatase 122 (H) 30 - 99 U/L    Total  Bilirubin 0.8 0.1 - 1.5 mg/dL    Albumin 4.4 3.2 - 4.9 g/dL    Total Protein 7.5 6.0 - 8.2 g/dL    Globulin 3.1 1.9 - 3.5 g/dL    A-G Ratio 1.4 g/dL   ESTIMATED GFR   Result Value Ref Range    GFR (CKD-EPI) 64 >60 mL/min/1.73 m 2       All labs reviewed by me.    COURSE & MEDICAL DECISION MAKING  Pertinent Labs & Imaging studies reviewed. (See chart for details)    Obtained and reviewed past medical records.  Patient's last encounter was March of this year she was seen in the outpatient setting for an annual health assessment.  She has a known history of type 2 diabetes, cerebral atrophy, depression, MS, mixed hyperlipidemia and osteoarthritis.  No prior hospital or ED encounters in our EMR.    7:03 AM - Patient seen and examined at bedside.  This this is a pleasant 72-year-old female who presents with copious diarrhea since Sunday.  No change with Imodium.  No fever.  Diarrhea is nonbloody.  She is not having associated abdominal pain.  Given the duration of symptoms and her now tachycardia, advised IV start, fluid hydration.  She has a benign abdominal exam.  No recent travel, antibiotics or hospitalizations.  Multiple family members with similar symptoms.  I suspect, its viral in its etiology.    9:45 AM patient's reevaluated bedside.  She has had another episode of diarrhea here in the department.  No blood.  She is afebrile.  She continues to not experience any abdominal pain.  We did collect a stool culture and this was sent although she understands, this will not be available today.  I have a high suspicion that this is viral.  She is tolerating fluids and not having worsening pain or feeling like she needs to vomit which is an improvement.  Labs were reviewed.  She does have a mild elevation in her white blood cell count which certainly could be attributed to a viral enteritis.  Electrolytes are overall unrevealing, potassium slightly low creatinine normal.  She did receive a liter of LR.  At this point, I  feel she can safely be discharged home.  We discussed addition of Lomotil as well as, treatment with Zofran.  She is encouraged to drink plenty of fluids, monitor her urine output.  She is improved.  She is well-appearing and nontoxic with reassuring vital signs.  She is given strict return precautions.  She is discharged in stable condition.    FINAL IMPRESSION  1. Diarrhea, unspecified type    2. Nausea

## 2022-05-06 NOTE — ED NOTES
Discharged to home with instructions to follow up with her doctor. Prescription sent to pharmacy. Taught regarding diet until diarrhea gone and to stay hydrated.

## 2022-05-06 NOTE — ED NOTES
Patient back from bathroom. She states that her nausea is gone and she is drinking water. She has had diarrhea twice since she has been in ER.

## 2022-05-09 ENCOUNTER — APPOINTMENT (OUTPATIENT)
Dept: LAB | Facility: MEDICAL CENTER | Age: 72
End: 2022-05-09
Attending: PSYCHIATRY & NEUROLOGY
Payer: MEDICARE

## 2022-05-12 ENCOUNTER — TELEPHONE (OUTPATIENT)
Dept: HEALTH INFORMATION MANAGEMENT | Facility: OTHER | Age: 72
End: 2022-05-12
Payer: MEDICARE

## 2022-05-20 PROBLEM — M85.852 OSTEOPENIA OF NECK OF LEFT FEMUR: Status: ACTIVE | Noted: 2022-05-20

## 2022-05-20 PROBLEM — Z87.448 HISTORY OF RENAL DISEASE: Status: ACTIVE | Noted: 2022-05-20

## 2022-05-20 PROBLEM — E87.6 HYPOKALEMIA: Status: ACTIVE | Noted: 2022-05-20

## 2022-05-20 PROBLEM — N18.30 TYPE 2 DIABETES MELLITUS WITH STAGE 3 CHRONIC KIDNEY DISEASE (HCC): Status: RESOLVED | Noted: 2021-06-15 | Resolved: 2022-05-20

## 2022-05-20 PROBLEM — E11.22 TYPE 2 DIABETES MELLITUS WITH STAGE 3 CHRONIC KIDNEY DISEASE (HCC): Status: RESOLVED | Noted: 2021-06-15 | Resolved: 2022-05-20

## 2022-05-20 PROBLEM — F39 MOOD DISORDER (HCC): Status: ACTIVE | Noted: 2017-01-06

## 2022-05-20 PROBLEM — N18.31 STAGE 3A CHRONIC KIDNEY DISEASE: Status: RESOLVED | Noted: 2020-12-15 | Resolved: 2022-05-20

## 2022-05-20 PROBLEM — F33.40 RECURRENT MAJOR DEPRESSIVE DISORDER, IN REMISSION (HCC): Status: RESOLVED | Noted: 2022-03-16 | Resolved: 2022-05-20

## 2022-06-07 ENCOUNTER — TELEPHONE (OUTPATIENT)
Dept: HEALTH INFORMATION MANAGEMENT | Facility: OTHER | Age: 72
End: 2022-06-07

## 2022-06-07 DIAGNOSIS — E11.9 TYPE 2 DIABETES MELLITUS WITHOUT COMPLICATION, WITHOUT LONG-TERM CURRENT USE OF INSULIN (HCC): ICD-10-CM

## 2022-06-07 NOTE — TELEPHONE ENCOUNTER
1. Caller Name: Nishi Rodriguez                        Call Back Number: [phone]       How would the patient prefer to be contacted with a response: MyChart message    2. SPECIFIC Action To Be Taken: Referral pending, please sign.    3. Diagnosis/Clinical Reason for Request: Thyroid/ DIabetes    4. Specialty & Provider Name/Lab/Imaging Location: [facility]    5. Is appointment scheduled for requested order/referral: no    Patient was informed they will receive a return phone call from the office ONLY if there are any questions before processing their request. Advised to call back if they haven't received a call from the referral department in 10 days.

## 2022-06-09 ENCOUNTER — OFFICE VISIT (OUTPATIENT)
Dept: MEDICAL GROUP | Facility: PHYSICIAN GROUP | Age: 72
End: 2022-06-09
Payer: MEDICARE

## 2022-06-09 VITALS
SYSTOLIC BLOOD PRESSURE: 120 MMHG | OXYGEN SATURATION: 95 % | WEIGHT: 134.3 LBS | HEIGHT: 60 IN | DIASTOLIC BLOOD PRESSURE: 70 MMHG | RESPIRATION RATE: 15 BRPM | TEMPERATURE: 96 F | HEART RATE: 92 BPM | BODY MASS INDEX: 26.37 KG/M2

## 2022-06-09 DIAGNOSIS — Z12.11 SCREENING FOR COLON CANCER: ICD-10-CM

## 2022-06-09 DIAGNOSIS — K59.00 CONSTIPATION, UNSPECIFIED CONSTIPATION TYPE: ICD-10-CM

## 2022-06-09 DIAGNOSIS — R19.09 GROIN LUMP: ICD-10-CM

## 2022-06-09 DIAGNOSIS — M53.3 SI (SACROILIAC) PAIN: ICD-10-CM

## 2022-06-09 PROCEDURE — 99214 OFFICE O/P EST MOD 30 MIN: CPT | Performed by: INTERNAL MEDICINE

## 2022-06-09 PROCEDURE — RXMED WILLOW AMBULATORY MEDICATION CHARGE: Performed by: INTERNAL MEDICINE

## 2022-06-09 RX ORDER — EMPAGLIFLOZIN AND METFORMIN HYDROCHLORIDE 12.5; 1 MG/1; MG/1
TABLET ORAL
COMMUNITY
End: 2022-06-09

## 2022-06-09 RX ORDER — LANCETS 28 GAUGE
EACH MISCELLANEOUS
COMMUNITY
End: 2022-12-17

## 2022-06-09 ASSESSMENT — FIBROSIS 4 INDEX: FIB4 SCORE: 1.89

## 2022-06-11 ENCOUNTER — HOSPITAL ENCOUNTER (OUTPATIENT)
Dept: LAB | Facility: MEDICAL CENTER | Age: 72
End: 2022-06-11
Attending: PSYCHIATRY & NEUROLOGY
Payer: MEDICARE

## 2022-06-11 LAB
ALBUMIN SERPL BCP-MCNC: 4.5 G/DL (ref 3.2–4.9)
ALBUMIN/GLOB SERPL: 1.6 G/DL
ALP SERPL-CCNC: 130 U/L (ref 30–99)
ALT SERPL-CCNC: 15 U/L (ref 2–50)
ANION GAP SERPL CALC-SCNC: 12 MMOL/L (ref 7–16)
AST SERPL-CCNC: 22 U/L (ref 12–45)
BASOPHILS # BLD AUTO: 0.7 % (ref 0–1.8)
BASOPHILS # BLD: 0.06 K/UL (ref 0–0.12)
BILIRUB SERPL-MCNC: 0.5 MG/DL (ref 0.1–1.5)
BUN SERPL-MCNC: 13 MG/DL (ref 8–22)
CALCIUM SERPL-MCNC: 9.5 MG/DL (ref 8.5–10.5)
CHLORIDE SERPL-SCNC: 103 MMOL/L (ref 96–112)
CO2 SERPL-SCNC: 22 MMOL/L (ref 20–33)
CREAT SERPL-MCNC: 0.83 MG/DL (ref 0.5–1.4)
EOSINOPHIL # BLD AUTO: 0.3 K/UL (ref 0–0.51)
EOSINOPHIL NFR BLD: 3.5 % (ref 0–6.9)
ERYTHROCYTE [DISTWIDTH] IN BLOOD BY AUTOMATED COUNT: 45.5 FL (ref 35.9–50)
GFR SERPLBLD CREATININE-BSD FMLA CKD-EPI: 75 ML/MIN/1.73 M 2
GLOBULIN SER CALC-MCNC: 2.8 G/DL (ref 1.9–3.5)
GLUCOSE SERPL-MCNC: 150 MG/DL (ref 65–99)
HCT VFR BLD AUTO: 47.4 % (ref 37–47)
HGB BLD-MCNC: 15.1 G/DL (ref 12–16)
IMM GRANULOCYTES # BLD AUTO: 0.03 K/UL (ref 0–0.11)
IMM GRANULOCYTES NFR BLD AUTO: 0.3 % (ref 0–0.9)
LYMPHOCYTES # BLD AUTO: 1.49 K/UL (ref 1–4.8)
LYMPHOCYTES NFR BLD: 17.1 % (ref 22–41)
MCH RBC QN AUTO: 29.7 PG (ref 27–33)
MCHC RBC AUTO-ENTMCNC: 31.9 G/DL (ref 33.6–35)
MCV RBC AUTO: 93.3 FL (ref 81.4–97.8)
MONOCYTES # BLD AUTO: 0.89 K/UL (ref 0–0.85)
MONOCYTES NFR BLD AUTO: 10.2 % (ref 0–13.4)
NEUTROPHILS # BLD AUTO: 5.92 K/UL (ref 2–7.15)
NEUTROPHILS NFR BLD: 68.2 % (ref 44–72)
NRBC # BLD AUTO: 0 K/UL
NRBC BLD-RTO: 0 /100 WBC
PLATELET # BLD AUTO: 275 K/UL (ref 164–446)
PMV BLD AUTO: 9.5 FL (ref 9–12.9)
POTASSIUM SERPL-SCNC: 3.4 MMOL/L (ref 3.6–5.5)
PROT SERPL-MCNC: 7.3 G/DL (ref 6–8.2)
RBC # BLD AUTO: 5.08 M/UL (ref 4.2–5.4)
SODIUM SERPL-SCNC: 137 MMOL/L (ref 135–145)
WBC # BLD AUTO: 8.7 K/UL (ref 4.8–10.8)

## 2022-06-11 PROCEDURE — 85025 COMPLETE CBC W/AUTO DIFF WBC: CPT

## 2022-06-11 PROCEDURE — 36415 COLL VENOUS BLD VENIPUNCTURE: CPT

## 2022-06-11 PROCEDURE — 80053 COMPREHEN METABOLIC PANEL: CPT

## 2022-06-13 ENCOUNTER — PHARMACY VISIT (OUTPATIENT)
Dept: PHARMACY | Facility: MEDICAL CENTER | Age: 72
End: 2022-06-13
Payer: COMMERCIAL

## 2022-06-21 RX ORDER — OMEPRAZOLE 20 MG/1
CAPSULE, DELAYED RELEASE ORAL
Qty: 100 CAPSULE | Refills: 3 | Status: SHIPPED | OUTPATIENT
Start: 2022-06-21 | End: 2023-10-17 | Stop reason: SDUPTHER

## 2022-06-22 ENCOUNTER — HOSPITAL ENCOUNTER (OUTPATIENT)
Facility: MEDICAL CENTER | Age: 72
End: 2022-06-22
Attending: INTERNAL MEDICINE
Payer: MEDICARE

## 2022-06-22 PROCEDURE — 82274 ASSAY TEST FOR BLOOD FECAL: CPT

## 2022-06-22 PROCEDURE — RXMED WILLOW AMBULATORY MEDICATION CHARGE: Performed by: INTERNAL MEDICINE

## 2022-06-23 ENCOUNTER — PHARMACY VISIT (OUTPATIENT)
Dept: PHARMACY | Facility: MEDICAL CENTER | Age: 72
End: 2022-06-23
Payer: COMMERCIAL

## 2022-06-24 DIAGNOSIS — Z12.11 SCREENING FOR COLON CANCER: ICD-10-CM

## 2022-06-27 ENCOUNTER — TELEPHONE (OUTPATIENT)
Dept: PHARMACY | Facility: MEDICAL CENTER | Age: 72
End: 2022-06-27
Payer: MEDICARE

## 2022-06-27 ENCOUNTER — PHARMACY VISIT (OUTPATIENT)
Dept: PHARMACY | Facility: MEDICAL CENTER | Age: 72
End: 2022-06-27
Payer: COMMERCIAL

## 2022-06-27 LAB — IMM ASSAY OCC BLD FITOB: NEGATIVE

## 2022-06-27 PROCEDURE — RXMED WILLOW AMBULATORY MEDICATION CHARGE: Performed by: INTERNAL MEDICINE

## 2022-06-27 NOTE — TELEPHONE ENCOUNTER
Spoke to Melody, she is doing well. Synjardy is set for delivery to her home via [employer] on 06/29, copays will be charged to cc on file.

## 2022-07-11 ENCOUNTER — HOSPITAL ENCOUNTER (OUTPATIENT)
Dept: LAB | Facility: MEDICAL CENTER | Age: 72
End: 2022-07-11
Attending: PSYCHIATRY & NEUROLOGY
Payer: MEDICARE

## 2022-07-11 LAB
ALBUMIN SERPL BCP-MCNC: 4.3 G/DL (ref 3.2–4.9)
ALBUMIN/GLOB SERPL: 1.7 G/DL
ALP SERPL-CCNC: 137 U/L (ref 30–99)
ALT SERPL-CCNC: 13 U/L (ref 2–50)
ANION GAP SERPL CALC-SCNC: 11 MMOL/L (ref 7–16)
AST SERPL-CCNC: 17 U/L (ref 12–45)
BASOPHILS # BLD AUTO: 1.2 % (ref 0–1.8)
BASOPHILS # BLD: 0.08 K/UL (ref 0–0.12)
BILIRUB SERPL-MCNC: 0.5 MG/DL (ref 0.1–1.5)
BUN SERPL-MCNC: 13 MG/DL (ref 8–22)
CALCIUM SERPL-MCNC: 8.8 MG/DL (ref 8.4–10.2)
CHLORIDE SERPL-SCNC: 106 MMOL/L (ref 96–112)
CO2 SERPL-SCNC: 23 MMOL/L (ref 20–33)
CREAT SERPL-MCNC: 0.97 MG/DL (ref 0.5–1.4)
EOSINOPHIL # BLD AUTO: 0.31 K/UL (ref 0–0.51)
EOSINOPHIL NFR BLD: 4.5 % (ref 0–6.9)
ERYTHROCYTE [DISTWIDTH] IN BLOOD BY AUTOMATED COUNT: 45.8 FL (ref 35.9–50)
GFR SERPLBLD CREATININE-BSD FMLA CKD-EPI: 62 ML/MIN/1.73 M 2
GLOBULIN SER CALC-MCNC: 2.6 G/DL (ref 1.9–3.5)
GLUCOSE SERPL-MCNC: 146 MG/DL (ref 65–99)
HCT VFR BLD AUTO: 40.9 % (ref 37–47)
HGB BLD-MCNC: 13.1 G/DL (ref 12–16)
IMM GRANULOCYTES # BLD AUTO: 0.02 K/UL (ref 0–0.11)
IMM GRANULOCYTES NFR BLD AUTO: 0.3 % (ref 0–0.9)
LYMPHOCYTES # BLD AUTO: 1.74 K/UL (ref 1–4.8)
LYMPHOCYTES NFR BLD: 25.1 % (ref 22–41)
MCH RBC QN AUTO: 29.9 PG (ref 27–33)
MCHC RBC AUTO-ENTMCNC: 32 G/DL (ref 33.6–35)
MCV RBC AUTO: 93.4 FL (ref 81.4–97.8)
MONOCYTES # BLD AUTO: 0.77 K/UL (ref 0–0.85)
MONOCYTES NFR BLD AUTO: 11.1 % (ref 0–13.4)
NEUTROPHILS # BLD AUTO: 4.01 K/UL (ref 2–7.15)
NEUTROPHILS NFR BLD: 57.8 % (ref 44–72)
NRBC # BLD AUTO: 0 K/UL
NRBC BLD-RTO: 0 /100 WBC
PLATELET # BLD AUTO: 237 K/UL (ref 164–446)
PMV BLD AUTO: 9.3 FL (ref 9–12.9)
POTASSIUM SERPL-SCNC: 3.9 MMOL/L (ref 3.6–5.5)
PROT SERPL-MCNC: 6.9 G/DL (ref 6–8.2)
RBC # BLD AUTO: 4.38 M/UL (ref 4.2–5.4)
SODIUM SERPL-SCNC: 140 MMOL/L (ref 135–145)
WBC # BLD AUTO: 6.9 K/UL (ref 4.8–10.8)

## 2022-07-11 PROCEDURE — 80053 COMPREHEN METABOLIC PANEL: CPT

## 2022-07-11 PROCEDURE — 36415 COLL VENOUS BLD VENIPUNCTURE: CPT

## 2022-07-11 PROCEDURE — 85025 COMPLETE CBC W/AUTO DIFF WBC: CPT

## 2022-07-19 ENCOUNTER — TELEPHONE (OUTPATIENT)
Dept: ENDOCRINOLOGY | Facility: MEDICAL CENTER | Age: 72
End: 2022-07-19

## 2022-07-19 ENCOUNTER — OFFICE VISIT (OUTPATIENT)
Dept: MEDICAL GROUP | Facility: PHYSICIAN GROUP | Age: 72
End: 2022-07-19
Payer: MEDICARE

## 2022-07-19 VITALS
WEIGHT: 137 LBS | HEART RATE: 96 BPM | RESPIRATION RATE: 15 BRPM | TEMPERATURE: 97 F | SYSTOLIC BLOOD PRESSURE: 120 MMHG | DIASTOLIC BLOOD PRESSURE: 60 MMHG | BODY MASS INDEX: 26.9 KG/M2 | OXYGEN SATURATION: 95 % | HEIGHT: 60 IN

## 2022-07-19 DIAGNOSIS — E04.2 NON-TOXIC MULTINODULAR GOITER: ICD-10-CM

## 2022-07-19 DIAGNOSIS — M17.11 PRIMARY OSTEOARTHRITIS OF RIGHT KNEE: ICD-10-CM

## 2022-07-19 DIAGNOSIS — E11.9 TYPE 2 DIABETES MELLITUS WITHOUT COMPLICATION, WITHOUT LONG-TERM CURRENT USE OF INSULIN (HCC): ICD-10-CM

## 2022-07-19 PROCEDURE — 99214 OFFICE O/P EST MOD 30 MIN: CPT | Performed by: INTERNAL MEDICINE

## 2022-07-19 ASSESSMENT — FIBROSIS 4 INDEX: FIB4 SCORE: 1.43

## 2022-07-19 NOTE — PROGRESS NOTES
CC: Follow-up type 2 diabetes.    HPI:  Melody presents with the following    1. Type 2 diabetes mellitus without complication, without long-term current use of insulin ([facility])  Patient is currently treating diabetes with Synjardy extended release daily.  Hemoglobin A1c 6.0, down from 6.6.  Patient has follow-up with endocrinology in October.  Patient no longer checks her blood sugar regularly.  Patient will be due for her diabetic retinal screening within the next month.  Patient is up-to-date with lipid panel, urine microalbumin, monofilament.    2. Non-toxic multinodular goiter  Patient will complete her follow-up thyroid ultrasound within the next month.  Most recent thyroid ultrasound completed November 2020.  Biopsy x2 have been negative.  TSH 3.4, free T4 1.0.    3. Primary osteoarthritis of right knee  Patient has a history of osteoarthritis of the right knee.  Followed by her orthopedist, Dr. Cooney.  She is trying to postpone right knee replacement and treating her osteoarthritis with alternating Synvisc and cortisone injections.  She also uses lidocaine patch when needed.  As well as Voltaren gel.  On occasion her pain will wake her up at night.  Patient has not been using her meloxicam as prescribed.      Patient Active Problem List    Diagnosis Date Noted   • Groin lump 06/09/2022   • SI (sacroiliac) pain 06/09/2022   • BMI 26.0-26.9,adult 05/20/2022   • Osteopenia of left thigh 05/20/2022   • Hypokalemia 05/20/2022   • History of renal disease 05/20/2022   • Cerebral atrophy ([facility]) 03/16/2022   • Osteoarthritis of right knee 06/15/2021   • Long term (current) use of oral hypoglycemic drugs 10/21/2020   • Non-toxic multinodular goiter 01/29/2020   • Risk for falls 07/12/2019   • Dysphagia 03/29/2019   • Type 2 diabetes mellitus without complication, without long-term current use of insulin ([facility]) 01/06/2017   • Dyslipidemia 01/06/2017   • Multiple sclerosis ([facility]) 01/06/2017   • Mood disorder  ([facility]) 01/06/2017   • Schatzki's ring 01/06/2017   • Hiatal hernia 01/06/2017       Current Outpatient Medications   Medication Sig Dispense Refill   • paroxetine (PAXIL) 40 MG tablet Take 1 Tablet by mouth every day. 100 Tablet 1   • omeprazole (PRILOSEC) 20 MG delayed-release capsule Take 1 capsule by mouth every morning, 30 minutes before breakfast. 100 Capsule 3   • glucose blood strip Freestyle InsuLinx Test Strips   USE TO TEST THREE TIMES DAILY     • FreeStyle Lancets Misc 28 guage     • diclofenac sodium (VOLTAREN) 1 % Gel Apply 2 grams topically to the affected area(s) 4 times a day as needed for moderate pain. 100 g 1   • Empagliflozin-metFORMIN HCl ER (SYNJARDY XR) 12.5-1000 MG TABLET SR 24 HR Take 1 tablet by mouth daily 100 Tablet 2   • simvastatin (ZOCOR) 40 MG Tab Take 1 Tablet by mouth every evening. 100 Tablet 2   • Teriflunomide (AUBAGIO) 14 MG Tab 1 Tablet every day.     • zolpidem (AMBIEN) 5 MG Tab Take 1 tablet by mouth every night at bedtime as needed for insomnia fir 30 days. 30 Tablet 1   • buPROPion SR (WELLBUTRIN-SR) 150 MG TABLET SR 12 HR sustained-release tablet Take 1 tablet by mouth every morning. 90 Tablet 2   • meloxicam (MOBIC) 15 MG tablet meloxicam 15 mg tablet   TAKE 1 TABLET BY MOUTH EVERY DAY     • modafinil (PROVIGIL) 200 MG Tab modafinil 200 mg tablet   TAKE ONE TABLET BY MOUTH ONE TIME DAILY     • lamoTRIgine (LAMICTAL) 100 MG Tab Take 200 mg by mouth every day.       No current facility-administered medications for this visit.         Allergies as of 07/19/2022   • (No Known Allergies)        Social History     Socioeconomic History   • Marital status:      Spouse name: Not on file   • Number of children: Not on file   • Years of education: Not on file   • Highest education level: 12th grade   Occupational History   • Not on file   Tobacco Use   • Smoking status: Never Smoker   • Smokeless tobacco: Never Used   Vaping Use   • Vaping Use: Never used   Substance and  Sexual Activity   • Alcohol use: Yes     Alcohol/week: 0.6 - 1.2 oz     Types: 1 - 2 Glasses of wine per week   • Drug use: No     Comment: CBD stick no THC   • Sexual activity: Never     Partners: Male   Other Topics Concern   • Not on file   Social History Narrative   • Not on file     Social Determinants of Health     Financial Resource Strain: Not on file   Food Insecurity: Not on file   Transportation Needs: Not on file   Physical Activity: Not on file   Stress: Not on file   Social Connections: Not on file   Intimate Partner Violence: Not on file   Housing Stability: Not on file       Family History   Problem Relation Age of Onset   • Colon Cancer Mother    • Heart Disease Mother         Cardiomyopathy   • Heart Attack Father    • Heart Disease Father    • No Known Problems Sister    • Cancer Brother         Skin   • Other Brother         Gaulbladder issues   • Cancer Maternal Grandmother         GI cancer   • No Known Problems Maternal Grandfather    • No Known Problems Paternal Grandmother    • No Known Problems Paternal Grandfather    • Other Daughter         Fibromyalgia   • Bipolar disorder Daughter    • ADHD Son    • Depression Son    • Hypertension Son        History reviewed. No pertinent surgical history.    ROS: Positive ROS per HPI.  Denies any Headache,Chest pain,  Shortness of breath,  Abdominal pain, Changes of bowel or bladder, Lower ext edema, Fevers, Nights sweats, Weight Changes, Focal weakness or numbness.  All other systems are negative.    /60 (BP Location: Left arm, Patient Position: Sitting, BP Cuff Size: Adult)   Pulse 96   Temp 36.1 °C (97 °F) (Temporal)   Resp 15   Ht 1.524 m (5')   Wt 62.1 kg (137 lb)   SpO2 95%   BMI 26.76 kg/m²      Constitutional: Alert, no distress, well-groomed.  Skin: Warm, dry, good turgor, no rashes in visible areas.  Eye: Equal, round and reactive, conjunctiva clear, lids normal.  ENMT: Lips without lesions, good dentition, moist mucous  membranes.  Neck: Trachea midline, no masses, no thyromegaly.  Respiratory: Unlabored respiratory effort, no cough.  Abdomen: Soft, no gross masses.  MSK: Normal gait, moves all extremities.  Neuro: Grossly non-focal. No cranial nerve deficit. Strength and sensation intact.   Psych: Alert and oriented x3, normal affect and mood.      Assessment and Plan.   72 y.o. female presenting with the following.     1. Type 2 diabetes mellitus without complication, without long-term current use of insulin (HCC)  Continue Synjardy at current dose.  Keep follow-up appointment with endocrinology in October.  Recommend to occasionally monitor blood sugar levels.  Patient to schedule diabetic retinal exam with her ophthalmologist.    - POCT A1C    2. Non-toxic multinodular goiter  Patient will keep follow-up appointment with her endocrinologist and complete thyroid ultrasound.    3. Primary osteoarthritis of right knee  Chronic.  Stable.  Trial to restart meloxicam 15 mg tablets daily.  Keep follow-up appointment with orthopedics.  Considering knee replacement.    My total time spent caring for the patient on the day of the encounter was 31 minutes.   This does not include time spent on separately billable procedures/tests.

## 2022-07-19 NOTE — TELEPHONE ENCOUNTER
Patient would like US orders for her thyroid per patient the order Dr Cat gave her last year is no longer good.

## 2022-08-04 PROCEDURE — RXMED WILLOW AMBULATORY MEDICATION CHARGE: Performed by: PSYCHIATRY & NEUROLOGY

## 2022-08-05 ENCOUNTER — PHARMACY VISIT (OUTPATIENT)
Dept: PHARMACY | Facility: MEDICAL CENTER | Age: 72
End: 2022-08-05
Payer: COMMERCIAL

## 2022-08-12 ENCOUNTER — HOSPITAL ENCOUNTER (OUTPATIENT)
Dept: LAB | Facility: MEDICAL CENTER | Age: 72
End: 2022-08-12
Attending: PSYCHIATRY & NEUROLOGY
Payer: MEDICARE

## 2022-08-12 LAB
ALBUMIN SERPL BCP-MCNC: 4.9 G/DL (ref 3.2–4.9)
ALBUMIN/GLOB SERPL: 1.8 G/DL
ALP SERPL-CCNC: 165 U/L (ref 30–99)
ALT SERPL-CCNC: 15 U/L (ref 2–50)
ANION GAP SERPL CALC-SCNC: 13 MMOL/L (ref 7–16)
AST SERPL-CCNC: 16 U/L (ref 12–45)
BASOPHILS # BLD AUTO: 0.8 % (ref 0–1.8)
BASOPHILS # BLD: 0.06 K/UL (ref 0–0.12)
BILIRUB SERPL-MCNC: 0.6 MG/DL (ref 0.1–1.5)
BUN SERPL-MCNC: 22 MG/DL (ref 8–22)
CALCIUM SERPL-MCNC: 9.4 MG/DL (ref 8.4–10.2)
CHLORIDE SERPL-SCNC: 104 MMOL/L (ref 96–112)
CO2 SERPL-SCNC: 23 MMOL/L (ref 20–33)
CREAT SERPL-MCNC: 0.99 MG/DL (ref 0.5–1.4)
EOSINOPHIL # BLD AUTO: 0.33 K/UL (ref 0–0.51)
EOSINOPHIL NFR BLD: 4.3 % (ref 0–6.9)
ERYTHROCYTE [DISTWIDTH] IN BLOOD BY AUTOMATED COUNT: 46.3 FL (ref 35.9–50)
GFR SERPLBLD CREATININE-BSD FMLA CKD-EPI: 60 ML/MIN/1.73 M 2
GLOBULIN SER CALC-MCNC: 2.8 G/DL (ref 1.9–3.5)
GLUCOSE SERPL-MCNC: 156 MG/DL (ref 65–99)
HCT VFR BLD AUTO: 44.6 % (ref 37–47)
HGB BLD-MCNC: 14.5 G/DL (ref 12–16)
IMM GRANULOCYTES # BLD AUTO: 0.03 K/UL (ref 0–0.11)
IMM GRANULOCYTES NFR BLD AUTO: 0.4 % (ref 0–0.9)
LYMPHOCYTES # BLD AUTO: 1.29 K/UL (ref 1–4.8)
LYMPHOCYTES NFR BLD: 16.7 % (ref 22–41)
MCH RBC QN AUTO: 30.2 PG (ref 27–33)
MCHC RBC AUTO-ENTMCNC: 32.5 G/DL (ref 33.6–35)
MCV RBC AUTO: 92.9 FL (ref 81.4–97.8)
MONOCYTES # BLD AUTO: 0.76 K/UL (ref 0–0.85)
MONOCYTES NFR BLD AUTO: 9.8 % (ref 0–13.4)
NEUTROPHILS # BLD AUTO: 5.26 K/UL (ref 2–7.15)
NEUTROPHILS NFR BLD: 68 % (ref 44–72)
NRBC # BLD AUTO: 0 K/UL
NRBC BLD-RTO: 0 /100 WBC
PLATELET # BLD AUTO: 248 K/UL (ref 164–446)
PMV BLD AUTO: 9.2 FL (ref 9–12.9)
POTASSIUM SERPL-SCNC: 3.9 MMOL/L (ref 3.6–5.5)
PROT SERPL-MCNC: 7.7 G/DL (ref 6–8.2)
RBC # BLD AUTO: 4.8 M/UL (ref 4.2–5.4)
SODIUM SERPL-SCNC: 140 MMOL/L (ref 135–145)
WBC # BLD AUTO: 7.7 K/UL (ref 4.8–10.8)

## 2022-08-12 PROCEDURE — 36415 COLL VENOUS BLD VENIPUNCTURE: CPT

## 2022-08-12 PROCEDURE — 80053 COMPREHEN METABOLIC PANEL: CPT

## 2022-08-12 PROCEDURE — 85025 COMPLETE CBC W/AUTO DIFF WBC: CPT

## 2022-09-05 PROCEDURE — RXMED WILLOW AMBULATORY MEDICATION CHARGE: Performed by: PSYCHIATRY & NEUROLOGY

## 2022-09-09 PROCEDURE — RXMED WILLOW AMBULATORY MEDICATION CHARGE: Performed by: STUDENT IN AN ORGANIZED HEALTH CARE EDUCATION/TRAINING PROGRAM

## 2022-09-11 ENCOUNTER — PHARMACY VISIT (OUTPATIENT)
Dept: PHARMACY | Facility: MEDICAL CENTER | Age: 72
End: 2022-09-11
Payer: COMMERCIAL

## 2022-09-12 ENCOUNTER — HOSPITAL ENCOUNTER (OUTPATIENT)
Dept: LAB | Facility: MEDICAL CENTER | Age: 72
End: 2022-09-12
Attending: PSYCHIATRY & NEUROLOGY
Payer: MEDICARE

## 2022-09-12 LAB
ALBUMIN SERPL BCP-MCNC: 4.6 G/DL (ref 3.2–4.9)
ALBUMIN/GLOB SERPL: 1.8 G/DL
ALP SERPL-CCNC: 126 U/L (ref 30–99)
ALT SERPL-CCNC: 15 U/L (ref 2–50)
ANION GAP SERPL CALC-SCNC: 11 MMOL/L (ref 7–16)
AST SERPL-CCNC: 20 U/L (ref 12–45)
BASOPHILS # BLD AUTO: 1.2 % (ref 0–1.8)
BASOPHILS # BLD: 0.09 K/UL (ref 0–0.12)
BILIRUB SERPL-MCNC: 0.6 MG/DL (ref 0.1–1.5)
BUN SERPL-MCNC: 20 MG/DL (ref 8–22)
CALCIUM SERPL-MCNC: 9.4 MG/DL (ref 8.4–10.2)
CHLORIDE SERPL-SCNC: 106 MMOL/L (ref 96–112)
CO2 SERPL-SCNC: 24 MMOL/L (ref 20–33)
CREAT SERPL-MCNC: 0.94 MG/DL (ref 0.5–1.4)
EOSINOPHIL # BLD AUTO: 0.27 K/UL (ref 0–0.51)
EOSINOPHIL NFR BLD: 3.7 % (ref 0–6.9)
ERYTHROCYTE [DISTWIDTH] IN BLOOD BY AUTOMATED COUNT: 45.6 FL (ref 35.9–50)
GFR SERPLBLD CREATININE-BSD FMLA CKD-EPI: 64 ML/MIN/1.73 M 2
GLOBULIN SER CALC-MCNC: 2.5 G/DL (ref 1.9–3.5)
GLUCOSE SERPL-MCNC: 135 MG/DL (ref 65–99)
HCT VFR BLD AUTO: 43.7 % (ref 37–47)
HGB BLD-MCNC: 13.9 G/DL (ref 12–16)
IMM GRANULOCYTES # BLD AUTO: 0.05 K/UL (ref 0–0.11)
IMM GRANULOCYTES NFR BLD AUTO: 0.7 % (ref 0–0.9)
LYMPHOCYTES # BLD AUTO: 1.25 K/UL (ref 1–4.8)
LYMPHOCYTES NFR BLD: 17.2 % (ref 22–41)
MCH RBC QN AUTO: 29.8 PG (ref 27–33)
MCHC RBC AUTO-ENTMCNC: 31.8 G/DL (ref 33.6–35)
MCV RBC AUTO: 93.8 FL (ref 81.4–97.8)
MONOCYTES # BLD AUTO: 0.6 K/UL (ref 0–0.85)
MONOCYTES NFR BLD AUTO: 8.3 % (ref 0–13.4)
NEUTROPHILS # BLD AUTO: 5 K/UL (ref 2–7.15)
NEUTROPHILS NFR BLD: 68.9 % (ref 44–72)
NRBC # BLD AUTO: 0 K/UL
NRBC BLD-RTO: 0 /100 WBC
PLATELET # BLD AUTO: 251 K/UL (ref 164–446)
PMV BLD AUTO: 8.7 FL (ref 9–12.9)
POTASSIUM SERPL-SCNC: 3.9 MMOL/L (ref 3.6–5.5)
PROT SERPL-MCNC: 7.1 G/DL (ref 6–8.2)
RBC # BLD AUTO: 4.66 M/UL (ref 4.2–5.4)
SODIUM SERPL-SCNC: 141 MMOL/L (ref 135–145)
WBC # BLD AUTO: 7.3 K/UL (ref 4.8–10.8)

## 2022-09-12 PROCEDURE — 36415 COLL VENOUS BLD VENIPUNCTURE: CPT

## 2022-09-12 PROCEDURE — 80053 COMPREHEN METABOLIC PANEL: CPT

## 2022-09-12 PROCEDURE — 85025 COMPLETE CBC W/AUTO DIFF WBC: CPT

## 2022-10-03 ENCOUNTER — DOCUMENTATION (OUTPATIENT)
Dept: PHARMACY | Facility: MEDICAL CENTER | Age: 72
End: 2022-10-03

## 2022-10-03 NOTE — PROGRESS NOTES
Outbound call to pt regarding Synjardy refill. Pt stated she is doing great on medication and denied missing any doses. Pt stated she has a full bottle remaining of 100 tablets. Pt stated she requests her refills thru my chart and doesn't usually get calls for refills.Pt had no questions or concerns at this time.

## 2022-10-19 ENCOUNTER — OFFICE VISIT (OUTPATIENT)
Dept: ENDOCRINOLOGY | Facility: MEDICAL CENTER | Age: 72
End: 2022-10-19
Attending: INTERNAL MEDICINE
Payer: MEDICARE

## 2022-10-19 VITALS
BODY MASS INDEX: 27.48 KG/M2 | HEART RATE: 84 BPM | OXYGEN SATURATION: 97 % | WEIGHT: 140 LBS | SYSTOLIC BLOOD PRESSURE: 98 MMHG | DIASTOLIC BLOOD PRESSURE: 64 MMHG | HEIGHT: 60 IN

## 2022-10-19 DIAGNOSIS — E04.2 NON-TOXIC MULTINODULAR GOITER: ICD-10-CM

## 2022-10-19 DIAGNOSIS — E11.9 CONTROLLED TYPE 2 DIABETES MELLITUS WITHOUT COMPLICATION, WITHOUT LONG-TERM CURRENT USE OF INSULIN (HCC): ICD-10-CM

## 2022-10-19 DIAGNOSIS — E78.5 DYSLIPIDEMIA: ICD-10-CM

## 2022-10-19 DIAGNOSIS — Z79.84 LONG TERM (CURRENT) USE OF ORAL HYPOGLYCEMIC DRUGS: ICD-10-CM

## 2022-10-19 PROCEDURE — RXMED WILLOW AMBULATORY MEDICATION CHARGE: Performed by: INTERNAL MEDICINE

## 2022-10-19 PROCEDURE — 92250 FUNDUS PHOTOGRAPHY W/I&R: CPT | Performed by: INTERNAL MEDICINE

## 2022-10-19 PROCEDURE — 99212 OFFICE O/P EST SF 10 MIN: CPT | Performed by: INTERNAL MEDICINE

## 2022-10-19 PROCEDURE — 99214 OFFICE O/P EST MOD 30 MIN: CPT | Performed by: INTERNAL MEDICINE

## 2022-10-19 RX ORDER — EMPAGLIFLOZIN, METFORMIN HYDROCHLORIDE 12.5; 1 MG/1; MG/1
TABLET, EXTENDED RELEASE ORAL
Qty: 100 TABLET | Refills: 2 | Status: SHIPPED | OUTPATIENT
Start: 2022-10-19 | End: 2023-11-02 | Stop reason: SDUPTHER

## 2022-10-19 ASSESSMENT — FIBROSIS 4 INDEX: FIB4 SCORE: 1.48

## 2022-10-19 NOTE — PROGRESS NOTES
CHIEF COMPLAINT: Patient is here for follow up of Type 2 Diabetes Mellitus.      HPI:     Melody Rodriguez is a 72 y.o. female with Type 2 Diabetes Mellitus here for follow up.    Labs from 10/19/2022 show a1c is 5.8%  Labs from 9/10/2021 show a1c was 6.2%  Labs from 3/16/2021 show a1c was 6.9%  Labs from 10/9/2020 show a1c was 6.8%  Labs from 12/18/2019 show a1c was 6.5%.    She previously saw KALEB Brock      She remains on Synjardy 12.5/1000 mg twice a day       I havent seen her in over 12 months  She denies side effects with her medications      She has hyperlipidemia and is taking simvastatin 40 mg daily.    She denies a history of coronary artery disease and cerebrovascular disease  She denies myalgias and cramps  LDL cholesterol was fair at 86 on 3/2022      She does not have signs of diabetic kidney disease   UACR was <30 on   3/2022      We discussed getting an eye exam in the office today         She has nontoxic multinodular goiter with   a 4 cm solid nodule in the right lower lobe;   a 2.0 cm solid nodule in the left upper lobe    a 1.5 cm solid nodule on the left mid lobe and   A 1.6 cm solid nodule in the left lower lobe     Last ultrasound was on November 21, 2020 showed stable nodules    Biopsy of the dominant 4 cm RML nodule on July 11, 2019   was nondiagnostic.   But repeat biopsy on January 12, 2021 came back benign  Biopsy of the LML nodule on July 11, 2019  came back as benign  Biopsy of the 2 cm ISA nodule on January 2021 was benign      She has been euthyroid on serial monitoring.    She still reports periodic  difficulty swallowing (mostly when she swallows a handful of pills)  She has Schatzi ring in her esophagus which has been dilated twice previously by GI  TSH was normal at 3.4 on 3/2022        BG Diary:  Patient doesn't check her BGs    Weight has been stable    Diabetes Complications   Retinopathy: No known retinopathy.  Last eye exam: POCT exam today   Neuropathy:  Denies paresthesias or numbness in hands or feet. Denies any foot wounds.  Exercise: Minimal.  Diet: Fair.  Patient's medications, allergies, and social histories were reviewed and updated as appropriate.    ROS:     CONS:     No fever, no chills   EYES:     No diplopia, no blurry vision   CV:           No chest pain, no palpitations   PULM:     No SOB, no cough, no hemoptysis.   GI:            No nausea, no vomiting, no diarrhea, no constipation   ENDO:     No polyuria, no polydipsia, no heat intolerance, no cold intolerance       Past Medical History:  Problem List:  2022-06: Groin lump  2022-06: SI (sacroiliac) pain  2022-05: BMI 26.0-26.9,adult  2022-05: Osteopenia of left thigh  2022-05: Hypokalemia  2022-05: History of renal disease  2022-03: Cerebral atrophy ([facility])  2022-03: Recurrent major depressive disorder, in remission ([facility])  2021-06: Osteoarthritis of right knee  2021-06: Type 2 diabetes mellitus with stage 3 chronic kidney disease   ([facility])  2020-12: Stage 3a chronic kidney disease ([facility])  2020-10: Long term (current) use of oral hypoglycemic drugs  2020-01: Non-toxic multinodular goiter  2019-07: Risk for falls  2019-03: Dysphagia  2017-01: Type 2 diabetes mellitus without complication, without long-  term current use of insulin ([facility])  2017-01: Dyslipidemia  2017-01: Multiple sclerosis ([facility])  2017-01: Mood disorder ([facility])  2017-01: Schatzki's ring  2017-01: Hiatal hernia    Past Surgical History:  No past surgical history on file.     Allergies:  Patient has no known allergies.     Social History:  Social History     Tobacco Use    Smoking status: Never    Smokeless tobacco: Never   Vaping Use    Vaping Use: Never used   Substance Use Topics    Alcohol use: Not Currently     Comment: wine - occasional    Drug use: No     Comment: CBD stick no THC        Family History:   family history includes ADHD in her son; Bipolar disorder in her daughter; Cancer in her brother and maternal grandmother; Colon Cancer  in her mother; Depression in her son; Heart Attack in her father; Heart Disease in her father and mother; Hypertension in her son; No Known Problems in her maternal grandfather, paternal grandfather, paternal grandmother, and sister; Other in her brother and daughter.      PHYSICAL EXAM:   OBJECTIVE:  Vital signs: BP (!) 98/64 (BP Location: Left arm, Patient Position: Sitting, BP Cuff Size: Adult)   Pulse 84   Ht 1.524 m (5')   Wt 63.5 kg (140 lb)   SpO2 97%   BMI 27.34 kg/m²   GENERAL: Well-developed, well-nourished in no apparent distress.   EYE:  No ocular asymmetry, PERRLA  HENT: Pink, moist mucous membranes.    NECK: Thyroid is diffusely enlarged with palpable nodules bilaterally  CARDIOVASCULAR:  No murmurs  LUNGS: Clear breath sounds  ABDOMEN: Soft, nontender   EXTREMITIES: No clubbing, cyanosis, or edema.   NEUROLOGICAL: No gross focal motor abnormalities   LYMPH: No cervical adenopathy seen  SKIN: No rashes, lesions.   Monofilament testing with a 10 gram force: sensation: intact bilaterally  Visual Inspection: Feet without maceration, ulcers, or fissures.  Pedal pulses: intact bilaterally        Labs:  Lab Results   Component Value Date/Time    HBA1C 6.6 (A) 03/16/2022 04:50 PM        Lab Results   Component Value Date/Time    WBC 7.3 09/12/2022 09:26 AM    RBC 4.66 09/12/2022 09:26 AM    HEMOGLOBIN 13.9 09/12/2022 09:26 AM    MCV 93.8 09/12/2022 09:26 AM    MCH 29.8 09/12/2022 09:26 AM    MCHC 31.8 (L) 09/12/2022 09:26 AM    RDW 45.6 09/12/2022 09:26 AM    MPV 8.7 (L) 09/12/2022 09:26 AM       Lab Results   Component Value Date/Time    SODIUM 141 09/12/2022 09:26 AM    POTASSIUM 3.9 09/12/2022 09:26 AM    CHLORIDE 106 09/12/2022 09:26 AM    CO2 24 09/12/2022 09:26 AM    ANION 11.0 09/12/2022 09:26 AM    GLUCOSE 135 (H) 09/12/2022 09:26 AM    BUN 20 09/12/2022 09:26 AM    CREATININE 0.94 09/12/2022 09:26 AM    CALCIUM 9.4 09/12/2022 09:26 AM    ASTSGOT 20 09/12/2022 09:26 AM    ALTSGPT 15 09/12/2022  09:26 AM    TBILIRUBIN 0.6 2022 09:26 AM    ALBUMIN 4.6 2022 09:26 AM    TOTPROTEIN 7.1 2022 09:26 AM    GLOBULIN 2.5 2022 09:26 AM    AGRATIO 1.8 2022 09:26 AM       Lab Results   Component Value Date/Time    CHOLSTRLTOT 179 2019 0813    TRIGLYCERIDE 145 2019 0813    HDL 39 (A) 2019 0813     (H) 2019 0813       Lab Results   Component Value Date/Time    MALBCRT 13 2022 06:11 AM    MICROALBUR 1.2 2022 06:11 AM        Lab Results   Component Value Date/Time    TSHULTRASEN 3.680 2019 0813     No results found for: FREEDIR  No results found for: FREET3  No results found for: THYSTIMIG    IMAGIN/21/2020 4:42 PM     HISTORY/REASON FOR EXAM:  Follow up NTMNG 4 cm nodule RUL and 2 nodules LML LLL previous FNA r lobe non dx     TECHNIQUE/EXAM DESCRIPTION:  Ultrasound of the soft tissues of the head and neck.     COMPARISON:  Thyroid ultrasound 2019     FINDINGS:  The thyroid gland is heterogeneous.  Vascularity is normal.     The right lobe of the thyroid gland measures 2.18 cm x 5.21 cm x 2.41 cm.  The left lobe of the thyroid gland measures 1.56 cm x 5.91 cm x 2.07 cm.  The isthmus measures 0.18 cm.     Nodules >= 1cm:     Nodule #1  Location: Right lower pole  Size:  3.94 x 2.40 x 1.97 cm from 4.0 x 2.4 x 2.9 cm  Composition:  Mixed-1  Echogenicity:  Isoechoic-1  Shape:  Wider than tall-0  Margins:  Smooth-0  Echogenic Foci:  Macroscopic-1     ACR TIRADS points/category:  3 - TR3 - Mildly Suspicious     Nodule #2  Location:  Left interpolar  Size:  1.49 x 1.19 x 1.44 cm from 1.9 x 1.0 x 0.7  Composition:  Solid-2  Echogenicity:  Isoechoic-1  Shape:  Taller than wide-3  Margins:  Smooth-0  Echogenic Foci:  None-0     ACR TIRADS points/category:  6 - TR4 - Moderately Suspicious     Nodule #3  Location:  Left lower pole  Size:  1.69 x 1.50 x 1.07 cm from 1.9 x 1.0 x 1.5  Composition:  Mixed-1  Echogenicity:  Isoechoic-1  Shape:   Wider than tall-0  Margins:  Smooth-0  Echogenic Foci:  None-0     ACR TIRADS points/category:  2 - TR2 - Not Suspicious     Nodule #4  Location:  Left upper  Size:  2.21 x 1.46 x 1.43 cm  Composition:  Mixed-1  Echogenicity:  Isoechoic-1  Shape:  Wider than tall-0  Margins:  Smooth-0  Echogenic Foci:  None-0     ACR TIRADS points/category:  2 - TR2 - Not Suspicious     IMPRESSION:     Left interpolar mass has slightly enlarged and is moderately suspicious     ACR TI-RADS Recommendations  TR4 - follow up ultrasound in 1,2,3 and 5 years     Recommendations based on the American College of [employer] Thyroid imaging, reporting and Data System (TI-RADS) 2017.            ASSESSMENT/PLAN:     1. Controlled type 2 diabetes mellitus without complication, without long-term current use of insulin (HCC)  Stable controlled  Continue Synjardy XR 12.5/1000 mg twice a day  She is up-to-date with her labs  We are getting an eye exam today  Follow-up in 6 months    2. Non-toxic multinodular goiter  Stable  She is euthyroid  She reports that her dysphagia is not severe and is intermittent  I want her to get a repeat thyroid ultrasound and I will update her    3.  Dyslipidemia  Controlled   Continue simvastatin  Follow low-fat diet  Repeat fasting lipids in 6 months    4. Long term (current) use of oral hypoglycemic drugs  Patient is oral agents for t2dm mgt      Return in about 6 months (around 4/19/2023).      Thank you kindly for allowing me to participate in the diabetes care plan for this patient.    Swapnil Cat MD, FACE, [facility]  01/29/20    CC:   Laya Cornelius M.D.

## 2022-10-20 PROCEDURE — RXMED WILLOW AMBULATORY MEDICATION CHARGE: Performed by: INTERNAL MEDICINE

## 2022-10-20 PROCEDURE — RXMED WILLOW AMBULATORY MEDICATION CHARGE: Performed by: PSYCHIATRY & NEUROLOGY

## 2022-10-24 ENCOUNTER — PHARMACY VISIT (OUTPATIENT)
Dept: PHARMACY | Facility: MEDICAL CENTER | Age: 72
End: 2022-10-24
Payer: COMMERCIAL

## 2022-10-25 LAB — RETINAL SCREEN: NEGATIVE

## 2022-10-28 PROCEDURE — RXMED WILLOW AMBULATORY MEDICATION CHARGE: Performed by: INTERNAL MEDICINE

## 2022-10-31 PROCEDURE — RXMED WILLOW AMBULATORY MEDICATION CHARGE: Performed by: STUDENT IN AN ORGANIZED HEALTH CARE EDUCATION/TRAINING PROGRAM

## 2022-11-01 ENCOUNTER — PHARMACY VISIT (OUTPATIENT)
Dept: PHARMACY | Facility: MEDICAL CENTER | Age: 72
End: 2022-11-01
Payer: COMMERCIAL

## 2022-11-03 ENCOUNTER — PATIENT OUTREACH (OUTPATIENT)
Dept: HEALTH INFORMATION MANAGEMENT | Facility: OTHER | Age: 72
End: 2022-11-03
Payer: MEDICARE

## 2022-11-03 ENCOUNTER — PATIENT MESSAGE (OUTPATIENT)
Dept: HEALTH INFORMATION MANAGEMENT | Facility: OTHER | Age: 72
End: 2022-11-03

## 2022-11-03 DIAGNOSIS — E11.9 CONTROLLED TYPE 2 DIABETES MELLITUS WITHOUT COMPLICATION, WITHOUT LONG-TERM CURRENT USE OF INSULIN (HCC): ICD-10-CM

## 2022-11-03 DIAGNOSIS — E11.9 TYPE 2 DIABETES MELLITUS WITHOUT COMPLICATION, WITHOUT LONG-TERM CURRENT USE OF INSULIN (HCC): ICD-10-CM

## 2022-11-03 NOTE — PROGRESS NOTES
CHANTAL Xie discussed the pt with HAY Kraft at the [facility] and realized the pt is now in a different clinic with a different Dr. CHANTAL call the [facility] r[facility]inic and spoke to the [facility] HAY Mckinnon and set the pt up with the same date and time appointment.  .

## 2022-11-03 NOTE — PROGRESS NOTES
CHANTAL Xie called the pt to introduce the CCM program that the client is eligble for. Pt accepted the program and has an enrollment appointment set with the CCM RN Swapnil on 11/9 @11am.

## 2022-11-04 ENCOUNTER — HOSPITAL ENCOUNTER (OUTPATIENT)
Dept: RADIOLOGY | Facility: MEDICAL CENTER | Age: 72
End: 2022-11-04
Attending: INTERNAL MEDICINE
Payer: MEDICARE

## 2022-11-04 DIAGNOSIS — E04.2 NON-TOXIC MULTINODULAR GOITER: ICD-10-CM

## 2022-11-04 PROCEDURE — 76536 US EXAM OF HEAD AND NECK: CPT

## 2022-11-09 ENCOUNTER — PATIENT OUTREACH (OUTPATIENT)
Dept: HEALTH INFORMATION MANAGEMENT | Facility: OTHER | Age: 72
End: 2022-11-09
Payer: MEDICARE

## 2022-11-09 DIAGNOSIS — Z91.81 RISK FOR FALLS: ICD-10-CM

## 2022-11-09 DIAGNOSIS — E11.9 CONTROLLED TYPE 2 DIABETES MELLITUS WITHOUT COMPLICATION, WITHOUT LONG-TERM CURRENT USE OF INSULIN (HCC): ICD-10-CM

## 2022-11-09 PROCEDURE — 99490 CHRNC CARE MGMT STAFF 1ST 20: CPT | Performed by: INTERNAL MEDICINE

## 2022-11-09 SDOH — ECONOMIC STABILITY: FOOD INSECURITY: WITHIN THE PAST 12 MONTHS, THE FOOD YOU BOUGHT JUST DIDN'T LAST AND YOU DIDN'T HAVE MONEY TO GET MORE.: NEVER TRUE

## 2022-11-09 SDOH — ECONOMIC STABILITY: FOOD INSECURITY: WITHIN THE PAST 12 MONTHS, YOU WORRIED THAT YOUR FOOD WOULD RUN OUT BEFORE YOU GOT MONEY TO BUY MORE.: NEVER TRUE

## 2022-11-09 SDOH — ECONOMIC STABILITY: INCOME INSECURITY: IN THE LAST 12 MONTHS, WAS THERE A TIME WHEN YOU WERE NOT ABLE TO PAY THE MORTGAGE OR RENT ON TIME?: NO

## 2022-11-09 SDOH — ECONOMIC STABILITY: HOUSING INSECURITY: IN THE LAST 12 MONTHS, HOW MANY PLACES HAVE YOU LIVED?: 1

## 2022-11-09 SDOH — HEALTH STABILITY: PHYSICAL HEALTH: ON AVERAGE, HOW MANY MINUTES DO YOU ENGAGE IN EXERCISE AT THIS LEVEL?: 0 MIN

## 2022-11-09 SDOH — HEALTH STABILITY: PHYSICAL HEALTH: ON AVERAGE, HOW MANY DAYS PER WEEK DO YOU ENGAGE IN MODERATE TO STRENUOUS EXERCISE (LIKE A BRISK WALK)?: 0 DAYS

## 2022-11-09 ASSESSMENT — SOCIAL DETERMINANTS OF HEALTH (SDOH): HOW HARD IS IT FOR YOU TO PAY FOR THE VERY BASICS LIKE FOOD, HOUSING, MEDICAL CARE, AND HEATING?: NOT HARD AT ALL

## 2022-11-09 ASSESSMENT — PATIENT HEALTH QUESTIONNAIRE - PHQ9: CLINICAL INTERPRETATION OF PHQ2 SCORE: 0

## 2022-11-09 NOTE — PROGRESS NOTES
Nurse CM outreach call to complete intake assessment for CCM program. Pt referred to CM from [facility]. Patient is agreeable to enroll in program. Pt reports history of DM, falls, and MS. States she does follow with neurologist, Dr. Guzman. Reports she follows with endocrinology for diabetes management. Reports last A1C was 5.8 on 10/19. Pt reports she has falls related to MS and balance problems. States she does use a cane to assist with mobility. Pt reports she will be getting knee replacement surgery in December. Pt reports she has a friend that will be staying with her for a week.     INITIAL CARE MANAGEMENT CARE PLAN/ASSESSMENT     Medication Self-Management Goals: Continue to take medications as directed. States she is able to afford her MS medication Aubagio as she has assistance with a katina.      Reviewed medications listed below with patient.      Current Outpatient Medications:     meloxicam (MOBIC) 15 MG tablet, Take 1 Tablet by mouth every day., Disp: 30 Tablet, Rfl: 0    Empagliflozin-metFORMIN HCl ER (SYNJARDY XR) 12.5-1000 MG TABLET SR 24 HR, Take 1 tablet by mouth daily, Disp: 100 Tablet, Rfl: 2    buPROPion SR (WELLBUTRIN-SR) 150 MG TABLET SR 12 HR sustained-release tablet, Take 1 tablet (150 mg) by mouth daily in the morning, Disp: 90 Tablet, Rfl: 2    lamotrigine (LAMICTAL) 200 MG tablet, Take 1 tablet (200 mg) by mouth daily, Disp: 90 Tablet, Rfl: 2    paroxetine (PAXIL) 40 MG tablet, TAKE 1 TABLET(40 MG) BY MOUTH DAILY IN THE MORNING, Disp: 90 Tablet, Rfl: 2    paroxetine (PAXIL) 40 MG tablet, Take 1 Tablet by mouth every day., Disp: 100 Tablet, Rfl: 1    omeprazole (PRILOSEC) 20 MG delayed-release capsule, Take 1 capsule by mouth every morning, 30 minutes before breakfast., Disp: 100 Capsule, Rfl: 3    glucose blood strip, Freestyle [employer] Test Strips  USE TO TEST THREE TIMES DAILY, Disp: , Rfl:     FreeStyle Lancets Misc, 28 guage, Disp: , Rfl:     diclofenac sodium (VOLTAREN) 1 % Gel, Apply 2  grams topically to the affected area(s) 4 times a day as needed for moderate pain., Disp: 100 g, Rfl: 1    simvastatin (ZOCOR) 40 MG Tab, Take 1 Tablet by mouth every evening., Disp: 100 Tablet, Rfl: 2    Teriflunomide (AUBAGIO) 14 MG Tab, 1 Tablet every day., Disp: , Rfl:     zolpidem (AMBIEN) 5 MG Tab, Take 1 tablet by mouth every night at bedtime as needed for insomnia fir 30 days., Disp: 30 Tablet, Rfl: 1    buPROPion SR (WELLBUTRIN-SR) 150 MG TABLET SR 12 HR sustained-release tablet, Take 1 tablet by mouth every morning., Disp: 90 Tablet, Rfl: 2    meloxicam (MOBIC) 15 MG tablet, meloxicam 15 mg tablet  TAKE 1 TABLET BY MOUTH EVERY DAY, Disp: , Rfl:     modafinil (PROVIGIL) 200 MG Tab, modafinil 200 mg tablet  TAKE ONE TABLET BY MOUTH ONE TIME DAILY, Disp: , Rfl:     lamoTRIgine (LAMICTAL) 100 MG Tab, Take 200 mg by mouth every day., Disp: , Rfl:        Physical/Functional/Environmental Status:     Activities of Daily Living:  Bathing: independent   Dressing: independent  Grooming: independent  Mouth Care: independent  Toileting: independent  Climbing a Flight of Stairs: independent    Independent Activities of Daily Living:  Shopping: independent  Cooking: independent  Managing Medications: independent  Using the phone and looking up numbers: independent  Driving or using public transportation: independent  Managing Finances: independent        11/9/2022    11:07 AM 11/9/2022    11:22 AM   STEADI Fall Risk   One or more falls in the last year Yes    Advised to use a cane or walker to get around safely  Yes   Feels unsteady when walking  Yes   Steadies self on furniture while walking at home  Yes   Worried about falling  Yes   Needs to push with hands when rising from a chair  Yes   Has trouble stepping up onto a curb / using stairs  Yes   Often has to rush to the toilet  No   Has lost some feeling in feet  No   Takes medicine that makes him/her feel lightheaded or more tired than usual  No   Takes medicine to  sleep or improve mood  Yes   Often feels sad or depressed  No               Goal:  Patient will maintain safety and reduce number of falls     Financial Status: Reports no current problems     Goal: N/A     Transportation Status: Pt states she drives her own vehicle.      Goal: N/A    Mental/Behavioral/Psychosocial Status:        10/5/2021     1:30 PM 2/23/2022     3:50 PM 11/9/2022    10:54 AM   Depression Screen (PHQ-2/PHQ-9)   PHQ-2 Total Score 0 0 0       Interpretation of PHQ-9 Total Score   Score Severity   1-4 No Depression   5-9 Mild Depression   10-14 Moderate Depression   15-19 Moderately Severe Depression   20-27 Severe Depression       Goal:  Reports no current problems      Chronic Care Management Care Plan      Goal:  Improved mobility and reduction in number of falls  Barriers:Reports balance issues related to MS  Interventions: Continue to use cane to assist with mobility. Increase mobility as tolerated after knee surgery. Follow closely with specialist. Request referral to physical therapy after knee surgery.    Start Date: 11/9/22  End Date:              Next Scheduled patient outreach:  One month  Nurse Care Coordinator:  Pratibha  Personal Care Management:  [phone]

## 2022-11-11 PROCEDURE — RXMED WILLOW AMBULATORY MEDICATION CHARGE: Performed by: PSYCHIATRY & NEUROLOGY

## 2022-11-15 ENCOUNTER — PHARMACY VISIT (OUTPATIENT)
Dept: PHARMACY | Facility: MEDICAL CENTER | Age: 72
End: 2022-11-15
Payer: COMMERCIAL

## 2022-11-22 ENCOUNTER — DOCUMENTATION (OUTPATIENT)
Dept: PHARMACY | Facility: MEDICAL CENTER | Age: 72
End: 2022-11-22
Payer: MEDICARE

## 2022-11-23 ENCOUNTER — DOCUMENTATION (OUTPATIENT)
Dept: PHARMACY | Facility: MEDICAL CENTER | Age: 72
End: 2022-11-23
Payer: MEDICARE

## 2022-11-23 ENCOUNTER — OFFICE VISIT (OUTPATIENT)
Dept: URGENT CARE | Facility: CLINIC | Age: 72
End: 2022-11-23
Payer: MEDICARE

## 2022-11-23 ENCOUNTER — APPOINTMENT (OUTPATIENT)
Dept: RADIOLOGY | Facility: IMAGING CENTER | Age: 72
End: 2022-11-23
Attending: FAMILY MEDICINE
Payer: MEDICARE

## 2022-11-23 VITALS
TEMPERATURE: 97 F | WEIGHT: 137 LBS | OXYGEN SATURATION: 97 % | HEIGHT: 60 IN | HEART RATE: 86 BPM | BODY MASS INDEX: 26.9 KG/M2 | RESPIRATION RATE: 14 BRPM | SYSTOLIC BLOOD PRESSURE: 122 MMHG | DIASTOLIC BLOOD PRESSURE: 66 MMHG

## 2022-11-23 DIAGNOSIS — R05.1 ACUTE COUGH: ICD-10-CM

## 2022-11-23 PROCEDURE — 99213 OFFICE O/P EST LOW 20 MIN: CPT | Performed by: FAMILY MEDICINE

## 2022-11-23 PROCEDURE — 71046 X-RAY EXAM CHEST 2 VIEWS: CPT | Mod: TC,FY | Performed by: RADIOLOGY

## 2022-11-23 RX ORDER — METHYLPREDNISOLONE 4 MG/1
TABLET ORAL
Qty: 21 TABLET | Refills: 0 | Status: SHIPPED | OUTPATIENT
Start: 2022-11-23 | End: 2022-12-08

## 2022-11-23 ASSESSMENT — FIBROSIS 4 INDEX: FIB4 SCORE: 1.48

## 2022-11-23 NOTE — PROGRESS NOTES
I have spoken with pt regarding Synjardy refill. Pt stated this was filled for 100 days on 10/24. Next call date was not updated. Pt stated she uses my chart for refills and calling isn't necessary Pt had no questions at this time.

## 2022-11-24 NOTE — PROGRESS NOTES
Subjective:      72 y.o. female presents to urgent care for cold symptoms that started almost 2 weeks ago.  She is experiencing cough, runny nose, and postnasal drip.  No headache, body ache, fever, or diarrhea. She denies any tobacco product use.  No history of asthma or COPD.  She is fully vaccinated against COVID.  No known sick contacts.    She denies any other questions or concerns at this time.    Current problem list, medication, and past medical/surgical history were reviewed in Epic.    ROS  See HPI     Objective:      /66   Pulse 86   Temp 36.1 °C (97 °F) (Temporal)   Resp 14   Ht 1.524 m (5')   Wt 62.1 kg (137 lb)   SpO2 97%   BMI 26.76 kg/m²     Physical Exam  Constitutional:       General: She is not in acute distress.     Appearance: She is not diaphoretic.   HENT:      Right Ear: Tympanic membrane, ear canal and external ear normal.      Left Ear: Tympanic membrane, ear canal and external ear normal.      Mouth/Throat:      Tongue: Tongue does not deviate from midline.      Palate: No lesions.      Pharynx: Uvula midline. No posterior oropharyngeal erythema.      Tonsils: No tonsillar exudate. 1+ on the right. 1+ on the left.   Cardiovascular:      Rate and Rhythm: Normal rate and regular rhythm.      Heart sounds: Normal heart sounds.   Pulmonary:      Effort: Pulmonary effort is normal. No respiratory distress.      Breath sounds: Normal breath sounds.   Neurological:      Mental Status: She is alert.   Psychiatric:         Mood and Affect: Affect normal.         Judgment: Judgment normal.     Assessment/Plan:     1. Acute cough  CXR showing no acute cardiopulmonary disease.  Unsure etiology, viral illness versus postviral cough as it has been going on for so long.  Prescription for Medrol Dosepak has been sent.  - DX-CHEST-2 VIEWS; Future  - methylPREDNISolone (MEDROL DOSEPAK) 4 MG Tablet Therapy Pack; Follow schedule on package instructions.  Dispense: 21 Tablet; Refill:  0      Instructed to return to Urgent Care or nearest Emergency Department if symptoms fail to improve, for any change in condition, further concerns, or new concerning symptoms. Patient states understanding of the plan of care and discharge instructions.    Luz Marina Lemos M.D.

## 2022-12-02 ENCOUNTER — PRE-ADMISSION TESTING (OUTPATIENT)
Dept: ADMISSIONS | Facility: MEDICAL CENTER | Age: 72
End: 2022-12-02
Attending: STUDENT IN AN ORGANIZED HEALTH CARE EDUCATION/TRAINING PROGRAM
Payer: MEDICARE

## 2022-12-02 NOTE — PREPROCEDURE INSTRUCTIONS
Per anesthesia protocol instructed to take the following medications DOS: Wellbutrin, lamictal, prilosec, paxil, teriflunomide.  Instructed to hold Mobic for 1 week prior to DOS and Empagliflozin 4 days prior to DOS.

## 2022-12-02 NOTE — DISCHARGE PLANNING
DISCHARGE PLANNING NOTE - TOTAL JOINT    Procedure: Procedure(s):  RIGHT TOTAL KNEE REPLACEMENT  Procedure Date: 12/14/2022  Insurance: Payor: [facility] SENIOR CARE PLUS / Plan: [facility] PREFERRED  / Product Type: Medicare Advantage /    Equipment currently available at home?   ice  Steps into the home? 0  Steps within the home? 0  Toilet height?  comfort  Type of shower? walk-in shower  Who will be with you during your recovery? friend  Is Outpatient Physical Therapy set up after surgery? No   Did you take the Total Joint Class and where? Yes  Planning same day discharge?Yes     This writer spoke on the phone with pt during her preadmission appt. Home safety checklist reviewed and copy given to pt. Pt educated to dc criteria. All questions answered and verbalizes understanding of all instructions. No dc needs identified at this time. Anticipate dc to home without barriers.

## 2022-12-06 ENCOUNTER — PHARMACY VISIT (OUTPATIENT)
Dept: PHARMACY | Facility: MEDICAL CENTER | Age: 72
End: 2022-12-06
Payer: COMMERCIAL

## 2022-12-06 ENCOUNTER — PATIENT OUTREACH (OUTPATIENT)
Dept: HEALTH INFORMATION MANAGEMENT | Facility: OTHER | Age: 72
End: 2022-12-06
Payer: MEDICARE

## 2022-12-06 DIAGNOSIS — E11.9 CONTROLLED TYPE 2 DIABETES MELLITUS WITHOUT COMPLICATION, WITHOUT LONG-TERM CURRENT USE OF INSULIN (HCC): ICD-10-CM

## 2022-12-06 PROCEDURE — RXMED WILLOW AMBULATORY MEDICATION CHARGE: Performed by: PSYCHIATRY & NEUROLOGY

## 2022-12-06 NOTE — PROGRESS NOTES
Nurse CM outreach call for monthly CCM assessment. Patient answered telephone. Reports she is home and doing okay. States she is going for knee surgery on 12/14. Pt will have a friend staying with her after surgery to assist her with care. Pt reports no falls over the past month. States she uses her cane for mobility. Pt has history of falls related to MS. Pt reports blood sugars have been okay when she has checked. Reports last A1C was in a good range. Last A1C noted in Epic was 6.2 on 9/10/22. Pt has orders in [facility] for labs including A1C. Pt reports she will be following with orthopedic provider post surgery. States no care management needs at this time. Nurse will contact patient next month for CCM call.

## 2022-12-07 ENCOUNTER — PRE-ADMISSION TESTING (OUTPATIENT)
Dept: ADMISSIONS | Facility: MEDICAL CENTER | Age: 72
End: 2022-12-07
Attending: STUDENT IN AN ORGANIZED HEALTH CARE EDUCATION/TRAINING PROGRAM
Payer: MEDICARE

## 2022-12-07 DIAGNOSIS — Z01.812 PRE-OPERATIVE LABORATORY EXAMINATION: ICD-10-CM

## 2022-12-07 DIAGNOSIS — Z01.810 PRE-OPERATIVE CARDIOVASCULAR EXAMINATION: ICD-10-CM

## 2022-12-07 LAB
ANION GAP SERPL CALC-SCNC: 13 MMOL/L (ref 7–16)
BUN SERPL-MCNC: 18 MG/DL (ref 8–22)
CALCIUM SERPL-MCNC: 9 MG/DL (ref 8.4–10.2)
CHLORIDE SERPL-SCNC: 101 MMOL/L (ref 96–112)
CO2 SERPL-SCNC: 23 MMOL/L (ref 20–33)
CREAT SERPL-MCNC: 1.09 MG/DL (ref 0.5–1.4)
EKG IMPRESSION: NORMAL
ERYTHROCYTE [DISTWIDTH] IN BLOOD BY AUTOMATED COUNT: 47.6 FL (ref 35.9–50)
EST. AVERAGE GLUCOSE BLD GHB EST-MCNC: 117 MG/DL
GFR SERPLBLD CREATININE-BSD FMLA CKD-EPI: 54 ML/MIN/1.73 M 2
GLUCOSE SERPL-MCNC: 140 MG/DL (ref 65–99)
HBA1C MFR BLD: 5.7 % (ref 4–5.6)
HCT VFR BLD AUTO: 42.5 % (ref 37–47)
HGB BLD-MCNC: 13.2 G/DL (ref 12–16)
MCH RBC QN AUTO: 29.9 PG (ref 27–33)
MCHC RBC AUTO-ENTMCNC: 31.1 G/DL (ref 33.6–35)
MCV RBC AUTO: 96.2 FL (ref 81.4–97.8)
PLATELET # BLD AUTO: 302 K/UL (ref 164–446)
PMV BLD AUTO: 9.5 FL (ref 9–12.9)
POTASSIUM SERPL-SCNC: 3.9 MMOL/L (ref 3.6–5.5)
RBC # BLD AUTO: 4.42 M/UL (ref 4.2–5.4)
SCCMEC + MECA PNL NOSE NAA+PROBE: NEGATIVE
SCCMEC + MECA PNL NOSE NAA+PROBE: POSITIVE
SODIUM SERPL-SCNC: 137 MMOL/L (ref 135–145)
WBC # BLD AUTO: 10.3 K/UL (ref 4.8–10.8)

## 2022-12-07 PROCEDURE — 85027 COMPLETE CBC AUTOMATED: CPT

## 2022-12-07 PROCEDURE — 80048 BASIC METABOLIC PNL TOTAL CA: CPT

## 2022-12-07 PROCEDURE — 83036 HEMOGLOBIN GLYCOSYLATED A1C: CPT

## 2022-12-07 PROCEDURE — 36415 COLL VENOUS BLD VENIPUNCTURE: CPT

## 2022-12-07 PROCEDURE — 93010 ELECTROCARDIOGRAM REPORT: CPT | Performed by: INTERNAL MEDICINE

## 2022-12-07 PROCEDURE — 87641 MR-STAPH DNA AMP PROBE: CPT

## 2022-12-07 PROCEDURE — 87640 STAPH A DNA AMP PROBE: CPT | Mod: XU

## 2022-12-07 PROCEDURE — 93005 ELECTROCARDIOGRAM TRACING: CPT

## 2022-12-08 DIAGNOSIS — Z01.818 PRE-OPERATIVE CLEARANCE: ICD-10-CM

## 2022-12-09 ENCOUNTER — OFFICE VISIT (OUTPATIENT)
Dept: CARDIOLOGY | Facility: MEDICAL CENTER | Age: 72
End: 2022-12-09
Attending: INTERNAL MEDICINE
Payer: MEDICARE

## 2022-12-09 VITALS
RESPIRATION RATE: 16 BRPM | SYSTOLIC BLOOD PRESSURE: 120 MMHG | WEIGHT: 136 LBS | BODY MASS INDEX: 26.7 KG/M2 | OXYGEN SATURATION: 95 % | DIASTOLIC BLOOD PRESSURE: 60 MMHG | HEART RATE: 72 BPM | HEIGHT: 60 IN

## 2022-12-09 DIAGNOSIS — E78.5 DYSLIPIDEMIA: ICD-10-CM

## 2022-12-09 DIAGNOSIS — R01.1 SYSTOLIC MURMUR: ICD-10-CM

## 2022-12-09 DIAGNOSIS — Z01.810 PREOPERATIVE CARDIOVASCULAR EXAMINATION: ICD-10-CM

## 2022-12-09 DIAGNOSIS — R94.31 ABNORMAL EKG: ICD-10-CM

## 2022-12-09 DIAGNOSIS — E11.9 CONTROLLED TYPE 2 DIABETES MELLITUS WITHOUT COMPLICATION, WITHOUT LONG-TERM CURRENT USE OF INSULIN (HCC): ICD-10-CM

## 2022-12-09 LAB — EKG IMPRESSION: NORMAL

## 2022-12-09 PROCEDURE — 93000 ELECTROCARDIOGRAM COMPLETE: CPT | Performed by: INTERNAL MEDICINE

## 2022-12-09 PROCEDURE — 99204 OFFICE O/P NEW MOD 45 MIN: CPT | Performed by: INTERNAL MEDICINE

## 2022-12-09 RX ORDER — METHYLPREDNISOLONE 4 MG/1
TABLET ORAL
Status: ON HOLD | COMMUNITY
End: 2022-12-15

## 2022-12-09 ASSESSMENT — ENCOUNTER SYMPTOMS
NEAR-SYNCOPE: 0
EXCESSIVE DAYTIME SLEEPINESS: 0
PARESTHESIAS: 0
IRREGULAR HEARTBEAT: 0
FLANK PAIN: 0
FEVER: 0
DIARRHEA: 0
DIZZINESS: 0
NIGHT SWEATS: 0
MYALGIAS: 0
NAUSEA: 0
NUMBNESS: 0
ORTHOPNEA: 0
DYSPNEA ON EXERTION: 0
CONSTIPATION: 0
DIAPHORESIS: 0
SLEEP DISTURBANCES DUE TO BREATHING: 0
SORE THROAT: 0
WEAKNESS: 0
BLURRED VISION: 0
DECREASED APPETITE: 0
HEADACHES: 0
BACK PAIN: 0
COUGH: 0
SYNCOPE: 0
LOSS OF BALANCE: 0
VOMITING: 0
PND: 0
DOUBLE VISION: 0
BLOATING: 0
LIGHT-HEADEDNESS: 0
WHEEZING: 0
SHORTNESS OF BREATH: 0
FALLS: 0
PALPITATIONS: 0

## 2022-12-09 ASSESSMENT — FIBROSIS 4 INDEX: FIB4 SCORE: 1.23

## 2022-12-09 NOTE — PROGRESS NOTES
Cardiology Initial Consultation Note    Date of note:    12/9/2022    Primary Care Provider: Danae Pritchett M.D.  Referring Provider: Danae Pritchett M.D.     Patient Name: Melody Rodriguez   YOB: 1950  MRN:              [id number]    Chief Complaint   Patient presents with    Other     NP Dx: Pre-operative clearance       History of Present Illness: Ms. Melody Rodriguez is a 72 y.o. female whose current medical problems include MS, diabetes, dyslipidemia and abnormal EKG who is here for cardiac consultation for abnormal EKG and preoperative cardiovascular evaluation.    Patient states that she is scheduled to undergo right total knee replacement on 12/14/2022.  Had preoperative EKG which was read as prior infarct and was told that she needs urgent cardiology evaluation before surgery can be performed.  From a cardiovascular perspective, patient denies prior history of MI.  Does get occasional bandlike tightness around her chest which is from multiple sclerosis.    In terms of physical activity, is limited due to right knee osteoarthritis.  However, she is active with her 4-year-old granddaughter and denies symptoms of chest pain, pressure, tightness with exertion.  Is able to go up and down the stairs, of course at a slower pace, with no limiting symptoms of angina.    Cardiovascular Risk Factors:  1. Smoking status: Never smoker  2. Type II Diabetes Mellitus: Yes  Lab Results   Component Value Date/Time    HBA1C 5.7 (H) 12/07/2022 09:37 AM    HBA1C 6.6 (A) 03/16/2022 04:50 PM     3. Hypertension: No  4. Dyslipidemia: Yes  Cholesterol,Tot   Date Value Ref Range Status   03/11/2022 168 100 - 199 mg/dL Final     LDL   Date Value Ref Range Status   03/11/2022 84 <100 mg/dL Final     HDL   Date Value Ref Range Status   03/11/2022 46 >=40 mg/dL Final     Triglycerides   Date Value Ref Range Status   03/11/2022 189 (H) 0 - 149 mg/dL Final     5. Family history of early Coronary Artery Disease  in a first degree relative (Male less than 55 years of age; Female less than 65 years of age): Denies  6.  Obesity and/or Metabolic Syndrome: No  7. Sedentary lifestyle: No    Review of Systems   Constitutional: Negative for decreased appetite, diaphoresis, fever, malaise/fatigue and night sweats.   HENT:  Negative for congestion and sore throat.    Eyes:  Negative for blurred vision and double vision.   Cardiovascular:  Negative for chest pain, cyanosis, dyspnea on exertion, irregular heartbeat, leg swelling, near-syncope, orthopnea, palpitations, paroxysmal nocturnal dyspnea and syncope.   Respiratory:  Negative for cough, shortness of breath, sleep disturbances due to breathing and wheezing.    Endocrine: Negative for cold intolerance and heat intolerance.   Musculoskeletal:  Positive for joint pain. Negative for back pain, falls and myalgias.   Gastrointestinal:  Negative for bloating, constipation, diarrhea, nausea and vomiting.   Genitourinary:  Negative for dysuria and flank pain.   Neurological:  Negative for excessive daytime sleepiness, dizziness, headaches, light-headedness, loss of balance, numbness, paresthesias and weakness.       Past Medical History:   Diagnosis Date    Acute nasopharyngitis     Recent chest xray clear    Arthritis     Right knee and thumb joints    Breath shortness     Comes and goes    Cancer (HCC)     Skin cancer squamous cell    Cerebral atrophy (HCC) 03/16/2022    Depression     Diabetes (HCC)     Disorder of thyroid     Nodules in both nodes. Biopsy indicates benign. Last ultrasound shows stable.    Groin lump 06/09/2022    Gynecological disorder     Cervical polyp in 1970    Heart burn     Controlled with Omeprazole    Heart murmur     Minor leakage in 3 valves due to scarlet fever as a child    Heart valve disease     Minor leakage in 3 valves due to scarlet fever as a child    Hiatus hernia syndrome     I think    High cholesterol     Taking Simvistatin    Hyperlipidemia      Muscle disorder     Osteoarthritis of right knee 06/15/2021    PONV (postoperative nausea and vomiting) 1970    Only time I’ve been under anesthesia    Recurrent major depressive disorder, in remission ([facility]) 03/16/2022    SI (sacroiliac) pain 06/09/2022    Type 2 diabetes mellitus with stage 3 chronic kidney disease ([facility]) 06/15/2021    Urinary bladder disorder     MS issue    Urinary incontinence     During MS relapse. Has since resolved         Past Surgical History:   Procedure Laterality Date    GYN SURGERY  1970    Cervical polyp         Current Outpatient Medications   Medication Sig Dispense Refill    methylPREDNISolone (MEDROL) 4 MG Tab methylprednisolone 4 mg tablets in a dose pack   TAKE 6 TABLETS ON DAY 1 AS DIRECTED ON PACKAGE AND DECREASE BY 1 TAB EACH DAY FOR A TOTAL OF 6 DAYS      zolpidem (AMBIEN) 5 MG Tab Take 1 tablet (5 mg) by mouth daily at bedtime as needed for insomnia 30 Tablet 0    Empagliflozin-metFORMIN HCl ER (SYNJARDY XR) 12.5-1000 MG TABLET SR 24 HR Take 1 tablet by mouth daily 100 Tablet 2    lamotrigine (LAMICTAL) 200 MG tablet Take 1 tablet (200 mg) by mouth daily 90 Tablet 2    paroxetine (PAXIL) 40 MG tablet Take 1 Tablet by mouth every day. 100 Tablet 1    omeprazole (PRILOSEC) 20 MG delayed-release capsule Take 1 capsule by mouth every morning, 30 minutes before breakfast. 100 Capsule 3    glucose blood strip Freestyle InsuLinx Test Strips   USE TO TEST THREE TIMES DAILY      FreeStyle Lancets Misc 28 guage      simvastatin (ZOCOR) 40 MG Tab Take 1 Tablet by mouth every evening. 100 Tablet 2    Teriflunomide (AUBAGIO) 14 MG Tab 1 Tablet every day.      buPROPion SR (WELLBUTRIN-SR) 150 MG TABLET SR 12 HR sustained-release tablet Take 1 tablet by mouth every morning. 90 Tablet 2    modafinil (PROVIGIL) 200 MG Tab as needed.       No current facility-administered medications for this visit.         No Known Allergies      Family History   Problem Relation Age of Onset    Colon Cancer  Mother     Heart Disease Mother         Cardiomyopathy    Lung Disease Mother         Emphysema from 2nd hand smoke    Cancer Mother         Colon    Colorectal Cancer Mother     Diabetes Mother     Heart Attack Father     Heart Disease Father         Numerous heart attacks    Diabetes Father     No Known Problems Sister     Cancer Brother         Skin cancer    Other Brother         Gaulbladder issues    Cancer Maternal Grandmother         GI cancer    No Known Problems Maternal Grandfather     No Known Problems Paternal Grandmother     No Known Problems Paternal Grandfather     Other Daughter         Fibromyalgia    Bipolar disorder Daughter     ADHD Son     Depression Son     Hypertension Son          Social History     Socioeconomic History    Marital status:      Spouse name: Not on file    Number of children: Not on file    Years of education: Not on file    Highest education level: 12th grade   Occupational History    Not on file   Tobacco Use    Smoking status: Never    Smokeless tobacco: Never   Vaping Use    Vaping Use: Never used   Substance and Sexual Activity    Alcohol use: Yes     Comment: Occasional glass of wine or april    Drug use: Not Currently    Sexual activity: Not on file   Other Topics Concern    Not on file   Social History Narrative    Not on file     Social Determinants of Health     Financial Resource Strain: Low Risk     Difficulty of Paying Living Expenses: Not hard at all   Food Insecurity: No Food Insecurity    Worried About Running Out of Food in the Last Year: Never true    Ran Out of Food in the Last Year: Never true   Transportation Needs: No Transportation Needs    Lack of Transportation (Medical): No    Lack of Transportation (Non-Medical): No   Physical Activity: Inactive    Days of Exercise per Week: 0 days    Minutes of Exercise per Session: 0 min   Stress: Not on file   Social Connections: Not on file   Intimate Partner Violence: Not on file   Housing Stability:  Low Risk     Unable to Pay for Housing in the Last Year: No    Number of Places Lived in the Last Year: 1    Unstable Housing in the Last Year: No         Physical Exam:  Ambulatory Vitals  /60 (BP Location: Left arm, Patient Position: Sitting, BP Cuff Size: Adult)   Pulse 72   Resp 16   Ht 1.524 m (5')   Wt 61.7 kg (136 lb)   SpO2 95%    Oxygen Therapy:  Pulse Oximetry: 95 %  BP Readings from Last 4 Encounters:   12/09/22 120/60   11/23/22 122/66   10/19/22 (!) 98/64   07/19/22 120/60       Weight/BMI: Body mass index is 26.56 kg/m².  Wt Readings from Last 4 Encounters:   12/09/22 61.7 kg (136 lb)   11/23/22 62.1 kg (137 lb)   10/19/22 63.5 kg (140 lb)   07/19/22 62.1 kg (137 lb)         General: Well appearing and in no apparent distress  Eyes: nl conjunctiva, no icteric sclera  ENT: wearing a mask, normal external appearance of ears  Neck: no visible JVP,  no carotid bruits  Lungs: normal respiratory effort, CTAB  Heart: RRR, systolic murmur throughout the precordium, no rubs or gallops, no edema bilateral lower extremities. No LV/RV heave on cardiac palpatation. 2+ bilateral radial pulses.    Abdomen: soft, non tender, non distended, no masses, normal bowel sounds.  No HSM.  Extremities/MSK: no clubbing, no cyanosis  Neurological: No focal sensory deficits  Psychiatric: Appropriate affect, A/O x 3, intact judgement and insight  Skin: Warm extremities      Lab Data Review:  Lab Results   Component Value Date/Time    CHOLSTRLTOT 168 03/11/2022 06:10 AM    LDL 84 03/11/2022 06:10 AM    HDL 46 03/11/2022 06:10 AM    TRIGLYCERIDE 189 (H) 03/11/2022 06:10 AM       Lab Results   Component Value Date/Time    SODIUM 137 12/07/2022 09:37 AM    POTASSIUM 3.9 12/07/2022 09:37 AM    CHLORIDE 101 12/07/2022 09:37 AM    CO2 23 12/07/2022 09:37 AM    GLUCOSE 140 (H) 12/07/2022 09:37 AM    BUN 18 12/07/2022 09:37 AM    CREATININE 1.09 12/07/2022 09:37 AM     Lab Results   Component Value Date/Time    ALKPHOSPHAT 126  (H) 09/12/2022 09:26 AM    ASTSGOT 20 09/12/2022 09:26 AM    ALTSGPT 15 09/12/2022 09:26 AM    TBILIRUBIN 0.6 09/12/2022 09:26 AM      Lab Results   Component Value Date/Time    WBC 10.3 12/07/2022 09:37 AM     Lab Results   Component Value Date/Time    HBA1C 5.7 (H) 12/07/2022 09:37 AM    HBA1C 6.6 (A) 03/16/2022 04:50 PM         Cardiac Imaging and Procedures Review:    EKG dated 12/9/2022: My personal interpretation is sinus rhythm with anterior q-waves    EKG dated 12/7/2022: My personal interpretation is SR with anterior q-waves    Echo dated 6/14/2018:   CONCLUSIONS  No prior study is available for comparison.   Normal left ventricular size and systolic function.  Left ventricular ejection fraction is visually estimated to be 60%.  Normal diastolic function.  Normal inferior vena cava size and inspiratory collapse.  Aortic sclerosis without stenosis.      Radiology test Review:  CXR: No acute cardiopulmonary abnormality noted.      Assessment & Plan     1. Preoperative cardiovascular examination  EKG    EC-ECHOCARDIOGRAM COMPLETE W/O CONT      2. Controlled type 2 diabetes mellitus without complication, without long-term current use of insulin (HCC)  EC-ECHOCARDIOGRAM COMPLETE W/O CONT      3. Dyslipidemia        4. Abnormal EKG  EC-ECHOCARDIOGRAM COMPLETE W/O CONT      5. Systolic murmur  EC-ECHOCARDIOGRAM COMPLETE W/O CONT            Shared Medical Decision Making:  We discussed her EKG results in extensive detail which shows anterior Q waves.  This may represent prior MI, however, this could be a normal finding of her EKG.  She denies prior history of MI but is a diabetic.  She is currently active without chest pain, pressure or symptoms of volume overload.  Denies orthopnea or leg swelling.    For abnormal EKG with possible left atrial enlargement, prior infarct and systolic murmur heard on exam today, obtain transthoracic echocardiogram to evaluate underlying cardiac structure and function.  We will  evaluate systolic function and assess for valvular heart disease.  Suspect mild aortic stenosis which has been discussed with the patient.  Denies near syncope or syncopal events which is reassuring.    Well-controlled diabetes and blood pressure.    Lipid panel reviewed.  Continue simvastatin 40 mg daily.    Will be able to provide cardiac evaluation after aforementioned test results have been obtained.      All of patient's excellent questions were answered to the best of my knowledge and to her satisfaction.  It was a pleasure seeing Ms. Melody Rodriguez in my clinic today. Return in about 6 months (around 6/9/2023). Patient is aware to call the cardiology clinic with any questions or concerns.      Oj Marino MD  [facility]  [facility]  [address] [address]  Phone: [phone]  Fax: [phone]    Please note that this dictation was created using voice recognition software. I have made every reasonable attempt to correct obvious errors, but it is possible there are errors of grammar and possibly content that I did not discover before finalizing the note.

## 2022-12-13 ENCOUNTER — HOSPITAL ENCOUNTER (OUTPATIENT)
Dept: CARDIOLOGY | Facility: MEDICAL CENTER | Age: 72
End: 2022-12-13
Attending: INTERNAL MEDICINE
Payer: MEDICARE

## 2022-12-13 ENCOUNTER — TELEPHONE (OUTPATIENT)
Dept: CARDIOLOGY | Facility: MEDICAL CENTER | Age: 72
End: 2022-12-13

## 2022-12-13 ENCOUNTER — ANESTHESIA EVENT (OUTPATIENT)
Dept: SURGERY | Facility: MEDICAL CENTER | Age: 72
End: 2022-12-13
Payer: MEDICARE

## 2022-12-13 DIAGNOSIS — R01.1 SYSTOLIC MURMUR: ICD-10-CM

## 2022-12-13 DIAGNOSIS — Z01.810 PREOPERATIVE CARDIOVASCULAR EXAMINATION: ICD-10-CM

## 2022-12-13 DIAGNOSIS — E11.9 CONTROLLED TYPE 2 DIABETES MELLITUS WITHOUT COMPLICATION, WITHOUT LONG-TERM CURRENT USE OF INSULIN (HCC): ICD-10-CM

## 2022-12-13 DIAGNOSIS — R94.31 ABNORMAL EKG: ICD-10-CM

## 2022-12-13 LAB
LV EJECT FRACT  99904: 65
LV EJECT FRACT MOD 2C 99903: 79.89
LV EJECT FRACT MOD 4C 99902: 71.85
LV EJECT FRACT MOD BP 99901: 76.57

## 2022-12-13 PROCEDURE — 93306 TTE W/DOPPLER COMPLETE: CPT | Mod: 26 | Performed by: INTERNAL MEDICINE

## 2022-12-13 PROCEDURE — 93306 TTE W/DOPPLER COMPLETE: CPT

## 2022-12-13 NOTE — TELEPHONE ENCOUNTER
LARRY    Caller: Angelique     Office Name, phone number, fax number:     [facility]  T: [phone]  F: [phone]    Fax clearance to [phone]    Procedure Name: RIGHT TOTAL KNEE REPLACEMENT     Procedure Scheduled Date: 12/14/2022    Callback Number: [phone]

## 2022-12-14 ENCOUNTER — HOSPITAL ENCOUNTER (OUTPATIENT)
Facility: MEDICAL CENTER | Age: 72
End: 2022-12-15
Attending: STUDENT IN AN ORGANIZED HEALTH CARE EDUCATION/TRAINING PROGRAM | Admitting: STUDENT IN AN ORGANIZED HEALTH CARE EDUCATION/TRAINING PROGRAM
Payer: MEDICARE

## 2022-12-14 ENCOUNTER — APPOINTMENT (OUTPATIENT)
Dept: RADIOLOGY | Facility: MEDICAL CENTER | Age: 72
End: 2022-12-14
Attending: STUDENT IN AN ORGANIZED HEALTH CARE EDUCATION/TRAINING PROGRAM
Payer: MEDICARE

## 2022-12-14 ENCOUNTER — ANESTHESIA (OUTPATIENT)
Dept: SURGERY | Facility: MEDICAL CENTER | Age: 72
End: 2022-12-14
Payer: MEDICARE

## 2022-12-14 DIAGNOSIS — Z98.890 POSTOPERATIVE HYPOXEMIA: ICD-10-CM

## 2022-12-14 DIAGNOSIS — Z96.651 TOTAL KNEE REPLACEMENT STATUS, RIGHT: ICD-10-CM

## 2022-12-14 DIAGNOSIS — R09.02 POSTOPERATIVE HYPOXEMIA: ICD-10-CM

## 2022-12-14 DIAGNOSIS — J96.01 ACUTE HYPOXEMIC RESPIRATORY FAILURE (HCC): ICD-10-CM

## 2022-12-14 LAB
ALBUMIN SERPL BCP-MCNC: 4 G/DL (ref 3.2–4.9)
ALBUMIN/GLOB SERPL: 1.8 G/DL
ALP SERPL-CCNC: 98 U/L (ref 30–99)
ALT SERPL-CCNC: 14 U/L (ref 2–50)
ANION GAP SERPL CALC-SCNC: 9 MMOL/L (ref 7–16)
AST SERPL-CCNC: 21 U/L (ref 12–45)
BASOPHILS # BLD AUTO: 0.3 % (ref 0–1.8)
BASOPHILS # BLD: 0.03 K/UL (ref 0–0.12)
BILIRUB SERPL-MCNC: 0.3 MG/DL (ref 0.1–1.5)
BUN SERPL-MCNC: 16 MG/DL (ref 8–22)
CALCIUM ALBUM COR SERPL-MCNC: 8.4 MG/DL (ref 8.5–10.5)
CALCIUM SERPL-MCNC: 8.4 MG/DL (ref 8.4–10.2)
CHLORIDE SERPL-SCNC: 105 MMOL/L (ref 96–112)
CO2 SERPL-SCNC: 22 MMOL/L (ref 20–33)
CREAT SERPL-MCNC: 0.94 MG/DL (ref 0.5–1.4)
EOSINOPHIL # BLD AUTO: 0 K/UL (ref 0–0.51)
EOSINOPHIL NFR BLD: 0 % (ref 0–6.9)
ERYTHROCYTE [DISTWIDTH] IN BLOOD BY AUTOMATED COUNT: 47.3 FL (ref 35.9–50)
GFR SERPLBLD CREATININE-BSD FMLA CKD-EPI: 64 ML/MIN/1.73 M 2
GLOBULIN SER CALC-MCNC: 2.2 G/DL (ref 1.9–3.5)
GLUCOSE BLD STRIP.AUTO-MCNC: 110 MG/DL (ref 65–99)
GLUCOSE SERPL-MCNC: 198 MG/DL (ref 65–99)
HCT VFR BLD AUTO: 34.1 % (ref 37–47)
HGB BLD-MCNC: 10.8 G/DL (ref 12–16)
IMM GRANULOCYTES # BLD AUTO: 0.07 K/UL (ref 0–0.11)
IMM GRANULOCYTES NFR BLD AUTO: 0.8 % (ref 0–0.9)
LYMPHOCYTES # BLD AUTO: 0.41 K/UL (ref 1–4.8)
LYMPHOCYTES NFR BLD: 4.6 % (ref 22–41)
MCH RBC QN AUTO: 30.3 PG (ref 27–33)
MCHC RBC AUTO-ENTMCNC: 31.7 G/DL (ref 33.6–35)
MCV RBC AUTO: 95.5 FL (ref 81.4–97.8)
MONOCYTES # BLD AUTO: 0.12 K/UL (ref 0–0.85)
MONOCYTES NFR BLD AUTO: 1.3 % (ref 0–13.4)
NEUTROPHILS # BLD AUTO: 8.37 K/UL (ref 2–7.15)
NEUTROPHILS NFR BLD: 93 % (ref 44–72)
NRBC # BLD AUTO: 0 K/UL
NRBC BLD-RTO: 0 /100 WBC
PLATELET # BLD AUTO: 212 K/UL (ref 164–446)
PMV BLD AUTO: 8.8 FL (ref 9–12.9)
POTASSIUM SERPL-SCNC: 4.4 MMOL/L (ref 3.6–5.5)
PROT SERPL-MCNC: 6.2 G/DL (ref 6–8.2)
RBC # BLD AUTO: 3.57 M/UL (ref 4.2–5.4)
SODIUM SERPL-SCNC: 136 MMOL/L (ref 135–145)
WBC # BLD AUTO: 9 K/UL (ref 4.8–10.8)

## 2022-12-14 PROCEDURE — 700105 HCHG RX REV CODE 258: Performed by: STUDENT IN AN ORGANIZED HEALTH CARE EDUCATION/TRAINING PROGRAM

## 2022-12-14 PROCEDURE — 502000 HCHG MISC OR IMPLANTS RC 0278: Performed by: STUDENT IN AN ORGANIZED HEALTH CARE EDUCATION/TRAINING PROGRAM

## 2022-12-14 PROCEDURE — 99204 OFFICE O/P NEW MOD 45 MIN: CPT | Performed by: INTERNAL MEDICINE

## 2022-12-14 PROCEDURE — 73560 X-RAY EXAM OF KNEE 1 OR 2: CPT | Mod: RT

## 2022-12-14 PROCEDURE — 82962 GLUCOSE BLOOD TEST: CPT

## 2022-12-14 PROCEDURE — 160035 HCHG PACU - 1ST 60 MINS PHASE I: Performed by: STUDENT IN AN ORGANIZED HEALTH CARE EDUCATION/TRAINING PROGRAM

## 2022-12-14 PROCEDURE — 71045 X-RAY EXAM CHEST 1 VIEW: CPT

## 2022-12-14 PROCEDURE — 160036 HCHG PACU - EA ADDL 30 MINS PHASE I: Performed by: STUDENT IN AN ORGANIZED HEALTH CARE EDUCATION/TRAINING PROGRAM

## 2022-12-14 PROCEDURE — 160029 HCHG SURGERY MINUTES - 1ST 30 MINS LEVEL 4: Performed by: STUDENT IN AN ORGANIZED HEALTH CARE EDUCATION/TRAINING PROGRAM

## 2022-12-14 PROCEDURE — 700101 HCHG RX REV CODE 250: Performed by: STUDENT IN AN ORGANIZED HEALTH CARE EDUCATION/TRAINING PROGRAM

## 2022-12-14 PROCEDURE — A9270 NON-COVERED ITEM OR SERVICE: HCPCS | Performed by: INTERNAL MEDICINE

## 2022-12-14 PROCEDURE — 700102 HCHG RX REV CODE 250 W/ 637 OVERRIDE(OP): Performed by: STUDENT IN AN ORGANIZED HEALTH CARE EDUCATION/TRAINING PROGRAM

## 2022-12-14 PROCEDURE — 160025 RECOVERY II MINUTES (STATS): Performed by: STUDENT IN AN ORGANIZED HEALTH CARE EDUCATION/TRAINING PROGRAM

## 2022-12-14 PROCEDURE — 700102 HCHG RX REV CODE 250 W/ 637 OVERRIDE(OP): Performed by: INTERNAL MEDICINE

## 2022-12-14 PROCEDURE — A9270 NON-COVERED ITEM OR SERVICE: HCPCS | Performed by: STUDENT IN AN ORGANIZED HEALTH CARE EDUCATION/TRAINING PROGRAM

## 2022-12-14 PROCEDURE — 76942 ECHO GUIDE FOR BIOPSY: CPT | Mod: 26 | Performed by: STUDENT IN AN ORGANIZED HEALTH CARE EDUCATION/TRAINING PROGRAM

## 2022-12-14 PROCEDURE — 160048 HCHG OR STATISTICAL LEVEL 1-5: Performed by: STUDENT IN AN ORGANIZED HEALTH CARE EDUCATION/TRAINING PROGRAM

## 2022-12-14 PROCEDURE — 99100 ANES PT EXTEME AGE<1 YR&>70: CPT | Performed by: STUDENT IN AN ORGANIZED HEALTH CARE EDUCATION/TRAINING PROGRAM

## 2022-12-14 PROCEDURE — 80053 COMPREHEN METABOLIC PANEL: CPT

## 2022-12-14 PROCEDURE — 502240 HCHG MISC OR SUPPLY RC 0272: Performed by: STUDENT IN AN ORGANIZED HEALTH CARE EDUCATION/TRAINING PROGRAM

## 2022-12-14 PROCEDURE — C1776 JOINT DEVICE (IMPLANTABLE): HCPCS | Performed by: STUDENT IN AN ORGANIZED HEALTH CARE EDUCATION/TRAINING PROGRAM

## 2022-12-14 PROCEDURE — 160046 HCHG PACU - 1ST 60 MINS PHASE II: Performed by: STUDENT IN AN ORGANIZED HEALTH CARE EDUCATION/TRAINING PROGRAM

## 2022-12-14 PROCEDURE — 97161 PT EVAL LOW COMPLEX 20 MIN: CPT

## 2022-12-14 PROCEDURE — G0378 HOSPITAL OBSERVATION PER HR: HCPCS

## 2022-12-14 PROCEDURE — 700111 HCHG RX REV CODE 636 W/ 250 OVERRIDE (IP): Performed by: STUDENT IN AN ORGANIZED HEALTH CARE EDUCATION/TRAINING PROGRAM

## 2022-12-14 PROCEDURE — 160047 HCHG PACU  - EA ADDL 30 MINS PHASE II: Performed by: STUDENT IN AN ORGANIZED HEALTH CARE EDUCATION/TRAINING PROGRAM

## 2022-12-14 PROCEDURE — 160009 HCHG ANES TIME/MIN: Performed by: STUDENT IN AN ORGANIZED HEALTH CARE EDUCATION/TRAINING PROGRAM

## 2022-12-14 PROCEDURE — 97110 THERAPEUTIC EXERCISES: CPT

## 2022-12-14 PROCEDURE — 160002 HCHG RECOVERY MINUTES (STAT): Performed by: STUDENT IN AN ORGANIZED HEALTH CARE EDUCATION/TRAINING PROGRAM

## 2022-12-14 PROCEDURE — 160041 HCHG SURGERY MINUTES - EA ADDL 1 MIN LEVEL 4: Performed by: STUDENT IN AN ORGANIZED HEALTH CARE EDUCATION/TRAINING PROGRAM

## 2022-12-14 PROCEDURE — 01402 ANES OPN/ARTH TOT KNE ARTHRP: CPT | Performed by: STUDENT IN AN ORGANIZED HEALTH CARE EDUCATION/TRAINING PROGRAM

## 2022-12-14 PROCEDURE — 36415 COLL VENOUS BLD VENIPUNCTURE: CPT

## 2022-12-14 PROCEDURE — 85025 COMPLETE CBC W/AUTO DIFF WBC: CPT

## 2022-12-14 PROCEDURE — 64447 NJX AA&/STRD FEMORAL NRV IMG: CPT | Performed by: STUDENT IN AN ORGANIZED HEALTH CARE EDUCATION/TRAINING PROGRAM

## 2022-12-14 PROCEDURE — C1713 ANCHOR/SCREW BN/BN,TIS/BN: HCPCS | Performed by: STUDENT IN AN ORGANIZED HEALTH CARE EDUCATION/TRAINING PROGRAM

## 2022-12-14 PROCEDURE — 64447 NJX AA&/STRD FEMORAL NRV IMG: CPT | Mod: 59,RT | Performed by: STUDENT IN AN ORGANIZED HEALTH CARE EDUCATION/TRAINING PROGRAM

## 2022-12-14 DEVICE — CEMENT BONE SIMPLEX W/GENTAICIN (10/PK): Type: IMPLANTABLE DEVICE | Site: KNEE | Status: FUNCTIONAL

## 2022-12-14 DEVICE — IMPLANTABLE DEVICE: Type: IMPLANTABLE DEVICE | Site: KNEE | Status: FUNCTIONAL

## 2022-12-14 RX ORDER — MIDAZOLAM HYDROCHLORIDE 1 MG/ML
INJECTION INTRAMUSCULAR; INTRAVENOUS
Status: COMPLETED
Start: 2022-12-14 | End: 2022-12-14

## 2022-12-14 RX ORDER — HYDROMORPHONE HYDROCHLORIDE 2 MG/ML
INJECTION, SOLUTION INTRAMUSCULAR; INTRAVENOUS; SUBCUTANEOUS PRN
Status: DISCONTINUED | OUTPATIENT
Start: 2022-12-14 | End: 2022-12-14 | Stop reason: SURG

## 2022-12-14 RX ORDER — SODIUM CHLORIDE, SODIUM LACTATE, POTASSIUM CHLORIDE, CALCIUM CHLORIDE 600; 310; 30; 20 MG/100ML; MG/100ML; MG/100ML; MG/100ML
INJECTION, SOLUTION INTRAVENOUS CONTINUOUS
Status: ACTIVE | OUTPATIENT
Start: 2022-12-14 | End: 2022-12-14

## 2022-12-14 RX ORDER — PAROXETINE HYDROCHLORIDE 20 MG/1
40 TABLET, FILM COATED ORAL DAILY
Status: DISCONTINUED | OUTPATIENT
Start: 2022-12-15 | End: 2022-12-15 | Stop reason: HOSPADM

## 2022-12-14 RX ORDER — MORPHINE SULFATE 0.5 MG/ML
INJECTION, SOLUTION EPIDURAL; INTRATHECAL; INTRAVENOUS
Status: DISCONTINUED | OUTPATIENT
Start: 2022-12-14 | End: 2022-12-14 | Stop reason: HOSPADM

## 2022-12-14 RX ORDER — CEFAZOLIN SODIUM 1 G/3ML
2 INJECTION, POWDER, FOR SOLUTION INTRAMUSCULAR; INTRAVENOUS ONCE
Status: DISCONTINUED | OUTPATIENT
Start: 2022-12-14 | End: 2022-12-14 | Stop reason: HOSPADM

## 2022-12-14 RX ORDER — MELOXICAM 7.5 MG/1
15 TABLET ORAL DAILY
COMMUNITY
End: 2022-12-16

## 2022-12-14 RX ORDER — SODIUM CHLORIDE, SODIUM LACTATE, POTASSIUM CHLORIDE, CALCIUM CHLORIDE 600; 310; 30; 20 MG/100ML; MG/100ML; MG/100ML; MG/100ML
INJECTION, SOLUTION INTRAVENOUS CONTINUOUS
Status: DISCONTINUED | OUTPATIENT
Start: 2022-12-14 | End: 2022-12-14

## 2022-12-14 RX ORDER — CELECOXIB 200 MG/1
200 CAPSULE ORAL ONCE
Status: COMPLETED | OUTPATIENT
Start: 2022-12-14 | End: 2022-12-14

## 2022-12-14 RX ORDER — OXYCODONE HCL 5 MG/5 ML
5 SOLUTION, ORAL ORAL
Status: DISCONTINUED | OUTPATIENT
Start: 2022-12-14 | End: 2022-12-14 | Stop reason: HOSPADM

## 2022-12-14 RX ORDER — HYDROMORPHONE HYDROCHLORIDE 1 MG/ML
0.2 INJECTION, SOLUTION INTRAMUSCULAR; INTRAVENOUS; SUBCUTANEOUS
Status: DISCONTINUED | OUTPATIENT
Start: 2022-12-14 | End: 2022-12-14 | Stop reason: HOSPADM

## 2022-12-14 RX ORDER — ZOLPIDEM TARTRATE 5 MG/1
5 TABLET ORAL NIGHTLY PRN
Status: DISCONTINUED | OUTPATIENT
Start: 2022-12-14 | End: 2022-12-15 | Stop reason: HOSPADM

## 2022-12-14 RX ORDER — LABETALOL HYDROCHLORIDE 5 MG/ML
5 INJECTION, SOLUTION INTRAVENOUS
Status: DISCONTINUED | OUTPATIENT
Start: 2022-12-14 | End: 2022-12-14 | Stop reason: HOSPADM

## 2022-12-14 RX ORDER — KETOROLAC TROMETHAMINE 30 MG/ML
INJECTION, SOLUTION INTRAMUSCULAR; INTRAVENOUS
Status: DISCONTINUED | OUTPATIENT
Start: 2022-12-14 | End: 2022-12-14 | Stop reason: HOSPADM

## 2022-12-14 RX ORDER — LIDOCAINE HYDROCHLORIDE 20 MG/ML
INJECTION, SOLUTION EPIDURAL; INFILTRATION; INTRACAUDAL; PERINEURAL PRN
Status: DISCONTINUED | OUTPATIENT
Start: 2022-12-14 | End: 2022-12-14 | Stop reason: SURG

## 2022-12-14 RX ORDER — ONDANSETRON 2 MG/ML
4 INJECTION INTRAMUSCULAR; INTRAVENOUS
Status: DISCONTINUED | OUTPATIENT
Start: 2022-12-14 | End: 2022-12-14 | Stop reason: HOSPADM

## 2022-12-14 RX ORDER — ACETAMINOPHEN 500 MG
1000 TABLET ORAL ONCE
Status: COMPLETED | OUTPATIENT
Start: 2022-12-14 | End: 2022-12-14

## 2022-12-14 RX ORDER — OXYCODONE HCL 10 MG/1
10 TABLET, FILM COATED, EXTENDED RELEASE ORAL ONCE
Status: COMPLETED | OUTPATIENT
Start: 2022-12-14 | End: 2022-12-14

## 2022-12-14 RX ORDER — OXYCODONE HYDROCHLORIDE 5 MG/1
2.5-5 TABLET ORAL EVERY 4 HOURS PRN
Status: DISCONTINUED | OUTPATIENT
Start: 2022-12-14 | End: 2022-12-15 | Stop reason: HOSPADM

## 2022-12-14 RX ORDER — ROPIVACAINE HYDROCHLORIDE 5 MG/ML
INJECTION, SOLUTION EPIDURAL; INFILTRATION; PERINEURAL
Status: DISCONTINUED | OUTPATIENT
Start: 2022-12-14 | End: 2022-12-14 | Stop reason: HOSPADM

## 2022-12-14 RX ORDER — BUPIVACAINE HYDROCHLORIDE 5 MG/ML
INJECTION, SOLUTION EPIDURAL; INTRACAUDAL
Status: COMPLETED
Start: 2022-12-14 | End: 2022-12-14

## 2022-12-14 RX ORDER — HYDRALAZINE HYDROCHLORIDE 20 MG/ML
5 INJECTION INTRAMUSCULAR; INTRAVENOUS
Status: DISCONTINUED | OUTPATIENT
Start: 2022-12-14 | End: 2022-12-14 | Stop reason: HOSPADM

## 2022-12-14 RX ORDER — DEXAMETHASONE SODIUM PHOSPHATE 4 MG/ML
INJECTION, SOLUTION INTRA-ARTICULAR; INTRALESIONAL; INTRAMUSCULAR; INTRAVENOUS; SOFT TISSUE
Status: COMPLETED
Start: 2022-12-14 | End: 2022-12-14

## 2022-12-14 RX ORDER — BUPROPION HYDROCHLORIDE 150 MG/1
150 TABLET, EXTENDED RELEASE ORAL DAILY
Status: DISCONTINUED | OUTPATIENT
Start: 2022-12-15 | End: 2022-12-15 | Stop reason: HOSPADM

## 2022-12-14 RX ORDER — SIMVASTATIN 20 MG
40 TABLET ORAL NIGHTLY
Status: DISCONTINUED | OUTPATIENT
Start: 2022-12-14 | End: 2022-12-15 | Stop reason: HOSPADM

## 2022-12-14 RX ORDER — SODIUM CHLORIDE 9 MG/ML
INJECTION, SOLUTION INTRAMUSCULAR; INTRAVENOUS; SUBCUTANEOUS
Status: DISCONTINUED | OUTPATIENT
Start: 2022-12-14 | End: 2022-12-14 | Stop reason: HOSPADM

## 2022-12-14 RX ORDER — HYDROMORPHONE HYDROCHLORIDE 1 MG/ML
0.5 INJECTION, SOLUTION INTRAMUSCULAR; INTRAVENOUS; SUBCUTANEOUS
Status: DISCONTINUED | OUTPATIENT
Start: 2022-12-14 | End: 2022-12-14 | Stop reason: HOSPADM

## 2022-12-14 RX ORDER — DEXAMETHASONE SODIUM PHOSPHATE 4 MG/ML
INJECTION, SOLUTION INTRA-ARTICULAR; INTRALESIONAL; INTRAMUSCULAR; INTRAVENOUS; SOFT TISSUE
Status: COMPLETED | OUTPATIENT
Start: 2022-12-14 | End: 2022-12-14

## 2022-12-14 RX ORDER — OXYCODONE HCL 5 MG/5 ML
10 SOLUTION, ORAL ORAL
Status: DISCONTINUED | OUTPATIENT
Start: 2022-12-14 | End: 2022-12-14 | Stop reason: HOSPADM

## 2022-12-14 RX ORDER — TRANEXAMIC ACID 100 MG/ML
INJECTION, SOLUTION INTRAVENOUS PRN
Status: DISCONTINUED | OUTPATIENT
Start: 2022-12-14 | End: 2022-12-14 | Stop reason: SURG

## 2022-12-14 RX ORDER — LAMOTRIGINE 100 MG/1
200 TABLET ORAL DAILY
Status: DISCONTINUED | OUTPATIENT
Start: 2022-12-15 | End: 2022-12-15 | Stop reason: HOSPADM

## 2022-12-14 RX ORDER — BUPIVACAINE HYDROCHLORIDE 5 MG/ML
INJECTION, SOLUTION EPIDURAL; INTRACAUDAL
Status: COMPLETED | OUTPATIENT
Start: 2022-12-14 | End: 2022-12-14

## 2022-12-14 RX ORDER — OMEPRAZOLE 20 MG/1
20 CAPSULE, DELAYED RELEASE ORAL DAILY
Status: DISCONTINUED | OUTPATIENT
Start: 2022-12-15 | End: 2022-12-15 | Stop reason: HOSPADM

## 2022-12-14 RX ORDER — DIPHENHYDRAMINE HYDROCHLORIDE 50 MG/ML
12.5 INJECTION INTRAMUSCULAR; INTRAVENOUS
Status: DISCONTINUED | OUTPATIENT
Start: 2022-12-14 | End: 2022-12-14 | Stop reason: HOSPADM

## 2022-12-14 RX ORDER — HYDROMORPHONE HYDROCHLORIDE 1 MG/ML
0.1 INJECTION, SOLUTION INTRAMUSCULAR; INTRAVENOUS; SUBCUTANEOUS
Status: DISCONTINUED | OUTPATIENT
Start: 2022-12-14 | End: 2022-12-14 | Stop reason: HOSPADM

## 2022-12-14 RX ORDER — EPINEPHRINE 1 MG/ML(1)
AMPUL (ML) INJECTION
Status: DISCONTINUED | OUTPATIENT
Start: 2022-12-14 | End: 2022-12-14 | Stop reason: HOSPADM

## 2022-12-14 RX ORDER — ONDANSETRON 2 MG/ML
INJECTION INTRAMUSCULAR; INTRAVENOUS PRN
Status: DISCONTINUED | OUTPATIENT
Start: 2022-12-14 | End: 2022-12-14 | Stop reason: SURG

## 2022-12-14 RX ORDER — HALOPERIDOL 5 MG/ML
1 INJECTION INTRAMUSCULAR
Status: DISCONTINUED | OUTPATIENT
Start: 2022-12-14 | End: 2022-12-14 | Stop reason: HOSPADM

## 2022-12-14 RX ORDER — ACETAMINOPHEN 325 MG/1
650 TABLET ORAL EVERY 4 HOURS PRN
Status: DISCONTINUED | OUTPATIENT
Start: 2022-12-14 | End: 2022-12-15 | Stop reason: HOSPADM

## 2022-12-14 RX ORDER — CEFAZOLIN SODIUM 1 G/3ML
2 INJECTION, POWDER, FOR SOLUTION INTRAMUSCULAR; INTRAVENOUS ONCE
Status: COMPLETED | OUTPATIENT
Start: 2022-12-14 | End: 2022-12-14

## 2022-12-14 RX ORDER — METFORMIN HYDROCHLORIDE 500 MG/1
1000 TABLET, EXTENDED RELEASE ORAL DAILY
Status: DISCONTINUED | OUTPATIENT
Start: 2022-12-15 | End: 2022-12-15 | Stop reason: HOSPADM

## 2022-12-14 RX ADMIN — LIDOCAINE HYDROCHLORIDE 0.5 ML: 10 INJECTION, SOLUTION EPIDURAL; INFILTRATION; INTRACAUDAL; PERINEURAL at 06:50

## 2022-12-14 RX ADMIN — ACETAMINOPHEN 650 MG: 325 TABLET, FILM COATED ORAL at 20:20

## 2022-12-14 RX ADMIN — TRANEXAMIC ACID 1000 MG: 100 INJECTION, SOLUTION INTRAVENOUS at 08:50

## 2022-12-14 RX ADMIN — LIDOCAINE HYDROCHLORIDE 60 MG: 20 INJECTION, SOLUTION EPIDURAL; INFILTRATION; INTRACAUDAL at 07:25

## 2022-12-14 RX ADMIN — CEFAZOLIN 2 G: 330 INJECTION, POWDER, FOR SOLUTION INTRAMUSCULAR; INTRAVENOUS at 07:30

## 2022-12-14 RX ADMIN — SIMVASTATIN 40 MG: 20 TABLET, FILM COATED ORAL at 20:19

## 2022-12-14 RX ADMIN — BUPIVACAINE HYDROCHLORIDE 20 ML: 5 INJECTION, SOLUTION EPIDURAL; INTRACAUDAL; PERINEURAL at 06:52

## 2022-12-14 RX ADMIN — MIDAZOLAM 1 MG: 1 INJECTION INTRAMUSCULAR; INTRAVENOUS at 06:50

## 2022-12-14 RX ADMIN — DEXAMETHASONE SODIUM PHOSPHATE 10 MG: 4 INJECTION, SOLUTION INTRA-ARTICULAR; INTRALESIONAL; INTRAMUSCULAR; INTRAVENOUS; SOFT TISSUE at 07:31

## 2022-12-14 RX ADMIN — CELECOXIB 200 MG: 200 CAPSULE ORAL at 06:41

## 2022-12-14 RX ADMIN — FENTANYL CITRATE 50 MCG: 50 INJECTION, SOLUTION INTRAMUSCULAR; INTRAVENOUS at 07:25

## 2022-12-14 RX ADMIN — FENTANYL CITRATE 50 MCG: 50 INJECTION, SOLUTION INTRAMUSCULAR; INTRAVENOUS at 06:50

## 2022-12-14 RX ADMIN — OXYCODONE HYDROCHLORIDE 10 MG: 10 TABLET, FILM COATED, EXTENDED RELEASE ORAL at 06:41

## 2022-12-14 RX ADMIN — OXYCODONE 5 MG: 5 TABLET ORAL at 20:19

## 2022-12-14 RX ADMIN — CLINDAMYCIN PHOSPHATE 900 MG: 150 INJECTION, SOLUTION INTRAMUSCULAR; INTRAVENOUS at 07:55

## 2022-12-14 RX ADMIN — ONDANSETRON 4 MG: 2 INJECTION INTRAMUSCULAR; INTRAVENOUS at 09:09

## 2022-12-14 RX ADMIN — SODIUM CHLORIDE, POTASSIUM CHLORIDE, SODIUM LACTATE AND CALCIUM CHLORIDE: 600; 310; 30; 20 INJECTION, SOLUTION INTRAVENOUS at 06:50

## 2022-12-14 RX ADMIN — DEXAMETHASONE SODIUM PHOSPHATE 4 MG: 4 INJECTION, SOLUTION INTRA-ARTICULAR; INTRALESIONAL; INTRAMUSCULAR; INTRAVENOUS; SOFT TISSUE at 06:52

## 2022-12-14 RX ADMIN — HYDROMORPHONE HYDROCHLORIDE 0.5 MG: 2 INJECTION INTRAMUSCULAR; INTRAVENOUS; SUBCUTANEOUS at 07:50

## 2022-12-14 RX ADMIN — HYDROMORPHONE HYDROCHLORIDE 0.5 MG: 2 INJECTION INTRAMUSCULAR; INTRAVENOUS; SUBCUTANEOUS at 08:00

## 2022-12-14 RX ADMIN — ACETAMINOPHEN 1000 MG: 500 TABLET ORAL at 06:40

## 2022-12-14 RX ADMIN — PROPOFOL 120 MG: 10 INJECTION, EMULSION INTRAVENOUS at 07:25

## 2022-12-14 RX ADMIN — TRANEXAMIC ACID 1000 MG: 100 INJECTION, SOLUTION INTRAVENOUS at 07:32

## 2022-12-14 ASSESSMENT — COGNITIVE AND FUNCTIONAL STATUS - GENERAL
CLIMB 3 TO 5 STEPS WITH RAILING: A LITTLE
WALKING IN HOSPITAL ROOM: A LITTLE
MOBILITY SCORE: 18
STANDING UP FROM CHAIR USING ARMS: A LITTLE
DAILY ACTIVITIY SCORE: 24
SUGGESTED CMS G CODE MODIFIER MOBILITY: CK
MOVING TO AND FROM BED TO CHAIR: A LITTLE
TURNING FROM BACK TO SIDE WHILE IN FLAT BAD: A LITTLE
CLIMB 3 TO 5 STEPS WITH RAILING: A LITTLE
SUGGESTED CMS G CODE MODIFIER DAILY ACTIVITY: CH
MOVING TO AND FROM BED TO CHAIR: A LITTLE
MOVING FROM LYING ON BACK TO SITTING ON SIDE OF FLAT BED: A LITTLE

## 2022-12-14 ASSESSMENT — LIFESTYLE VARIABLES
TOTAL SCORE: 0
HAVE PEOPLE ANNOYED YOU BY CRITICIZING YOUR DRINKING: NO
EVER HAD A DRINK FIRST THING IN THE MORNING TO STEADY YOUR NERVES TO GET RID OF A HANGOVER: NO
ON A TYPICAL DAY WHEN YOU DRINK ALCOHOL HOW MANY DRINKS DO YOU HAVE: 0
HAVE YOU EVER FELT YOU SHOULD CUT DOWN ON YOUR DRINKING: NO
HOW MANY TIMES IN THE PAST YEAR HAVE YOU HAD 5 OR MORE DRINKS IN A DAY: 0
EVER FELT BAD OR GUILTY ABOUT YOUR DRINKING: NO
AVERAGE NUMBER OF DAYS PER WEEK YOU HAVE A DRINK CONTAINING ALCOHOL: 0
TOTAL SCORE: 0
TOTAL SCORE: 0
CONSUMPTION TOTAL: NEGATIVE

## 2022-12-14 ASSESSMENT — PATIENT HEALTH QUESTIONNAIRE - PHQ9
1. LITTLE INTEREST OR PLEASURE IN DOING THINGS: NOT AT ALL
SUM OF ALL RESPONSES TO PHQ9 QUESTIONS 1 AND 2: 0
2. FEELING DOWN, DEPRESSED, IRRITABLE, OR HOPELESS: NOT AT ALL

## 2022-12-14 ASSESSMENT — PAIN DESCRIPTION - PAIN TYPE
TYPE: SURGICAL PAIN
TYPE: CHRONIC PAIN
TYPE: SURGICAL PAIN
TYPE: ACUTE PAIN

## 2022-12-14 ASSESSMENT — GAIT ASSESSMENTS
DEVIATION: DECREASED HEEL STRIKE;DECREASED TOE OFF
ASSISTIVE DEVICE: FRONT WHEEL WALKER
DISTANCE (FEET): 120
GAIT LEVEL OF ASSIST: STANDBY ASSIST

## 2022-12-14 ASSESSMENT — PAIN SCALES - GENERAL: PAIN_LEVEL: 0

## 2022-12-14 ASSESSMENT — FIBROSIS 4 INDEX: FIB4 SCORE: 1.23

## 2022-12-14 NOTE — ANESTHESIA POSTPROCEDURE EVALUATION
Patient: Melody Rodriguez    Procedure Summary     Date: 12/14/22 Room / Location:  OR  / SURGERY [facility]    Anesthesia Start: 0720 Anesthesia Stop: 0926    Procedure: RIGHT TOTAL KNEE REPLACEMENT (Right: Knee) Diagnosis: (OSTEOARTHRITIS OF KNEE)    Surgeons: Britton Cooney M.D. Responsible Provider: Sabino Richards M.D.    Anesthesia Type: general, peripheral nerve block ASA Status: 2          Final Anesthesia Type: general, peripheral nerve block  Last vitals  BP   Blood Pressure : 104/50    Temp   36 °C (96.8 °F)    Pulse   80   Resp   16    SpO2   90 %      Anesthesia Post Evaluation    Patient location during evaluation: PACU  Patient participation: complete - patient participated  Level of consciousness: awake and alert  Pain score: 0    Airway patency: patent  Anesthetic complications: no  Cardiovascular status: hemodynamically stable  Respiratory status: acceptable  Hydration status: euvolemic    PONV: none    patient able to participate, but full recovery from regional anesthesia has not occurred and is not expected within the stipulated timeframe for the completion of the evaluation      There were no known notable events for this encounter.     Nurse Pain Score: 0 (NPRS)

## 2022-12-14 NOTE — ANESTHESIA PROCEDURE NOTES
Airway    Date/Time: 12/14/2022 7:27 AM  Performed by: Sabino Richards M.D.  Authorized by: Sabino Richards M.D.     Location:  OR  Urgency:  Elective  Indications for Airway Management:  Anesthesia      Spontaneous Ventilation: absent    Sedation Level:  Deep  Preoxygenated: Yes    Mask Difficulty Assessment:  0 - not attempted  Final Airway Type:  Supraglottic airway  Final Supraglottic Airway:  Standard LMA    SGA Size:  4  Number of Attempts at Approach:  1

## 2022-12-14 NOTE — OR NURSING
1018  - Pt resting comfortably, using IS, sitting up, sipping water, denies pain/nausea, VSS    1030 - Pt resdting comfortably, VSS, Is encouraged, denies pain/nausea    1035 - Report to Martha GUIOD

## 2022-12-14 NOTE — TELEPHONE ENCOUNTER
"Spoke on the phone with rigo, reviewed last OV note that wanted her to have echo prior to clearance neel pt already in OR. Was advised that Angelique spoke with someone who \"made it seem like she was cleared\" so they proceeded.     Requested Angelique return call to review the communication as no clearance was given.   "

## 2022-12-14 NOTE — DISCHARGE INSTRUCTIONS
Surgeon specific instructions: See blue handout from     If any questions arise, call your provider.  If your provider is not available, please feel free to call the Surgical Center at [phone].    MEDICATIONS: Resume taking daily medication.  Take prescribed pain medication with food.  If no medication is prescribed, you may take non-aspirin pain medication if needed.  PAIN MEDICATION CAN BE VERY CONSTIPATING.  Take a stool softener or laxative such as senokot, pericolace, or milk of magnesia if needed.    Last pain medication given at N/A    What to Expect Post Anesthesia    Rest and take it easy for the first 24 hours.  A responsible adult is recommended to remain with you during that time.  It is normal to feel sleepy.  We encourage you to not do anything that requires balance, judgment or coordination.    FOR 24 HOURS DO NOT:  Drive, operate machinery or run household appliances.  Drink beer or alcoholic beverages.  Make important decisions or sign legal documents.    To avoid nausea, slowly advance diet as tolerated, avoiding spicy or greasy foods for the first day.  Add more substantial food to your diet according to your provider's instructions.  INCREASE FLUIDS AND FIBER TO AVOID CONSTIPATION.    MILD FLU-LIKE SYMPTOMS ARE NORMAL.  YOU MAY EXPERIENCE GENERALIZED MUSCLE ACHES, THROAT IRRITATION, HEADACHE AND/OR SOME NAUSEA.    Diet  Resume your normal diet as tolerated.  A diet low in cholesterol, fat, and sodium is recommended for good health.     Peripheral Nerve Block Discharge Instructions from Same Day Surgery and Inpatient :    What to Expect - Lower Extremity  The block may cause you to experience numbness and weakness in your hip and thigh, thigh and knee, or calf and foot on the same side as your surgery  Numbness, tingling and / or weakness are all normal. For some people, this may be an unpleasant sensation  These issues will be resolved when the local anesthetic wears off  "  You may experience numbness and tingling in your thigh on the same side as your surgery if the block medicine was injected at your groin area  Numbness will make it difficult to walk  You may have problems with balance and walking so be very careful   Follow your surgeon's direction regarding weight bearing on your surgical limb  Be very careful with your numb limb  Precautions  The numbness may affect your balance  Be careful when walking or moving around  Your leg may be weak: be very careful putting weight on it  If your surgeon did not specify a time, you should not bear weight for 24 hours  Be sure to ask for help when you need it  It is better to have help than to fall and hurt yourself  Prevent Injury  Protect the limb like a baby  Beware of exposing your limb to extreme heat or cold or trauma  The limb may be injured without you noticing because it is numb  Keep the limb elevated whenever possible  Do not sleep on the limb  Change the position of the limb regularly  Avoid putting pressure on your surgical limb  Pain Control  The initial block on the day of surgery will make your extremity feel \"numb\"  Any consecutive injection including prior to discharge from the hospital will make your extremity feel \"numb\"  You may feel an aching or burning when the local anesthesia starts to wear off  Take pain pills as prescribed by your surgeon  Call your surgeon or anesthesiologist if you do not have adequate pain control                "

## 2022-12-14 NOTE — OR NURSING
0604 P TO PRE OP TO ASSUME CARE    0704 Patient allergies and NPO status verified, home medication reconciliation completed and belongings secured. Patient verbalizes understanding of pain scale, expected course of stay and plan of care. Surgical site verified with patient. IV access established. Sequentials placed on L leg

## 2022-12-14 NOTE — OR NURSING
1129: Patient arrived to phase II from PACU 1 via gurney. Report received from RN. Respirations are spontaneous and unlabored. Dressing is CDI. VSS on RA. RLE: pedal pulse is 2+, cap refill less than 3 seconds, warm.    1145: PT at bedside    1155: Family at bedside.     1210: pt feeling groggy and sleepy still, Able to ambulate with walker, per PT. PT would like OT to see pt when more awake. Will monitor and notify OT    1415: O2 saturations reaching low 80s on room air. MD notified, admission orders received. Anesthesia notified, orders received.    1420: report to Nereida RN    1500: report from Nereida GUIDO. Pt resting in recliner. No c/o pain or nausea at this time. Family at bedside    1605: bed ready. Receiving RN not yet ready for report    1620: Report given to receiving RN. Room needing hospital bed. Will transport once room is fully ready.     1635: room ready. Pt transported by RN

## 2022-12-14 NOTE — OR NURSING
0917- Patient arrived from OR via bed.  R knee dressing CDI; pedal pulse 2+. Knee gatch locked. +block     Sedation/Resp Status: Non responsive to verbal with OPA in place.  Respirations spontaneous and non-labored.    HR 81SR; VSS on 6L 02 via simple mask.  The patient does not appear to be in pain or nauseous as evidence by sleeping.    0930- The patient is still sleeping with OPA in place. The R knee dressing is free of drainage.    0945- Patient still sleeping with OPA in place. R knee dressing unchanged.    0950- OPA out for return of spontaneous eye opening/gag reflex - respirations continue spontaneous and non-labored.     1000- X-RAY tech at bedside. No reports of pain or nausea. The dressing is free of drainage. Instructed on IS use, demonstrated good efforts to 1500 for 10 breaths.      1015- No reports of pain or nausea. Dressing unchanged. Placed on 2L due to desatting.      1018- Handoff report given to Nathalie GUIDO    1035- Received report back from Nathalie    1100- No change in patient condition. Dressing remains free of drainage. Called into OR for admit order. Was told to get patient up in chair and walk her. CNA performed walk test and the patient passed.     1129- Report called to Zachary GUIDO. Transferred to phase 2 for more ambulation and to sit upright in chair to help with o2 levels.

## 2022-12-14 NOTE — ASSESSMENT & PLAN NOTE
- Suspect this is due to atelectasis.  Requiring 2 L of oxygen currently.  Chest x-ray showed no infiltrates, consolidation, effusion, or edema.  She had an echocardiogram on 12/13 showing normal left and right ventricular size and systolic functions, with no significant valvular abnormalities.  -Try to wean off due to supplemental oxygen, keep saturations above 88%.  Start incentive spirometry at least every 1 hour.  Encourage deep breathing.  Monitor overnight for supplemental oxygen weaning.

## 2022-12-14 NOTE — THERAPY
Physical Therapy   Initial Evaluation     Patient Name: Melody Rodriguez  Age:  72 y.o., Sex:  female  Medical Record #: [id number]  Today's Date: 12/14/2022     Precautions  Precautions: Weight Bearing As Tolerated Right Lower Extremity    Assessment  Patient is 72 y.o. female s/p right TKA POD #0.  Pt agreeable to therapy evaluation. Pt is able to ambulate with FWW SBA, c/o foot numb from nerve block but no foot drop noted, able to ambulate safely.  O2 sats down to mid 80s on RA after ambulating, re-applied 1L nc sats up to 92%. RN updated. Pt is quite groggy, her friend whom is staying with her was present for education and review of HEP. Pt able to return demo all exs. Pt was provided with education on elevation, icing, positioning, and supine/seated therapeutic exercises. Pt has no further acute skilled PT needs at this time however recommend OT eval prior to DC since pt does live alone. Anticipate pt to d/c home once medically clear with recs for FWW use and OP therapy services.     Plan    Recommend Physical Therapy for Evaluation only   DC Equipment Recommendations: Front-Wheel Walker  Discharge Recommendations: Recommend outpatient physical therapy services to address higher level deficits        12/14/22 1210   Prior Living Situation   Housing / Facility 1 Story House   Steps Into Home 0   Steps In Home 0   Equipment Owned None   Lives with - Patient's Self Care Capacity Alone and Able to Care For Self   Comments Pts friend will be staying with her until Sunday   Prior Level of Functional Mobility   Bed Mobility Independent   Transfer Status Independent   Ambulation Independent   Assistive Devices Used None   Stairs Independent   Cognition    Comments Pt is lethargic but awakens and agreeable for PT.   Passive ROM Lower Body   Passive ROM Lower Body Not Tested   Active ROM Lower Body    Active ROM Lower Body  X   Comments 0-80 deg R knee   Strength Lower Body   Lower Body Strength  X   Comments R knee  "NT   Sensation Lower Body   Lower Extremity Sensation   X   Comments c/o numbness R foot   Balance Assessment   Sitting Balance (Static) Fair +   Sitting Balance (Dynamic) Fair   Standing Balance (Static) Fair   Standing Balance (Dynamic) Fair   Weight Shift Sitting Fair   Weight Shift Standing Fair   Comments stdg with FWW   Gait Analysis   Gait Level Of Assist Standby Assist   Assistive Device Front Wheel Walker   Distance (Feet) 120   # of Times Distance was Traveled 1   Deviation Decreased Heel Strike;Decreased Toe Off   Comments Pt c/o feeling \"woozy\" when ambulating as well as \"lightheaded\" but states she gets lightheaded often at home   Bed Mobility    Supine to Sit   (NT, pt sitting up in recliner)   Functional Mobility   Sit to Stand Standby Assist   Bed, Chair, Wheelchair Transfer Standby Assist   Activity Tolerance   Comments O2 sats 85% on RA after amb, re applied 1 L nc sats up to 92%, instructed in IS use     "

## 2022-12-14 NOTE — OR NURSING
1420 assumed pt care.     1430 xray at bedside. Plan of care reviewed w/ pt and family member.      1500 pt care handed off to HAY Sharma.

## 2022-12-14 NOTE — OP REPORT
OPERATIVE NOTE     DATE OF PROCEDURE: 12/14/2022           PRE-OP DIAGNOSIS:  1.  Right knee osteoarthritis           POST-OP DIAGNOSIS: same           PROCEDURE:  1.  Right knee total knee arthroplasty           SURGEON: Britton Cooney M.D. - Primary           ASSISTANT: KLAEB Johnson     Physician assistant was required for critical portions of the case including patient positioning manipulation possibly graft preparation retraction suture management wound closure as well as other critical portions of the case.  I be unable to perform these portion of the case by myself there were no other certified first assist available    ANESTHESIA: General peripheral nerve block           ESTIMATED BLOOD LOSS: 150 cc                  SPECIMENS: None           COMPLICATIONS: None           CONDITION: Stable           OPERATIVE INDICATIONS AND DESCRIPTION OF PROCEDURE:     Implants:  Right Dianelys persona total knee arthroplasty system  Femur size 7 standard  Tibia size E  12 mm mc poly-    I met the patient in the preoperative holding area.  I had a full discussion with them regarding multiple options for the patient's condition including nonoperative management.  Again today I offered them nonoperative treatment modalities.  Regarding operative options I specifically I outlined articular. I discussed some possible complications including bleeding possibly requiring transfusion, infection, neurovascular damage, malunion, nonunion, failure of implants, failure of surgery, chronic pain, need for revision surgery, instability, limb length discrepancy, prolonged rehab, weight bearing restrictions, DVT, PE, MI, stroke and death. After going over risks and benefits making no promises either guaranteed or implied patient elected to proceed.  At this point informed consent was signed.  A surgical marking pen was used to place my initials on the patient's right knee.    Patient was brought back to the operating room.  They were  placed supine on a regular table all bony prominences padded very well to prevent neuropraxia's.  They were secured to the table with a strap.  General anesthesia was introduced.  Krissy/imp positioner was used for for positioning.  At this point a tourniquet was placed and then the right lower extremity was prepped in sterile standard fashion.  At this point a timeout was performed with all parties in the room ceasing activity. Informed consent sheet was visible confirming the patient's name date of birth MRN and matched their wristband. Antibiotics Ancef/clinda were confirmed and the right lower extremity was confirmed as the correct surgical site.  My surgical initials were visible on this extremity.  At this point we were cleared to proceed with the procedure.  1 g of TXA was given just prior to incision.    Exam under anesthesia revealed slight flexion contracture stable ligamentous exam    We then insufflated the tourniquet after exsanguinating the limb with Esmarch.  Total tourniquet time was 19 minutes    We began by creating a longitudinal incision of the anterior aspect of the knee.  Is approximately 15 cm in length.  Sharp dissection was carried down through the skin Bovie cautery was used to maintain hemostasis at all times.  This point we exposed our extensor mechanism we performed a medial parapatellar approach.  Sharp dissection was carried down we have a small cuff of tendon to repair after the arthrotomy.  The arthrotomy was carried down to bone all the way to the medial aspect of the tibial tubercle.  At this point appropriate releases were done we exposed our joint space.  A small resection of fat pad was performed and the meniscus was removed.  This point we resected both the ACL PCL and turned our attention to the femur    Using intramedullary jig just anterior on the femur to the insertion of the PCL we reamed into the joint and placed our jig.  Our distal femoral cut block was placed in 5  degrees valgus cut angle.  This pinned in place and we cut our femur.  This did demonstrate a good cut with piano shape to it.  At this point we turned our attention to the tibia    Without tibia exposed we placed the extramedullary jig.  We had a jig set and 3 degrees posterior tibial slope.  We made sure to stay in line with the second ray where the middle axis of the ankle joint.  At this point using our 2/10 guide we took 2 mm off of the more worn aspect of the knee and pinned our jig in place.  With adequate projection of the collaterals we cut our proximal tibia.  Again this was an perpendicular to the anatomical axis.  At this point we began tensioning our knee in extension we were able to place a 12 mm block.  After performing releases with our tensioner in place we were near neutral alignment and very happy with our gap in extension    At this point we turned our attention to flexion gap.  Using our tensioner with the knee in 90 degrees we tensioned our flexion gap.  With the appropriate releases were able to maintain neutral gap at flexion.  At this point we drilled for our block in place sized our femur.  We removed our tensioning device and at this point put our block in place.  We made sure to make sure we were not notching the anterior femur.  We pinned our block and made our appropriate cuts    This point we turned our attention trials.  After removing all bony fragments we placed our distal femur as well as pinned our tibial in place.  Our trial polywas placed within the joint.  We took through range of motion were able to achieve full extension to 130 degrees of's flexion.  Was very stable with approximately 1 mm medial gapping in 2-3 lateral gapping.  We are very happy with this and clinically their limb was in neutral alignment    Next we turned our attention to the patella.  There were small osteophytes that were removed with rongeur.  Overall chondral surface still intact.  Because of this we  decided to treat the patella with just a denervation which was performed circumferentially around the patella and great care was taken not to violate any portion of the extensor mechanism.    We then placed a posterior capsular injection with some along the medial lateral retinaculum.  Used a total of 80 cc.  The injection mixture was 40 mls half percent ropivacaine 40 mL normal saline 8 mg per 8 mL morphine and Toradol 30 mg 1 ml.  We made sure to aspirate prior to any injection to prevent injecting directly into any vascular structures    At this point we drilled and punched our tibia.  Our trial components were removed.  Maintaining a very dry field we opened up our final implants.  Copious irrigation was used in the wound and their cement was prepared on the back table.  At this point we cemented all of our components in place.  Once we had done this we placed the trial poly within the joint health knee in extension with pressure on the components while cement cured    This point we did a Betadine wash for 5 minutes.  At this point copious irrigation 1 to 2 L with pulse lavage was used for irrigation.  We placed our final poly within the joint took it through range of motion which was very stable similar to our trial.  We were able to achieve full extension to 130 degrees of flexion.  Our patella was tracking very well.  At this point any remaining bleeders were cauterized.  1 g vancomycin was placed deep within the wound.  During closure second gram TXA was given.  Our extensor mechanism was closed using several nonabsorbable sutures and then a barbed running suture.  Our skin was closed in interrupted subcutaneous/running subcuticular fashion.  Sterile bandages were placed.  Counts were correct x2 patient was woken up from general anesthesia taken to PACU in stable condition    POSTOPERATIVE PLAN:  Weight-bearing: Weight-bear as tolerated  DVT prophylaxis: Aspirin 81 twice daily for 6 weeks  Antibiotics: Preop  as well as 24 hours postop  Showering: Okay to shower postop day 7 and remove dressing, no lotions no scrubbing's, place new dressing.  No tub soaks baths or swimming  Prescriptions: have been E prescribed  Follow-up:  in 2 weeks.  Call with any questions or concerns patient as well as his significant other have been counseled on signs of infection and to call immediately if they develop any of these  Physical Therapy: Begin within 1 week, may need extension dynamic brace if unable to achieve full extension    Did call the patient's family to discuss the surgery with them.  Counseled them on signs of infection which they understood.  If they have any issues including any concerns with infection including redness drainage swelling pain they are to call our office immediately or go to the local emergency department    This is dictated with voice recognition software please excuse any errors

## 2022-12-14 NOTE — TELEPHONE ENCOUNTER
DA    Caller: Bonnie    Office Name, phone number, fax number: [facility] / T: [phone]  F: [phone] / [phone]    Fax clearance to [phone] - on 12-09    Procedure Name: Knee replacement    Procedure Scheduled Date: 12-    Callback Number:     Bonnie- [facility]  Ph. [phone]  Fax: [phone] or [phone]

## 2022-12-14 NOTE — ANESTHESIA PREPROCEDURE EVALUATION
Case: [id number] Date/Time: 12/14/22 0645    Procedure: RIGHT TOTAL KNEE REPLACEMENT    Pre-op diagnosis: OSTEOARTHRITIS OF KNEE    Location: [facility] OR 03 / SURGERY [facility]    Surgeons: Britton Cooney M.D.          Relevant Problems   GI   (positive) Hiatal hernia      ENDO   (positive) Controlled type 2 diabetes mellitus without complication, without long-term current use of insulin (HCC)       Physical Exam    Airway   Mallampati: II  TM distance: >3 FB  Neck ROM: full       Cardiovascular - normal exam  Rhythm: regular  Rate: normal  (-) murmur     Dental - normal exam           Pulmonary - normal exam  Breath sounds clear to auscultation     Abdominal    Neurological - normal exam                 Anesthesia Plan    ASA 2       Plan - general and peripheral nerve block     Peripheral nerve block will be post-op pain control  Airway plan will be LMA          Induction: intravenous    Postoperative Plan: Postoperative administration of opioids is intended.    Pertinent diagnostic labs and testing reviewed    Informed Consent:    Anesthetic plan and risks discussed with patient.    Use of blood products discussed with: patient whom consented to blood products.

## 2022-12-14 NOTE — ASSESSMENT & PLAN NOTE
- Resume home empagliflozin and metformin.  Accu-Cheks before meals and at bedtime. Accu-Cheks before meals and at bedtime. Goal to keep BG between 140-180 per 2019 ADA guidelines.

## 2022-12-14 NOTE — ANESTHESIA PROCEDURE NOTES
Peripheral Block    Date/Time: 12/14/2022 6:52 AM  Performed by: Sabino Richards M.D.  Authorized by: Sabino Richards M.D.     Patient Location:  Pre-op  Start Time:  12/14/2022 6:52 AM  End Time:  12/14/2022 6:58 AM  Reason for Block: at surgeon's request and post-op pain management ONLY    patient identified, IV checked, site marked, risks and benefits discussed, surgical consent, monitors and equipment checked, pre-op evaluation and timeout performed    Patient Position:  Supine  Prep: ChloraPrep    Monitoring:  Heart rate, continuous pulse ox and cardiac monitor  Block Region:  Lower Extremity  Lower Extremity - Block Type:  Selective FEMORAL nerve block at the Adductor Canal    Laterality:  Right  Procedures: ultrasound guided  Image captured, interpreted and electronically stored.  Strength:  1 %  Dose:  3 ml  Block Type:  Single-shot  Needle Length:  100mm  Needle Gauge:  21 G  Needle Localization:  Ultrasound guidance  Injection Assessment:  Negative aspiration for heme, no paresthesia on injection, incremental injection and local visualized surrounding nerve on ultrasound  Evidence of intravascular injection: No     US Guided Selective Femoral Nerve Block at Adductor Canal:   US probe placed at mid-thigh level on externally rotated leg and femur identified.  Probe directed medially until Sartorius Muscle (SM), Femoral Artery (FA) and Saphenous Nerve (SN) identified in Adductor Canal (AC).  Needle inserted anterolateral to probe in an in plane approach into a subsartorial perivascular perineural position.  After negative aspiration LA injected with ease and visualized spreading within the AC.

## 2022-12-15 ENCOUNTER — HOME HEALTH ADMISSION (OUTPATIENT)
Dept: HOME HEALTH SERVICES | Facility: HOME HEALTHCARE | Age: 72
End: 2022-12-15
Payer: MEDICARE

## 2022-12-15 ENCOUNTER — APPOINTMENT (OUTPATIENT)
Dept: RADIOLOGY | Facility: MEDICAL CENTER | Age: 72
End: 2022-12-15
Attending: INTERNAL MEDICINE
Payer: MEDICARE

## 2022-12-15 ENCOUNTER — TELEPHONE (OUTPATIENT)
Dept: OTHER | Facility: MEDICAL CENTER | Age: 72
End: 2022-12-15

## 2022-12-15 VITALS
DIASTOLIC BLOOD PRESSURE: 60 MMHG | WEIGHT: 136.47 LBS | HEIGHT: 60 IN | BODY MASS INDEX: 26.79 KG/M2 | TEMPERATURE: 98.5 F | HEART RATE: 72 BPM | SYSTOLIC BLOOD PRESSURE: 128 MMHG | RESPIRATION RATE: 18 BRPM | OXYGEN SATURATION: 94 %

## 2022-12-15 PROBLEM — J96.01 ACUTE HYPOXEMIC RESPIRATORY FAILURE (HCC): Status: ACTIVE | Noted: 2022-12-15

## 2022-12-15 LAB
ANION GAP SERPL CALC-SCNC: 9 MMOL/L (ref 7–16)
BUN SERPL-MCNC: 16 MG/DL (ref 8–22)
CALCIUM SERPL-MCNC: 8.8 MG/DL (ref 8.4–10.2)
CHLORIDE SERPL-SCNC: 105 MMOL/L (ref 96–112)
CO2 SERPL-SCNC: 25 MMOL/L (ref 20–33)
CREAT SERPL-MCNC: 0.92 MG/DL (ref 0.5–1.4)
D DIMER PPP IA.FEU-MCNC: 1.24 UG/ML (FEU) (ref 0–0.5)
ERYTHROCYTE [DISTWIDTH] IN BLOOD BY AUTOMATED COUNT: 48.7 FL (ref 35.9–50)
GFR SERPLBLD CREATININE-BSD FMLA CKD-EPI: 66 ML/MIN/1.73 M 2
GLUCOSE SERPL-MCNC: 162 MG/DL (ref 65–99)
HCT VFR BLD AUTO: 30.3 % (ref 37–47)
HCT VFR BLD AUTO: 31.6 % (ref 37–47)
HGB BLD-MCNC: 9.6 G/DL (ref 12–16)
HGB BLD-MCNC: 9.9 G/DL (ref 12–16)
MCH RBC QN AUTO: 30.2 PG (ref 27–33)
MCHC RBC AUTO-ENTMCNC: 31.3 G/DL (ref 33.6–35)
MCV RBC AUTO: 96.3 FL (ref 81.4–97.8)
PLATELET # BLD AUTO: 220 K/UL (ref 164–446)
PMV BLD AUTO: 8.8 FL (ref 9–12.9)
POTASSIUM SERPL-SCNC: 3.7 MMOL/L (ref 3.6–5.5)
RBC # BLD AUTO: 3.28 M/UL (ref 4.2–5.4)
SODIUM SERPL-SCNC: 139 MMOL/L (ref 135–145)
WBC # BLD AUTO: 11.7 K/UL (ref 4.8–10.8)

## 2022-12-15 PROCEDURE — A9270 NON-COVERED ITEM OR SERVICE: HCPCS | Performed by: INTERNAL MEDICINE

## 2022-12-15 PROCEDURE — 85014 HEMATOCRIT: CPT | Mod: XU

## 2022-12-15 PROCEDURE — 97165 OT EVAL LOW COMPLEX 30 MIN: CPT

## 2022-12-15 PROCEDURE — 700117 HCHG RX CONTRAST REV CODE 255: Performed by: INTERNAL MEDICINE

## 2022-12-15 PROCEDURE — 97535 SELF CARE MNGMENT TRAINING: CPT

## 2022-12-15 PROCEDURE — 71275 CT ANGIOGRAPHY CHEST: CPT

## 2022-12-15 PROCEDURE — 85027 COMPLETE CBC AUTOMATED: CPT

## 2022-12-15 PROCEDURE — 700105 HCHG RX REV CODE 258: Performed by: INTERNAL MEDICINE

## 2022-12-15 PROCEDURE — G0378 HOSPITAL OBSERVATION PER HR: HCPCS

## 2022-12-15 PROCEDURE — 80048 BASIC METABOLIC PNL TOTAL CA: CPT

## 2022-12-15 PROCEDURE — 36415 COLL VENOUS BLD VENIPUNCTURE: CPT

## 2022-12-15 PROCEDURE — 99453 REM MNTR PHYSIOL PARAM SETUP: CPT

## 2022-12-15 PROCEDURE — 99217 PR OBSERVATION CARE DISCHARGE: CPT | Performed by: INTERNAL MEDICINE

## 2022-12-15 PROCEDURE — 85379 FIBRIN DEGRADATION QUANT: CPT

## 2022-12-15 PROCEDURE — 700102 HCHG RX REV CODE 250 W/ 637 OVERRIDE(OP): Performed by: INTERNAL MEDICINE

## 2022-12-15 PROCEDURE — 99454 REM MNTR PHYSIOL PARAM 16-30: CPT

## 2022-12-15 PROCEDURE — 85018 HEMOGLOBIN: CPT | Mod: XU

## 2022-12-15 PROCEDURE — 94760 N-INVAS EAR/PLS OXIMETRY 1: CPT

## 2022-12-15 RX ORDER — OXYCODONE HYDROCHLORIDE 5 MG/1
5 TABLET ORAL EVERY 8 HOURS PRN
Qty: 12 TABLET | Refills: 0 | Status: SHIPPED | OUTPATIENT
Start: 2022-12-15 | End: 2022-12-19

## 2022-12-15 RX ORDER — ASPIRIN 81 MG/1
81 TABLET ORAL 2 TIMES DAILY
Qty: 60 TABLET | Refills: 0 | Status: SHIPPED | OUTPATIENT
Start: 2022-12-15 | End: 2023-04-20

## 2022-12-15 RX ORDER — SODIUM CHLORIDE, SODIUM LACTATE, POTASSIUM CHLORIDE, CALCIUM CHLORIDE 600; 310; 30; 20 MG/100ML; MG/100ML; MG/100ML; MG/100ML
INJECTION, SOLUTION INTRAVENOUS CONTINUOUS
Status: DISCONTINUED | OUTPATIENT
Start: 2022-12-15 | End: 2022-12-15 | Stop reason: HOSPADM

## 2022-12-15 RX ADMIN — ASPIRIN 81 MG: 81 TABLET, COATED ORAL at 11:19

## 2022-12-15 RX ADMIN — LAMOTRIGINE 200 MG: 100 TABLET ORAL at 06:07

## 2022-12-15 RX ADMIN — ACETAMINOPHEN 650 MG: 325 TABLET, FILM COATED ORAL at 09:42

## 2022-12-15 RX ADMIN — OMEPRAZOLE 20 MG: 20 CAPSULE, DELAYED RELEASE ORAL at 06:08

## 2022-12-15 RX ADMIN — PAROXETINE HYDROCHLORIDE 40 MG: 20 TABLET, FILM COATED ORAL at 06:06

## 2022-12-15 RX ADMIN — OXYCODONE 5 MG: 5 TABLET ORAL at 06:07

## 2022-12-15 RX ADMIN — IOHEXOL 70 ML: 350 INJECTION, SOLUTION INTRAVENOUS at 10:42

## 2022-12-15 RX ADMIN — OXYCODONE 5 MG: 5 TABLET ORAL at 12:07

## 2022-12-15 RX ADMIN — ACETAMINOPHEN 650 MG: 325 TABLET, FILM COATED ORAL at 01:41

## 2022-12-15 RX ADMIN — SODIUM CHLORIDE, POTASSIUM CHLORIDE, SODIUM LACTATE AND CALCIUM CHLORIDE: 600; 310; 30; 20 INJECTION, SOLUTION INTRAVENOUS at 11:14

## 2022-12-15 ASSESSMENT — COGNITIVE AND FUNCTIONAL STATUS - GENERAL
CLIMB 3 TO 5 STEPS WITH RAILING: A LITTLE
WALKING IN HOSPITAL ROOM: A LITTLE
SUGGESTED CMS G CODE MODIFIER DAILY ACTIVITY: CI
DAILY ACTIVITIY SCORE: 18
MOBILITY SCORE: 18
DRESSING REGULAR UPPER BODY CLOTHING: A LITTLE
SUGGESTED CMS G CODE MODIFIER DAILY ACTIVITY: CK
EATING MEALS: A LITTLE
TOILETING: A LITTLE
TURNING FROM BACK TO SIDE WHILE IN FLAT BAD: A LITTLE
MOVING TO AND FROM BED TO CHAIR: A LITTLE
MOVING FROM LYING ON BACK TO SITTING ON SIDE OF FLAT BED: A LITTLE
STANDING UP FROM CHAIR USING ARMS: A LITTLE
DRESSING REGULAR LOWER BODY CLOTHING: A LITTLE
HELP NEEDED FOR BATHING: A LITTLE
PERSONAL GROOMING: A LITTLE
HELP NEEDED FOR BATHING: A LITTLE
SUGGESTED CMS G CODE MODIFIER MOBILITY: CK
DAILY ACTIVITIY SCORE: 23

## 2022-12-15 ASSESSMENT — PAIN DESCRIPTION - PAIN TYPE
TYPE: SURGICAL PAIN
TYPE: ACUTE PAIN
TYPE: ACUTE PAIN
TYPE: SURGICAL PAIN
TYPE: SURGICAL PAIN

## 2022-12-15 ASSESSMENT — GAIT ASSESSMENTS: DISTANCE (FEET): 15

## 2022-12-15 ASSESSMENT — ACTIVITIES OF DAILY LIVING (ADL): TOILETING: INDEPENDENT

## 2022-12-15 NOTE — THERAPY
"Occupational Therapy   Initial Evaluation     Patient Name: Melody Rodriguez  Age:  72 y.o., Sex:  female  Medical Record #: [id number]  Today's Date: 12/15/2022     Precautions  Precautions: (P) Weight Bearing As Tolerated Right Lower Extremity    Assessment  Patient is 72 y.o. female with a diagnosis of Right TKA.  Additional factors influencing patient status / progress: Right knee pain/discomfort.  Patient reports living alone in a H in [place], [place].  Son resides in area and is available to assist as needed + friends in area are available to assist as well.  Patient reported PLOF Mod Indep to Indep for ADL's, transfers and functional mobility w/out a device.  Patient demonstrated Mod Indep bed mobility, Sup Sit/Stand, Sup functional mobility via FWW, Sup Transfers, Mod Indep LB clothing management, Indep UB clothing management, Mod Indep standing for G&H, Mod Indep toileting task.  O2 sat rates w/out activity patient is stable.  With OOB activity O2 sat rates drop to low 80%.  with 3L O2 sat rate stabilizes at low 90% - Recommending O2 secondary desat w/ activity.  Therapist reviewed/educated patient on environmental/safety awareness, fall precautions, AE/DME, ADL's and transfers.  No further skilled occupational therapy recommended at this time.      Plan    Recommend Occupational Therapy for Evaluation only.    DC Equipment Recommendations: (P) None (Recommending O2 secondary desat w/ activity)  Discharge Recommendations: (P) Anticipate that the patient will have no further occupational therapy needs after discharge from the hospital     Subjective    \"I guess I'll have to go home with oxygen?\"     Objective       12/15/22 0820   Initial Contact Note    Initial Contact Note Order Received and Verified, Evaluation Only - Patient Does Not Require Further Acute Occupational Therapy at this Time.  However, May Benefit from Post Acute Therapy for Higher Level Functional Deficits.   Prior Living Situation   Housing " / Facility 1 Story House   Steps Into Home 0   Steps In Home 0   Bathroom Set up Walk In Shower;Shower Glass Doors;Shower Chair   Equipment Owned Front-Wheel Walker;Tub / Shower Seat;Hand Held Shower   Lives with - Patient's Self Care Capacity Alone and Able to Care For Self   Comments Patient reports living alone in a [facility] in [place], [place].  Son resides in area and is available to assist as needed + friends in area are available to assist as well.  Patient reported PLOF Mod Indep to Indep for ADL's, transfers and functional mobility w/out a device.   Prior Level of ADL Function   Self Feeding Independent   Grooming / Hygiene Independent   Bathing Independent   Dressing Independent   Toileting Independent   Prior Level of IADL Function   Medication Management Independent   Laundry Independent   Kitchen Mobility Independent   Finances Independent   Home Management Independent   Shopping Independent   Prior Level Of Mobility Independent Without Device in Community;Independent Without Device in Home;Independent With Steps in Community   Occupation (Pre-Hospital Vocational) Retired Due To Age   Precautions   Precautions Weight Bearing As Tolerated Right Lower Extremity   Vitals   Pulse 69   Patient BP Position Sitting   Respiration 18   Pulse Oximetry 93 %   O2 Delivery Device Room air w/o2 available   Vitals Comments O2 sat rates w/out activity patient is stable.  With OOB activity O2 sat rates drop to low 80%.  with 3L O2 sat rate stabilizes at low 90%.   Pain   Intervention Cold Pack;Rest;Repositioned   Pain 0 - 10 Group   Location Knee   Location Orientation Right   Description Aching   Comfort Goal Perform Activity   Therapist Pain Assessment During Activity;2   Non Verbal Descriptors   Non Verbal Scale  Calm;Unlabored Breathing   Cognition    Cognition / Consciousness WDL   Active ROM Upper Body   Active ROM Upper Body  WDL   Dominant Hand Right   Strength Upper Body   Upper Body Strength  WDL   Upper Body Muscle Tone    Upper Body Muscle Tone  WDL   Coordination Upper Body   Coordination WDL   Balance Assessment   Sitting Balance (Static) Good   Sitting Balance (Dynamic) Fair +   Standing Balance (Static) Fair +   Standing Balance (Dynamic) Fair   Weight Shift Sitting Good   Weight Shift Standing Fair   Comments OOB FWW   Bed Mobility    Supine to Sit Modified Independent   Sit to Supine Modified Independent   Scooting Modified Independent   ADL Assessment   Grooming Modified Independent;Standing   Upper Body Dressing Independent   Lower Body Dressing Modified Independent   Toileting Modified Independent   How much help from another person does the patient currently need...   Putting on and taking off regular lower body clothing? 4   Bathing (including washing, rinsing, and drying)? 3   Toileting, which includes using a toilet, bedpan, or urinal? 4   Putting on and taking off regular upper body clothing? 4   Taking care of personal grooming such as brushing teeth? 4   Eating meals? 4   6 Clicks Daily Activity Score 23   Functional Mobility   Sit to Stand Supervised   Bed, Chair, Wheelchair Transfer Supervised   Toilet Transfers Supervised   Transfer Method Stand Step   Mobility Sup FWW, EOB<>toilet + sink   Distance (Feet) 15   # of Times Distance was Traveled 2   Edema / Skin Assessment   Edema / Skin  Not Assessed   Activity Tolerance   Sitting Edge of Bed 10   Standing 5x2   Comments sitting/commode 5   Education Group   Education Provided Joint Protection;Transfers;Role of Occupational Therapist;Activities of Daily Living   Role of Occupational Therapist Patient Response Patient;Acceptance;Verbal Demonstration;Explanation   Joint Protection Patient Response Patient;Acceptance;Explanation;Demonstration;Verbal Demonstration;Action Demonstration   Transfers Patient Response Patient;Acceptance;Explanation;Demonstration;Verbal Demonstration;Action Demonstration   ADL Patient Response  Patient;Acceptance;Explanation;Demonstration;Verbal Demonstration;Action Demonstration   Anticipated Discharge Equipment and Recommendations   DC Equipment Recommendations None  (Recommending O2 secondary desat w/ activity)   Discharge Recommendations Anticipate that the patient will have no further occupational therapy needs after discharge from the hospital

## 2022-12-15 NOTE — RESPIRATORY CARE
REMOTE MONITORING PROGRAM by RESPIRATORY THERAPY  12/15/2022 at 11:41 AM by Suzette Coffey, RRT     Patient's name:  Melody Rodriguez        MRN: [id number]       : 1950  Physical address: [address] [place] [address]  Cell phone # [phone]  Current oxygen requirement 3L  Ref # [id number] & Lot # [id number]  Emergency contact name & number: Ramu Lerner (Son) [phone]  Primary language English  Acute Respiratory Failure - Hypoxia    Patient agrees to Remote Monitoring program. Device instruction performed with welcome packet, consent signed and placed at bedside chart. Patient instructed on how to use MyChart and Zoom. No further questions at this time.

## 2022-12-15 NOTE — DISCHARGE PLANNING
Received Choice form at 1003  Agency/Facility Name: [facility]   Referral sent per Choice form @ 1010     Received Choice form at 1013  [facility]  Referral sent per Choice form @ 1013     @1222  Per HYACINTH UGIDO CM manually faxed all necessary documents to [facility]    @1250  Agency/Facility Name: [facility]  Spoke To: Farrukh  Outcome: Per Farrukh, pt requested the equipment be delivered to her home due to the pt not being able to lift the equipment on her own. Per Farrukh, she gave [facility] permission to get a tank from the bunker at [facility] to discharge the pt home with.  RN CM notified

## 2022-12-15 NOTE — DISCHARGE PLANNING
Case Management Discharge Planning    Admission Date: 12/14/2022  GMLOS:    ALOS: 0    6-Clicks ADL Score: 23  6-Clicks Mobility Score: 18      Anticipated Discharge Dispo: Discharge Disposition: D/T to home under [facility] care in anticipation of covered skilled care (06)    DME Needed: Yes    DME Ordered: Yes    Action(s) Taken: Spoke to pt at bedside. Received verbal approval for HH with [facility]  and O2 from [facility]. Per pt, she purchased walker from [employer], no choice for walker needed.     Per chart, [facility] HH accepted.     Per Surya from [facility], O2 delivery ETA: 1400    Escalations Completed: None    Medically Clear: No    Next Steps: f/u with [facility] regarding O2 ETA.    Barriers to Discharge: DME

## 2022-12-15 NOTE — CARE PLAN
The patient is Stable - Low risk of patient condition declining or worsening    Shift Goals  Clinical Goals: pt will be weaned off oxygen this shift. pt will not sustain a fall this shift.  Patient Goals: sleep comfortably    Progress made toward(s) clinical / shift goals:      Pt weaned to room air this morning. Most recent O2 saturation is 93%.    Pt placed on low fall precautions. No falls this shift.    Problem: Pain - Standard  Goal: Alleviation of pain or a reduction in pain to the patient’s comfort goal  Outcome: Progressing     Problem: Knowledge Deficit - Standard  Goal: Patient and family/care givers will demonstrate understanding of plan of care, disease process/condition, diagnostic tests and medications  Outcome: Progressing         Patient is not progressing towards the following goals:

## 2022-12-15 NOTE — DISCHARGE SUMMARY
"Discharge Summary    CHIEF COMPLAINT ON ADMISSION  No chief complaint on file.      Reason for Admission  Status post total knee replacement*     Admission Date  12/14/2022    CODE STATUS  No Order    HPI & HOSPITAL COURSE  As per chart review:  \"72 y.o. female with history of multiple sclerosis, type 2 diabetes mellitus, hyperlipidemia, multinodular nontoxic goiter, who underwent an uncomplicated right total knee arthroplasty today, with uncomplicated perioperative and postoperative course.  Review of her chart showed that preoperative evaluation initially showed anterior Q waves and left atrial enlargement on the EKG, prompting cardiology consult.  She had a systolic murmur and exam, prompting an echocardiogram which showed normal right and left ventricular sizes and systolic functions, with no hemodynamically significant valvular disease.\"    Postoperatively, she did well.  However, she continues to require low-flow oxygen supplement at 2 L and was unable to be weaned off.  Chest x-ray (personally reviewed) showed no infiltrates, consolidation, or edema.  Labs were unremarkable.  Orthopedics then decided to keep her in the hospital for observation and for oxygen weaning.     In PACU she is not in respiratory distress, and very comfortable.  Pain is well controlled.  She denies any chest pain, shortness of breath, cough, nausea, vomiting, abdominal pain, leg edema, orthopnea, paroxysmal nocturnal dyspnea, or bowel movement changes.  She stated that she felt well even prior to the surgery.  She is agreeable to stay in the hospital overnight.\"    The patient was admitted for further management and care.  She was unable to be completely weaned off of the oxygen. When walking she desaturated into the 70's. We did order a D-dimer which was elevated, we ordered CTA of the chest which was negative for pulmonary embolism.    This could be due to early atelectasis, she was advised to continue with incentive spirometer.  " Otherwise the patient currently not complaining of any other symptom.  The patient will be discharged with oxygen.  She will require close follow-up with PCP as an outpatient.  If unable to be weaned off she might require up to pulmonary.  We will defer to PCP to make that determination.    Patient has been accepted for home health, she has also been ordered remote monitoring oxygenation.  Patient will be discharged home, she will require close follow-up with PCP and orthopedic surgery as an outpatient.        Therefore, she is discharged in fair and stable condition to home with organized home healthcare and close outpatient follow-up.    The patient recovered much more quickly than anticipated on admission.    Discharge Date  12/15/2022    FOLLOW UP ITEMS POST DISCHARGE  Patient will require close follow-up with PCP and orthopedic surgery as an outpatient.    DISCHARGE DIAGNOSES  Principal Problem:    Postoperative hypoxemia POA: Yes  Active Problems:    Controlled type 2 diabetes mellitus without complication, without long-term current use of insulin (HCC) POA: Yes    Dyslipidemia POA: Yes    Multiple sclerosis (HCC) POA: Yes    Non-toxic multinodular goiter POA: Yes    Total knee replacement status, right POA: Yes    Acute hypoxemic respiratory failure (HCC) POA: Unknown  Resolved Problems:    * No resolved hospital problems. *      FOLLOW UP  Future Appointments   Date Time Provider Department Center   12/16/2022 To Be Determined Kimberly Schmitz PT [facility] None   1/25/2023  3:50 PM Danae Pritchett M.D. [facility] None   4/21/2023 10:30 AM JOLIE Rosas   6/7/2023  9:45 AM Oj Marino M.D. [facility] None     Britton Cooney M.D.  [address]  [address]  [place] [id number] [phone]    Schedule an appointment as soon as possible for a visit  For a follow up    Danae Pritchett M.D.  [address] [facility]  [address]  [place] [id number] [phone]    Schedule an appointment as soon as  possible for a visit        MEDICATIONS ON DISCHARGE     Medication List        START taking these medications        Instructions   aspirin 81 MG EC tablet   Take 1 Tablet by mouth 2 times a day.  Dose: 81 mg     oxyCODONE immediate-release 5 MG Tabs  Commonly known as: ROXICODONE   Take 1 Tablet by mouth every 8 hours as needed for Severe Pain for up to 4 days.  Dose: 5 mg            CONTINUE taking these medications        Instructions   Aubagio 14 MG Tabs  Generic drug: Teriflunomide   1 Tablet every day.  Dose: 1 Tablet     buPROPion  MG Tb12 sustained-release tablet  Commonly known as: WELLBUTRIN-SR   Take 1 tablet by mouth every morning.     FreeStyle Lancets Misc   28 guage     glucose blood strip   Freestyle InsuLinx Test Strips   USE TO TEST THREE TIMES DAILY     lamotrigine 200 MG tablet  Commonly known as: LAMICTAL   Take 1 tablet (200 mg) by mouth daily     meloxicam 7.5 MG Tabs  Commonly known as: MOBIC   Take 15 mg by mouth every day.  Dose: 15 mg     modafinil 200 MG Tabs  Commonly known as: PROVIGIL   as needed.     omeprazole 20 MG delayed-release capsule  Commonly known as: PRILOSEC   Take 1 capsule by mouth every morning, 30 minutes before breakfast.     paroxetine 40 MG tablet  Commonly known as: PAXIL   Take 1 Tablet by mouth every day.  Dose: 40 mg     simvastatin 40 MG Tabs  Commonly known as: ZOCOR   Take 1 Tablet by mouth every evening.  Dose: 40 mg     Synjardy XR 12.5-1000 MG Tb24  Generic drug: Empagliflozin-metFORMIN HCl ER   Take 1 tablet by mouth daily     zolpidem 5 MG Tabs  Commonly known as: AMBIEN   Take 1 tablet (5 mg) by mouth daily at bedtime as needed for insomnia            STOP taking these medications      methylPREDNISolone 4 MG Tabs  Commonly known as: MEDROL              Allergies  No Known Allergies    DIET  Orders Placed This Encounter   Procedures    Diet Order Diet: Consistent CHO (Diabetic)     Standing Status:   Standing     Number of Occurrences:   1      Order Specific Question:   Diet:     Answer:   Consistent CHO (Diabetic) [4]       ACTIVITY  As tolerated. And directed by PT  Weight bearing as tolerated and directed by PT    CONSULTATIONS  Orthopedic Surgery    PROCEDURES  Right knee total knee arthroplasty      CT-CTA CHEST PULMONARY ARTERY W/ RECONS   Final Result      1.  No evidence pulmonary emboli.      2.  No airspace consolidation or pleural effusions.      3.  Coronary artery calcifications.                  DX-CHEST-PORTABLE (1 VIEW)   Final Result      Cardiomegaly.      DX-KNEE 2- RIGHT   Final Result      Post right knee tricompartment arthroplasty.           LABORATORY  Lab Results   Component Value Date    SODIUM 139 12/15/2022    POTASSIUM 3.7 12/15/2022    CHLORIDE 105 12/15/2022    CO2 25 12/15/2022    GLUCOSE 162 (H) 12/15/2022    BUN 16 12/15/2022    CREATININE 0.92 12/15/2022        Lab Results   Component Value Date    WBC 11.7 (H) 12/15/2022    HEMOGLOBIN 9.6 (L) 12/15/2022    HEMATOCRIT 30.3 (L) 12/15/2022    PLATELETCT 220 12/15/2022        Total time of the discharge process exceeds 35 minutes.

## 2022-12-15 NOTE — TELEPHONE ENCOUNTER
RPM Notification: Patient Communication  Actions: None    Total time (minutes) spent communicating with patient:     Patient is a new admit to our Home monitoring program. She is currently on 3L of oxygen.    Will continue to monitor and call her if he alerts or disconnects.

## 2022-12-15 NOTE — PROGRESS NOTES
4 Eyes Skin Assessment Completed by HAY Morrison and HAY Raza.    Head WDL  Ears WDL  Nose WDL  Mouth WDL  Neck WDL  Breast/Chest WDL  Shoulder Blades WDL  Spine WDL  (R) Arm/Elbow/Hand WDL  (L) Arm/Elbow/Hand WDL  Abdomen WDL  Groin WDL  Scrotum/Coccyx/Buttocks WDL  (R) Leg Incision  (L) Leg WDL  (R) Heel/Foot/Toe WDL  (L) Heel/Foot/Toe WDL          Devices In Places Blood Pressure Cuff, Pulse Ox, and Nasal Cannula      Interventions In Place N/A    Possible Skin Injury No    Pictures Uploaded Into Epic N/A  Wound Consult Placed N/A  RN Wound Prevention Protocol Ordered No

## 2022-12-15 NOTE — FACE TO FACE
"Face to Face Note  -  Durable Medical Equipment    Sebastien Russo M.D. - NPI: [id number]  I certify that this patient is under my care and that they had a durable medical equipment(DME)face to face encounter by myself that meets the physician DME face-to-face encounter requirements with this patient on:    Date of encounter:   Patient:                    MRN:                       YOB: 2022  Melody Rodriguez  [id number]  1950     The encounter with the patient was in whole, or in part, for the following medical condition, which is the primary reason for durable medical equipment:  Other - Acute hypoxemic respiratory failure    I certify that, based on my findings, the following durable medical equipment is medically necessary:    Oxygen   HOME O2 Saturation Measurements:(Values must be present for Home Oxygen orders)  Room air sat at rest: 93  Room air sat with amb: 73  With liters of O2: 3, O2 sat at rest with O2: 95  With Liters of O2: 3, O2 sat with amb with O2 : 93     If patient feels more short of breath, they can go up to 6 liters per minute and contact healthcare provider.    Supporting Symptoms: The patient requires supplemental oxygen, as the following interventions have been tried with limited or no improvement: \"Ambulation with oximetry.    My Clinical findings support the need for the above equipment due to:  Hypoxia  "

## 2022-12-15 NOTE — PROGRESS NOTES
Received report from night RN. Assumed patient care. Patient alert and oriented x4 in no acute respiratory distress. Denies pain at this time. Dressing over rihgt knee clean, dry and intact. Discussed plan of care and understood. Call light placed within reach.

## 2022-12-15 NOTE — DISCHARGE PLANNING
ATTN: Case Management  RE: Referral for Home Health    As of 12/15/2022, we have accepted the Home Health referral for the patient listed above.    A [facility] clinician will be out to see the patient within 48 hours. If you have any questions or concerns regarding the patient's transition to Home Health, please do not hesitate to contact us at [phone].      We look forward to collaborating with you,  [facility] Home Health Team

## 2022-12-15 NOTE — ASSESSMENT & PLAN NOTE
- Uncomplicated perioperative and postoperative course.  -Continue analgesics with as needed Tylenol, and low-dose oxycodone.  -Encourage ambulation.

## 2022-12-15 NOTE — PROGRESS NOTES
Pt is awake in bed. Pt c/o 7/10 pain in R knee. Medicated per MAR. Pt remains on 2LPM oxygen to maintain saturation of 92%. Pt has no c/o SOB while at rest. Pt has ambulated and voided post-op. Dressing to R knee is clean, dry, and intact. CMS to RLE is intact. Pt has ambulated and voided post-op. Fall precautions in place.

## 2022-12-15 NOTE — CONSULTS
Hospital Medicine Consultation    Date of Service  12/14/2022    Referring Physician  Britton Cooney M.D.    Consulting Physician  Steven Dave M.D.    Reason for Consultation  Postoperative hypoxemia    History of Presenting Illness  72 y.o. female with history of multiple sclerosis, type 2 diabetes mellitus, hyperlipidemia, multinodular nontoxic goiter, who underwent an uncomplicated right total knee arthroplasty today, with uncomplicated perioperative and postoperative course.  Review of her chart showed that preoperative evaluation initially showed anterior Q waves and left atrial enlargement on the EKG, prompting cardiology consult.  She had a systolic murmur and exam, prompting an echocardiogram which showed normal right and left ventricular sizes and systolic functions, with no hemodynamically significant valvular disease.      Postoperatively, she did well.  However, she continues to require low-flow oxygen supplement at 2 L and was unable to be weaned off.  Chest x-ray (personally reviewed) showed no infiltrates, consolidation, or edema.  Labs were unremarkable.  Orthopedics then decided to keep her in the hospital for observation and for oxygen weaning.    I saw her in the PACU.  She is not in respiratory distress, and very comfortable.  Pain is well controlled.  She denies any chest pain, shortness of breath, cough, nausea, vomiting, abdominal pain, leg edema, orthopnea, paroxysmal nocturnal dyspnea, or bowel movement changes.  She stated that she felt well even prior to the surgery.  She is agreeable to stay in the hospital overnight.      Review of Systems  ROS    Pertinent positives/negatives as mentioned above.     A complete review of systems was personally done by me. All other systems were negative.       Past Medical History   has a past medical history of Acute nasopharyngitis, Arthritis, Breath shortness, Cancer (HCC), Cerebral atrophy (HCC) (03/16/2022), Depression, Diabetes ([facility]),  Disorder of thyroid, Groin lump (06/09/2022), Gynecological disorder, Heart burn, Heart murmur, Heart valve disease, Hiatus hernia syndrome, High cholesterol, Hyperlipidemia, Muscle disorder, Osteoarthritis of right knee (06/15/2021), PONV (postoperative nausea and vomiting) (1970), Recurrent major depressive disorder, in remission (HCC) (03/16/2022), SI (sacroiliac) pain (06/09/2022), Type 2 diabetes mellitus with stage 3 chronic kidney disease (HCC) (06/15/2021), Urinary bladder disorder, and Urinary incontinence.    She has no past medical history of Anesthesia, Anginal syndrome (HCC), Arrhythmia, Asthma, Blood clotting disorder (HCC), Bowel habit changes, Bronchitis, Carcinoma in situ of respiratory system, Cataract, Continuous ambulatory peritoneal dialysis status (HCC), Coughing blood, Dental disorder, Dialysis patient (HCC), Emphysema of lung (HCC), Glaucoma, Hemorrhagic disorder (HCC), Hepatitis A, Hepatitis B, Hepatitis C, Indigestion, Infectious disease, Jaundice, Myocardial infarct (HCC), Pacemaker, Pneumonia, Pregnant, Rheumatic fever, Seizure (HCC), Sleep apnea, Stroke (HCC), or Tuberculosis.    Surgical History   has a past surgical history that includes gyn surgery (1970).    Family History  family history includes ADHD in her son; Bipolar disorder in her daughter; Cancer in her brother, maternal grandmother, and mother; Colon Cancer in her mother; Colorectal Cancer in her mother; Depression in her son; Diabetes in her father and mother; Heart Attack in her father; Heart Disease in her father and mother; Hypertension in her son; Lung Disease in her mother; No Known Problems in her maternal grandfather, paternal grandfather, paternal grandmother, and sister; Other in her brother and daughter.    Social History   reports that she has never smoked. She has never used smokeless tobacco. She reports current alcohol use. She reports that she does not currently use drugs.    Medications  Prior to Admission  Medications   Prescriptions Last Dose Informant Patient Reported? Taking?   Empagliflozin-metFORMIN HCl ER (SYNJARDY XR) 12.5-1000 MG TABLET SR 24 HR 2022  No No   Sig: Take 1 tablet by mouth daily   FreeStyle Lancets Misc   Yes No   Si guage   Teriflunomide (AUBAGIO) 14 MG Tab 2022  Yes No   Si Tablet every day.   buPROPion SR (WELLBUTRIN-SR) 150 MG TABLET SR 12 HR sustained-release tablet 2022  No No   Sig: Take 1 tablet by mouth every morning.   glucose blood strip   Yes No   Sig: Freestyle InsuLinx Test Strips   USE TO TEST THREE TIMES DAILY   lamotrigine (LAMICTAL) 200 MG tablet 2022  No No   Sig: Take 1 tablet (200 mg) by mouth daily   meloxicam (MOBIC) 7.5 MG Tab 2022  Yes Yes   Sig: Take 15 mg by mouth every day.   methylPREDNISolone (MEDROL) 4 MG Tab 2022  Yes No   Sig: methylprednisolone 4 mg tablets in a dose pack   TAKE 6 TABLETS ON DAY 1 AS DIRECTED ON PACKAGE AND DECREASE BY 1 TAB EACH DAY FOR A TOTAL OF 6 DAYS   modafinil (PROVIGIL) 200 MG Tab 2022  Yes No   Sig: as needed.   omeprazole (PRILOSEC) 20 MG delayed-release capsule 2022  No No   Sig: Take 1 capsule by mouth every morning, 30 minutes before breakfast.   paroxetine (PAXIL) 40 MG tablet 2022  No No   Sig: Take 1 Tablet by mouth every day.   simvastatin (ZOCOR) 40 MG Tab 2022  No No   Sig: Take 1 Tablet by mouth every evening.   zolpidem (AMBIEN) 5 MG Tab 2022  No No   Sig: Take 1 tablet (5 mg) by mouth daily at bedtime as needed for insomnia      Facility-Administered Medications: None       Allergies  No Known Allergies    Physical Exam  Temp:  [35.9 °C (96.7 °F)-36.5 °C (97.7 °F)] 36 °C (96.8 °F)  Pulse:  [78-86] 85  Resp:  [16-18] 16  BP: (100-141)/(46-69) 119/68  SpO2:  [83 %-96 %] 94 %    Physical Exam  Vitals reviewed.   Constitutional:       General: She is not in acute distress.     Appearance: Normal appearance. She is normal weight. She is not ill-appearing or  diaphoretic.   HENT:      Head: Normocephalic and atraumatic.      Mouth/Throat:      Mouth: Mucous membranes are moist.      Pharynx: No oropharyngeal exudate or posterior oropharyngeal erythema.   Eyes:      General: No scleral icterus.     Extraocular Movements: Extraocular movements intact.      Conjunctiva/sclera: Conjunctivae normal.      Pupils: Pupils are equal, round, and reactive to light.   Cardiovascular:      Rate and Rhythm: Normal rate and regular rhythm.      Heart sounds: Murmur (systolic) heard.   Pulmonary:      Effort: Pulmonary effort is normal. No respiratory distress.      Breath sounds: Normal breath sounds. No stridor. No wheezing, rhonchi or rales.   Chest:      Chest wall: No tenderness.   Abdominal:      General: Bowel sounds are normal. There is no distension.      Palpations: Abdomen is soft. There is no mass.      Tenderness: There is no abdominal tenderness. There is no guarding or rebound.   Musculoskeletal:         General: No swelling. Normal range of motion.      Cervical back: Normal range of motion and neck supple. No rigidity. No muscular tenderness.      Right lower leg: No edema.      Left lower leg: No edema.      Comments: Right knee surgical site intact, dressing CDI   Lymphadenopathy:      Cervical: No cervical adenopathy.   Skin:     General: Skin is warm and dry.      Coloration: Skin is not jaundiced.      Findings: No rash.   Neurological:      General: No focal deficit present.      Mental Status: She is alert and oriented to person, place, and time. Mental status is at baseline.      Cranial Nerves: No cranial nerve deficit.   Psychiatric:         Mood and Affect: Mood normal.         Behavior: Behavior normal.         Thought Content: Thought content normal.         Judgment: Judgment normal.       Fluids  Date 12/14/22 0700 - 12/15/22 0659   Shift 9672-7139 6876-2677 0083-3061 24 Hour Total   INTAKE   P.O. 60   60   I.V. 1000   1000   Other 0   0   Shift Total  1060   1060   OUTPUT   Blood 300   300   Shift Total 300   300   Weight (kg) 61.9 61.9 61.9 61.9       Laboratory  Recent Labs     12/14/22  1511   WBC 9.0   RBC 3.57*   HEMOGLOBIN 10.8*   HEMATOCRIT 34.1*   MCV 95.5   MCH 30.3   MCHC 31.7*   RDW 47.3   PLATELETCT 212   MPV 8.8*     Recent Labs     12/14/22  1511   SODIUM 136   POTASSIUM 4.4   CHLORIDE 105   CO2 22   GLUCOSE 198*   BUN 16   CREATININE 0.94   CALCIUM 8.4                     Imaging  DX-CHEST-PORTABLE (1 VIEW)   Final Result      Cardiomegaly.      DX-KNEE 2- RIGHT   Final Result      Post right knee tricompartment arthroplasty.          Assessment/Plan  * Postoperative hypoxemia- (present on admission)  Assessment & Plan  - Suspect this is due to atelectasis.  Requiring 2 L of oxygen currently.  Chest x-ray showed no infiltrates, consolidation, effusion, or edema.  She had an echocardiogram on 12/13 showing normal left and right ventricular size and systolic functions, with no significant valvular abnormalities.  -Try to wean off due to supplemental oxygen, keep saturations above 88%.  Start incentive spirometry at least every 1 hour.  Encourage deep breathing.  Monitor overnight for supplemental oxygen weaning.    Total knee replacement status, right- (present on admission)  Assessment & Plan  - Uncomplicated perioperative and postoperative course.  -Continue analgesics with as needed Tylenol, and low-dose oxycodone.  -Encourage ambulation.    Multiple sclerosis (HCC)- (present on admission)  Assessment & Plan  - Stable.    Dyslipidemia- (present on admission)  Assessment & Plan  - Resume home Zocor.    Controlled type 2 diabetes mellitus without complication, without long-term current use of insulin (HCC)- (present on admission)  Assessment & Plan  - Resume home empagliflozin and metformin.  Accu-Cheks before meals and at bedtime. Accu-Cheks before meals and at bedtime. Goal to keep BG between 140-180 per 2019 ADA guidelines.

## 2022-12-15 NOTE — FACE TO FACE
Face to Face Note  -  Durable Medical Equipment    Sebastien Russo M.D. - NPI: [id number]  I certify that this patient is under my care and that they have had a durable medical equipment(DME)face to face encounter by myself that meets the physician DME face-to-face encounter requirements with this patient on:    Date of encounter:   Patient:                    MRN:                       YOB: 2022  Melody Rodriguez  [id number]  1950     The encounter with the patient was in whole, or in part, for the following medical condition, which is the primary reason for durable medical equipment:  Right knee total arthroplasty    I certify that, based on my findings, the following durable medical equipment is medically necessary:  Walkers.        ------------------------------------------------------------------------------------------------------------------    Face to Face Supporting Documentation - Home Health    The encounter with this patient was in whole or in part the primary reason for home health admission.    Date of encounter:   Patient:                    MRN:                       YOB: 2022  Melody Rodriguez  [id number]  1950     Home health to see patient for:  Skilled Nursing care for assessment, interventions & education, Physical Therapy evaluation and treatment, and Occupational therapy evaluation and treatment    Skilled need for:  Surgical Aftercare right knee total arthroplasty    Skilled nursing interventions to include:  Comment: as per PT/OT    Homebound evidenced status by:  Needs the assistance of another person in order to leave the home. Leaving home must require a considerable and taxing effort. There must exist a normal inability to leave the home.    Community Physician to provide follow up care: Danae Pritchett M.D.     Optional Interventions    Wound information & treatment:    Home Infusion Therapy orders:     Line/Drain/Airway:    I certify the face to face encounter for this home care referral meets the CMS requirements and the encounter/clinical assessment with the patient was, in whole, or in part, for the medical condition(s) listed above, which is the primary reason for home health care. Based on my clinical findings: the service(s) are medically necessary, support the need for home health care, and the homebound criteria are met.  I certify that this patient has had a face to face encounter by myself.  Sebastien Russo M.D. - NPI: [id number]    *Debility, frailty and advanced age in the absence of an acute deterioration or exacerbation of a condition do not qualify a patient for home health.

## 2022-12-16 ENCOUNTER — HOME CARE VISIT (OUTPATIENT)
Dept: HOME HEALTH SERVICES | Facility: HOME HEALTHCARE | Age: 72
End: 2022-12-16
Payer: MEDICARE

## 2022-12-16 ENCOUNTER — PATIENT OUTREACH (OUTPATIENT)
Dept: MEDICAL GROUP | Facility: PHYSICIAN GROUP | Age: 72
End: 2022-12-16
Payer: MEDICARE

## 2022-12-16 ENCOUNTER — TELEPHONE (OUTPATIENT)
Dept: OTHER | Facility: MEDICAL CENTER | Age: 72
End: 2022-12-16
Payer: MEDICARE

## 2022-12-16 ENCOUNTER — HOSPITAL ENCOUNTER (EMERGENCY)
Facility: MEDICAL CENTER | Age: 72
End: 2022-12-16
Attending: EMERGENCY MEDICINE
Payer: MEDICARE

## 2022-12-16 ENCOUNTER — APPOINTMENT (OUTPATIENT)
Dept: RADIOLOGY | Facility: MEDICAL CENTER | Age: 72
End: 2022-12-16
Attending: EMERGENCY MEDICINE
Payer: MEDICARE

## 2022-12-16 VITALS
SYSTOLIC BLOOD PRESSURE: 121 MMHG | BODY MASS INDEX: 26.9 KG/M2 | RESPIRATION RATE: 16 BRPM | HEIGHT: 60 IN | WEIGHT: 137 LBS | DIASTOLIC BLOOD PRESSURE: 62 MMHG | OXYGEN SATURATION: 96 % | TEMPERATURE: 97.1 F | HEART RATE: 78 BPM

## 2022-12-16 VITALS — HEART RATE: 86 BPM | OXYGEN SATURATION: 98 % | RESPIRATION RATE: 17 BRPM

## 2022-12-16 VITALS
HEART RATE: 86 BPM | HEIGHT: 60 IN | BODY MASS INDEX: 27.36 KG/M2 | RESPIRATION RATE: 18 BRPM | DIASTOLIC BLOOD PRESSURE: 62 MMHG | OXYGEN SATURATION: 96 % | TEMPERATURE: 99.3 F | WEIGHT: 139.38 LBS | SYSTOLIC BLOOD PRESSURE: 168 MMHG

## 2022-12-16 DIAGNOSIS — E11.9 CONTROLLED TYPE 2 DIABETES MELLITUS WITHOUT COMPLICATION, WITHOUT LONG-TERM CURRENT USE OF INSULIN (HCC): ICD-10-CM

## 2022-12-16 DIAGNOSIS — G89.18 POST-OP PAIN: ICD-10-CM

## 2022-12-16 PROCEDURE — 665001 SOC-HOME HEALTH

## 2022-12-16 PROCEDURE — 700111 HCHG RX REV CODE 636 W/ 250 OVERRIDE (IP): Performed by: EMERGENCY MEDICINE

## 2022-12-16 PROCEDURE — 93971 EXTREMITY STUDY: CPT | Mod: RT

## 2022-12-16 PROCEDURE — 99284 EMERGENCY DEPT VISIT MOD MDM: CPT

## 2022-12-16 PROCEDURE — 302874 HCHG BANDAGE ACE 2 OR 3""

## 2022-12-16 PROCEDURE — 96375 TX/PRO/DX INJ NEW DRUG ADDON: CPT

## 2022-12-16 PROCEDURE — G0151 HHCP-SERV OF PT,EA 15 MIN: HCPCS

## 2022-12-16 PROCEDURE — 96374 THER/PROPH/DIAG INJ IV PUSH: CPT

## 2022-12-16 RX ORDER — MORPHINE SULFATE 4 MG/ML
4 INJECTION INTRAVENOUS ONCE
Status: COMPLETED | OUTPATIENT
Start: 2022-12-16 | End: 2022-12-16

## 2022-12-16 RX ORDER — ONDANSETRON 2 MG/ML
4 INJECTION INTRAMUSCULAR; INTRAVENOUS ONCE
Status: COMPLETED | OUTPATIENT
Start: 2022-12-16 | End: 2022-12-16

## 2022-12-16 RX ADMIN — ONDANSETRON 4 MG: 2 INJECTION INTRAMUSCULAR; INTRAVENOUS at 15:56

## 2022-12-16 RX ADMIN — MORPHINE SULFATE 4 MG: 4 INJECTION INTRAVENOUS at 15:56

## 2022-12-16 SDOH — ECONOMIC STABILITY: HOUSING INSECURITY: EVIDENCE OF SMOKING MATERIAL: 0

## 2022-12-16 SDOH — ECONOMIC STABILITY: FOOD INSECURITY: MEALS PER DAY: 2

## 2022-12-16 SDOH — ECONOMIC STABILITY: FOOD INSECURITY: SNACKS PER DAY: 2

## 2022-12-16 SDOH — ECONOMIC STABILITY: HOUSING INSECURITY: HOME SAFETY: PATIENT WILL NEED CONTINUED EDUCATION ON OXYGEN SAFETY

## 2022-12-16 ASSESSMENT — FIBROSIS 4 INDEX
FIB4 SCORE: 1.84
FIB4 SCORE: 1.84

## 2022-12-16 ASSESSMENT — ENCOUNTER SYMPTOMS
PAIN LOCATION: RIGHT KNEE
PAIN SEVERITY GOAL: 0/10
CHANGE IN APPETITE: DECREASED
DEPRESSED MOOD: 1
PAIN LOCATION - PAIN QUALITY: ACHY
DYSPNEA ACTIVITY LEVEL: AT REST
SHORTNESS OF BREATH: 1
VOMITING: DENIES
LOWEST PAIN SEVERITY IN PAST 24 HOURS: 3/10
DEBILITATING PAIN: 1
LAST BOWEL MOVEMENT: 66456
HIGHEST PAIN SEVERITY IN PAST 24 HOURS: 10/10
TROUBLE SWALLOWING: 1
PAIN LOCATION - PAIN SEVERITY: 6/10
APPETITE LEVEL: POOR
PAIN: 1
PERSON REPORTING PAIN: PATIENT
STOOL FREQUENCY: LESS THAN DAILY

## 2022-12-16 ASSESSMENT — ACTIVITIES OF DAILY LIVING (ADL)
TRANSPORTATION COMMENTS: PT REQUIRES ASSIST AND ASSISTIVE DEVICE TO LEAVE HOME; FALL RISK
PHYSICAL TRANSFERS ASSESSED: 1
AMBULATION ASSISTANCE: STAND BY ASSIST
AMBULATION_DISTANCE/DURATION_TOLERATED: 25 FT X 2
AMBULATION ASSISTANCE ON FLAT SURFACES: 1
AMBULATION ASSISTANCE: 1
OASIS_M1830: 03
AMBULATION ASSISTANCE: SUPERVISION
CURRENT_FUNCTION: SUPERVISION
CURRENT_FUNCTION: STAND BY ASSIST

## 2022-12-16 ASSESSMENT — PATIENT HEALTH QUESTIONNAIRE - PHQ9
1. LITTLE INTEREST OR PLEASURE IN DOING THINGS: 00
2. FEELING DOWN, DEPRESSED, IRRITABLE, OR HOPELESS: 00
CLINICAL INTERPRETATION OF PHQ2 SCORE: 0

## 2022-12-16 ASSESSMENT — PAIN DESCRIPTION - PAIN TYPE
TYPE: ACUTE PAIN
TYPE: ACUTE PAIN

## 2022-12-16 NOTE — TELEPHONE ENCOUNTER
RPM Notification: Welcome Call  Actions: None    Total time (minutes) spent communicating with patient: 10    Called patient to go over the Welcome call and explained the program in detail. I also let her know that her first Pulmonology appointment is on 12/22. She did not have nay additional questions. Will continue to monitor.

## 2022-12-16 NOTE — Clinical Note
Primary dx/Skilled need: Right TKA on 12/14/22; MS, Bipolar, DM 2, postoperative hypoxemia, now on continuous oxygen  PT: 1w1, 2w3  Zip code: [id number]  Disciplines ordered: Skilled nursing, physical therapy; patient declining occupational therapy at this time  Insurance & authorization: [employer]  Certification period: 12/16/2022 - 2/13/2023  Special considerations: none

## 2022-12-16 NOTE — TELEPHONE ENCOUNTER
RPM Notification: Disconnect and Patient Communication  Actions: Contacted Patient    Total time (minutes) spent communicating with patient: 4    Pt disconnected and pt was unaware. Pt stated her wrist band was yellow/green color. I had pt restart her phone that didn't work. I had her check her bluetooth it said disconnected so she reconnected bluetooth. That worked pt reconnected

## 2022-12-16 NOTE — Clinical Note
Dr. Pritchett,  Patient's blood pressure today was 168/62. Patient's pain level at beginning of home health visit was 10/10 and by end of visit, pain was down to 6/10.

## 2022-12-16 NOTE — PROGRESS NOTES
12/16/22 9:15am:  Nurse  outreach call for hospital follow-up TCM.   left for patient requesting a return call to  at [phone].    12/16/22 2:00 pm:  Pt returned  call for TCM follow-up. Pt reports she has been having ongoing pain in her knee status post surgery. Reports she is not getting any pain relief from the oxycodone. Pt reports physical therapist with home care was at her residence earlier today. Pt states she currently has a call to orthopedic provider regarding severe pain in knee. Pt states she is waiting on a call back. Pt reports she is wearing oxygen at 3 liters. Reports current pulse oximeter reading is 96%. Pt states her blood pressure was elevated when physical therapist was at her house earlier. Patient will attempt to contact orthopedic provider again for follow-up on knee pain. Pt will go to ER for evaluation if unable to reach orthopedic provider and knee pain continues to be severe.     2:30pm: Nurse outreach call to see if pt was able to reach orthopedic provider. Pt did inform nurse with callback that she was able to reach orthopedic provider. Reports she called EMS to go to hospital for ongoing pain in knee.

## 2022-12-17 ENCOUNTER — APPOINTMENT (OUTPATIENT)
Dept: RADIOLOGY | Facility: MEDICAL CENTER | Age: 72
End: 2022-12-17
Attending: EMERGENCY MEDICINE
Payer: MEDICARE

## 2022-12-17 ENCOUNTER — HOME CARE VISIT (OUTPATIENT)
Dept: HOME HEALTH SERVICES | Facility: HOME HEALTHCARE | Age: 72
End: 2022-12-17
Payer: MEDICARE

## 2022-12-17 ENCOUNTER — HOSPITAL ENCOUNTER (EMERGENCY)
Facility: MEDICAL CENTER | Age: 72
End: 2022-12-19
Attending: EMERGENCY MEDICINE
Payer: MEDICARE

## 2022-12-17 ENCOUNTER — TELEPHONE (OUTPATIENT)
Dept: OTHER | Facility: MEDICAL CENTER | Age: 72
End: 2022-12-17
Payer: MEDICARE

## 2022-12-17 VITALS — OXYGEN SATURATION: 94 % | RESPIRATION RATE: 19 BRPM | HEART RATE: 75 BPM

## 2022-12-17 DIAGNOSIS — R09.02 HYPOXIA: ICD-10-CM

## 2022-12-17 DIAGNOSIS — M85.852 OSTEOPENIA OF LEFT THIGH: ICD-10-CM

## 2022-12-17 DIAGNOSIS — Z96.651 HISTORY OF TOTAL RIGHT KNEE REPLACEMENT: ICD-10-CM

## 2022-12-17 DIAGNOSIS — Z91.81 RISK FOR FALLS: ICD-10-CM

## 2022-12-17 PROBLEM — J96.90 RESPIRATORY FAILURE (HCC): Status: ACTIVE | Noted: 2022-12-17

## 2022-12-17 PROBLEM — E78.2 MIXED HYPERLIPIDEMIA: Status: ACTIVE | Noted: 2022-12-17

## 2022-12-17 LAB
ALBUMIN SERPL BCP-MCNC: 4.1 G/DL (ref 3.2–4.9)
ALBUMIN/GLOB SERPL: 1.3 G/DL
ALP SERPL-CCNC: 103 U/L (ref 30–99)
ALT SERPL-CCNC: 11 U/L (ref 2–50)
ANION GAP SERPL CALC-SCNC: 14 MMOL/L (ref 7–16)
AST SERPL-CCNC: 18 U/L (ref 12–45)
BASOPHILS # BLD AUTO: 0.7 % (ref 0–1.8)
BASOPHILS # BLD: 0.07 K/UL (ref 0–0.12)
BILIRUB SERPL-MCNC: 0.9 MG/DL (ref 0.1–1.5)
BUN SERPL-MCNC: 18 MG/DL (ref 8–22)
CALCIUM ALBUM COR SERPL-MCNC: 9.2 MG/DL (ref 8.5–10.5)
CALCIUM SERPL-MCNC: 9.3 MG/DL (ref 8.4–10.2)
CHLORIDE SERPL-SCNC: 95 MMOL/L (ref 96–112)
CO2 SERPL-SCNC: 22 MMOL/L (ref 20–33)
CREAT SERPL-MCNC: 0.97 MG/DL (ref 0.5–1.4)
EKG IMPRESSION: NORMAL
EOSINOPHIL # BLD AUTO: 0.18 K/UL (ref 0–0.51)
EOSINOPHIL NFR BLD: 1.8 % (ref 0–6.9)
ERYTHROCYTE [DISTWIDTH] IN BLOOD BY AUTOMATED COUNT: 47.4 FL (ref 35.9–50)
FLUAV RNA SPEC QL NAA+PROBE: NEGATIVE
FLUBV RNA SPEC QL NAA+PROBE: NEGATIVE
GFR SERPLBLD CREATININE-BSD FMLA CKD-EPI: 62 ML/MIN/1.73 M 2
GLOBULIN SER CALC-MCNC: 3.2 G/DL (ref 1.9–3.5)
GLUCOSE SERPL-MCNC: 117 MG/DL (ref 65–99)
HCT VFR BLD AUTO: 36.4 % (ref 37–47)
HGB BLD-MCNC: 11.6 G/DL (ref 12–16)
IMM GRANULOCYTES # BLD AUTO: 0.04 K/UL (ref 0–0.11)
IMM GRANULOCYTES NFR BLD AUTO: 0.4 % (ref 0–0.9)
LYMPHOCYTES # BLD AUTO: 1.46 K/UL (ref 1–4.8)
LYMPHOCYTES NFR BLD: 14.8 % (ref 22–41)
MCH RBC QN AUTO: 30 PG (ref 27–33)
MCHC RBC AUTO-ENTMCNC: 31.9 G/DL (ref 33.6–35)
MCV RBC AUTO: 94.1 FL (ref 81.4–97.8)
MONOCYTES # BLD AUTO: 1.24 K/UL (ref 0–0.85)
MONOCYTES NFR BLD AUTO: 12.6 % (ref 0–13.4)
NEUTROPHILS # BLD AUTO: 6.85 K/UL (ref 2–7.15)
NEUTROPHILS NFR BLD: 69.7 % (ref 44–72)
NRBC # BLD AUTO: 0 K/UL
NRBC BLD-RTO: 0 /100 WBC
NT-PROBNP SERPL IA-MCNC: 580 PG/ML (ref 0–125)
PLATELET # BLD AUTO: 243 K/UL (ref 164–446)
PMV BLD AUTO: 8.8 FL (ref 9–12.9)
POTASSIUM SERPL-SCNC: 3.4 MMOL/L (ref 3.6–5.5)
PROT SERPL-MCNC: 7.3 G/DL (ref 6–8.2)
RBC # BLD AUTO: 3.87 M/UL (ref 4.2–5.4)
RSV RNA SPEC QL NAA+PROBE: NEGATIVE
SARS-COV-2 RNA RESP QL NAA+PROBE: NOTDETECTED
SODIUM SERPL-SCNC: 131 MMOL/L (ref 135–145)
SPECIMEN SOURCE: NORMAL
TROPONIN T SERPL-MCNC: 13 NG/L (ref 6–19)
WBC # BLD AUTO: 9.8 K/UL (ref 4.8–10.8)

## 2022-12-17 PROCEDURE — 99285 EMERGENCY DEPT VISIT HI MDM: CPT

## 2022-12-17 PROCEDURE — 99284 EMERGENCY DEPT VISIT MOD MDM: CPT | Performed by: HOSPITALIST

## 2022-12-17 PROCEDURE — 80053 COMPREHEN METABOLIC PANEL: CPT

## 2022-12-17 PROCEDURE — A9270 NON-COVERED ITEM OR SERVICE: HCPCS | Performed by: HOSPITALIST

## 2022-12-17 PROCEDURE — 36415 COLL VENOUS BLD VENIPUNCTURE: CPT

## 2022-12-17 PROCEDURE — 71045 X-RAY EXAM CHEST 1 VIEW: CPT

## 2022-12-17 PROCEDURE — 84484 ASSAY OF TROPONIN QUANT: CPT

## 2022-12-17 PROCEDURE — 96366 THER/PROPH/DIAG IV INF ADDON: CPT

## 2022-12-17 PROCEDURE — 83880 ASSAY OF NATRIURETIC PEPTIDE: CPT

## 2022-12-17 PROCEDURE — C9803 HOPD COVID-19 SPEC COLLECT: HCPCS | Performed by: EMERGENCY MEDICINE

## 2022-12-17 PROCEDURE — 700102 HCHG RX REV CODE 250 W/ 637 OVERRIDE(OP): Performed by: HOSPITALIST

## 2022-12-17 PROCEDURE — G0299 HHS/HOSPICE OF RN EA 15 MIN: HCPCS

## 2022-12-17 PROCEDURE — 96365 THER/PROPH/DIAG IV INF INIT: CPT

## 2022-12-17 PROCEDURE — 700101 HCHG RX REV CODE 250: Performed by: HOSPITALIST

## 2022-12-17 PROCEDURE — 93005 ELECTROCARDIOGRAM TRACING: CPT | Performed by: EMERGENCY MEDICINE

## 2022-12-17 PROCEDURE — 700111 HCHG RX REV CODE 636 W/ 250 OVERRIDE (IP): Performed by: EMERGENCY MEDICINE

## 2022-12-17 PROCEDURE — 85025 COMPLETE CBC W/AUTO DIFF WBC: CPT

## 2022-12-17 PROCEDURE — 0241U HCHG SARS-COV-2 COVID-19 NFCT DS RESP RNA 4 TRGT MIC: CPT

## 2022-12-17 PROCEDURE — 96375 TX/PRO/DX INJ NEW DRUG ADDON: CPT

## 2022-12-17 RX ORDER — MODAFINIL 200 MG/1
200 TABLET ORAL
Status: DISCONTINUED | OUTPATIENT
Start: 2022-12-17 | End: 2022-12-19 | Stop reason: HOSPADM

## 2022-12-17 RX ORDER — METFORMIN HYDROCHLORIDE 500 MG/1
1000 TABLET, EXTENDED RELEASE ORAL DAILY
Status: DISCONTINUED | OUTPATIENT
Start: 2022-12-18 | End: 2022-12-19 | Stop reason: HOSPADM

## 2022-12-17 RX ORDER — SODIUM CHLORIDE AND POTASSIUM CHLORIDE 300; 900 MG/100ML; MG/100ML
1000 INJECTION, SOLUTION INTRAVENOUS CONTINUOUS
Status: DISCONTINUED | OUTPATIENT
Start: 2022-12-17 | End: 2022-12-19

## 2022-12-17 RX ORDER — ACETAMINOPHEN 500 MG
500-1000 TABLET ORAL EVERY 6 HOURS PRN
Status: SHIPPED | COMMUNITY
End: 2023-01-25

## 2022-12-17 RX ORDER — HYDROMORPHONE HYDROCHLORIDE 1 MG/ML
0.5 INJECTION, SOLUTION INTRAMUSCULAR; INTRAVENOUS; SUBCUTANEOUS ONCE
Status: COMPLETED | OUTPATIENT
Start: 2022-12-17 | End: 2022-12-17

## 2022-12-17 RX ORDER — SIMVASTATIN 20 MG
40 TABLET ORAL NIGHTLY
Status: DISCONTINUED | OUTPATIENT
Start: 2022-12-17 | End: 2022-12-19 | Stop reason: HOSPADM

## 2022-12-17 RX ORDER — TERIFLUNOMIDE 14 MG/1
14 TABLET, FILM COATED ORAL DAILY
Status: DISCONTINUED | OUTPATIENT
Start: 2022-12-18 | End: 2022-12-17

## 2022-12-17 RX ORDER — TERIFLUNOMIDE 14 MG/1
14 TABLET, FILM COATED ORAL EVERY EVENING
Status: DISCONTINUED | OUTPATIENT
Start: 2022-12-18 | End: 2022-12-19 | Stop reason: HOSPADM

## 2022-12-17 RX ORDER — OXYCODONE HYDROCHLORIDE 5 MG/1
5 TABLET ORAL EVERY 8 HOURS PRN
Status: DISCONTINUED | OUTPATIENT
Start: 2022-12-17 | End: 2022-12-19 | Stop reason: HOSPADM

## 2022-12-17 RX ORDER — DOCUSATE SODIUM 100 MG/1
100 CAPSULE, LIQUID FILLED ORAL 2 TIMES DAILY
Status: SHIPPED | COMMUNITY
End: 2023-08-29

## 2022-12-17 RX ORDER — MORPHINE SULFATE 4 MG/ML
4 INJECTION INTRAVENOUS ONCE
Status: COMPLETED | OUTPATIENT
Start: 2022-12-17 | End: 2022-12-17

## 2022-12-17 RX ORDER — BUPROPION HYDROCHLORIDE 150 MG/1
150 TABLET, EXTENDED RELEASE ORAL 2 TIMES DAILY
Status: DISCONTINUED | OUTPATIENT
Start: 2022-12-17 | End: 2022-12-19 | Stop reason: HOSPADM

## 2022-12-17 RX ORDER — OMEPRAZOLE 20 MG/1
20 CAPSULE, DELAYED RELEASE ORAL DAILY
Status: DISCONTINUED | OUTPATIENT
Start: 2022-12-18 | End: 2022-12-19 | Stop reason: HOSPADM

## 2022-12-17 RX ORDER — LAMOTRIGINE 100 MG/1
200 TABLET ORAL DAILY
Status: DISCONTINUED | OUTPATIENT
Start: 2022-12-18 | End: 2022-12-19 | Stop reason: HOSPADM

## 2022-12-17 RX ORDER — PAROXETINE HYDROCHLORIDE 20 MG/1
40 TABLET, FILM COATED ORAL DAILY
Status: DISCONTINUED | OUTPATIENT
Start: 2022-12-18 | End: 2022-12-19 | Stop reason: HOSPADM

## 2022-12-17 RX ORDER — ZOLPIDEM TARTRATE 5 MG/1
5 TABLET ORAL NIGHTLY PRN
Status: DISCONTINUED | OUTPATIENT
Start: 2022-12-17 | End: 2022-12-19 | Stop reason: HOSPADM

## 2022-12-17 RX ORDER — DOCUSATE SODIUM 100 MG/1
100 CAPSULE, LIQUID FILLED ORAL 2 TIMES DAILY
Status: DISCONTINUED | OUTPATIENT
Start: 2022-12-17 | End: 2022-12-19 | Stop reason: HOSPADM

## 2022-12-17 RX ORDER — ACETAMINOPHEN 500 MG
500-1000 TABLET ORAL EVERY 6 HOURS PRN
Status: DISCONTINUED | OUTPATIENT
Start: 2022-12-17 | End: 2022-12-19 | Stop reason: HOSPADM

## 2022-12-17 RX ADMIN — SIMVASTATIN 40 MG: 20 TABLET, FILM COATED ORAL at 21:00

## 2022-12-17 RX ADMIN — HYDROMORPHONE HYDROCHLORIDE 0.5 MG: 1 INJECTION, SOLUTION INTRAMUSCULAR; INTRAVENOUS; SUBCUTANEOUS at 17:22

## 2022-12-17 RX ADMIN — POTASSIUM CHLORIDE AND SODIUM CHLORIDE 1000 ML: 900; 300 INJECTION, SOLUTION INTRAVENOUS at 21:40

## 2022-12-17 RX ADMIN — ASPIRIN 81 MG: 81 TABLET, COATED ORAL at 18:40

## 2022-12-17 RX ADMIN — BUPROPION HYDROCHLORIDE 150 MG: 150 TABLET, EXTENDED RELEASE ORAL at 18:40

## 2022-12-17 RX ADMIN — MORPHINE SULFATE 4 MG: 4 INJECTION INTRAVENOUS at 15:38

## 2022-12-17 RX ADMIN — ZOLPIDEM TARTRATE 5 MG: 5 TABLET ORAL at 21:39

## 2022-12-17 ASSESSMENT — ENCOUNTER SYMPTOMS
FEVER: 0
MYALGIAS: 0
FOCAL WEAKNESS: 0
EYE REDNESS: 0
FLANK PAIN: 0
CHILLS: 0
SHORTNESS OF BREATH: 1
ABDOMINAL PAIN: 0
EYE DISCHARGE: 0
BRUISES/BLEEDS EASILY: 0
NERVOUS/ANXIOUS: 0
STRIDOR: 0
COUGH: 0
VOMITING: 0

## 2022-12-17 ASSESSMENT — PAIN DESCRIPTION - PAIN TYPE
TYPE: ACUTE PAIN
TYPE: ACUTE PAIN

## 2022-12-17 ASSESSMENT — FIBROSIS 4 INDEX: FIB4 SCORE: 1.84

## 2022-12-17 NOTE — ED TRIAGE NOTES
Pt amb to triage w front-wheel walker. C/o L knee pain post-op day #4 for L knee replacement. Pt on pulseoxiemmtry hoem monitoring and o2 3L via nc for post-op hypoxia symptoms. Pt taking percocet nad tylenol for her pain relief. Pt had a home health RN visit this am.

## 2022-12-17 NOTE — TELEPHONE ENCOUNTER
RPM Notification: Reconnect  Actions: Contacted Patient    Total time (minutes) spent communicating with patient: 2     Patient called asking for assistance in reconnecting. I had patient restart the prasad and she quickly reconnected without issue.

## 2022-12-17 NOTE — ED PROVIDER NOTES
ED Provider Note    CHIEF COMPLAINT  Chief Complaint   Patient presents with    Post-Op Pain       LIMITATION TO HISTORY   Select: : None    HPI  Melody Rodriguez is a 72 y.o. female who presents here for evaluation of postoperative pain.  Patient states that she had a knee replacement done a couple of days ago, and was doing okay while in the hospital, but stated that she was not given her typical pain medications.  She states the pain increased, and then over the last couple of days, she is trying to manage that pain.  She is having increased pain with range of motion at the knee, but no vomiting, fever chills, chest pain, or shortness of breath.  She states that the pain stays in the knee and radiates to the back of the knee, but she denies any calf pain.  She is here with her friend.  She has not been up and around.  This is due to pain.      OUTSIDE HISTORIAN(S):  Select: Friend      EXTERNAL RECORDS REVIEWED  Select: Inpatient Notes ;    REVIEW OF SYSTEMS  See HPI for further details. All other systems are negative.     PAST MEDICAL HISTORY   has a past medical history of Acute nasopharyngitis, Arthritis, Breath shortness, Cancer ([facility]), Cerebral atrophy ([facility]) (03/16/2022), Depression, Diabetes ([facility]), Disorder of thyroid, Groin lump (06/09/2022), Gynecological disorder, Heart burn, Heart murmur, Heart valve disease, Hiatus hernia syndrome, High cholesterol, Hyperlipidemia, Muscle disorder, Osteoarthritis of right knee (06/15/2021), PONV (postoperative nausea and vomiting) (1970), Recurrent major depressive disorder, in remission ([facility]) (03/16/2022), SI (sacroiliac) pain (06/09/2022), Type 2 diabetes mellitus with stage 3 chronic kidney disease ([facility]) (06/15/2021), Urinary bladder disorder, and Urinary incontinence.    SOCIAL HISTORY  Social History     Tobacco Use    Smoking status: Never    Smokeless tobacco: Never   Vaping Use    Vaping Use: Never used   Substance and Sexual Activity    Alcohol use: Yes      Comment: Occasional glass of wine or april    Drug use: Not Currently    Sexual activity: Not on file       SURGICAL HISTORY   has a past surgical history that includes gyn surgery (1970) and total knee arthroplasty (Right, 12/14/2022).    CURRENT MEDICATIONS  Home Medications       Reviewed by Jackie Hodges R.N. (Registered Nurse) on 12/16/22 at 1526  Med List Status: Not Addressed     Medication Last Dose Status   Acetaminophen 500 MG Cap  Active   aspirin EC 81 MG EC tablet  Active   buPROPion SR (WELLBUTRIN-SR) 150 MG TABLET SR 12 HR sustained-release tablet  Active   Empagliflozin-metFORMIN HCl ER (SYNJARDY XR) 12.5-1000 MG TABLET SR 24 HR  Active   FreeStyle Lancets Misc  Active   glucose blood strip  Active   lamotrigine (LAMICTAL) 200 MG tablet  Active   meloxicam (MOBIC) 15 MG tablet  Active   modafinil (PROVIGIL) 200 MG Tab  Active   omeprazole (PRILOSEC) 20 MG delayed-release capsule  Active   oxyCODONE immediate-release (ROXICODONE) 5 MG Tab  Active   paroxetine (PAXIL) 40 MG tablet  Active   simvastatin (ZOCOR) 40 MG Tab  Active   Teriflunomide (AUBAGIO) 14 MG Tab  Active   zolpidem (AMBIEN) 5 MG Tab  Active                    ALLERGIES  No Known Allergies    VITAL SIGNS: /61   Pulse 73   Temp 36.2 °C (97.1 °F) (Temporal)   Resp 18   Ht 1.524 m (5')   Wt 62.1 kg (137 lb)   SpO2 98%   BMI 26.76 kg/m²        ALLERGIES  No Known Allergies    REVIEW OF SYSTEMS  See HPI for further details. Review of systems as above, otherwise all other systems are negative.     PHYSICAL EXAM  Constitutional: Well developed, well nourished.  Mild acute distress.  HEENT: Normocephalic, atraumatic. Posterior pharynx clear and moist.  Eyes:  EOMI. Normal sclera.  Neck: Supple, Full range of motion, nontender.  Chest/Pulmonary: clear to ausculation. Symmetrical expansion.   Cardio: Regular rate and rhythm with no murmur.   Abdomen: Soft, nontender. No peritoneal signs. No guarding. No palpable  masses.  Musculoskeletal: No deformity, 1+ edema, neurovascular intact.  Right lower extremity; tenderness to the popliteal fossa and lateral right knee.  Neurovascular tact distally.  Nontender calf, nontender ankle, nontender right hip.  Midline incision covered without any surrounding erythema.   Neuro: Clear speech, appropriate, cooperative, cranial nerves II-XII grossly intact.  Psych: Normal mood and affect    PROCEDURES     MEDICAL RECORD  I have reviewed patient's medical record and pertinent results are listed.    COURSE & MEDICAL DECISION MAKING  I have reviewed any medical record information, laboratory studies and radiographic results as noted above      Ultrasound study shows no DVT per radiology      If you have had any blood pressure issues while here in the emergency department, please see your doctor for a further evaluation or work up.    Differential diagnoses include but not limited to: DVT and cellulitis    This patient presents with post operative pain.  At this time, I have counseled the patient/family regarding their medications, pain control, and follow up.  They will continue their medications, if any, as prescribed.  They will return immediately for any worsening symptoms and/or any other medical concerns.  They will see their doctor, or contact the doctor provided, in 1-2 days for follow up.       FINAL IMPRESSION  Right lower extremity pain postoperative      Electronically signed by: Timmy Valerio D.O., 12/16/2022 4:23 PM

## 2022-12-17 NOTE — ED PROVIDER NOTES
ED Provider Note    CHIEF COMPLAINT  Chief Complaint   Patient presents with    Post-Op Pain       LIMITATION TO HISTORY   Select: : None    HPI  Melody Rodriguez is a 72 y.o. female who presents to the emergency department for the second time in 2 days for uncontrolled pain and now concern for her safety.  Patient underwent a right total knee replacement by Dr. Cooney on 12/14.  She was discharged home on the 15th after having postoperative hypoxia she was sent home on 3 L nasal cannula with a negative CTPA study.  She was seen here yesterday for continued pain in the right lower extremity and had a normal DVT study.  She states that home health came out to see her today and they were just concerned for her wellbeing.  Originally the plan was to have a friend help her get to physical therapy get to her appointments but now that she is on oxygen she cannot cook because she has a gas stove she states she has really narrow doors and a very small flimsy walker and she feels unsteady on her feet a lot of the time.  She states her pain is severe and not controlled by pain medication at home.  The home health nurse was concerned for safety the patient states she is concerned for her safety as she has no one to help her get around she states she is wearing depends because she cannot make to the bathroom fast enough.  She is here because she is interested in some skilled nursing or rehab    In terms of the hypoxia she had a negative PE study but states she is felt short of breath for a very long time that rounds really changed in the last few days oxygen definitely helps she underwent cardiology work-up prior to her surgery which she had a normal echocardiogram but states she still feels a bit short of breath    OUTSIDE HISTORIAN(S):  Select: Home health nurse     EXTERNAL RECORDS REVIEWED  Select: Inpatient Notes admission/labs.imaging and Outpatient Notes cardiology/echo    12/13 echo   CONCLUSIONS  Normal left  ventricular size and systolic function.  No regional motion abnormality noted.  Normal right ventricular size and systolic function.  No hemodynamically significant valvular heart disease noted.     Dr. Marino reviewed an EKG with concern for anterior Q waves could represent a presented prior MI or normal finding for her EKG.  Echocardiogram within normal limit she was cleared for surgery    REVIEW OF SYSTEMS  See HPI for further details. All other systems are negative.     PAST MEDICAL HISTORY   has a past medical history of Acute nasopharyngitis, Arthritis, Breath shortness, Cancer ([facility]), Cerebral atrophy ([facility]) (03/16/2022), Depression, Diabetes ([facility]), Disorder of thyroid, Groin lump (06/09/2022), Gynecological disorder, Heart burn, Heart murmur, Heart valve disease, Hiatus hernia syndrome, High cholesterol, Hyperlipidemia, Muscle disorder, Osteoarthritis of right knee (06/15/2021), PONV (postoperative nausea and vomiting) (1970), Recurrent major depressive disorder, in remission ([facility]) (03/16/2022), SI (sacroiliac) pain (06/09/2022), Type 2 diabetes mellitus with stage 3 chronic kidney disease ([facility]) (06/15/2021), Urinary bladder disorder, and Urinary incontinence.    SOCIAL HISTORY  Social History     Tobacco Use    Smoking status: Never    Smokeless tobacco: Never   Vaping Use    Vaping Use: Never used   Substance and Sexual Activity    Alcohol use: Yes     Comment: Occasional glass of wine or april    Drug use: Not Currently    Sexual activity: Not on file       SURGICAL HISTORY   has a past surgical history that includes gyn surgery (1970) and total knee arthroplasty (Right, 12/14/2022).    CURRENT MEDICATIONS  Home Medications    **Home medications have not yet been reviewed for this encounter**         ALLERGIES  No Known Allergies    PHYSICAL EXAM  VITAL SIGNS: /79   Pulse 97   Temp 36 °C (96.8 °F) (Temporal)   Resp 18   Ht 1.524 m (5')   Wt 62 kg (136 lb 11 oz)   SpO2 100%   BMI 26.69 kg/m²     Pulse Ox Interpretation:   Pulse Ox is normal on 3L  Constitutional: Alert in no apparent distress.  HENT: Normocephalic atraumatic, MMM  Eyes: PER, Conjunctiva normal, Non-icteric.   Neck: Normal range of motion, No tenderness, Supple, No stridor.   Cardiovascular: Regular rate and rhythm, no murmurs.   Thorax & Lungs: Normal breath sounds, No respiratory distress, No wheezing, No chest tenderness.   Abdomen: Bowel sounds normal, Soft, No tenderness, No pulsatile masses. No peritoneal signs.  Skin: Warm, Dry, No erythema, No rash.   Back: No bony tenderness, No CVA tenderness.   Extremities/MSK: Intact equal distal pulses, right lower extremity with 1+ edema and tenderness to the popliteal fossa just mild generalized tenderness throughout no warmth no redness no erythema midlineincision is covered clean and dry   Neurologic: Alert and oriented x3, No focal deficits noted.         DIAGNOSTIC STUDIES / PROCEDURES    Patient presents really with no acute change in the last 2 days but now she just feels unable to care for herself at home alone.  She is on home oxygen which she never been on before she is got severe uncontrolled pain despite oxycodone of the right knee.  Negative PE study we will make sure not missing anything in terms of her hypoxia we will repeat a cardiac enzyme troponin chest x-ray and proBNP.  As well as an EKG.  If all of these turn out unremarkable then we will discussed ED opts for placement in rehab with PT OT evaluation in the morning.  The patient feels comfortable this plan she will be treated with IV morphine    EKG  Results for orders placed or performed during the hospital encounter of 12/17/22   EKG (NOW)   Result Value Ref Range    Report       [facility] Emergency Dept.    Test Date:  2022-12-17  Pt Name:    NEREYDA JIMENEZ            Department: [facility]  MRN:        [id number]                      Room:       -ROOM 8  Gender:     Female                        Technician: molly  :        [id number]                   Requested By:DELMI BRO  Order #:    [id number]                    Reading MD: Delmi Bro MD    Measurements  Intervals                                Axis  Rate:       78                           P:          52  AL:         176                          QRS:        -5  QRSD:       78                           T:          76  QT:         348  QTc:        397    Interpretive Statements  Sinus rhythm at a rate of 78 no ST elevations or ST depressions no abnormal T  wave inversions no pathologic Q waves normal intervals normal axis  Compared to ECG 2022 10:40:49  no acute change, no ant q waves present  Electronically Signed On 2022 15:37:58 PST by Delmi Bro MD           LABS  Labs Reviewed   CBC WITH DIFFERENTIAL - Abnormal; Notable for the following components:       Result Value    RBC 3.87 (*)     Hemoglobin 11.6 (*)     Hematocrit 36.4 (*)     MCHC 31.9 (*)     MPV 8.8 (*)     Lymphocytes 14.80 (*)     Monos (Absolute) 1.24 (*)     All other components within normal limits    Narrative:     Biotin intake of greater than 5 mg per day may interfere with  troponin levels, causing false low values.   COMP METABOLIC PANEL - Abnormal; Notable for the following components:    Sodium 131 (*)     Potassium 3.4 (*)     Chloride 95 (*)     Glucose 117 (*)     Alkaline Phosphatase 103 (*)     All other components within normal limits    Narrative:     Biotin intake of greater than 5 mg per day may interfere with  troponin levels, causing false low values.   PROBRAIN NATRIURETIC PEPTIDE, NT - Abnormal; Notable for the following components:    NT-proBNP 580 (*)     All other components within normal limits    Narrative:     Biotin intake of greater than 5 mg per day may interfere with  troponin levels, causing false low values.   TROPONIN    Narrative:     Biotin intake of greater than 5 mg per day may interfere with  troponin levels, causing  false low values.   CORRECTED CALCIUM    Narrative:     Biotin intake of greater than 5 mg per day may interfere with  troponin levels, causing false low values.   ESTIMATED GFR    Narrative:     Biotin intake of greater than 5 mg per day may interfere with  troponin levels, causing false low values.   COV-2, FLU A/B, AND RSV BY PCR (CEPHEID)         RADIOLOGY  DX-CHEST-PORTABLE (1 VIEW)   Final Result      No acute cardiopulmonary abnormality identified.              COURSE & MEDICAL DECISION MAKING  Pertinent Labs & Imaging studies reviewed. (See chart for details)  Family care for self after recent knee replacement.  She is hypoxic with a recent CTPA which within normal limits laboratory analysis here is not showing signs of severe fluid overload nor pneumonia nor other cause of her hypoxia.  I spoke with Dr. Selena Tariq with the hospitalist service and he recommended COVID test if that was positive she could be hospitalized if not been he agreed to consult on her during ED observation here.  The patient is continue to be treated with pain management as needed.  Doing well on her baseline oxygen of 3 L    DISCUSSION OF MANAGEMENT WITH OTHER PHYSICIANS, QHP OR APPROPRIATE SOURCE  Select: Consultant Dr Walker recommends covid test and Social Work who put in pt ot orders and will work in placement     INDEPENDENT INTERPRETATION OF STUDIES  Select: EKG(s) unremarkable, X-ray(s) no evidence of infection, and labs no signs of sever fluid loverload     ESCALATION OF CARE  Will require rehab    SOCIAL DETERMINANTS THAT SIGNIFICANTLY AFFECT CARE  Select: cannot care for self lives alone        4:50 PM  Admitted to ED obs at this time - no fam hx that is contributory     5:04 PM  Spoke w KASH who will evaluate the patient and start doing the orders needed to get her placed in skilled nursing or rehab facility.      Patient was signed out to my oncoming partner for further disposition likely tomorrow.  Patient  understands that she is going to be here overnight.    FINAL IMPRESSION  1. Osteopenia of left thigh  Referral to Skilled Nursing Facility      2. Risk for falls  Referral to Skilled Nursing Facility      3. Hypoxia        4. History of total right knee replacement              Electronically signed by: Delmi Moore M.D., 12/17/2022 3:03 PM

## 2022-12-17 NOTE — TELEPHONE ENCOUNTER
RPM Notification: Disconnect  Actions: Reconnected Patient    Total time (minutes) spent communicating with patient: 3 min      Melody stated her braclet was blinking red. Intructed pt to detach  and reattach battery- pt reconnected

## 2022-12-17 NOTE — Clinical Note
"Initial Nursing Evaluation  Patient in bed upon SN arrival, reports in severe pain. Patient report 7 to 9 out of 10 \"muscle spasm\" to right lower back, right hip to right knee. Patient is unable to perform ADLs due to pain and hypoxia. Friend Ferdinand is providing 24/7 care however she is returning to her home in [place] on Sunday 12/18 and patient will have no caregiver after that. Ferdinand tells SN that despite using supplement oxygen at home, patient's SpO2 still drops randomly throughout the night and with exertion. Patient needs higher level of care and would like to go to a skilled or rehab facility. She will have Ferdinand drive her to the ER after this nursing visit. Report given to supervisor on-call.     Patient is under hospital observation per chart as of 12/17/22.   "

## 2022-12-17 NOTE — ED NOTES
Pt's oxygen saturations decreased while sleeping to 77-80% while on 1L O2 by nasal cannula  Pt was awakened, asked to take deep breaths, increased O2 to 3L by nasal cannula and pt has improved oxygenation to 99% at this time  ERP notified

## 2022-12-18 PROCEDURE — 700101 HCHG RX REV CODE 250: Performed by: EMERGENCY MEDICINE

## 2022-12-18 PROCEDURE — 36415 COLL VENOUS BLD VENIPUNCTURE: CPT

## 2022-12-18 PROCEDURE — A9270 NON-COVERED ITEM OR SERVICE: HCPCS | Performed by: HOSPITALIST

## 2022-12-18 PROCEDURE — 97162 PT EVAL MOD COMPLEX 30 MIN: CPT

## 2022-12-18 PROCEDURE — 96366 THER/PROPH/DIAG IV INF ADDON: CPT

## 2022-12-18 PROCEDURE — 700101 HCHG RX REV CODE 250: Performed by: HOSPITALIST

## 2022-12-18 PROCEDURE — 97110 THERAPEUTIC EXERCISES: CPT

## 2022-12-18 PROCEDURE — 99282 EMERGENCY DEPT VISIT SF MDM: CPT | Performed by: INTERNAL MEDICINE

## 2022-12-18 PROCEDURE — 700102 HCHG RX REV CODE 250 W/ 637 OVERRIDE(OP): Performed by: HOSPITALIST

## 2022-12-18 RX ORDER — LIDOCAINE 50 MG/G
1 PATCH TOPICAL EVERY 24 HOURS
Status: DISCONTINUED | OUTPATIENT
Start: 2022-12-18 | End: 2022-12-19 | Stop reason: HOSPADM

## 2022-12-18 RX ADMIN — LAMOTRIGINE 200 MG: 100 TABLET ORAL at 08:52

## 2022-12-18 RX ADMIN — ACETAMINOPHEN 500 MG: 500 TABLET ORAL at 16:34

## 2022-12-18 RX ADMIN — TERIFLUNOMIDE 14 MG: 14 TABLET, FILM COATED ORAL at 17:16

## 2022-12-18 RX ADMIN — LIDOCAINE 1 PATCH: 50 PATCH TOPICAL at 09:24

## 2022-12-18 RX ADMIN — SIMVASTATIN 40 MG: 20 TABLET, FILM COATED ORAL at 20:19

## 2022-12-18 RX ADMIN — ASPIRIN 81 MG: 81 TABLET, COATED ORAL at 17:16

## 2022-12-18 RX ADMIN — OXYCODONE 5 MG: 5 TABLET ORAL at 09:26

## 2022-12-18 RX ADMIN — ZOLPIDEM TARTRATE 5 MG: 5 TABLET ORAL at 20:19

## 2022-12-18 RX ADMIN — PAROXETINE HYDROCHLORIDE 40 MG: 20 TABLET, FILM COATED ORAL at 08:52

## 2022-12-18 RX ADMIN — BUPROPION HYDROCHLORIDE 150 MG: 150 TABLET, EXTENDED RELEASE ORAL at 08:51

## 2022-12-18 RX ADMIN — METFORMIN HYDROCHLORIDE 1000 MG: 500 TABLET, EXTENDED RELEASE ORAL at 08:52

## 2022-12-18 RX ADMIN — ASPIRIN 81 MG: 81 TABLET, COATED ORAL at 08:52

## 2022-12-18 RX ADMIN — ACETAMINOPHEN 1000 MG: 500 TABLET ORAL at 07:23

## 2022-12-18 RX ADMIN — BUPROPION HYDROCHLORIDE 150 MG: 150 TABLET, EXTENDED RELEASE ORAL at 17:15

## 2022-12-18 RX ADMIN — OMEPRAZOLE 20 MG: 20 CAPSULE, DELAYED RELEASE ORAL at 08:52

## 2022-12-18 RX ADMIN — POTASSIUM CHLORIDE AND SODIUM CHLORIDE 1000 ML: 900; 300 INJECTION, SOLUTION INTRAVENOUS at 07:47

## 2022-12-18 ASSESSMENT — COGNITIVE AND FUNCTIONAL STATUS - GENERAL
WALKING IN HOSPITAL ROOM: A LITTLE
STANDING UP FROM CHAIR USING ARMS: A LITTLE
TURNING FROM BACK TO SIDE WHILE IN FLAT BAD: A LITTLE
MOVING FROM LYING ON BACK TO SITTING ON SIDE OF FLAT BED: A LITTLE
CLIMB 3 TO 5 STEPS WITH RAILING: A LITTLE
SUGGESTED CMS G CODE MODIFIER MOBILITY: CK
MOBILITY SCORE: 18
MOVING TO AND FROM BED TO CHAIR: A LITTLE

## 2022-12-18 ASSESSMENT — PAIN DESCRIPTION - PAIN TYPE: TYPE: ACUTE PAIN

## 2022-12-18 ASSESSMENT — ENCOUNTER SYMPTOMS
PALPITATIONS: 0
FEVER: 0
BLURRED VISION: 0
FALLS: 0
VOMITING: 0
BACK PAIN: 0
DIZZINESS: 0
DOUBLE VISION: 0
HEADACHES: 0
CHILLS: 0
NERVOUS/ANXIOUS: 0
SHORTNESS OF BREATH: 0
HEARTBURN: 0
COUGH: 0
ABDOMINAL PAIN: 0

## 2022-12-18 ASSESSMENT — GAIT ASSESSMENTS
ASSISTIVE DEVICE: FRONT WHEEL WALKER
DISTANCE (FEET): 120
GAIT LEVEL OF ASSIST: CONTACT GUARD ASSIST
DEVIATION: ANTALGIC;STEP TO;BRADYKINETIC;SHUFFLED GAIT;DECREASED HEEL STRIKE;DECREASED TOE OFF

## 2022-12-18 ASSESSMENT — LIFESTYLE VARIABLES: SUBSTANCE_ABUSE: 0

## 2022-12-18 NOTE — ED NOTES
Pt resting in bed talking with friend.   Home med Teriflunomide brought in and given to pharmacist at this time. Pt states took her nightly dose.

## 2022-12-18 NOTE — CONSULTS
Hospital Medicine Consultation    Date of Service  12/17/2022    Referring Physician  Delmi Moore M.D.    Consulting Physician  Telma Walker M.D.    Reason for Consultation  Help manage patient medical problems pending placement at rehab    History of Presenting Illness  72 y.o. female with a past medical history of diabetes, hyperlipidemia, recent diagnosis of respiratory failure since her total right knee replacement who presented 12/17/2022 with progressively worsening generalized weakness after her right knee replacement surgery after spending few days at home with home health.   Hospitalist services were consulted to help manage patient medical problems pending bed availability at rehab.  Patient was noticed to be hypoxemic after surgery she was discharged on 3 L of oxygen nasal cannula.  CT angiography did not show evidence for pulmonary embolism.  Venous ultrasound did not show evidence for deep vein thrombosis.  Patient has not been able to take care of herself at home despite home health hence came to the emergency room.     Review of Systems  Review of Systems   Constitutional:  Positive for malaise/fatigue. Negative for chills and fever.   Eyes:  Negative for discharge and redness.   Respiratory:  Positive for shortness of breath. Negative for cough and stridor.    Cardiovascular:  Negative for chest pain and leg swelling.   Gastrointestinal:  Negative for abdominal pain and vomiting.   Genitourinary:  Negative for flank pain.   Musculoskeletal:  Positive for joint pain. Negative for myalgias.   Skin: Negative.    Neurological:  Negative for focal weakness.   Endo/Heme/Allergies:  Does not bruise/bleed easily.   Psychiatric/Behavioral:  The patient is not nervous/anxious.      Past Medical History   has a past medical history of Acute nasopharyngitis, Arthritis, Breath shortness, Cancer ([facility]), Cerebral atrophy ([facility]) (03/16/2022), Depression, Diabetes ([facility]), Disorder of thyroid, Groin lump  (06/09/2022), Gynecological disorder, Heart burn, Heart murmur, Heart valve disease, Hiatus hernia syndrome, High cholesterol, Hyperlipidemia, Muscle disorder, Osteoarthritis of right knee (06/15/2021), PONV (postoperative nausea and vomiting) (1970), Recurrent major depressive disorder, in remission (HCC) (03/16/2022), SI (sacroiliac) pain (06/09/2022), Type 2 diabetes mellitus with stage 3 chronic kidney disease (HCC) (06/15/2021), Urinary bladder disorder, and Urinary incontinence.    She has no past medical history of Anesthesia, Anginal syndrome (HCC), Arrhythmia, Asthma, Blood clotting disorder (HCC), Bowel habit changes, Bronchitis, Carcinoma in situ of respiratory system, Cataract, Continuous ambulatory peritoneal dialysis status (HCC), Coughing blood, Dental disorder, Dialysis patient (HCC), Emphysema of lung (HCC), Glaucoma, Hemorrhagic disorder (HCC), Hepatitis A, Hepatitis B, Hepatitis C, Indigestion, Infectious disease, Jaundice, Myocardial infarct (HCC), Pacemaker, Pneumonia, Pregnant, Rheumatic fever, Seizure (HCC), Sleep apnea, Stroke (HCC), or Tuberculosis.    Surgical History   has a past surgical history that includes gyn surgery (1970) and pr total knee arthroplasty (Right, 12/14/2022).    Family History  family history includes ADHD in her son; Bipolar disorder in her daughter; Cancer in her brother, maternal grandmother, and mother; Colon Cancer in her mother; Colorectal Cancer in her mother; Depression in her son; Diabetes in her father and mother; Heart Attack in her father; Heart Disease in her father and mother; Hypertension in her son; Lung Disease in her mother; No Known Problems in her maternal grandfather, paternal grandfather, paternal grandmother, and sister; Other in her brother and daughter.    Social History   reports that she has never smoked. She has never used smokeless tobacco. She reports current alcohol use. She reports that she does not currently use  drugs.    Medications  Prior to Admission Medications   Prescriptions Last Dose Informant Patient Reported? Taking?   Empagliflozin-metFORMIN HCl ER (SYNJARDY XR) 12.5-1000 MG TABLET SR 24 HR ABOUT 1 WEEK AGO at [facility] Patient No No   Sig: Take 1 tablet by mouth daily   Teriflunomide (AUBAGIO) 14 MG Tab FEW DAYS AGO at [facility] Patient Yes No   Sig: Take 14 mg by mouth every day.   acetaminophen (TYLENOL) 500 MG Tab 12/17/2022 at 0730 Patient Yes Yes   Sig: Take 500-1,000 mg by mouth every 6 hours as needed. Indications: Pain   aspirin EC 81 MG EC tablet 12/17/2022 at AM Patient No No   Sig: Take 1 Tablet by mouth 2 times a day.   buPROPion SR (WELLBUTRIN-SR) 150 MG TABLET SR 12 HR sustained-release tablet 12/17/2022 at AM Patient No No   Sig: Take 1 tablet by mouth every morning.   docusate sodium (COLACE) 100 MG Cap 12/17/2022 at AM Patient Yes Yes   Sig: Take 100 mg by mouth 2 times a day.   lamotrigine (LAMICTAL) 200 MG tablet FEW DAYS AGO at [facility] Patient No No   Sig: Take 1 tablet (200 mg) by mouth daily   modafinil (PROVIGIL) 200 MG Tab > 1 WEEK at [facility] Patient Yes No   Sig: Take 200 mg by mouth 1 time a day as needed.   omeprazole (PRILOSEC) 20 MG delayed-release capsule 12/17/2022 at AM Patient No No   Sig: Take 1 capsule by mouth every morning, 30 minutes before breakfast.   oxyCODONE immediate-release (ROXICODONE) 5 MG Tab 12/17/2022 at 1230 Patient No No   Sig: Take 1 Tablet by mouth every 8 hours as needed for Severe Pain for up to 4 days.   paroxetine (PAXIL) 40 MG tablet 12/17/2022 at AM Patient No No   Sig: Take 1 Tablet by mouth every day.   simvastatin (ZOCOR) 40 MG Tab FEW DAYS AGO at [facility] Patient No No   Sig: Take 1 Tablet by mouth every evening.   zolpidem (AMBIEN) 5 MG Tab 12/16/2022 at PM Patient No No   Sig: Take 1 tablet (5 mg) by mouth daily at bedtime as needed for insomnia      Facility-Administered Medications: None     Allergies  No Known Allergies    Physical Exam  Temp:  [36 °C (96.8 °F)] 36 °C  (96.8 °F)  Pulse:  [78-97] 86  Resp:  [8-29] 19  BP: (117-147)/(58-83) 131/69  SpO2:  [95 %-100 %] 98 %    Physical Exam  Constitutional:       General: She is not in acute distress.  HENT:      Head: Normocephalic and atraumatic.      Right Ear: External ear normal.      Left Ear: External ear normal.      Nose: No congestion or rhinorrhea.      Mouth/Throat:      Mouth: Mucous membranes are moist.      Pharynx: No oropharyngeal exudate or posterior oropharyngeal erythema.   Eyes:      General: No scleral icterus.        Right eye: No discharge.         Left eye: No discharge.      Conjunctiva/sclera: Conjunctivae normal.      Pupils: Pupils are equal, round, and reactive to light.   Cardiovascular:      Heart sounds:     No friction rub. No gallop.   Pulmonary:      Effort: Pulmonary effort is normal.      Comments: Requiring 2 L of oxygen to achieve adequate saturation.  Is able to speak in full sentences.  Abdominal:      General: Abdomen is flat. There is no distension.      Tenderness: There is no guarding.   Musculoskeletal:         General: Swelling and tenderness present.      Cervical back: Neck supple. No rigidity. No muscular tenderness.      Right lower leg: No edema.      Left lower leg: No edema.      Comments: Clean dressing over the right knee.   Skin:     Capillary Refill: Capillary refill takes 2 to 3 seconds.      Coloration: Skin is not jaundiced or pale.      Findings: No bruising or erythema.   Neurological:      Mental Status: She is alert and oriented to person, place, and time.   Psychiatric:         Mood and Affect: Mood normal.         Judgment: Judgment normal.     Laboratory  Recent Labs     12/15/22  0740 12/15/22  1214 12/17/22  1539   WBC 11.7*  --  9.8   RBC 3.28*  --  3.87*   HEMOGLOBIN 9.9* 9.6* 11.6*   HEMATOCRIT 31.6* 30.3* 36.4*   MCV 96.3  --  94.1   MCH 30.2  --  30.0   MCHC 31.3*  --  31.9*   RDW 48.7  --  47.4   PLATELETCT 220  --  243   MPV 8.8*  --  8.8*     Recent Labs      12/15/22  0740 12/17/22  1539   SODIUM 139 131*   POTASSIUM 3.7 3.4*   CHLORIDE 105 95*   CO2 25 22   GLUCOSE 162* 117*   BUN 16 18   CREATININE 0.92 0.97   CALCIUM 8.8 9.3                     Imaging  DX-CHEST-PORTABLE (1 VIEW)   Final Result      No acute cardiopulmonary abnormality identified.        Assessment/Plan  Respiratory failure (HCC)- (present on admission)  Assessment & Plan  Patient reports complaining of shortness of breath and easy fatigability over the past 3-6 months  Noticed to be hypoxemic postoperatively  CT angiography was negative for pulmonary embolism, consolidation. CT/CTA Dec 15 No evidence pulmonary emboli. No airspace consolidation or pleural effusions.   Echo Dec 13 Normal left ventricular size and systolic function. No regional motion abnormality noted. Normal right ventricular size and systolic function. No hemodynamically significant valvular heart disease noted.  Recommend ruling out COVID-19/influenza  Oxygen as needed, Respiratory protocol, Bronchodilators, Incentive spirometry  Recommend pulmonaryreferral/consult      Total knee replacement status, right- (present on admission)  Assessment & Plan  Multimodal pain control  Physical and Occupational Therapy, will need rehab    Controlled type 2 diabetes mellitus without complication, without long-term current use of insulin (HCC)- (present on admission)  Assessment & Plan  Resume home Empagliflozin-metFORMIN, ordered    Mixed hyperlipidemia- (present on admission)  Assessment & Plan  Resume home simvastatin    SCDs, ASA BID

## 2022-12-18 NOTE — ED NOTES
FIRST CONTACT WITH PT, PT WAS PLACED IN ER 4 AT 0100 AM. PT IS AAAOX4, NAD. PT WAS TOLD PLAN OF CARE.

## 2022-12-18 NOTE — ED NOTES
Pt resting in stretcher on 2L O2 NC. Alert, oriented. R knee ace wrapped. States pain to knee - PRN to be given.  Requesting morning meds be given with meal tray.

## 2022-12-18 NOTE — ED NOTES
Pt resting in bed, call light within reach. No complaints at this time and updated on plan of care.

## 2022-12-18 NOTE — ED NOTES
Pt bedding changed due to IV disconnection. Pt able to stand bedside with walker without significant staff assist. Pt alert, oriented, denies further needs. Visitor bedside.

## 2022-12-18 NOTE — ED NOTES
Patient's home medications have been reviewed by the pharmacy team.     Past Medical History:   Diagnosis Date    Acute nasopharyngitis     Recent chest xray clear    Arthritis     Right knee and thumb joints    Breath shortness     Comes and goes    Cancer ([facility])     Skin cancer squamous cell    Cerebral atrophy ([facility]) 03/16/2022    Depression     Diabetes ([facility])     Disorder of thyroid     Nodules in both nodes. Biopsy indicates benign. Last ultrasound shows stable.    Groin lump 06/09/2022    Gynecological disorder     Cervical polyp in 1970    Heart burn     Controlled with Omeprazole    Heart murmur     Minor leakage in 3 valves due to scarlet fever as a child    Heart valve disease     Minor leakage in 3 valves due to scarlet fever as a child    Hiatus hernia syndrome     I think    High cholesterol     Taking Simvistatin    Hyperlipidemia     Muscle disorder     Osteoarthritis of right knee 06/15/2021    PONV (postoperative nausea and vomiting) 1970    Only time I’ve been under anesthesia    Recurrent major depressive disorder, in remission ([facility]) 03/16/2022    SI (sacroiliac) pain 06/09/2022    Type 2 diabetes mellitus with stage 3 chronic kidney disease ([facility]) 06/15/2021    Urinary bladder disorder     MS issue    Urinary incontinence     During MS relapse. Has since resolved       Patient's Medications   New Prescriptions    No medications on file   Previous Medications    ACETAMINOPHEN (TYLENOL) 500 MG TAB    Take 500-1,000 mg by mouth every 6 hours as needed. Indications: Pain    ASPIRIN EC 81 MG EC TABLET    Take 1 Tablet by mouth 2 times a day.    BUPROPION SR (WELLBUTRIN-SR) 150 MG TABLET SR 12 HR SUSTAINED-RELEASE TABLET    Take 1 tablet by mouth every morning.    DOCUSATE SODIUM (COLACE) 100 MG CAP    Take 100 mg by mouth 2 times a day.    EMPAGLIFLOZIN-METFORMIN HCL ER (SYNJARDY XR) 12.5-1000 MG TABLET SR 24 HR    Take 1 tablet by mouth daily    LAMOTRIGINE (LAMICTAL) 200 MG TABLET    Take 1 tablet (200  mg) by mouth daily    MODAFINIL (PROVIGIL) 200 MG TAB    Take 200 mg by mouth 1 time a day as needed.    OMEPRAZOLE (PRILOSEC) 20 MG DELAYED-RELEASE CAPSULE    Take 1 capsule by mouth every morning, 30 minutes before breakfast.    OXYCODONE IMMEDIATE-RELEASE (ROXICODONE) 5 MG TAB    Take 1 Tablet by mouth every 8 hours as needed for Severe Pain for up to 4 days.    PAROXETINE (PAXIL) 40 MG TABLET    Take 1 Tablet by mouth every day.    SIMVASTATIN (ZOCOR) 40 MG TAB    Take 1 Tablet by mouth every evening.    TERIFLUNOMIDE (AUBAGIO) 14 MG TAB    Take 14 mg by mouth every day.    ZOLPIDEM (AMBIEN) 5 MG TAB    Take 1 tablet (5 mg) by mouth daily at bedtime as needed for insomnia   Modified Medications    No medications on file   Discontinued Medications    ACETAMINOPHEN 500 MG CAP    Take 2 Capsules by mouth every 8 hours as needed (mild pain).    FREESTYLE LANCETS MISC    28 guage    GLUCOSE BLOOD STRIP    Freestyle [employer] Test Strips   USE TO TEST THREE TIMES DAILY    HOME CARE OXYGEN    Inhale 3 L/min continuous. Oxygen dose range: 3 L/min  Respiratory route via: Nasal Cannula   Oxygen supplier: [employer]    MELOXICAM (MOBIC) 15 MG TABLET    Take 15 mg by mouth every day.     A:  Medications do not appear to be contributing to current complaints.     P:    No recommendations at this time. Home medications have been reordered as appropriate.    Chito Lam, PharmD, BCPS

## 2022-12-18 NOTE — CASE COMMUNICATION
noted  ----- Message -----  From: Kimberly Schmitz, PT  Sent: 12/16/2022   6:10 PM PST  To: Kimberly Dugan R.N., Chen Gil R.N., *      Primary dx/Skilled need: Right TKA on 12/14/22; MS, Bipolar, DM 2, postoperative hypoxemia, now on continuous oxygen  PT: 1w1, 2w3  Zip code: [phone]  Disciplines ordered: Skilled nursing, physical therapy; patient declining occupational therapy at this time  Insurance & authorization: half-way Plus   Certification period: 12/16/2022 - 2/13/2023  Special considerations: none

## 2022-12-18 NOTE — ED PROVIDER NOTES
ED Provider Note    ED observation note    History: Patient underwent a right total knee arthroplasty by Dr. Cooney on 12/14/2022.  The patient was discharged home and developed complications of hypoxia which reevaluated and pulmonary embolism was ruled out.  Patient also had pain and swelling and a DVT was ruled out.  The patient lives alone and is not been able to care for self.  She is currently waiting placement to skilled nursing facility as she is unable to care for self at home.    Physical exam  HEENT: Normal  Chest/thorax: Clear to auscultation  Cardiac: Regular rate and rhythm without murmurs  Abdomen: Soft nontender  Extremities mild edema in the right lower extremity; no signs of surgical wound infection  Neurologically: Awake, oriented x3    Previous imaging laboratory studies reviewed    Assessment:  status post right total knee arthroplasty  Inability to care for self    Plan: Awaiting transfer to skilled nursing facility assistance with her rehabilitation

## 2022-12-18 NOTE — DISCHARGE PLANNING
Anticipated Discharge Disposition: SNF vs Home w HH    Action: [facility] no admission intake on Sun. [facility] left. [facility] no intake on Sun. [facility] left vm. [facility] no answer. No [facility] liason on for [facility] on Sunday, PT note reviewed, She has o2 via [facility]  in place. POP 4. 6 clicks 18.    Barriers to Discharge: SNF availability and approval    Plan: Will cont to follow

## 2022-12-18 NOTE — DISCHARGE PLANNING
Received referral from Dr. Moore that her recommendation is for pt to go to SNF.  Met with pt who said she lives alone and after surgery, she has been unable to ambulate and pain has increased. Pt said she has a friend that can help her but the ideal placement is in an SNF. Pt agreeable with CM to send a blanket referral to all the SNF in [facility].     Received an order for PT/OT eval but will be done possibly tomorrow.         Care Transition Team Assessment    Information Source  Orientation Level: Oriented X4  Information Given By: Patient  Who is responsible for making decisions for patient? : Patient    Readmission Evaluation  Is this a readmission?: No    Elopement Risk  Legal Hold: No  Ambulatory or Self Mobile in Wheelchair: No-Not an Elopement Risk    Interdisciplinary Discharge Planning  Does Admitting Nurse Feel This Could be a Complex Discharge?: No  Primary Care Physician: Dr. Danae Souza  Lives with - Patient's Self Care Capacity: Alone and Able to Care For Self  Support Systems: Friends / Neighbors  Housing / Facility: [address]  Do You Take your Prescribed Medications Regularly: Yes  Able to Return to Previous ADL's: Future Time w/Therapy  Mobility Issues: Yes  Prior Services: Skilled Home Health Services  Patient Prefers to be Discharged to:: SNF  Assistance Needed: Yes  Durable Medical Equipment: Other - Specify (pt uses a cane)    Discharge Preparedness  What is your plan after discharge?: Skilled nursing facility  What are your discharge supports?: Other (comment) (friend)  Prior Functional Level: Ambulatory, Drives Self, Independent with Activities of Daily Living, Independent with Medication Management  Difficulity with ADLs: Walking, Toileting, Bathing  Difficulity with IADLs: Cooking, Driving, Laundry, Shopping    Functional Assesment  Prior Functional Level: Ambulatory, Drives Self, Independent with Activities of Daily Living, Independent with Medication  Management    Finances  Financial Barriers to Discharge: No  Prescription Coverage: Yes    Vision / Hearing Impairment  Vision Impairment : Yes  Hearing Impairment : No    Values / Beliefs / Concerns  Values / Beliefs Concerns : No    Advance Directive  Advance Directive?: Living Will    Domestic Abuse  Have you ever been the victim of abuse or violence?: No    Discharge Risks or Barriers  Discharge risks or barriers?: Post-acute placement / services  Patient risk factors: Cognitive / sensory / physical deficit, Vulnerable adult    Anticipated Discharge Information  Discharge Disposition: D/T to SNF with Medicare cert in anticipation of skilled care (03)

## 2022-12-18 NOTE — CASE COMMUNICATION
noted  ----- Message -----  From: Kimberly Schmitz, PT  Sent: 12/16/2022   8:08 PM PST  To: Kimberly Dugan R.N., Chen Gil R.N., *      Dr. Pritchett,  Patient's blood pressure today was 168/62. Patient's pain level at beginning of home health visit was 10/10 and by end of visit, pain was down to 6/10.

## 2022-12-18 NOTE — ED NOTES
Pt updated on being here in ED overnight and PT/OT to follow up in am.   No needs currently. Call light within reach.

## 2022-12-18 NOTE — ED NOTES
Patient requested medications to be moved to a little later in the morning. Patient educated on timeliness of medication administration. Verbal understanding of education provided. Patient request met.

## 2022-12-18 NOTE — THERAPY
Physical Therapy   Initial Evaluation     Patient Name: Melody Rodriguez  Age:  72 y.o., Sex:  female  Medical Record #: [id number]  Today's Date: 12/18/2022     Precautions  Precautions: Fall Risk;Weight Bearing As Tolerated Right Lower Extremity    Assessment  Patient is a 72 y.o. female who underwent a Right TKA on 12/14/22 at [facility].  Pt  was discharged home with home health services however pt referred back to hospital by home care due to pain and inability to manage at home at current level of function.   Pt seen today for PT assessment in ED. Pt presenting with limitations in mobility due to post op pain, decreased endurance with need for supplemental O2 at 3 l/m, decreased right knee ROM and strength deficits. Recommend short stay at SNF for post TKA recovery prior to home alone with home care resumption      Plan    Recommend Physical Therapy 5 times per week until therapy goals are met for the following treatments:  Bed Mobility, Gait Training, Neuro Re-Education / Balance, Self Care/Home Evaluation, Therapeutic Activities, and Therapeutic Exercises    DC Equipment Recommendations: None  Discharge Recommendations: Recommend post-acute placement for additional physical therapy services prior to discharge home            Objective       12/18/22 0945   Vitals   O2 (LPM) 3   O2 Delivery Device Nasal Cannula   Pain 0 - 10 Group   Location Leg   Location Orientation Right   Pain Rating Scale (NPRS) 7   Therapist Pain Assessment Post Activity Pain Same as Prior to Activity;7   Cognition    Cognition / Consciousness WDL   Active ROM Lower Body    Active ROM Lower Body  X   Rt Knee Flexion Degrees 80   Rt Knee Extension Degrees -5   Strength Lower Body   Lower Body Strength  X   Rt Knee Flexion Strength 3- (F-)   Rt Knee Extension Strength 3 (F)   Comments limited by post op pain   Sensation Lower Body   Lower Extremity Sensation   WDL   Lower Body Muscle Tone   Lower Body Muscle Tone  WDL   Supine Lower Body  Exercise   Supine Lower Body Exercises Yes   Short Arc Quad 1 set of 10;Bilateral   Heel Slide 1 set of 10;Bilateral   Ankle Pumps 2 sets of 10;Bilateral   Gluteal Isometrics 1 set of 10;Bilateral   Quadriceps Isometrics 1 set of 10;Bilateral   Sitting Lower Body Exercises   Sitting Lower Body Exercises Yes   Other Exercises seated heel slides x 10 with cues for full ROM and 5 sec hold   Vision   Vision Comments no c/o visual changes   Neurological Concerns   Neurological Concerns No   Balance   Sitting Balance (Static) Good   Sitting Balance (Dynamic) Fair +   Standing Balance (Static) Fair   Standing Balance (Dynamic) Fair -   Weight Shift Sitting Good   Weight Shift Standing Fair   Gait Analysis   Gait Level Of Assist Contact Guard Assist   Assistive Device Front Wheel Walker   Distance (Feet) 120   # of Times Distance was Traveled 1   Deviation Antalgic;Step To;Bradykinetic;Shuffled Gait;Decreased Heel Strike;Decreased Toe Off  (decreased foot clearance, decreased knee flexion in swing phase.)   # of Stairs Climbed 0   Weight Bearing Status WBAT   Comments slow antalgic with assistance required for O2 tank   Bed Mobility    Supine to Sit Standby Assist   Sit to Supine Standby Assist   Scooting Standby Assist   Functional Mobility   Sit to Stand Contact Guard Assist   Transfer Method Stand Step   Mobility EOB>hallway> BTB   How much difficulty does the patient currently have...   Turning over in bed (including adjusting bedclothes, sheets and blankets)? 3   Sitting down on and standing up from a chair with arms (e.g., wheelchair, bedside commode, etc.) 3   Moving from lying on back to sitting on the side of the bed? 3   How much help from another person does the patient currently need...   Moving to and from a bed to a chair (including a wheelchair)? 3   Need to walk in a hospital room? 3   Climbing 3-5 steps with a railing? 3   6 clicks Mobility Score 18   Activity Tolerance   Sitting Edge of Bed 10   Standing 5    Short Term Goals    Short Term Goal # 1 Pt will perform bed mobility a IND level by visit 6   Short Term Goal # 2 Pt will transfer with FWW at Mod I level by visit 6   Short Term Goal # 3 Pt will ambulate with FWW for 200 ft on even surfaces with SPV by visit 6   Short Term Goal # 4 Right knee ROM 0-90 by visit 3   Short Term Goal # 5 pt will verbalize understanding of pain control methods and pain will be controlled at 3/10  level   Education Group   Education Provided Role of Physical Therapist;Gait Training;Use of Assistive Device;Exercises - Supine;Exercises - Seated   Role of Physical Therapist Patient Response Patient;Acceptance;Explanation;Verbal Demonstration   Gait Training Patient Response Patient;Acceptance;Explanation;Demonstration;Reinforcement Needed   Use of Assistive Device Patient Response Patient;Acceptance;Reinforcement Needed   Exercises - Supine Patient Response Patient;Acceptance;Eager;Explanation;Demonstration;Reinforcement Needed   Exercises - Seated Patient Response Patient;Acceptance;Explanation;Demonstration;Reinforcement Needed   Anticipated Discharge Equipment and Recommendations   DC Equipment Recommendations None   Discharge Recommendations Recommend post-acute placement for additional physical therapy services prior to discharge home

## 2022-12-18 NOTE — ASSESSMENT & PLAN NOTE
Patient reports complaining of shortness of breath and easy fatigability over the past 3-6 months  Noticed to be hypoxemic postoperatively  CT angiography was negative for pulmonary embolism, consolidation. CT/CTA Dec 15 No evidence pulmonary emboli. No airspace consolidation or pleural effusions.   Echo Dec 13 Normal left ventricular size and systolic function. No regional motion abnormality noted. Normal right ventricular size and systolic function. No hemodynamically significant valvular heart disease noted.  Recommend ruling out COVID-19/influenza  Oxygen as needed, Respiratory protocol, Bronchodilators, Incentive spirometry  Recommend pulmonaryreferral/consult

## 2022-12-18 NOTE — PROGRESS NOTES
"Hospital Medicine Daily Progress Note    Date of Service  12/18/2022    Chief Complaint  Melody Rodriguez is a 72 y.o. female pending SNF placement.    Hospital Course  As per chart review:  \"72 y.o. female with a past medical history of diabetes, hyperlipidemia, recent diagnosis of respiratory failure since her total right knee replacement who presented 12/17/2022 with progressively worsening generalized weakness after her right knee replacement surgery after spending few days at home with home health.   Hospitalist services were consulted to help manage patient medical problems pending bed availability at rehab.  Patient was noticed to be hypoxemic after surgery she was discharged on 3 L of oxygen nasal cannula.  CT angiography did not show evidence for pulmonary embolism.  Venous ultrasound did not show evidence for deep vein thrombosis.  Patient has not been able to take care of herself at home despite home health hence came to the emergency room.\"    Interval Problem Update  12/18: Patient seen at bedside this morning.  Patient laying in bed comfortably.  Patient still requiring supplemental oxygen.  The patient will require possible outpatient pulmonary consult.  Patient is pending skilled nursing facility placement.  Appreciate the consult by the ER.  Patient is medically cleared for discharge.        Consultants/Specialty  We were consulted by ER, we are not primary team.    Code Status  Prior    Disposition  Patient is medically cleared for discharge.   Anticipate discharge to SNF, pending placement.    Review of Systems  Review of Systems   Constitutional:  Negative for chills and fever.   HENT:  Negative for hearing loss and nosebleeds.    Eyes:  Negative for blurred vision and double vision.   Respiratory:  Negative for cough and shortness of breath.    Cardiovascular:  Negative for chest pain and palpitations.   Gastrointestinal:  Negative for abdominal pain, heartburn and vomiting. "   Genitourinary:  Negative for dysuria and urgency.   Musculoskeletal:  Positive for joint pain. Negative for back pain and falls.   Skin:  Negative for itching and rash.   Neurological:  Negative for dizziness and headaches.   Psychiatric/Behavioral:  Negative for substance abuse. The patient is not nervous/anxious.    All other systems reviewed and are negative.     Physical Exam  Temp:  [36 °C (96.8 °F)] 36 °C (96.8 °F)  Pulse:  [71-97] 83  Resp:  [8-29] 14  BP: ()/(46-92) 131/81  SpO2:  [95 %-100 %] 97 %    Physical Exam  Vitals and nursing note reviewed.   Constitutional:       General: She is not in acute distress.     Appearance: Normal appearance.   HENT:      Head: Normocephalic and atraumatic.      Right Ear: External ear normal.      Left Ear: External ear normal.      Mouth/Throat:      Pharynx: No oropharyngeal exudate or posterior oropharyngeal erythema.   Eyes:      General:         Right eye: No discharge.         Left eye: No discharge.   Cardiovascular:      Rate and Rhythm: Normal rate and regular rhythm.      Pulses: Normal pulses.      Heart sounds: No murmur heard.    No gallop.   Pulmonary:      Effort: Pulmonary effort is normal. No respiratory distress.      Breath sounds: Normal breath sounds. No wheezing or rhonchi.      Comments: On NC  Abdominal:      General: Bowel sounds are normal. There is no distension.      Palpations: Abdomen is soft.      Tenderness: There is no abdominal tenderness. There is no guarding.   Musculoskeletal:         General: Tenderness present. No swelling. Normal range of motion.      Cervical back: Normal range of motion and neck supple. No tenderness.      Comments: With lower extremity bandage.   Skin:     General: Skin is warm and dry.   Neurological:      General: No focal deficit present.      Mental Status: She is alert and oriented to person, place, and time. Mental status is at baseline.      Motor: No weakness.   Psychiatric:         Mood and  Affect: Mood normal.         Behavior: Behavior normal.       Fluids  No intake or output data in the 24 hours ending 12/18/22 0902    Laboratory  Recent Labs     12/15/22  1214 12/17/22  1539   WBC  --  9.8   RBC  --  3.87*   HEMOGLOBIN 9.6* 11.6*   HEMATOCRIT 30.3* 36.4*   MCV  --  94.1   MCH  --  30.0   MCHC  --  31.9*   RDW  --  47.4   PLATELETCT  --  243   MPV  --  8.8*     Recent Labs     12/17/22  1539   SODIUM 131*   POTASSIUM 3.4*   CHLORIDE 95*   CO2 22   GLUCOSE 117*   BUN 18   CREATININE 0.97   CALCIUM 9.3                   Imaging  DX-CHEST-PORTABLE (1 VIEW)   Final Result      No acute cardiopulmonary abnormality identified.           Assessment/Plan  * Respiratory failure (HCC)- (present on admission)  Assessment & Plan  Patient reports complaining of shortness of breath and easy fatigability over the past 3-6 months  Noticed to be hypoxemic postoperatively  CT angiography was negative for pulmonary embolism, consolidation. CT/CTA Dec 15 No evidence pulmonary emboli. No airspace consolidation or pleural effusions.   Echo Dec 13 Normal left ventricular size and systolic function. No regional motion abnormality noted. Normal right ventricular size and systolic function. No hemodynamically significant valvular heart disease noted.  Recommend ruling out COVID-19/influenza  Oxygen as needed, Respiratory protocol, Bronchodilators, Incentive spirometry  Recommend pulmonaryreferral/consult      Mixed hyperlipidemia- (present on admission)  Assessment & Plan  Resume home simvastatin    Total knee replacement status, right- (present on admission)  Assessment & Plan  Multimodal pain control  Physical and Occupational Therapy, will need rehab    Mood disorder (HCC)- (present on admission)  Assessment & Plan  Continue home medication    Multiple sclerosis (HCC)- (present on admission)  Assessment & Plan  Continue home medication  Patient will require continued outpatient follow up    Controlled type 2 diabetes  mellitus without complication, without long-term current use of insulin (HCC)- (present on admission)  Assessment & Plan  Resume home Empagliflozin-metFORMIN, ordered       VTE prophylaxis: Aspirin BID    I have performed a physical exam and reviewed and updated ROS and Plan today (12/18/2022). In review of yesterday's note (12/17/2022), there are no changes except as documented above.

## 2022-12-19 ENCOUNTER — TELEPHONE (OUTPATIENT)
Dept: OTHER | Facility: MEDICAL CENTER | Age: 72
End: 2022-12-19
Payer: MEDICARE

## 2022-12-19 ENCOUNTER — HOME CARE VISIT (OUTPATIENT)
Dept: HOME HEALTH SERVICES | Facility: HOME HEALTHCARE | Age: 72
End: 2022-12-19
Payer: MEDICARE

## 2022-12-19 ENCOUNTER — DOCUMENTATION (OUTPATIENT)
Dept: MEDICAL GROUP | Facility: PHYSICIAN GROUP | Age: 72
End: 2022-12-19
Payer: MEDICARE

## 2022-12-19 VITALS — RESPIRATION RATE: 18 BRPM | HEART RATE: 79 BPM | OXYGEN SATURATION: 90 %

## 2022-12-19 VITALS
DIASTOLIC BLOOD PRESSURE: 69 MMHG | RESPIRATION RATE: 18 BRPM | HEART RATE: 71 BPM | HEIGHT: 60 IN | BODY MASS INDEX: 26.84 KG/M2 | OXYGEN SATURATION: 95 % | TEMPERATURE: 97.9 F | SYSTOLIC BLOOD PRESSURE: 124 MMHG | WEIGHT: 136.69 LBS

## 2022-12-19 VITALS — HEART RATE: 67 BPM | RESPIRATION RATE: 17 BRPM | OXYGEN SATURATION: 96 %

## 2022-12-19 VITALS — RESPIRATION RATE: 18 BRPM | OXYGEN SATURATION: 96 % | HEART RATE: 79 BPM

## 2022-12-19 VITALS
TEMPERATURE: 98.2 F | SYSTOLIC BLOOD PRESSURE: 130 MMHG | HEART RATE: 71 BPM | OXYGEN SATURATION: 97 % | DIASTOLIC BLOOD PRESSURE: 62 MMHG | RESPIRATION RATE: 18 BRPM

## 2022-12-19 PROBLEM — E87.1 HYPONATREMIA: Status: ACTIVE | Noted: 2022-12-19

## 2022-12-19 LAB
ANION GAP SERPL CALC-SCNC: 8 MMOL/L (ref 7–16)
BUN SERPL-MCNC: 14 MG/DL (ref 8–22)
CALCIUM SERPL-MCNC: 8.6 MG/DL (ref 8.4–10.2)
CHLORIDE SERPL-SCNC: 104 MMOL/L (ref 96–112)
CO2 SERPL-SCNC: 22 MMOL/L (ref 20–33)
CREAT SERPL-MCNC: 0.7 MG/DL (ref 0.5–1.4)
GFR SERPLBLD CREATININE-BSD FMLA CKD-EPI: 91 ML/MIN/1.73 M 2
GLUCOSE SERPL-MCNC: 97 MG/DL (ref 65–99)
POTASSIUM SERPL-SCNC: 4.6 MMOL/L (ref 3.6–5.5)
SODIUM SERPL-SCNC: 134 MMOL/L (ref 135–145)

## 2022-12-19 PROCEDURE — 36415 COLL VENOUS BLD VENIPUNCTURE: CPT

## 2022-12-19 PROCEDURE — 96366 THER/PROPH/DIAG IV INF ADDON: CPT

## 2022-12-19 PROCEDURE — A9270 NON-COVERED ITEM OR SERVICE: HCPCS | Performed by: HOSPITALIST

## 2022-12-19 PROCEDURE — 700101 HCHG RX REV CODE 250: Performed by: HOSPITALIST

## 2022-12-19 PROCEDURE — 97165 OT EVAL LOW COMPLEX 30 MIN: CPT

## 2022-12-19 PROCEDURE — 97535 SELF CARE MNGMENT TRAINING: CPT

## 2022-12-19 PROCEDURE — 700101 HCHG RX REV CODE 250: Performed by: EMERGENCY MEDICINE

## 2022-12-19 PROCEDURE — 80048 BASIC METABOLIC PNL TOTAL CA: CPT

## 2022-12-19 PROCEDURE — 99282 EMERGENCY DEPT VISIT SF MDM: CPT | Performed by: INTERNAL MEDICINE

## 2022-12-19 PROCEDURE — 700102 HCHG RX REV CODE 250 W/ 637 OVERRIDE(OP): Performed by: HOSPITALIST

## 2022-12-19 RX ADMIN — ACETAMINOPHEN 1000 MG: 500 TABLET ORAL at 01:32

## 2022-12-19 RX ADMIN — LAMOTRIGINE 200 MG: 100 TABLET ORAL at 07:53

## 2022-12-19 RX ADMIN — ASPIRIN 81 MG: 81 TABLET, COATED ORAL at 07:52

## 2022-12-19 RX ADMIN — ACETAMINOPHEN 1000 MG: 500 TABLET ORAL at 07:59

## 2022-12-19 RX ADMIN — BUPROPION HYDROCHLORIDE 150 MG: 150 TABLET, EXTENDED RELEASE ORAL at 07:52

## 2022-12-19 RX ADMIN — METFORMIN HYDROCHLORIDE 1000 MG: 500 TABLET, EXTENDED RELEASE ORAL at 07:54

## 2022-12-19 RX ADMIN — LIDOCAINE 1 PATCH: 50 PATCH TOPICAL at 07:51

## 2022-12-19 RX ADMIN — POTASSIUM CHLORIDE AND SODIUM CHLORIDE 1000 ML: 900; 300 INJECTION, SOLUTION INTRAVENOUS at 01:06

## 2022-12-19 RX ADMIN — OMEPRAZOLE 20 MG: 20 CAPSULE, DELAYED RELEASE ORAL at 07:52

## 2022-12-19 RX ADMIN — PAROXETINE HYDROCHLORIDE 40 MG: 20 TABLET, FILM COATED ORAL at 08:04

## 2022-12-19 ASSESSMENT — ENCOUNTER SYMPTOMS
CHILLS: 0
HIGHEST PAIN SEVERITY IN PAST 24 HOURS: 8/10
PAIN LOCATION - EXACERBATING FACTORS: PHYSICAL ACTIVITY
FEVER: 0
DIZZINESS: 0
NERVOUS/ANXIOUS: 0
NAUSEA: 1
PAIN LOCATION - PAIN DURATION: ALL THE TIME
BACK PAIN: 0
VOMITING: NO
DEPRESSED MOOD: 1
PALPITATIONS: 0
LOWEST PAIN SEVERITY IN PAST 24 HOURS: 7/10
VOMITING: 0
BLURRED VISION: 0
MUSCLE WEAKNESS: 1
PAIN: 1
LAST BOWEL MOVEMENT: 66460
PAIN LOCATION - PAIN QUALITY: SPASM
LIMITED RANGE OF MOTION: 1
DOUBLE VISION: 0
HEARTBURN: 0
SHORTNESS OF BREATH: 1
PAIN LOCATION - PAIN FREQUENCY: CONSTANT
SHORTNESS OF BREATH: 0
PERSON REPORTING PAIN: PATIENT
FALLS: 0
PAIN LOCATION - PAIN SEVERITY: 7/10
SEVERE DYSPNEA: 1
SUBJECTIVE PAIN PROGRESSION: RAPIDLY WORSENING
STOOL FREQUENCY: LESS THAN DAILY
BOWEL PATTERN NORMAL: 1
ABDOMINAL PAIN: 0
HEADACHES: 0
DYSPNEA ACTIVITY LEVEL: AFTER AMBULATING LESS THAN 10 FT
PAIN SEVERITY GOAL: 3/10
COUGH: 0

## 2022-12-19 ASSESSMENT — COGNITIVE AND FUNCTIONAL STATUS - GENERAL
HELP NEEDED FOR BATHING: A LITTLE
SUGGESTED CMS G CODE MODIFIER DAILY ACTIVITY: CI
DAILY ACTIVITIY SCORE: 23

## 2022-12-19 ASSESSMENT — ACTIVITIES OF DAILY LIVING (ADL): TOILETING: INDEPENDENT

## 2022-12-19 ASSESSMENT — GAIT ASSESSMENTS: DISTANCE (FEET): 25

## 2022-12-19 ASSESSMENT — LIFESTYLE VARIABLES: SUBSTANCE_ABUSE: 0

## 2022-12-19 NOTE — DISCHARGE SUMMARY
ED Observation Discharge Summary    Patient:Melody Rodriguez  Patient : 1950  Patient MRN: [id number]  Patient PCP: Danae Pritchett M.D.    Admit Date: 2022  Discharge Date and Time: 22 12:43 PM  Discharge Diagnosis:   1. Osteopenia of left thigh    2. Risk for falls    3. Hypoxia    4. History of total right knee replacement        Discharge Attending: Juve Reis M.D.  Discharge Service: ED Observation    ED Course  Melody is a 72 y.o. female who was evaluated at [facility] emergency department.  Recently postop from a total knee replacement.  Was having some difficulty arranging some activities of daily living.  Came to the emergency department.  Patient has been observed in the emergency department.  Doing well.  Cleared by PT and OT for discharge to home with home health.  Home health order has been placed.  Referral placed.  Social work involved.  Assisting with arranging outpatient follow-up and home health care.  Patient has been ambulating in the emergency department.  Ambulating over 120 feet in the emergency department.  Seems to be recovering well.  Does not meet criteria for skilled nursing facility placement or rehab placement.    Discharge Exam:  /69   Pulse 71   Temp 36.6 °C (97.9 °F) (Temporal)   Resp 18   Ht 1.524 m (5')   Wt 62 kg (136 lb 11 oz)   SpO2 95%   BMI 26.69 kg/m² .    Constitutional: Awake and alert. Nontoxic  HENT:  Grossly normal  Eyes: Grossly normal  Neck: Normal range of motion  Cardiovascular: Normal heart rate   Thorax & Lungs: No respiratory distress  Abdomen: Nontender  Skin:  No pathologic rash.  Post Operative dressing clean dry and intact.    Extremities: Well perfused.  Psychiatric: Affect normal    Labs  Results for orders placed or performed during the hospital encounter of 22   CBC WITH DIFFERENTIAL   Result Value Ref Range    WBC 9.8 4.8 - 10.8 K/uL    RBC 3.87 (L) 4.20 - 5.40 M/uL    Hemoglobin 11.6 (L) 12.0  - 16.0 g/dL    Hematocrit 36.4 (L) 37.0 - 47.0 %    MCV 94.1 81.4 - 97.8 fL    MCH 30.0 27.0 - 33.0 pg    MCHC 31.9 (L) 33.6 - 35.0 g/dL    RDW 47.4 35.9 - 50.0 fL    Platelet Count 243 164 - 446 K/uL    MPV 8.8 (L) 9.0 - 12.9 fL    Neutrophils-Polys 69.70 44.00 - 72.00 %    Lymphocytes 14.80 (L) 22.00 - 41.00 %    Monocytes 12.60 0.00 - 13.40 %    Eosinophils 1.80 0.00 - 6.90 %    Basophils 0.70 0.00 - 1.80 %    Immature Granulocytes 0.40 0.00 - 0.90 %    Nucleated RBC 0.00 /100 WBC    Neutrophils (Absolute) 6.85 2.00 - 7.15 K/uL    Lymphs (Absolute) 1.46 1.00 - 4.80 K/uL    Monos (Absolute) 1.24 (H) 0.00 - 0.85 K/uL    Eos (Absolute) 0.18 0.00 - 0.51 K/uL    Baso (Absolute) 0.07 0.00 - 0.12 K/uL    Immature Granulocytes (abs) 0.04 0.00 - 0.11 K/uL    NRBC (Absolute) 0.00 K/uL   COMP METABOLIC PANEL   Result Value Ref Range    Sodium 131 (L) 135 - 145 mmol/L    Potassium 3.4 (L) 3.6 - 5.5 mmol/L    Chloride 95 (L) 96 - 112 mmol/L    Co2 22 20 - 33 mmol/L    Anion Gap 14.0 7.0 - 16.0    Glucose 117 (H) 65 - 99 mg/dL    Bun 18 8 - 22 mg/dL    Creatinine 0.97 0.50 - 1.40 mg/dL    Calcium 9.3 8.4 - 10.2 mg/dL    AST(SGOT) 18 12 - 45 U/L    ALT(SGPT) 11 2 - 50 U/L    Alkaline Phosphatase 103 (H) 30 - 99 U/L    Total Bilirubin 0.9 0.1 - 1.5 mg/dL    Albumin 4.1 3.2 - 4.9 g/dL    Total Protein 7.3 6.0 - 8.2 g/dL    Globulin 3.2 1.9 - 3.5 g/dL    A-G Ratio 1.3 g/dL   TROPONIN   Result Value Ref Range    Troponin T 13 6 - 19 ng/L   proBrain Natriuretic Peptide, NT   Result Value Ref Range    NT-proBNP 580 (H) 0 - 125 pg/mL   CORRECTED CALCIUM   Result Value Ref Range    Correct Calcium 9.2 8.5 - 10.5 mg/dL   ESTIMATED GFR   Result Value Ref Range    GFR (CKD-EPI) 62 >60 mL/min/1.73 m 2   CoV-2, FLU A/B, and RSV by PCR (2-4 Hours CEPHEID) : Collect NP swab in VTM    Specimen: Respirate   Result Value Ref Range    Influenza virus A RNA Negative Negative    Influenza virus B, PCR Negative Negative    RSV, PCR Negative Negative     SARS-CoV-2 by PCR NotDetected     SARS-CoV-2 Source NP Swab    Basic Metabolic Panel   Result Value Ref Range    Sodium 134 (L) 135 - 145 mmol/L    Potassium 4.6 3.6 - 5.5 mmol/L    Chloride 104 96 - 112 mmol/L    Co2 22 20 - 33 mmol/L    Glucose 97 65 - 99 mg/dL    Bun 14 8 - 22 mg/dL    Creatinine 0.70 0.50 - 1.40 mg/dL    Calcium 8.6 8.4 - 10.2 mg/dL    Anion Gap 8.0 7.0 - 16.0   ESTIMATED GFR   Result Value Ref Range    GFR (CKD-EPI) 91 >60 mL/min/1.73 m 2   EKG (NOW)   Result Value Ref Range    Report       [facility] Emergency Dept.    Test Date:  2022  Pt Name:    NEREYDA JIMENEZ            Department: [facility]  MRN:        [id number]                      [facility]  Gender:     Female                       Technician: molly  :        1950                   Requested By:WEN BRO  Order #:    [id number]                    Reading MD: Wen Bro MD    Measurements  Intervals                                Axis  Rate:       78                           P:          52  GA:         176                          QRS:        -5  QRSD:       78                           T:          76  QT:         348  QTc:        397    Interpretive Statements  Sinus rhythm at a rate of 78 no ST elevations or ST depressions no abnormal T  wave inversions no pathologic Q waves normal intervals normal axis  Compared to ECG 2022 10:40:49  no acute change, no ant q waves present  Electronically Signed On 2022 15:37:58 PST by Wen Bro MD         Radiology  DX-CHEST-PORTABLE (1 VIEW)   Final Result      No acute cardiopulmonary abnormality identified.            Medications:   New Prescriptions    No medications on file       Discharge Condition: Stable    Electronically signed by: Juve Reis M.D., 2022 12:43 PM

## 2022-12-19 NOTE — THERAPY
"Occupational Therapy   Initial Evaluation     Patient Name: Melody Rodriguez  Age:  72 y.o., Sex:  female  Medical Record #: [id number]  Today's Date: 12/19/2022     Precautions  Precautions: (P) Weight Bearing As Tolerated Right Lower Extremity    Assessment  Patient is 72 y.o. female with a diagnosis of Respiratory Failure.  Additional factors influencing patient status / progress: Limited activity tolerance, R knee pain/discomfort.  Patient resides alone in a [facility] in [place], [place].  Son lives in area and is avaleble to assist intermittently,  Friends/neigghbors available to assist as well.Patient resides alone in a [facility] in [place], [place].  Son lives in area and is avaleble to assist intermittently,  Friends/neigghbors available to assist as well.  Patient demonstrated Mod Indep for bed mobility, Supervision sit/stand, SBA transfers, Mod Indep LB clothing management, Indep UB clothing managementm Mod Indep standing for G&H.  Therapist recommending  OT.    Plan    Recommend Occupational Therapy for Evaluation only.    DC Equipment Recommendations: (P) None  Discharge Recommendations: (P) Recommend home health for continued occupational therapy services     Subjective    \"I'm feeling much better.\"     Objective       12/19/22 0815   Initial Contact Note    Initial Contact Note Order Received and Verified, Evaluation Only - Patient Does Not Require Further Acute Occupational Therapy at this Time.  However, May Benefit from Post Acute Therapy for Higher Level Functional Deficits.   Prior Living Situation   Prior Services Skilled Home Health Services   Housing / Facility 1 Story House   Steps Into Home 0   Steps In Home 0   Bathroom Set up Walk In Shower;Shower Glass Doors;Shower Chair   Equipment Owned Front-Wheel Walker;Tub / Shower Seat;Hand Held Shower   Lives with - Patient's Self Care Capacity Alone and Able to Care For Self   Comments Patient resides alone in a [facility] in [place], [place].  Son lives in area and is avaleble to " assist intermittently,  Friends/neigghbors available to assist as well.   Prior Level of ADL Function   Self Feeding Independent   Grooming / Hygiene Independent   Bathing Requires Assist   Dressing Independent   Toileting Independent   Prior Level of IADL Function   Medication Management Independent   Shopping Independent   Prior Level Of Mobility Independent With Device in Home   History of Falls   History of Falls No   Precautions   Precautions Weight Bearing As Tolerated Right Lower Extremity   Vitals   Pulse 69   Patient BP Position Sitting   Pulse Oximetry 97 %   O2 (LPM) 1   O2 Delivery Device Nasal Cannula   Vitals Comments Trialed OOB activity on room aire - maintained mid 90% O2 sat rate   Pain   Intervention Rest;Repositioned   Pain 0 - 10 Group   Location Knee   Location Orientation Right   Description Aching   Comfort Goal Perform Activity   Therapist Pain Assessment Post Activity Pain Same as Prior to Activity;5   Cognition    Cognition / Consciousness WDL   Active ROM Upper Body   Active ROM Upper Body  WDL   Dominant Hand Right   Strength Upper Body   Upper Body Strength  WDL   Upper Body Muscle Tone   Upper Body Muscle Tone  WDL   Coordination Upper Body   Coordination WDL   Balance Assessment   Sitting Balance (Static) Good   Sitting Balance (Dynamic) Fair +   Standing Balance (Static) Fair +   Standing Balance (Dynamic) Fair   Weight Shift Sitting Good   Weight Shift Standing Fair   Comments OOB FWW   Bed Mobility    Supine to Sit Modified Independent   Sit to Supine Modified Independent   Scooting Modified Independent   ADL Assessment   Eating Independent   Grooming Modified Independent   Upper Body Dressing Modified Independent   Lower Body Dressing Modified Independent   Toileting Modified Independent   How much help from another person does the patient currently need...   Putting on and taking off regular lower body clothing? 4   Bathing (including washing, rinsing, and drying)? 3   Toileting,  which includes using a toilet, bedpan, or urinal? 4   Putting on and taking off regular upper body clothing? 4   Taking care of personal grooming such as brushing teeth? 4   Eating meals? 4   6 Clicks Daily Activity Score 23   Functional Mobility   Sit to Stand Supervised   Bed, Chair, Wheelchair Transfer Standby Assist   Toilet Transfers Standby Assist   Transfer Method Stand Step   Mobility Sup FWW   Distance (Feet) 25   # of Times Distance was Traveled 2   Edema / Skin Assessment   Edema / Skin  Not Assessed   Activity Tolerance   Sitting Edge of Bed 10   Standing 5x2   Education Group   Education Provided Joint Protection;Energy Conservation;Transfers;Role of Occupational Therapist;Activities of Daily Living   Role of Occupational Therapist Patient Response Patient;Acceptance;Explanation;Verbal Demonstration   Energy Conservation Patient Response Patient;Acceptance;Explanation;Demonstration;Verbal Demonstration;Action Demonstration   Joint Protection Patient Response Patient;Acceptance;Explanation;Demonstration;Verbal Demonstration;Action Demonstration   Transfers Patient Response Patient;Acceptance;Explanation;Demonstration;Verbal Demonstration;Action Demonstration   ADL Patient Response Patient;Acceptance;Explanation;Demonstration;Verbal Demonstration;Action Demonstration   Anticipated Discharge Equipment and Recommendations   DC Equipment Recommendations None   Discharge Recommendations Recommend home health for continued occupational therapy services

## 2022-12-19 NOTE — FACE TO FACE
Face to Face Supporting Documentation - Home Health    The encounter with this patient was in whole or in part the primary reason for home health admission.    Date of encounter:   Patient:                    MRN:                       YOB: 2022  Melody Rodriguez  [id number]  1950     Home health to see patient for:  Skilled Nursing care for assessment, interventions & education, Medical social work consult, Home health aide, Physical Therapy evaluation and treatment, and Occupational therapy evaluation and treatment    Skilled need for:  Exacerbation of Chronic Disease State   and Surgical Aftercare total knee replacement    Skilled nursing interventions to include:      Homebound status evidenced by:  Need the aid of supportive devices such as crutches, canes, wheelchairs or walkers or Needs the assistance of another person in order to leave the home. Leaving home requires a considerable and taxing effort. There is a normal inability to leave the home.    Community Physician to provide follow up care: Danae Pritchett M.D.     Optional Interventions? No      I certify the face to face encounter for this home health care referral meets the CMS requirements and the encounter/clinical assessment with the patient was, in whole, or in part, for the medical condition(s) listed above, which is the primary reason for home health care. Based on my clinical findings: the service(s) are medically necessary, support the need for home health care, and the homebound criteria are met.  I certify that this patient has had a face to face encounter by myself.  Juve Reis M.D. - NPI: [id number]

## 2022-12-19 NOTE — TELEPHONE ENCOUNTER
RPM Notification: Disconnect, Reconnect, and Patient Communication  Actions: Contacted Patient and Device Troubleshoot    Total time (minutes) spent communicating with patient: 2 min    Called Nishi to let her know she was disconnected. I had her open the prasad and and go to live dash. She was able to get reconnected.

## 2022-12-19 NOTE — ED NOTES
Pt assisted to restroom, ambulates well, gait steady  Pt sitting up in chair, states she doesn't feel like she needs 02 now, Sp02 91% after ambulating to restroom

## 2022-12-19 NOTE — DISCHARGE PLANNING
Agency/Facility Name: [facility]  Spoke To: Yesica  Outcome: DPA informed that SNF can take Pt today. DPA to discuss with RN CM and update SNF.     RN CM notified.     1025-  Agency/Facility Name: [facility]   Spoke To: Yesica  Outcome: DPA informed SNF that Pt no longer wants SNF, and is looking at HH instead, per RN NIXON.     1053-  Received Choice form at 1017  Agency/Facility Name: [facility]    Referral sent per Choice form @ 4521

## 2022-12-19 NOTE — ED NOTES
Pt stating if she does not get placement today at SNF, she would like to go home as she is only taking Tylenol for pain.  O2 decreased from 2L to L.  Will monitor O2 sats.

## 2022-12-19 NOTE — PROGRESS NOTES
"Hospital Medicine Daily Progress Note    Date of Service  12/19/2022    Chief Complaint  Melody Rodriguez is a 72 y.o. female pending SNF placement.    Hospital Course  As per chart review:  \"72 y.o. female with a past medical history of diabetes, hyperlipidemia, recent diagnosis of respiratory failure since her total right knee replacement who presented 12/17/2022 with progressively worsening generalized weakness after her right knee replacement surgery after spending few days at home with home health.   Hospitalist services were consulted to help manage patient medical problems pending bed availability at rehab.  Patient was noticed to be hypoxemic after surgery she was discharged on 3 L of oxygen nasal cannula.  CT angiography did not show evidence for pulmonary embolism.  Venous ultrasound did not show evidence for deep vein thrombosis.  Patient has not been able to take care of herself at home despite home health hence came to the emergency room.\"    Interval Problem Update  12/18: Patient seen at bedside this morning.  Patient laying in bed comfortably.  Patient still requiring supplemental oxygen.  The patient will require possible outpatient pulmonary consult.  Patient is pending skilled nursing facility placement.      12/19: Patient seen at bedside this morning. No overnight events reported. Pending placement. Appreciate the consult by the ER.  Patient is medically cleared for discharge.        Consultants/Specialty  We were consulted by ER, we are not primary team.    Code Status  Prior    Disposition  Patient is medically cleared for discharge.   Anticipate discharge to SNF, pending placement.    Review of Systems  Review of Systems   Constitutional:  Negative for chills and fever.   HENT:  Negative for hearing loss and nosebleeds.    Eyes:  Negative for blurred vision and double vision.   Respiratory:  Negative for cough and shortness of breath.    Cardiovascular:  Negative for chest pain and " palpitations.   Gastrointestinal:  Negative for abdominal pain, heartburn and vomiting.   Genitourinary:  Negative for dysuria and urgency.   Musculoskeletal:  Positive for joint pain. Negative for back pain and falls.   Skin:  Negative for itching and rash.   Neurological:  Negative for dizziness and headaches.   Psychiatric/Behavioral:  Negative for substance abuse. The patient is not nervous/anxious.    All other systems reviewed and are negative.     Physical Exam  Temp:  [36.6 °C (97.9 °F)] 36.6 °C (97.9 °F)  Pulse:  [68-84] 68  Resp:  [18] 18  BP: (112-150)/(55-78) 124/69  SpO2:  [93 %-99 %] 96 %    Physical Exam  Vitals and nursing note reviewed.   Constitutional:       General: She is not in acute distress.     Appearance: Normal appearance.   HENT:      Head: Normocephalic and atraumatic.      Right Ear: External ear normal.      Left Ear: External ear normal.      Mouth/Throat:      Pharynx: No oropharyngeal exudate or posterior oropharyngeal erythema.   Eyes:      General:         Right eye: No discharge.         Left eye: No discharge.   Cardiovascular:      Rate and Rhythm: Normal rate and regular rhythm.      Pulses: Normal pulses.      Heart sounds: No murmur heard.    No gallop.   Pulmonary:      Effort: Pulmonary effort is normal. No respiratory distress.      Breath sounds: Normal breath sounds. No wheezing or rhonchi.      Comments: On NC  Abdominal:      General: Bowel sounds are normal. There is no distension.      Palpations: Abdomen is soft.      Tenderness: There is no abdominal tenderness. There is no guarding.   Musculoskeletal:         General: Tenderness present. No swelling. Normal range of motion.      Cervical back: Normal range of motion and neck supple. No tenderness.      Comments: With lower extremity bandage.   Skin:     General: Skin is warm and dry.   Neurological:      General: No focal deficit present.      Mental Status: She is alert and oriented to person, place, and time.  Mental status is at baseline.      Motor: No weakness.   Psychiatric:         Mood and Affect: Mood normal.         Behavior: Behavior normal.       Fluids  No intake or output data in the 24 hours ending 12/19/22 0846    Laboratory  Recent Labs     12/17/22  1539   WBC 9.8   RBC 3.87*   HEMOGLOBIN 11.6*   HEMATOCRIT 36.4*   MCV 94.1   MCH 30.0   MCHC 31.9*   RDW 47.4   PLATELETCT 243   MPV 8.8*       Recent Labs     12/17/22  1539 12/19/22  0302   SODIUM 131* 134*   POTASSIUM 3.4* 4.6   CHLORIDE 95* 104   CO2 22 22   GLUCOSE 117* 97   BUN 18 14   CREATININE 0.97 0.70   CALCIUM 9.3 8.6                     Imaging  DX-CHEST-PORTABLE (1 VIEW)   Final Result      No acute cardiopulmonary abnormality identified.             Assessment/Plan  * Respiratory failure (HCC)- (present on admission)  Assessment & Plan  Patient reports complaining of shortness of breath and easy fatigability over the past 3-6 months  Noticed to be hypoxemic postoperatively  CT angiography was negative for pulmonary embolism, consolidation. CT/CTA Dec 15 No evidence pulmonary emboli. No airspace consolidation or pleural effusions.   Echo Dec 13 Normal left ventricular size and systolic function. No regional motion abnormality noted. Normal right ventricular size and systolic function. No hemodynamically significant valvular heart disease noted.  Recommend ruling out COVID-19/influenza  Oxygen as needed, Respiratory protocol, Bronchodilators, Incentive spirometry  Recommend pulmonaryreferral/consult      Hyponatremia  Assessment & Plan  Mild  monitor    Mixed hyperlipidemia- (present on admission)  Assessment & Plan  Resume home simvastatin    Total knee replacement status, right- (present on admission)  Assessment & Plan  Multimodal pain control  Physical and Occupational Therapy, will need rehab    Mood disorder (HCC)- (present on admission)  Assessment & Plan  Continue home medication    Multiple sclerosis (HCC)- (present on  admission)  Assessment & Plan  Continue home medication  Patient will require continued outpatient follow up    Controlled type 2 diabetes mellitus without complication, without long-term current use of insulin (HCC)- (present on admission)  Assessment & Plan  Resume home Empagliflozin-metFORMIN, ordered         VTE prophylaxis: Aspirin BID    I have performed a physical exam and reviewed and updated ROS and Plan today (12/19/2022). In review of yesterday's note (12/18/2022), there are no changes except as documented above.

## 2022-12-19 NOTE — ED NOTES
FIRST CONTACT WITH PT, PT AMB TO THE RESTROOM WOI AND IS NOW BACK IN BED.  PT WAS TOLD PLAN OF CARE.

## 2022-12-19 NOTE — ED NOTES
Pt requested to not have BP cuff on while she is sleeping; Pt asked by this RN to at least keep pulse ox on - Pt verbalized understanding;

## 2022-12-19 NOTE — ED NOTES
Patient ambulated with a steady gait to the restroom w/ FWW. Patient was accompanied by RN who rolled oxygen at 2L with patient. Patient provided blanket upon returning to bed O2 saturation at 90% upon return. Patient with no further needs at this time. Call light within reach.

## 2022-12-19 NOTE — DISCHARGE PLANNING
ATTN: Case Management  RE: Referral for Home Health    As of 12.19.22, we have accepted the Home Health referral for the patient listed above.    A [facility] clinician will be out to see the patient within 48 hours. If you have any questions or concerns regarding the patient’s transition to Home Health, please do not hesitate to contact us at [phone].      We look forward to collaborating with you,  [facility] Team

## 2022-12-19 NOTE — DISCHARGE PLANNING
Anticipated Discharge Disposition: Home with HH    Action: Voalte from Remote Monitoring team that she is alarming as low sat and they want to ensure that she is sent home with o2. Forwarded message to RN Luz Marina eden as well. ER CM met with pt at bedside and she is aware to stay on o2. Reviewed needs and encouraged to have son come  pt. She will keep o2 on at home and work with home team to wean    Barriers to Discharge: Son coming to get pt    Plan: No further needs

## 2022-12-19 NOTE — ED NOTES
Report received from NOC RN, POC discussed, walking rounds completed, pt resting, no s/s distress, call light within reach and will continue to monitor.

## 2022-12-19 NOTE — PROGRESS NOTES
Medication chart review for [facility] services    Received referral from [facility].   Medications reviewed  compared with discharge summary if available.    Current medication list per [facility]   No current facility-administered medications for this visit.    Current Outpatient Medications:     acetaminophen, 500-1,000 mg, Oral, Q6HRS PRN    docusate sodium, 100 mg, Oral, BID    aspirin EC, 81 mg, Oral, BID    oxyCODONE immediate-release, 5 mg, Oral, Q8HRS PRN    zolpidem, Take 1 tablet (5 mg) by mouth daily at bedtime as needed for insomnia    Synjardy XR, Take 1 tablet by mouth daily    lamotrigine, Take 1 tablet (200 mg) by mouth daily    paroxetine, 40 mg, Oral, DAILY    omeprazole, Take 1 capsule by mouth every morning, 30 minutes before breakfast.    simvastatin, 40 mg, Oral, Nightly    Aubagio, 14 mg, Oral, DAILY    buPROPion SR, Take 1 tablet by mouth every morning.    modafinil, 200 mg, Oral, QDAY PRN    Facility-Administered Medications Ordered in Other Visits:     lidocaine    acetaminophen    aspirin    buPROPion SR    docusate sodium    metFORMIN ER    lamotrigine    modafinil    omeprazole    oxyCODONE immediate-release    PARoxetine    simvastatin    zolpidem    Teriflunomide    0.9 % NaCl with KCl 40 mEq 1,000 mL    Location of hospital, and discharge summary date, if applicable:   Discharge from [facility] 12/15/2022, but currently in the emergency room department as of 12/19/2022.  Discharge medication summary, per discharge summary:    N/a    No Known Allergies    Labs     Lab Results   Component Value Date/Time    SODIUM 134 (L) 12/19/2022 03:02 AM    POTASSIUM 4.6 12/19/2022 03:02 AM    CHLORIDE 104 12/19/2022 03:02 AM    CO2 22 12/19/2022 03:02 AM    GLUCOSE 97 12/19/2022 03:02 AM    BUN 14 12/19/2022 03:02 AM    CREATININE 0.70 12/19/2022 03:02 AM     Lab Results   Component Value Date/Time    ALKPHOSPHAT 103 (H) 12/17/2022 03:39 PM    ASTSGOT 18 12/17/2022 03:39 PM     ALTSGPT 11 12/17/2022 03:39 PM    TBILIRUBIN 0.9 12/17/2022 03:39 PM    ALBUMIN 4.1 12/17/2022 03:39 PM        Assessment for clinically significant drug interactions, drug omissions/additions, duplicative therapies.            CC   Danae Pritchett M.D.  [address]  [place] [facility] [facility] [address]  Fax: [phone]    [facility]  Phone [phone] fax [phone]    This note was created using voice recognition software (Dragon). The accuracy of the dictation is limited by the abilities of the software. I have reviewed the note prior to signing, however some errors in grammar and context are still possible. If you have any questions related to this note please do not hesitate to contact our office.

## 2022-12-19 NOTE — DISCHARGE PLANNING
Anticipated Discharge Disposition: Home with Home health    Action: Seen by OT this am . Did well. She does not want to go to SNF anymore. She feels her pain is in control now. She was interested in Rehab. Latia Roman for Rehab evaluated. Too high functioning for Rehab. SNF would have likely been the same. Home health is what she wants now. HH was already on service with [facility] , new orders done tho and sent Choice form for [facility] done and faxed to [facility].     Barriers to Discharge: None. Son transport home or Uber per her choice    Plan: Will cont to follow for HH acceptance and transport

## 2022-12-19 NOTE — ED NOTES
Pt discharged to son, on 02, reviewed all discharge instructions including follow up, pt verbalizes understanding, and denies questions.   Escorted to lobby. No belongings left in room.

## 2022-12-19 NOTE — ED NOTES
Pt medicated for Pn, given some water; Pt denied having any other needs at this time, no distress noted;

## 2022-12-19 NOTE — ED NOTES
Pt disconnected to use restroom by this RN, Erasmo Rocha took Pt to and from restroom - Pt used her walker;

## 2022-12-19 NOTE — ED NOTES
Medicated with scheduled meds, breakfast tray provided, new wash clothes provided, pt has own toothbrush and toothpaste at bedside.

## 2022-12-19 NOTE — TELEPHONE ENCOUNTER
RPM Notification: High Alert and Patient Communication  Actions: Contacted Patient and Escalated to RN    Total time (minutes) spent communicating with patient: 15    Alerted at 84% 1321  Called Nishi. She stated the RN took her oxygen off. That she would try to get her back in the room to place back on.  Nishi is at  RM 4 in the ER. She had been in the ER since 12/17. She should be discharging today. Per out RN Sup Nishi needs to be placed back on oxygen and ensure she goes home with it.    I reached out to bedside RN Luz Marina. Via voalte. No response.  Called  ER. Unit clerk answered. I asked to talk to charge RN. Stated he was busy. I asked that she please let the RN for RM 4 know they need oxygen back on. That the pt is on our [facility] Program. She is alerting. Pt states RN took oxygen off. To please place back on and ensure to send home with oxygen.   RN sup or RPM reached out to  for the pt as they were the only one connected to the pt. To please make sure pt has oxygen

## 2022-12-19 NOTE — CASE COMMUNICATION
"noted  ----- Message -----  From: Alex Miller R.N.  Sent: 12/19/2022  12:05 AM PST  To: Kimberly Dugan R.N., Kimberly Schmitz, PT, *      Initial Nursing Evaluation  Patient in bed upon SN arrival, reports in severe pain. Patient report 7 to 9 out of 10 \"muscle spasm\" to right lower back, right hip to right knee. Patient is unable to perform ADLs due to pain and hypoxia. Friend Ferdinand is providing 24/7 care however she is returning to her home [facility] on Sunday 12/18 and patient will have no caregiver after that. Ferdinand tells SN that despite using supplement oxygen at home, patient's SpO2 still drops randomly throughout the night and with exertion. Patient needs higher level of care and would like to go to a skilled or rehab facility. She will have Ferdinand drive her to the ER after this nursing visit. Report given to supervisor on-call.     Patient is under hospital observat ion per chart as of 12/17/22.   "

## 2022-12-19 NOTE — CASE COMMUNICATION
noted  ----- Message -----  From: Alex Miller R.N.  Sent: 12/19/2022  12:05 AM PST  To: Kimberly Dugan R.N., Chen Gil R.N., *      Please get insurance authorization for 1w1 SNV. Thank you.

## 2022-12-20 ENCOUNTER — TELEPHONE (OUTPATIENT)
Dept: OTHER | Facility: MEDICAL CENTER | Age: 72
End: 2022-12-20

## 2022-12-20 ENCOUNTER — HOME CARE VISIT (OUTPATIENT)
Dept: HOME HEALTH SERVICES | Facility: HOME HEALTHCARE | Age: 72
End: 2022-12-20
Payer: MEDICARE

## 2022-12-20 ENCOUNTER — OFFICE VISIT (OUTPATIENT)
Dept: MEDICAL GROUP | Facility: MEDICAL CENTER | Age: 72
End: 2022-12-20
Payer: MEDICARE

## 2022-12-20 VITALS
OXYGEN SATURATION: 99 % | TEMPERATURE: 96.5 F | SYSTOLIC BLOOD PRESSURE: 126 MMHG | HEIGHT: 60 IN | HEART RATE: 95 BPM | BODY MASS INDEX: 26.84 KG/M2 | RESPIRATION RATE: 16 BRPM | DIASTOLIC BLOOD PRESSURE: 70 MMHG | WEIGHT: 136.69 LBS

## 2022-12-20 DIAGNOSIS — Z09 HOSPITAL DISCHARGE FOLLOW-UP: ICD-10-CM

## 2022-12-20 DIAGNOSIS — J96.01 ACUTE HYPOXEMIC RESPIRATORY FAILURE (HCC): ICD-10-CM

## 2022-12-20 PROCEDURE — 99214 OFFICE O/P EST MOD 30 MIN: CPT | Performed by: STUDENT IN AN ORGANIZED HEALTH CARE EDUCATION/TRAINING PROGRAM

## 2022-12-20 ASSESSMENT — FIBROSIS 4 INDEX: FIB4 SCORE: 1.608060504414739321

## 2022-12-20 NOTE — PROGRESS NOTES
Subjective:     CC: Hospital follow-up for acute respiratory failure after surgery    HPI:   Melody presents today with follow-up for acute respiratory failure after surgery.  She recently had a knee replacement.  She went home and then returned to the hospital due to respiratory failure.  She was found to need 3 L of O2.  Without the oxygen at rest she dropped as low as 84, but is sometimes fine without oxygen.  There has not been a clear pattern.  She did try to walk prior to leaving the hospital this last time and was unable to walk without dropping into the 70s.  She has been using 3 L consistently without issue.  She was seen in the emergency department for possible skilled nursing facility placement.  She was cleared by both physical therapy and Occupational Therapy to go home with home care.  She has home care nurse and PT scheduled for tomorrow.    ROS:  ROS    Objective:     Exam:  /70 (BP Location: Left arm, Patient Position: Sitting, BP Cuff Size: Adult)   Pulse 95   Temp 35.8 °C (96.5 °F) (Temporal)   Resp 16   Ht 1.524 m (5')   Wt 62 kg (136 lb 11 oz)   SpO2 99%   BMI 26.69 kg/m²  Body mass index is 26.69 kg/m².    Physical Exam  Vitals reviewed.   Constitutional:       General: She is not in acute distress.     Appearance: She is normal weight. She is not toxic-appearing.   HENT:      Head: Normocephalic and atraumatic.      Right Ear: External ear normal.      Left Ear: External ear normal.      Nose: Nose normal. No congestion.   Eyes:      General:         Right eye: No discharge.         Left eye: No discharge.      Extraocular Movements: Extraocular movements intact.      Conjunctiva/sclera: Conjunctivae normal.   Cardiovascular:      Rate and Rhythm: Normal rate and regular rhythm.      Pulses: Normal pulses.      Heart sounds: Normal heart sounds. No murmur heard.  Pulmonary:      Effort: Pulmonary effort is normal. No respiratory distress.      Breath sounds: Normal breath  sounds.   Abdominal:      General: Abdomen is flat. Bowel sounds are normal. There is no distension.      Palpations: Abdomen is soft.      Tenderness: There is no abdominal tenderness.   Musculoskeletal:         General: Normal range of motion.   Skin:     General: Skin is warm and dry.      Capillary Refill: Capillary refill takes less than 2 seconds.   Neurological:      Mental Status: She is alert.   Psychiatric:         Mood and Affect: Mood normal.         Behavior: Behavior normal.         Thought Content: Thought content normal.         Judgment: Judgment normal.         Assessment & Plan:     72 y.o. female with the following -     1. Hospital discharge follow-up  Notes from hospital reviewed, pain controlled today, home care nursing and physical therapy in place    2. Acute hypoxemic respiratory failure (HCC)  Unclear source of respiratory failure.  Normal CTA, normal echo within the last several days.  Unable to wean down oxygen today as she has such erratic oxygen readings, some as low as 84 at rest on room air.  Portable oxygen concentrator ordered.  Doing paperwork completed.  Follow-up with primary care in 1 month to possibly wean O2.  She does have a appointment coming up with a pulmonologist to help as well.  - DME Portable Oxygen Concentrator      HCC Gap Form    Last edited 12/20/22 14:51 PST by Colleen Kelley M.D.         Return in about 4 weeks (around 1/17/2023) for Hypoxic respiratory failure, with PCP.    Please note that this dictation was created using voice recognition software. I have made every reasonable attempt to correct obvious errors, but I expect that there are errors of grammar and possibly content that I did not discover before finalizing the note.

## 2022-12-20 NOTE — CASE COMMUNICATION
Appt made this AM, then called back to report her MD appt had been moved to much earlier in day. Agreed to have visit rescheduled to later in week.

## 2022-12-21 ENCOUNTER — HOME CARE VISIT (OUTPATIENT)
Dept: HOME HEALTH SERVICES | Facility: HOME HEALTHCARE | Age: 72
End: 2022-12-21
Payer: MEDICARE

## 2022-12-21 PROCEDURE — G0151 HHCP-SERV OF PT,EA 15 MIN: HCPCS

## 2022-12-21 NOTE — TELEPHONE ENCOUNTER
RPM Notification: Disconnect  Actions: Device Troubleshoot    Total time (minutes) spent communicating with patient: 6    Patient became disconnected at 1983. Walked patient through troubleshooting device and changing out sensor. Pt reconnected at 2000, vitals are stable and will continue to monitor.

## 2022-12-21 NOTE — TELEPHONE ENCOUNTER
RPM Notification: Disconnect and Patient Communication  Actions: Contacted Patient    Total time (minutes) spent communicating with patient: 2      Pt disconnected and was unaware. Pt reconnected her bluetooth and it worked

## 2022-12-22 ENCOUNTER — HOME CARE VISIT (OUTPATIENT)
Dept: HOME HEALTH SERVICES | Facility: HOME HEALTHCARE | Age: 72
End: 2022-12-22
Payer: MEDICARE

## 2022-12-22 ENCOUNTER — TELEPHONE (OUTPATIENT)
Dept: OTHER | Facility: MEDICAL CENTER | Age: 72
End: 2022-12-22

## 2022-12-22 VITALS — RESPIRATION RATE: 22 BRPM | HEART RATE: 74 BPM | OXYGEN SATURATION: 98 %

## 2022-12-22 PROCEDURE — G0299 HHS/HOSPICE OF RN EA 15 MIN: HCPCS

## 2022-12-22 NOTE — Clinical Note
SN Visit on 12/22/22    Patient answers door when SN arrived. Ambulates without DME and gait steady. Temporal temp 96.9 F. Rechecked at the end of this visit and no change. Pt denies any chills or shivering. Reports feeling much better since home from the hospital. Right knee incision JAYDE without s/sx of infection. Wound photo taken and sent to chart. Uses 2 to 3 L of supplement oxygen continuous and denies any s/sx of respiratory distress. Has upcoming appt with surgeon on 12/30/22 per pt. Oxygen safety, pain management, fall prevention and infection prevention reviewed with pt . Pt voices understanding. Weekly f/u SNV scheduled per pt's preference.

## 2022-12-22 NOTE — TELEPHONE ENCOUNTER
RPM Notification: Reconnect  Actions: Contacted Patient    Total time (minutes) spent communicating with patient: 3    Patient had called at 1106 to let me know she was going to disconnect to shower. At 1146 she called back to say she had her monitor back on. Her stats didn't come back up on my board, so I called her back and had her open her prasad. We turned her bluetooth off and back on and her stats came back on the screen. Her SpO2 is at 93%

## 2022-12-23 ENCOUNTER — HOME CARE VISIT (OUTPATIENT)
Dept: HOME HEALTH SERVICES | Facility: HOME HEALTHCARE | Age: 72
End: 2022-12-23
Payer: MEDICARE

## 2022-12-23 PROCEDURE — G0151 HHCP-SERV OF PT,EA 15 MIN: HCPCS

## 2022-12-24 VITALS
RESPIRATION RATE: 18 BRPM | DIASTOLIC BLOOD PRESSURE: 72 MMHG | SYSTOLIC BLOOD PRESSURE: 140 MMHG | OXYGEN SATURATION: 96 % | TEMPERATURE: 96.9 F | HEART RATE: 88 BPM

## 2022-12-24 ASSESSMENT — ENCOUNTER SYMPTOMS
PAIN LOCATION - EXACERBATING FACTORS: PHYSICAL ACTIVITY
PAIN SEVERITY GOAL: 0/10
SUBJECTIVE PAIN PROGRESSION: GRADUALLY IMPROVING
PAIN LOCATION: RIGHT KNEE
DYSPNEA ON EXERTION: 1
VOMITING: NO
HIGHEST PAIN SEVERITY IN PAST 24 HOURS: 2/10
PAIN LOCATION - PAIN FREQUENCY: INTERMITTENT
LIMITED RANGE OF MOTION: 1
NAUSEA: NO
CHEST TIGHTNESS: 1
PAIN: 1
PERSON REPORTING PAIN: PATIENT
PAIN LOCATION - PAIN QUALITY: ACHY, SORE
PAIN LOCATION - PAIN DURATION: WITH ACTIVITY
PAIN LOCATION - PAIN SEVERITY: 2/10
LOWEST PAIN SEVERITY IN PAST 24 HOURS: 0/10

## 2022-12-25 ENCOUNTER — TELEPHONE (OUTPATIENT)
Dept: OTHER | Facility: MEDICAL CENTER | Age: 72
End: 2022-12-25
Payer: MEDICARE

## 2022-12-25 VITALS
HEART RATE: 86 BPM | TEMPERATURE: 97.5 F | OXYGEN SATURATION: 97 % | SYSTOLIC BLOOD PRESSURE: 130 MMHG | RESPIRATION RATE: 18 BRPM | DIASTOLIC BLOOD PRESSURE: 80 MMHG

## 2022-12-25 VITALS — RESPIRATION RATE: 20 BRPM | HEART RATE: 92 BPM | OXYGEN SATURATION: 91 %

## 2022-12-25 VITALS
RESPIRATION RATE: 16 BRPM | TEMPERATURE: 97.8 F | SYSTOLIC BLOOD PRESSURE: 125 MMHG | DIASTOLIC BLOOD PRESSURE: 65 MMHG | OXYGEN SATURATION: 96 % | HEART RATE: 86 BPM

## 2022-12-25 ASSESSMENT — ENCOUNTER SYMPTOMS
HIGHEST PAIN SEVERITY IN PAST 24 HOURS: 4/10
PERSON REPORTING PAIN: PATIENT
PAIN: 1
PAIN LOCATION: RIGHT KNEE
PAIN LOCATION: RIGHT KNEE
PAIN LOCATION - PAIN SEVERITY: 2/10
PAIN LOCATION - PAIN QUALITY: DULL ACHE
PAIN LOCATION - PAIN QUALITY: DULL ACHE
LOWEST PAIN SEVERITY IN PAST 24 HOURS: 0/10
PAIN SEVERITY GOAL: 1/10
PERSON REPORTING PAIN: PATIENT
HIGHEST PAIN SEVERITY IN PAST 24 HOURS: 2/10
PAIN LOCATION - PAIN SEVERITY: 4/10
PAIN SEVERITY GOAL: 0/10
LOWEST PAIN SEVERITY IN PAST 24 HOURS: 3/10
PAIN LOCATION - PAIN FREQUENCY: WITH ACTIVITY
PAIN LOCATION - PAIN FREQUENCY: CONSTANT
PAIN: 1

## 2022-12-25 NOTE — TELEPHONE ENCOUNTER
RPM Notification: Patient Communication  Actions: None    Total time (minutes) spent communicating with patient: 1      At 0747 jorge a called and ask if she is reconnected because she put a new senor on after shower. I let her know that yes she is reconnected and I can see her vitals.

## 2022-12-25 NOTE — TELEPHONE ENCOUNTER
RPM Notification: Disconnect  Actions: None    Total time (minutes) spent communicating with patient: 1      Pt called at 0722 to let me know that she is going to disconnect to shower.

## 2022-12-26 ENCOUNTER — HOME CARE VISIT (OUTPATIENT)
Dept: HOME HEALTH SERVICES | Facility: HOME HEALTHCARE | Age: 72
End: 2022-12-26
Payer: MEDICARE

## 2022-12-26 ENCOUNTER — TELEPHONE (OUTPATIENT)
Dept: OTHER | Facility: MEDICAL CENTER | Age: 72
End: 2022-12-26
Payer: MEDICARE

## 2022-12-26 VITALS — OXYGEN SATURATION: 96 % | HEART RATE: 78 BPM | RESPIRATION RATE: 26 BRPM

## 2022-12-26 NOTE — TELEPHONE ENCOUNTER
RPM Notification: Disconnect  Actions: Contacted Patient    Total time (minutes) spent communicating with patient: 3    Patient disconnected at : 08:21  Patient contacted at: 08:22  Patient reconnected at: 8:26am    Patient was contacted about a disconnect. Patient removed chip and placed it back in to the wrist band. Patient was able to reconnect.       Patient's vitals remain stable and within range

## 2022-12-26 NOTE — CASE COMMUNICATION
noted  ----- Message -----  From: Alex Miller R.N.  Sent: 12/25/2022   2:00 PM PST  To: Kimberly Dugan R.N., Chen Gil R.N., *      SN Visit on 12/22/22    Patient answers door when SN arrived. Ambulates without DME and gait steady. Temporal temp 96.9 F. Rechecked at the end of this visit and no change. Pt denies any chills or shivering. Reports feeling much better since home from the hospital. Right knee incision JAYDE without s/sx  of infection. Wound photo taken and sent to chart. Uses 2 to 3 L of supplement oxygen continuous and denies any s/sx of respiratory distress. Has upcoming appt with surgeon on 12/30/22 per pt. Oxygen safety, pain management, fall prevention and infection prevention reviewed with pt . Pt voices understanding. Weekly f/u SNV scheduled per pt's preference.

## 2022-12-26 NOTE — CASE COMMUNICATION
noted  ----- Message -----  From: Meghan Burroughs, PT  Sent: 12/25/2022   8:17 AM PST  To: Kimberly Dugan R.N.      Missed today's visit, patient was in the ED; will schedule upon dc.

## 2022-12-27 ENCOUNTER — HOME CARE VISIT (OUTPATIENT)
Dept: HOME HEALTH SERVICES | Facility: HOME HEALTHCARE | Age: 72
End: 2022-12-27
Payer: MEDICARE

## 2022-12-27 VITALS — SYSTOLIC BLOOD PRESSURE: 130 MMHG | DIASTOLIC BLOOD PRESSURE: 68 MMHG

## 2022-12-27 VITALS — HEART RATE: 80 BPM | OXYGEN SATURATION: 99 % | TEMPERATURE: 96.8 F

## 2022-12-27 PROCEDURE — G0151 HHCP-SERV OF PT,EA 15 MIN: HCPCS

## 2022-12-27 PROCEDURE — RXMED WILLOW AMBULATORY MEDICATION CHARGE: Performed by: PSYCHIATRY & NEUROLOGY

## 2022-12-27 PROCEDURE — G0495 RN CARE TRAIN/EDU IN HH: HCPCS

## 2022-12-27 ASSESSMENT — ENCOUNTER SYMPTOMS
PAIN LOCATION - PAIN DURATION: ONGOING
SHORTNESS OF BREATH: 1
SUBJECTIVE PAIN PROGRESSION: UNCHANGED
STOOL FREQUENCY: DAILY
BOWEL PATTERN NORMAL: 1
LIMITED RANGE OF MOTION: 1
PAIN LOCATION - RELIEVING FACTORS: TYLENOL
LOWER EXTREMITY EDEMA: 1
PAIN LOCATION - PAIN QUALITY: ACHE
LAST BOWEL MOVEMENT: 66470
SEVERE DYSPNEA: 1
PERSON REPORTING PAIN: PATIENT
PAIN: 1
PAIN LOCATION - PAIN FREQUENCY: FREQUENT
PAIN LOCATION - EXACERBATING FACTORS: WALK
PAIN SEVERITY GOAL: 0/10
DYSPNEA ACTIVITY LEVEL: AFTER AMBULATING MORE THAN 20 FT
HIGHEST PAIN SEVERITY IN PAST 24 HOURS: 6/10
PAIN LOCATION - PAIN SEVERITY: 4/10
LOWEST PAIN SEVERITY IN PAST 24 HOURS: 0/10

## 2022-12-28 ENCOUNTER — HOME CARE VISIT (OUTPATIENT)
Dept: HOME HEALTH SERVICES | Facility: HOME HEALTHCARE | Age: 72
End: 2022-12-28
Payer: MEDICARE

## 2022-12-28 ENCOUNTER — TELEPHONE (OUTPATIENT)
Dept: OTHER | Facility: MEDICAL CENTER | Age: 72
End: 2022-12-28
Payer: MEDICARE

## 2022-12-28 PROCEDURE — G0155 HHCP-SVS OF CSW,EA 15 MIN: HCPCS

## 2022-12-28 SDOH — ECONOMIC STABILITY: HOUSING INSECURITY: EVIDENCE OF SMOKING MATERIAL: 0

## 2022-12-28 SDOH — ECONOMIC STABILITY: HOUSING INSECURITY: HOME SAFETY: PATIENT NOW USING PORTABLE OXYGEN AS CATS HAVE CHEWED ON LONG OXYGEN TUBING

## 2022-12-28 ASSESSMENT — ACTIVITIES OF DAILY LIVING (ADL)
AMBULATION_DISTANCE/DURATION_TOLERATED: 60 FT
TRANSPORTATION COMMENTS: PT REQUIRES ASSIST AND ASSISTIVE DEVICE TO LEAVE HOME; FALL RISK
AMBULATION_REQUIRES_ASSISTANCE: 1
AMBULATION ASSISTANCE ON FLAT SURFACES: 1

## 2022-12-28 ASSESSMENT — ENCOUNTER SYMPTOMS
PAIN: 1
DEBILITATING PAIN: 1
DEPRESSED MOOD: 1
PERSON REPORTING PAIN: PATIENT

## 2022-12-29 ENCOUNTER — HOME CARE VISIT (OUTPATIENT)
Dept: HOME HEALTH SERVICES | Facility: HOME HEALTHCARE | Age: 72
End: 2022-12-29
Payer: MEDICARE

## 2022-12-29 ENCOUNTER — TELEPHONE (OUTPATIENT)
Dept: OTHER | Facility: MEDICAL CENTER | Age: 72
End: 2022-12-29
Payer: MEDICARE

## 2022-12-29 ENCOUNTER — PHARMACY VISIT (OUTPATIENT)
Dept: PHARMACY | Facility: MEDICAL CENTER | Age: 72
End: 2022-12-29
Payer: COMMERCIAL

## 2022-12-29 ENCOUNTER — PATIENT MESSAGE (OUTPATIENT)
Dept: CARDIOLOGY | Facility: MEDICAL CENTER | Age: 72
End: 2022-12-29
Payer: MEDICARE

## 2022-12-29 NOTE — TELEPHONE ENCOUNTER
RPM Notification: Disconnect  Actions: Contacted Patient    Total time (minutes) spent communicating with patient: 2    Pt became disconnected at  1604 . Contacted patient and patient reported she was doing well. She stated her light was flashing yellow. I had her take off the battery and put it back on and she did. She stated that now it is flashing red. She said that right now she could not change battery because she was with her son but will be having someone help her get one for her. I told her we would check on her in 2 hrs if she did not reconnect. I let Jihan GUIDO know.     
Unable to answer due to medical condition/unresponsive/etc...

## 2022-12-29 NOTE — CASE COMMUNICATION
I agree with changes.  ----- Message -----  From: Ai Jacobson R.N.  Sent: 12/29/2022  11:12 AM PST  To: Kimberly Schmitz PT      Quality Review for SOC OASIS by MAIKEL Jacobson, RN on  December 29, 2022        Edits completed by MAIKEL Jacobson, RN:  1.  and  diagnosis coding updated per chart review.  2.  checked multiple ED visits per chart review  3. Per narrative that patient needs CGA and a walker for safe ambula tion, [id number] I is 4

## 2022-12-29 NOTE — CASE COMMUNICATION
Quality Review for SOC OASIS by MAIKEL Jacobson RN on  December 29, 2022        Edits completed by MAIKEL Jacobson RN:  1.  and  diagnosis coding updated per chart review.  2.  checked multiple ED visits per chart review  3. Per narrative that patient needs CGA and a walker for safe ambulation, [id number] I is 4

## 2022-12-29 NOTE — TELEPHONE ENCOUNTER
RPM Notification: Disconnect and Patient Communication  Actions: Patient Contacted RTOC and Patient in Shower    Total time (minutes) spent communicating with patient: 1 min    Nishi called to let us know she was disconnected to take a shower. Will let us know when done

## 2022-12-30 ENCOUNTER — TELEPHONE (OUTPATIENT)
Dept: OTHER | Facility: MEDICAL CENTER | Age: 72
End: 2022-12-30
Payer: MEDICARE

## 2022-12-30 ENCOUNTER — HOME CARE VISIT (OUTPATIENT)
Dept: HOME HEALTH SERVICES | Facility: HOME HEALTHCARE | Age: 72
End: 2022-12-30
Payer: MEDICARE

## 2022-12-30 VITALS — OXYGEN SATURATION: 97 % | HEART RATE: 110 BPM

## 2022-12-30 DIAGNOSIS — R09.02 POSTOPERATIVE HYPOXIA: ICD-10-CM

## 2022-12-30 DIAGNOSIS — Z98.890 POSTOPERATIVE HYPOXIA: ICD-10-CM

## 2022-12-30 PROBLEM — R19.09 GROIN LUMP: Status: RESOLVED | Noted: 2022-06-09 | Resolved: 2022-12-30

## 2022-12-30 PROBLEM — J96.01 ACUTE HYPOXEMIC RESPIRATORY FAILURE (HCC): Status: RESOLVED | Noted: 2022-12-15 | Resolved: 2022-12-30

## 2022-12-30 PROCEDURE — G0151 HHCP-SERV OF PT,EA 15 MIN: HCPCS

## 2022-12-30 NOTE — TELEPHONE ENCOUNTER
RPM Notification: Disconnect  Actions: Contacted Patient    Total time (minutes) spent communicating with patient: 3 Min        Nishi became disconnected. Contacted pt who stated she is okay and was able to get reconnected.

## 2022-12-31 ENCOUNTER — HOME CARE VISIT (OUTPATIENT)
Dept: HOME HEALTH SERVICES | Facility: HOME HEALTHCARE | Age: 72
End: 2022-12-31
Payer: MEDICARE

## 2022-12-31 ASSESSMENT — ACTIVITIES OF DAILY LIVING (ADL)
HOME_HEALTH_OASIS: 00
OASIS_M1830: 01

## 2022-12-31 NOTE — Clinical Note
Patient discharged from agency effective 12/30/2022, as per patient request. Patient will be going to outpatient physical therapy.

## 2023-01-02 ENCOUNTER — HOME CARE VISIT (OUTPATIENT)
Dept: HOME HEALTH SERVICES | Facility: HOME HEALTHCARE | Age: 73
End: 2023-01-02
Payer: MEDICARE

## 2023-01-02 ENCOUNTER — TELEPHONE (OUTPATIENT)
Dept: OTHER | Facility: MEDICAL CENTER | Age: 73
End: 2023-01-02
Payer: MEDICARE

## 2023-01-02 VITALS — OXYGEN SATURATION: 98 % | HEART RATE: 81 BPM | RESPIRATION RATE: 21 BRPM

## 2023-01-02 PROCEDURE — G0180 MD CERTIFICATION HHA PATIENT: HCPCS | Performed by: INTERNAL MEDICINE

## 2023-01-02 NOTE — CASE COMMUNICATION
Quality Review for DC OASIS by MAIKEL Jacobson, RN on  January 2, 2023       Edits completed by MAIKEL Jacobson RN:  1.  C and E are yes per the POC  2. Changed  to 1, per new guidance option #2 should only be chosen if the pt will be on service with other HHA.

## 2023-01-02 NOTE — TELEPHONE ENCOUNTER
RPM Notification: Disconnect and Reconnect  Actions: Contacted Patient    Total time (minutes) spent communicating with patient: 0      At 1430 Disconnect. Contacted pt and no answer. Left pt a voicemail about being disconnected and if she could please reconnect for us. Stated if she needs any assistance to let us know and we are more than happy to help with that.  At 1435 Reconnect. Pt was able to reconnect back to the monitor.

## 2023-01-02 NOTE — CASE COMMUNICATION
noted  ----- Message -----  From: Kimberly Schmitz, PT  Sent: 12/31/2022  10:06 AM PST  To: Kimberly Dugan R.N., Chen Gil R.N., *      Patient discharged from agency effective 12/30/2022, as per patient request. Patient will be going to outpatient physical therapy.

## 2023-01-03 ENCOUNTER — TELEPHONE (OUTPATIENT)
Dept: OTHER | Facility: MEDICAL CENTER | Age: 73
End: 2023-01-03

## 2023-01-03 VITALS — RESPIRATION RATE: 19 BRPM | OXYGEN SATURATION: 98 % | HEART RATE: 83 BPM

## 2023-01-03 NOTE — CASE COMMUNICATION
I agree with changes.  ----- Message -----  From: Ai Jacobson R.N.  Sent: 1/2/2023   2:23 PM PST  To: Kimberly Schmitz, PT      Quality Review for DC OASIS by MAIKEL Jacobson, RN on  January 2, 2023       Edits completed by MAIKEL Jacobson, RN:  1.  C and E are yes per the POC  2. Changed  to 1, per new guidance option #2 should only be chosen if the pt will be on service with other HHA.

## 2023-01-04 ENCOUNTER — TELEPHONE (OUTPATIENT)
Dept: OTHER | Facility: MEDICAL CENTER | Age: 73
End: 2023-01-04
Payer: MEDICARE

## 2023-01-04 VITALS — HEART RATE: 109 BPM | RESPIRATION RATE: 17 BRPM | OXYGEN SATURATION: 98 %

## 2023-01-04 VITALS — OXYGEN SATURATION: 99 % | RESPIRATION RATE: 16 BRPM | HEART RATE: 78 BPM

## 2023-01-04 NOTE — TELEPHONE ENCOUNTER
RPM Notification: Reconnect  Actions: Contacted Patient    Total time (minutes) spent communicating with patient: 1    Patient let me know she reconnected. She reconnected at 0908 w/ a SpO2 of 98%.

## 2023-01-04 NOTE — TELEPHONE ENCOUNTER
RPM Notification: Disconnect  Actions: Contacted Patient    Total time (minutes) spent communicating with patient: 2    Pt became disconnected at  0726 . Contacted patient and patient reported she was doing well her light was flashing green. She stated she would reconnect to [employer]. She also stated she will be starting  physical therapy and it finishes until 9am. She said just in case she disconnects she will not be able to answer when she is doing physical therapy. I thanked her for letting me know. She connected back to [employer] and had a solid blue light but we were not getting her numbers. She had a solid blue light. I asked her to turn off her phone and put it back on and then log in to the [employer] prasad. She was still not able to reconnect. She was about to start he physical therapy so I told her we could try to reconnect once she is done with physical therapy and she gets home. She stated she would call me once she was home. I told her that was fine but if I did not hear back from her I would call her in about 2hrs or so. She agreed with plan.

## 2023-01-05 ENCOUNTER — TELEPHONE (OUTPATIENT)
Dept: OTHER | Facility: MEDICAL CENTER | Age: 73
End: 2023-01-05
Payer: MEDICARE

## 2023-01-05 VITALS — HEART RATE: 87 BPM | OXYGEN SATURATION: 97 % | RESPIRATION RATE: 15 BRPM

## 2023-01-05 VITALS — HEART RATE: 129 BPM | OXYGEN SATURATION: 96 %

## 2023-01-05 NOTE — TELEPHONE ENCOUNTER
RPM Notification: Reconnect  Actions: None    Total time (minutes) spent communicating with patient: 0    At 1222 Reconnect. Pt has reconnected back to the monitor. Pt disconnected at 1153 to shower, pt was off the monitor for about 31 minutes.

## 2023-01-05 NOTE — TELEPHONE ENCOUNTER
RPM Notification: Disconnect  Actions: Patient Contacted RTOC    Total time (minutes) spent communicating with patient: 1    At 1145 Pt contacted us to let us know that she was going to take the monitor off to shower. At 1153 Disconnect. Pt has disconnected from the monitor.

## 2023-01-05 NOTE — TELEPHONE ENCOUNTER
RPM Notification: Disconnect and Reconnect  Actions: Contacted Patient    Total time (minutes) spent communicating with patient: 3     Patient disconnected 1927 and fell off at 1957. Called patient and informed her that she was disconnected. Asked patient if she was feeling alright and she said she was fine. Patient quickly reconnected at 1959 with an SpO2 of 99% with no issue.

## 2023-01-06 ENCOUNTER — PATIENT OUTREACH (OUTPATIENT)
Dept: HEALTH INFORMATION MANAGEMENT | Facility: OTHER | Age: 73
End: 2023-01-06
Payer: MEDICARE

## 2023-01-06 DIAGNOSIS — E11.9 TYPE 2 DIABETES MELLITUS WITHOUT COMPLICATION, WITHOUT LONG-TERM CURRENT USE OF INSULIN (HCC): ICD-10-CM

## 2023-01-06 DIAGNOSIS — E11.9 CONTROLLED TYPE 2 DIABETES MELLITUS WITHOUT COMPLICATION, WITHOUT LONG-TERM CURRENT USE OF INSULIN (HCC): ICD-10-CM

## 2023-01-06 PROCEDURE — 99999 PR NO CHARGE: CPT | Performed by: INTERNAL MEDICINE

## 2023-01-06 PROCEDURE — RXMED WILLOW AMBULATORY MEDICATION CHARGE: Performed by: STUDENT IN AN ORGANIZED HEALTH CARE EDUCATION/TRAINING PROGRAM

## 2023-01-06 NOTE — PROGRESS NOTES
Nurse NIXON outreach call for monthly CCM assessment. Nurse spoke to patient and reviewed care plan. Pt is no longer following with home care.  Pt reports her knee is improving. States her pain is in good control and she has been released to resume driving. Pt states she did have to go back to the hospital on two separate occasions for pain management related to knee surgery. Pt states she is still needing to wear oxygen. Pt is on remote monitoring program for oxygen. States she is currently wearing oxygen at 2 liters. Reports she did have recent visit with pulmonary provider and is waiting to get scheduled for 6 minute walking test. States she is scheduled for PFT's in February. Pt reports she feels better on oxygen and has less fatigue and feels less foggy. Pt would like to know next steps for determining cause of her respiratory problems. Pt will continue to follow with pulmonary for recommendations and follow-up. Pt reports she has also been following with cardiology. Pt is scheduled to see her PCP at the end of January and will discuss next steps for her follow-up regarding respiratory problems. Pt reports she has all of her medications. Reports no falls over the past month. Plan:  nurse NIXON will continue to follow-up monthly in CCM program. Pt will contact nurse NIXON if needing assist with care coordination. Pt will continue to follow with specialists. Pt denies needing any additional resources at this time.

## 2023-01-07 ENCOUNTER — TELEPHONE (OUTPATIENT)
Dept: OTHER | Facility: MEDICAL CENTER | Age: 73
End: 2023-01-07
Payer: MEDICARE

## 2023-01-07 VITALS — RESPIRATION RATE: 13 BRPM | HEART RATE: 79 BPM | OXYGEN SATURATION: 98 %

## 2023-01-07 NOTE — TELEPHONE ENCOUNTER
RPM Notification: Disconnect and Reconnect  Actions: Contacted Patient    Total time (minutes) spent communicating with patient: 2     Patient disconnected at 2335 and fell off at 0005. Called patient and asked patient to restart her prasad. Patient reconnected at 0010 with an SpO2 of 98%.

## 2023-01-08 ENCOUNTER — TELEPHONE (OUTPATIENT)
Dept: OTHER | Facility: MEDICAL CENTER | Age: 73
End: 2023-01-08
Payer: MEDICARE

## 2023-01-08 VITALS — OXYGEN SATURATION: 83 % | RESPIRATION RATE: 30 BRPM | HEART RATE: 106 BPM

## 2023-01-08 VITALS — HEART RATE: 113 BPM | OXYGEN SATURATION: 96 %

## 2023-01-08 NOTE — TELEPHONE ENCOUNTER
RPM Notification: Disconnect  Actions: Contacted Patient    Total time (minutes) spent communicating with patient: 5 Min    Nishi became disconnected @  4150. Contacted pt who stated her chip was unplugged from the wristband- replugged the chip and working on connection. Attempting to restart phone.  PT became reconnected after restarting phone @  3578

## 2023-01-08 NOTE — TELEPHONE ENCOUNTER
RPM Notification: Disconnect, Reconnect, and Patient Communication  Actions: Patient Contacted RTOC and Patient in Shower    Total time (minutes) spent communicating with patient: 1  At 1008, pt contacted RTOC to let us know she will be disconnecting to shower. Pt was disconnected from this time until 1040 when she called and stated she is back and reconnected. Will continue to monitor pt.

## 2023-01-08 NOTE — TELEPHONE ENCOUNTER
RPM Notification: High Alert  Actions: Contacted Patient    Total time (minutes) spent communicating with patient: 1  At 1134, pt alerted 83% SpO2. Contacted pt via phone call. Pt stated she is at [employer] walking around and has been weaning her oxygen. She stated she is currently on 1.5L, I let her know on our sheet it says she should be on 3L. She stated for the past 48 hours she has been going lower as she was told she could. She asked if she should go back up to 2L as this is her first day on 1.5L, I told her that her oxygen saturwent back up but if she continues to alert I will have her go up to 2L. I will follow up on how many liters of oxygen she should be on as there have been no notes stating this change that I see or have been told about. Will continue to monitor pt.

## 2023-01-09 ENCOUNTER — PHARMACY VISIT (OUTPATIENT)
Dept: PHARMACY | Facility: MEDICAL CENTER | Age: 73
End: 2023-01-09
Payer: COMMERCIAL

## 2023-01-09 ENCOUNTER — TELEPHONE (OUTPATIENT)
Dept: OTHER | Facility: MEDICAL CENTER | Age: 73
End: 2023-01-09
Payer: MEDICARE

## 2023-01-09 NOTE — TELEPHONE ENCOUNTER
RPM Notification: High Alert  Actions: None    Total time (minutes) spent communicating with patient: 5    Called [employer] to let her know she alerted for low O2:    Patient Nishi Rodriguez SpO2 Very Low: 84 %  Jan 09, 2023 09:04    Patient stated she was doing okay and knows she is low. She stated she was dropping her grand daughter off at school. I told her to take some nice big deep breaths to see if that will help. Will continue to monitor her.

## 2023-01-11 ENCOUNTER — TELEPHONE (OUTPATIENT)
Dept: OTHER | Facility: MEDICAL CENTER | Age: 73
End: 2023-01-11

## 2023-01-11 ENCOUNTER — NON-PROVIDER VISIT (OUTPATIENT)
Dept: SLEEP MEDICINE | Facility: MEDICAL CENTER | Age: 73
End: 2023-01-11
Attending: STUDENT IN AN ORGANIZED HEALTH CARE EDUCATION/TRAINING PROGRAM
Payer: MEDICARE

## 2023-01-11 ENCOUNTER — APPOINTMENT (OUTPATIENT)
Dept: RADIOLOGY | Facility: MEDICAL CENTER | Age: 73
End: 2023-01-11
Attending: INTERNAL MEDICINE
Payer: MEDICARE

## 2023-01-11 VITALS — OXYGEN SATURATION: 92 % | HEART RATE: 122 BPM

## 2023-01-11 VITALS — OXYGEN SATURATION: 97 % | HEART RATE: 116 BPM | RESPIRATION RATE: 21 BRPM

## 2023-01-11 VITALS — HEART RATE: 107 BPM | OXYGEN SATURATION: 97 %

## 2023-01-11 DIAGNOSIS — J96.01 ACUTE RESPIRATORY FAILURE WITH HYPOXIA (HCC): ICD-10-CM

## 2023-01-11 DIAGNOSIS — Z12.31 VISIT FOR SCREENING MAMMOGRAM: ICD-10-CM

## 2023-01-11 PROCEDURE — 94618 PULMONARY STRESS TESTING: CPT | Performed by: INTERNAL MEDICINE

## 2023-01-11 PROCEDURE — 77067 SCR MAMMO BI INCL CAD: CPT

## 2023-01-11 ASSESSMENT — 6 MINUTE WALK TEST (6MWT)
PERCEIVED FATIGUE AT 1 MIN: 0
PERCEIVED BREATHLESSNESS AT 4 MIN: 1
PERCEIVED BREATHLESSNESS AT 1 MIN: 0.5
TOTAL REST TIME: 0
PERCEIVED FATIGUE AT 2 MIN: 0
SAO2 AT 1 MIN: 96
PERCEIVED BREATHLESSNESS AT 3 MIN: 1
HEART RATE AT 5 MIN: 129
PERCEIVED FATIGUE AT 2 MIN: 0
PERCEIVED FATIGUE AT 5 MIN: 0
PERCEIVED BREATHLESSNESS AT 1 MIN: 3
NUMBER OF RESTS: 0
AMBULATES WITH O2: WITHOUT O2
SAO2 AT 3 MIN: 97
PERCEIVED BREATHLESSNESS AT 2 MIN: 1
HEART RATE AT 1 MIN: 125
O2 SAT PERCENT ROOM AIR: 99
SAO2 AT 5 MIN: 97
PERCEIVED BREATHLESSNESS AT 6 MIN: 2
PERCEIVED FATIGUE AT 4 MIN: 0
HEART RATE AT 2 MIN: 122
COMMENTS: TEST ON ROOM AIR.
BLOOD PRESSURE AT 1 MIN: 128/76
SAO2 AT 1 MIN: 97
BLOOD PRESSURE: RIGHT ARM
HEART RATE AT 6 MIN: 127
PERCEIVED BREATHLESSNESS AT 5 MIN: 2
SITTING BLOOD PRESSURE: 114/70
PERCEIVED FATIGUE AT 3 MIN: 0
SAO2 AT 2 MIN: 97
PERCEIVED FATIGUE AT 6 MIN: 0
SAO2 AT 2 MIN: 95
HEART RATE: 46
PERCENT OF NORMAL WALKED: 59
SAO2 AT 6 MIN: 96
HEART RATE AT 3 MIN: 125
HEART RATE AT 1 MIN: 117
PERCEIVED FATIGUE AT 1 MIN: 0
PERCEIVED BREATHLESSNESS AT 2 MIN: 4
HEART RATE AT 4 MIN: 129
SAO2 AT 4 MIN: 96
HEART RATE AT 2 MIN: 129

## 2023-01-11 NOTE — TELEPHONE ENCOUNTER
RPM Notification: High Alert and Patient Communication  Actions: Contacted Patient    Total time (minutes) spent communicating with patient: 1 min    Aleted at 66% 0957. Went right up to 98%  Called Nishi. She stated she had to remove oxygen for a second while she had her mammogram done. Oxygen is laced back on.

## 2023-01-11 NOTE — PROCEDURES
Test on Room Air.    No significant desaturation with ambulation.  Distance walked was 840 feet or 59% predicted

## 2023-01-11 NOTE — TELEPHONE ENCOUNTER
RPM Notification: Disconnect, Reconnect, and Patient Communication  Actions: Patient Contacted RTOC, Reconnected Patient, and Patient in Shower    Total time (minutes) spent communicating with patient: 30 min    Nishi called at 0758 that she was disconnecting to take a shower.    Called back at 0828 that she was done and reconnected. I let her know we were able to see her, back on

## 2023-01-11 NOTE — TELEPHONE ENCOUNTER
RPM Notification: Disconnect, Reconnect, Patient Communication, and Emergency Contact Communication  Actions: Contacted Patient, Contacted Emergency Contact (EC), Patient Contacted RTOC, and Reconnected Patient    Total time (minutes) spent communicating with patient: 10 min    Disconnected. 1313 Called Nishi. No answer. Left message.  1318 Called Nishi again still no answer.  1318 Called EC Martin, right to . No ring  1319 Called Martin again. Was able to get a hold of him. He stated he was going to give Nishi a call.  1324 Nishi called and was reconnected. She stated she left her phone in the car. She was doing ok, just forgot her phone

## 2023-01-12 ENCOUNTER — TELEPHONE (OUTPATIENT)
Dept: OTHER | Facility: MEDICAL CENTER | Age: 73
End: 2023-01-12
Payer: MEDICARE

## 2023-01-13 ENCOUNTER — TELEPHONE (OUTPATIENT)
Dept: OTHER | Facility: MEDICAL CENTER | Age: 73
End: 2023-01-13
Payer: MEDICARE

## 2023-01-13 VITALS — HEART RATE: 97 BPM | OXYGEN SATURATION: 95 % | RESPIRATION RATE: 24 BRPM

## 2023-01-13 NOTE — TELEPHONE ENCOUNTER
RPM Notification: Disconnect  Actions: Contacted Patient    Total time (minutes) spent communicating with patient: 1      Patient disconnected at : 9:13am  Patient contacted at: 9:13 am  Patient reconnected at: 10:11 am    Patient stated that she was out of the house and was driving when I contacted her. Patient stated that she will reconnect when she is back from her errands.       Patient's vitals remain stable and within range

## 2023-01-13 NOTE — TELEPHONE ENCOUNTER
RPM Notification: Disconnect  Actions: Contacted Patient    Total time (minutes) spent communicating with patient: 1    Pt disconnected at 1733. I called and spoke with the pt, she said the light on her wristband was blue, but that she would check it out and get reconnected. I told her to call back if she needed help figuring anything out. Pt reconnected at 1736.

## 2023-01-14 ENCOUNTER — TELEPHONE (OUTPATIENT)
Dept: OTHER | Facility: MEDICAL CENTER | Age: 73
End: 2023-01-14
Payer: MEDICARE

## 2023-01-14 NOTE — TELEPHONE ENCOUNTER
RPM Notification: Disconnect  Actions: Contacted Patient    Total time (minutes) spent communicating with patient: 3 MIN     Melody disconnected @  9302. Contacted pt who stated she is feeling okay and will try to reconnect.  Pt was able to reconnect and is at  93 %

## 2023-01-14 NOTE — TELEPHONE ENCOUNTER
RPM Notification: Disconnect  Actions: Contacted Patient    Total time (minutes) spent communicating with patient: 3      Pt disconnected at 1129, I called them at 1131. they stated that they would work on it, and did not require help. I called them again at 1245  and they stated that they were not signed into the prasad. They reset their password and were reconnected by 1253.

## 2023-01-15 ENCOUNTER — TELEPHONE (OUTPATIENT)
Dept: OTHER | Facility: MEDICAL CENTER | Age: 73
End: 2023-01-15
Payer: MEDICARE

## 2023-01-15 NOTE — TELEPHONE ENCOUNTER
RPM Notification: Disconnect and Patient Communication  Actions: Contacted Patient    Total time (minutes) spent communicating with patient: 1      Pt disconnected at 1220 am. Pt stated she would get reconnected.    Pt reconnected at 1225 am

## 2023-01-16 ENCOUNTER — TELEPHONE (OUTPATIENT)
Dept: OTHER | Facility: MEDICAL CENTER | Age: 73
End: 2023-01-16

## 2023-01-16 NOTE — TELEPHONE ENCOUNTER
RPM Notification: Disconnect  Actions: None    Total time (minutes) spent communicating with patient: 5    Nishi sent over a text message letting us know that she is going to disconnect for a shower. Will look for her to reconnect when done.

## 2023-01-16 NOTE — TELEPHONE ENCOUNTER
RPM Notification: Disconnect, Reconnect, and Patient Communication  Actions: Contacted Patient and Reconnected Patient    Total time (minutes) spent communicating with patient: 4    Called patient, she states she is doing well. I had her reset the device and this fixed the disconnect.

## 2023-01-17 ENCOUNTER — APPOINTMENT (OUTPATIENT)
Dept: SLEEP MEDICINE | Facility: MEDICAL CENTER | Age: 73
End: 2023-01-17
Payer: MEDICARE

## 2023-01-17 NOTE — TELEPHONE ENCOUNTER
RPM Notification: Graduate  Actions: None    Total time (minutes) spent communicating with patient: 5      Per Dr. Lebron patient can graduate from program. I called the patient to let her know. She thanked us and I wished her well.

## 2023-01-25 ENCOUNTER — OFFICE VISIT (OUTPATIENT)
Dept: MEDICAL GROUP | Facility: PHYSICIAN GROUP | Age: 73
End: 2023-01-25
Payer: MEDICARE

## 2023-01-25 VITALS
RESPIRATION RATE: 12 BRPM | WEIGHT: 132.6 LBS | HEIGHT: 60 IN | SYSTOLIC BLOOD PRESSURE: 120 MMHG | HEART RATE: 101 BPM | BODY MASS INDEX: 26.03 KG/M2 | OXYGEN SATURATION: 96 % | DIASTOLIC BLOOD PRESSURE: 74 MMHG

## 2023-01-25 DIAGNOSIS — Z00.00 ENCOUNTER FOR SUBSEQUENT ANNUAL WELLNESS VISIT (AWV) IN MEDICARE PATIENT: ICD-10-CM

## 2023-01-25 DIAGNOSIS — G31.9 CEREBRAL ATROPHY (HCC): ICD-10-CM

## 2023-01-25 DIAGNOSIS — E78.5 DYSLIPIDEMIA: ICD-10-CM

## 2023-01-25 DIAGNOSIS — F39 MOOD DISORDER (HCC): ICD-10-CM

## 2023-01-25 DIAGNOSIS — Z11.59 NEED FOR HEPATITIS C SCREENING TEST: ICD-10-CM

## 2023-01-25 DIAGNOSIS — M85.80 OSTEOPENIA, UNSPECIFIED LOCATION: ICD-10-CM

## 2023-01-25 DIAGNOSIS — G35 MULTIPLE SCLEROSIS (HCC): ICD-10-CM

## 2023-01-25 DIAGNOSIS — Z98.890 POSTOPERATIVE HYPOXEMIA: ICD-10-CM

## 2023-01-25 DIAGNOSIS — E11.9 CONTROLLED TYPE 2 DIABETES MELLITUS WITHOUT COMPLICATION, WITHOUT LONG-TERM CURRENT USE OF INSULIN (HCC): ICD-10-CM

## 2023-01-25 DIAGNOSIS — R09.02 POSTOPERATIVE HYPOXEMIA: ICD-10-CM

## 2023-01-25 DIAGNOSIS — Z23 NEED FOR VACCINATION: ICD-10-CM

## 2023-01-25 PROBLEM — J96.90 RESPIRATORY FAILURE (HCC): Status: RESOLVED | Noted: 2022-12-17 | Resolved: 2023-01-25

## 2023-01-25 PROCEDURE — G0439 PPPS, SUBSEQ VISIT: HCPCS | Mod: 25 | Performed by: INTERNAL MEDICINE

## 2023-01-25 PROCEDURE — G0008 ADMIN INFLUENZA VIRUS VAC: HCPCS | Performed by: INTERNAL MEDICINE

## 2023-01-25 PROCEDURE — 90662 IIV NO PRSV INCREASED AG IM: CPT | Performed by: INTERNAL MEDICINE

## 2023-01-25 ASSESSMENT — ACTIVITIES OF DAILY LIVING (ADL): BATHING_REQUIRES_ASSISTANCE: 0

## 2023-01-25 ASSESSMENT — FIBROSIS 4 INDEX: FIB4 SCORE: 1.608060504414739321

## 2023-01-25 ASSESSMENT — ENCOUNTER SYMPTOMS: GENERAL WELL-BEING: GOOD

## 2023-01-25 ASSESSMENT — PATIENT HEALTH QUESTIONNAIRE - PHQ9: CLINICAL INTERPRETATION OF PHQ2 SCORE: 0

## 2023-01-25 NOTE — PROGRESS NOTES
Pulmonary Clinic- Initial Consult    Date of Service: 1/27/23    Referring Physician: No ref. provider found    Reason for Consult: Hypoxia    Chief Complaint:   Chief Complaint   Patient presents with    Hospital Follow-up     Last Seen 01/16/2023 [facility]    ED 12/17/2022 -12/19/2022 - no pulm consult   6MW 01/11/2023         HPI:   Melody Rodriguez is a 72 y.o. female who is followed by Dr. Pritchett and is referred to the pulmonary clinic for hypoxia.  Patient was recently admitted to the hospital with difficulties coping after a total knee replacement.  She was seen in the hospital and was placed on oxygen and discharged on the RPM program.  She has since weaned off oxygen and denies all pulmonary symptoms including shortness of breath, cough or sputum production.  She had a CTA in the hospital which was negative for PE and also negative for any intrapulmonary findings.  She recently did a 6-minute walk test in another clinic and was able to complete this without desaturating on room air.  She has no pulmonary history.  She is a never smoker.  She has acid reflux which is treated with omeprazole.  She denies any allergies.  She has no prior exposures to pulmonary toxins.  She is saturating 93% in clinic today.  She does endorse every now and then out of the blue either at rest or with activity her heart rate will go to 140 accompanied by some shortness of breath.  This needs to be investigated by cardiology.  I have asked the patient to follow-up with her primary care doctor to obtain a cardiology consult for the work-up of her tachycardia.        Past Medical History:   Diagnosis Date    Acute nasopharyngitis     Recent chest xray clear    Arthritis     Right knee and thumb joints    Breath shortness     Comes and goes    Cancer (HCC)     Skin cancer squamous cell    Cerebral atrophy (HCC) 03/16/2022    Depression     Diabetes (HCC)     Disorder of thyroid     Nodules in both nodes. Biopsy indicates benign.  Last ultrasound shows stable.    Groin lump 06/09/2022    Gynecological disorder     Cervical polyp in 1970    Heart burn     Controlled with Omeprazole    Heart murmur     Minor leakage in 3 valves due to scarlet fever as a child    Heart valve disease     Minor leakage in 3 valves due to scarlet fever as a child    Hiatus hernia syndrome     I think    High cholesterol     Taking Simvistatin    Hyperlipidemia     Muscle disorder     Osteoarthritis of right knee 06/15/2021    PONV (postoperative nausea and vomiting) 1970    Only time I’ve been under anesthesia    Recurrent major depressive disorder, in remission ([facility]) 03/16/2022    SI (sacroiliac) pain 06/09/2022    Type 2 diabetes mellitus with stage 3 chronic kidney disease ([facility]) 06/15/2021    Urinary bladder disorder     MS issue    Urinary incontinence     During MS relapse. Has since resolved       Past Surgical History:   Procedure Laterality Date    PB TOTAL KNEE ARTHROPLASTY Right 12/14/2022    Procedure: RIGHT TOTAL KNEE REPLACEMENT;  Surgeon: Britton Cooney M.D.;  Location: SURGERY [facility];  Service: Orthopedics    GYN SURGERY  1970    Cervical polyp       Social History     Socioeconomic History    Marital status:      Spouse name: Not on file    Number of children: Not on file    Years of education: Not on file    Highest education level: 12th grade   Occupational History    Not on file   Tobacco Use    Smoking status: Never    Smokeless tobacco: Never   Vaping Use    Vaping Use: Never used   Substance and Sexual Activity    Alcohol use: Yes     Comment: Occasional glass of wine or april    Drug use: Not Currently    Sexual activity: Not on file   Other Topics Concern    Not on file   Social History Narrative    Not on file     Social Determinants of Health     Financial Resource Strain: Low Risk     Difficulty of Paying Living Expenses: Not hard at all   Food Insecurity: No Food Insecurity    Worried About Running Out of Food in  the Last Year: Never true    Ran Out of Food in the Last Year: Never true   Transportation Needs: No Transportation Needs    Lack of Transportation (Medical): No    Lack of Transportation (Non-Medical): No   Physical Activity: Inactive    Days of Exercise per Week: 0 days    Minutes of Exercise per Session: 0 min   Stress: Not on file   Social Connections: Not on file   Intimate Partner Violence: Not on file   Housing Stability: Low Risk     Unable to Pay for Housing in the Last Year: No    Number of Places Lived in the Last Year: 1    Unstable Housing in the Last Year: No          Family History   Problem Relation Age of Onset    Colon Cancer Mother     Heart Disease Mother         Cardiomyopathy    Lung Disease Mother         Emphysema from 2nd hand smoke    Cancer Mother         Colon    Colorectal Cancer Mother     Diabetes Mother     Heart Attack Father     Heart Disease Father         Numerous heart attacks    Diabetes Father     No Known Problems Sister     Cancer Brother         Skin cancer    Other Brother         Gaulbladder issues    Cancer Maternal Grandmother         GI cancer    No Known Problems Maternal Grandfather     No Known Problems Paternal Grandmother     No Known Problems Paternal Grandfather     Other Daughter         Fibromyalgia    Bipolar disorder Daughter     ADHD Son     Depression Son     Hypertension Son        Current Outpatient Medications on File Prior to Visit   Medication Sig Dispense Refill    meloxicam (MOBIC) 15 MG tablet Take 1 Tablet by mouth every day. 30 Tablet 0    buPROPion SR (WELLBUTRIN-SR) 150 MG TABLET SR 12 HR sustained-release tablet Take 1 tablet (150 mg) by mouth daily in the morning 90 Tablet 0    paroxetine (PAXIL) 40 MG tablet TAKE 1 TABLET(40 MG) BY MOUTH DAILY IN THE MORNING 90 Tablet 0    mupirocin (BACTROBAN) 2 % Ointment       docusate sodium (COLACE) 100 MG Cap Take 100 mg by mouth 2 times a day. Indications: Constipation      aspirin EC 81 MG EC tablet  Take 1 Tablet by mouth 2 times a day. 60 Tablet 0    zolpidem (AMBIEN) 5 MG Tab Take 1 tablet (5 mg) by mouth daily at bedtime as needed for insomnia 30 Tablet 0    Empagliflozin-metFORMIN HCl ER (SYNJARDY XR) 12.5-1000 MG TABLET SR 24 HR Take 1 tablet by mouth daily 100 Tablet 2    lamotrigine (LAMICTAL) 200 MG tablet Take 1 tablet (200 mg) by mouth daily 90 Tablet 2    paroxetine (PAXIL) 40 MG tablet Take 1 Tablet by mouth every day. 100 Tablet 1    omeprazole (PRILOSEC) 20 MG delayed-release capsule Take 1 capsule by mouth every morning, 30 minutes before breakfast. 100 Capsule 3    simvastatin (ZOCOR) 40 MG Tab Take 1 Tablet by mouth every evening. 100 Tablet 2    Teriflunomide (AUBAGIO) 14 MG Tab Take 14 mg by mouth every day. Indications: Relapsing, Remitting Multiple Sclerosis      buPROPion SR (WELLBUTRIN-SR) 150 MG TABLET SR 12 HR sustained-release tablet Take 1 tablet by mouth every morning. 90 Tablet 2    modafinil (PROVIGIL) 200 MG Tab Take 200 mg by mouth 1 time a day as needed. Indications: Tiredness associated with Multiple Sclerosis       No current facility-administered medications on file prior to visit.       Allergies: Patient has no known allergies.      ROS:   Review of Systems   Constitutional:  Negative for chills and fever.   HENT:  Negative for congestion and sinus pain.    Respiratory:  Negative for cough, sputum production, shortness of breath and wheezing.    Cardiovascular:  Positive for palpitations. Negative for chest pain and leg swelling.   Gastrointestinal:  Positive for heartburn.   Genitourinary:  Negative for dysuria.   Neurological:  Negative for dizziness and headaches.   Endo/Heme/Allergies:  Negative for environmental allergies.     Vitals:  /70 (BP Location: Left arm, Patient Position: Sitting, BP Cuff Size: Adult)   Pulse 95   Resp 16   Ht 1.524 m (5')   Wt 58.7 kg (129 lb 8 oz)   SpO2 93%     Physical Exam:  Physical Exam  Constitutional:       Appearance:  Normal appearance.   HENT:      Head: Atraumatic.      Mouth/Throat:      Mouth: Mucous membranes are dry.   Eyes:      Extraocular Movements: Extraocular movements intact.   Cardiovascular:      Rate and Rhythm: Normal rate and regular rhythm.      Heart sounds: Normal heart sounds.   Pulmonary:      Effort: Pulmonary effort is normal. No respiratory distress.      Breath sounds: Normal breath sounds. No wheezing or rales.   Abdominal:      General: Abdomen is flat.      Palpations: Abdomen is soft.   Musculoskeletal:         General: No swelling, tenderness or deformity.   Skin:     General: Skin is dry.   Neurological:      General: No focal deficit present.      Mental Status: She is alert and oriented to person, place, and time.         Vaccinations:    Pneumovax: Complete  Covid: Vaccinated + 4/22  Annual Flu: 10/21, DUE    Pertinent Studies:  Laboratory Data:    6MWT:    PFTs as reviewed by me personally show:    Imaging as reviewed by me personally show:      Pertinent Cardiac Studies:  Echo:      Assessment/Plan:    Problem List Items Addressed This Visit       Postoperative hypoxemia     RESOLVED.  Plan:  - Patient reports intermittent tachycardia to 140s at rest and with exertion, needs cardiac follow up for possible monitor  - Hypoxia has resolved  - Ok to cancel PFT as patient has no history of pulmonary disease, no current symptoms and no smoking history  - Follow up PRN if symptoms recur             Return if symptoms worsen or fail to improve.     This note was generated using voice recognition software which has a chance of producing errors of grammar and possibly content.  I have made every reasonable attempt to find and correct any obvious errors, but it should be expected that some may not be found prior to finalization of this note.    Time spent in record review prior to patient arrival, reviewing results, and in face-to-face encounter totaled 20 min, excluding any procedures if  performed.      Zoila Donohue MD RD  Pulmonary and Critical Care Medicine  Counts include 234 beds at the [facility]

## 2023-01-25 NOTE — PROGRESS NOTES
Chief Complaint   Patient presents with    Annual Wellness Visit       HPI:  Melody Rodriguez is a 72 y.o. here for Medicare Annual Wellness Visit     1. Encounter for subsequent annual wellness visit (AWV) in Medicare patient  Annual wellness visit.  - Subsequent Annual Wellness Visit - Includes PPPS ()  - DS-BONE DENSITY STUDY (DEXA); Future    2. Need for vaccination    - Influenza Vaccine, High Dose (65+ Only)  - Subsequent Annual Wellness Visit - Includes PPPS ()    3. Controlled type 2 diabetes mellitus without complication, without long-term current use of insulin (HCC)  Chronic.  Stable.  Followed by endocrinology.  Currently being treated with Synjardy XR.  Hemoglobin A1c 5.7.  - Subsequent Annual Wellness Visit - Includes PPPS ()    4. Cerebral atrophy (HCC)  Chronic.  Stable.  Noted on brain MRI in October 2021.  - Subsequent Annual Wellness Visit - Includes PPPS ()    5. Multiple sclerosis (HCC)  Chronic.  Stable.  Followed by neurology.  Treated with Aubagio.  - Subsequent Annual Wellness Visit - Includes PPPS ()    6. Dyslipidemia  Patient stable on Zocor 40 mg daily.  - Subsequent Annual Wellness Visit - Includes PPPS ()    7. Mood disorder (HCC)  Chronic.  Stable.  Patient taking Wellbutrin 150 mg daily, Paxil tablets daily.  - Subsequent Annual Wellness Visit - Includes PPPS ()    8. Need for hepatitis C screening test    - HEP C VIRUS ANTIBODY; Future  - Subsequent Annual Wellness Visit - Includes PPPS ()    9. Postoperative hypoxemia  Patient recently underwent right total knee replacement in December.  Postoperatively she suffered from acute respiratory hypoxia and was discharged with home O2.  Patient discontinued oxygen last week and doing well.  Completed physical therapy for her knee.  Patient has a 6-minute walking test.  She has an appointment with pulmonology at the end of January.  - Subsequent Annual Wellness Visit - Includes PPPS  ()    10. Osteopenia, unspecified location  Due for bone density.  DEXA scan in 2019 showed a femoral T score -1.8.  Not on bisphosphonate.  - DS-BONE DENSITY STUDY (DEXA); Future      Patient Active Problem List    Diagnosis Date Noted    Hyponatremia 12/19/2022    Mixed hyperlipidemia 12/17/2022    Respiratory failure (HCC) 12/17/2022    Total knee replacement status, right 12/14/2022    Postoperative hypoxemia 12/14/2022    SI (sacroiliac) pain 06/09/2022    BMI 26.0-26.9,adult 05/20/2022    Osteopenia of left thigh 05/20/2022    Hypokalemia 05/20/2022    History of renal disease 05/20/2022    Cerebral atrophy ([facility]) 03/16/2022    Osteoarthritis of right knee 06/15/2021    Long term (current) use of oral hypoglycemic drugs 10/21/2020    Non-toxic multinodular goiter 01/29/2020    Risk for falls 07/12/2019    Dysphagia 03/29/2019    Controlled type 2 diabetes mellitus without complication, without long-term current use of insulin ([facility]) 01/06/2017    Dyslipidemia 01/06/2017    Multiple sclerosis ([facility]) 01/06/2017    Mood disorder ([facility]) 01/06/2017    Schatzki's ring 01/06/2017    Hiatal hernia 01/06/2017       Current Outpatient Medications   Medication Sig Dispense Refill    meloxicam (MOBIC) 15 MG tablet Take 1 Tablet by mouth every day. 30 Tablet 0    buPROPion SR (WELLBUTRIN-SR) 150 MG TABLET SR 12 HR sustained-release tablet Take 1 tablet (150 mg) by mouth daily in the morning 90 Tablet 0    paroxetine (PAXIL) 40 MG tablet TAKE 1 TABLET(40 MG) BY MOUTH DAILY IN THE MORNING 90 Tablet 0    mupirocin (BACTROBAN) 2 % Ointment       docusate sodium (COLACE) 100 MG Cap Take 100 mg by mouth 2 times a day. Indications: Constipation      aspirin EC 81 MG EC tablet Take 1 Tablet by mouth 2 times a day. 60 Tablet 0    zolpidem (AMBIEN) 5 MG Tab Take 1 tablet (5 mg) by mouth daily at bedtime as needed for insomnia 30 Tablet 0    Empagliflozin-metFORMIN HCl ER (SYNJARDY XR) 12.5-1000 MG TABLET SR 24 HR Take 1 tablet by  mouth daily 100 Tablet 2    lamotrigine (LAMICTAL) 200 MG tablet Take 1 tablet (200 mg) by mouth daily 90 Tablet 2    paroxetine (PAXIL) 40 MG tablet Take 1 Tablet by mouth every day. 100 Tablet 1    omeprazole (PRILOSEC) 20 MG delayed-release capsule Take 1 capsule by mouth every morning, 30 minutes before breakfast. 100 Capsule 3    simvastatin (ZOCOR) 40 MG Tab Take 1 Tablet by mouth every evening. 100 Tablet 2    Teriflunomide (AUBAGIO) 14 MG Tab Take 14 mg by mouth every day. Indications: Relapsing, Remitting Multiple Sclerosis      buPROPion SR (WELLBUTRIN-SR) 150 MG TABLET SR 12 HR sustained-release tablet Take 1 tablet by mouth every morning. 90 Tablet 2    modafinil (PROVIGIL) 200 MG Tab Take 200 mg by mouth 1 time a day as needed. Indications: Tiredness associated with Multiple Sclerosis       No current facility-administered medications for this visit.          Current supplements as per medication list.     Allergies: Patient has no known allergies.    Current social contact/activities:     She  reports that she has never smoked. She has never used smokeless tobacco. She reports current alcohol use. She reports that she does not currently use drugs.  Counseling given: Not Answered      ROS:    Gait: Uses no assistive device  Ostomy: No  Other tubes: No  Amputations: No  Chronic oxygen use: No  Last eye exam: About 6 months ago  Wears hearing aids: Yes but lost them   : Denies any urinary leakage during the last 6 months    Screening:  Up-to-date with colonoscopy.  Up-to-date with mammogram.  Bone density due.    Depression Screening  Little interest or pleasure in doing things?  0 - not at all  Feeling down, depressed , or hopeless? 0 - not at all  Patient Health Questionnaire Score: 0     If depressive symptoms identified deferred to follow up visit unless specifically addressed in assessment and plan.    Interpretation of PHQ-9 Total Score   Score Severity   1-4 No Depression   5-9 Mild Depression    10-14 Moderate Depression   15-19 Moderately Severe Depression   20-27 Severe Depression    Screening for Cognitive Impairment  Three Minute Recall (daughter, heaven, mountain) 3/3    Tobi clock face with all 12 numbers and set the hands to show 10 past 11.  Yes    Cognitive concerns identified deferred for follow up unless specifically addressed in assessment and plan.    Fall Risk Assessment  Has the patient had two or more falls in the last year or any fall with injury in the last year?  No    Safety Assessment  Throw rugs on floor.  Yes  Handrails on all stairs.  Yes  Good lighting in all hallways.  Yes  Difficulty hearing.  Yes  Patient counseled about all safety risks that were identified.    Functional Assessment ADLs  Are there any barriers preventing you from cooking for yourself or meeting nutritional needs?  No.    Are there any barriers preventing you from driving safely or obtaining transportation?  No.    Are there any barriers preventing you from using a telephone or calling for help?  No.    Are there any barriers preventing you from shopping?  No.    Are there any barriers preventing you from taking care of your own finances?  No.    Are there any barriers preventing you from managing your medications?  No.    Are there any barriers preventing you from showering, bathing or dressing yourself?  No.    Are you currently engaging in any exercise or physical activity?  Yes.     What is your perception of your health?  Good    Advance Care Planning  Do you have an Advance Directive, Living Will, Durable Power of , or POLST? Yes  Advance Directive Living Will     is on file      Health Maintenance Summary            Overdue - HEPATITIS C SCREENING (Once) Overdue - never done      No completion history exists for this topic.              Overdue - COVID-19 Vaccine (5 - Booster for Pfizer series) Overdue since 6/18/2022 04/23/2022  Imm Admin: PFIZER PURPLE CAP SARS-COV-2 VACCINATION (12+)     12/17/2021  Imm Admin: PFIZER PURPLE CAP SARS-COV-2 VACCINATION (12+)    02/13/2021  Imm Admin: PFIZER PURPLE CAP SARS-COV-2 VACCINATION (12+)    01/23/2021  Imm Admin: PFIZER PURPLE CAP SARS-COV-2 VACCINATION (12+)              Ordered - IMM INFLUENZA (1) Ordered on 1/25/2023      10/05/2021  Imm Admin: Influenza Vaccine Adult HD    10/01/2020  Imm Admin: Influenza Vaccine Adult HD    12/18/2019  Imm Admin: Influenza Vaccine Adult HD    11/08/2018  Imm Admin: Influenza Vaccine Adult HD    01/18/2018  Imm Admin: Influenza Vaccine Adult HD    Only the first 5 history entries have been loaded, but more history exists.              Ordered - FASTING LIPID PROFILE (Yearly) Ordered on 10/19/2022      03/11/2022  Lipid Profile    03/16/2021  Lipid Profile    10/09/2020  Lipid Profile    06/12/2019  Lipid Profile    06/11/2018  LIPID PROFILE              Ordered - URINE ACR / MICROALBUMIN (Yearly) Ordered on 10/19/2022      03/11/2022  MICROALBUMIN CREAT RATIO URINE    03/16/2021  MICROALBUMIN CREAT RATIO URINE    10/09/2020  MICROALBUMIN CREAT RATIO URINE    01/29/2020  MICROALBUMIN CREAT RATIO URINE    06/12/2019  MICROALBUMIN CREAT RATIO URINE    Only the first 5 history entries have been loaded, but more history exists.              Annual Wellness Visit (Every 366 Days) Next due on 5/21/2023 05/20/2022  Level of Service: ANNUAL WELLNESS VISIT-INCLUDES PPPS SUBSEQUE*    10/05/2021  Subsequent Annual Wellness Visit - Includes PPPS ()    05/26/2020  Subsequent Annual Wellness Visit - Includes PPPS ()    05/26/2020  Visit Dx: Medicare annual wellness visit, subsequent    07/12/2019  Subsequent Annual Wellness Visit - Includes PPPS ()    Only the first 5 history entries have been loaded, but more history exists.              A1C SCREENING (Every 6 Months) Tentatively due on 6/7/2023 12/07/2022  HEMOGLOBIN A1C    03/16/2022  POCT A1C    09/10/2021  HEMOGLOBIN A1C    03/16/2021  HEMOGLOBIN A1C     10/09/2020  HEMOGLOBIN A1C    Only the first 5 history entries have been loaded, but more history exists.              DIABETES MONOFILAMENT / LE EXAM (Yearly) Next due on 10/19/2023      10/19/2022  SmartData: WORKFLOW - DIABETES - DIABETIC FOOT EXAM PERFORMED    10/19/2022  Diabetic Monofilament LE Exam    09/22/2021  SmartData: WORKFLOW - DIABETES - DIABETIC FOOT EXAM PERFORMED    03/24/2021  SmartData: WORKFLOW - DIABETES - DIABETIC FOOT EXAM PERFORMED    12/29/2020  SmartData: WORKFLOW - DIABETES - DIABETIC FOOT EXAM PERFORMED    Only the first 5 history entries have been loaded, but more history exists.              RETINAL SCREENING (Yearly) Next due on 10/25/2023      10/25/2022  RETINAL SCREENING RESULTS    10/19/2022  POCT Retinal Eye Exam    07/22/2021  REFERRAL FOR RETINAL SCREENING EXAM    06/02/2020  REFERRAL FOR RETINAL SCREENING EXAM    01/30/2020  REFERRAL FOR RETINAL SCREENING EXAM    Only the first 5 history entries have been loaded, but more history exists.              Ordered - SERUM CREATININE (Yearly) Ordered on 10/19/2022      12/19/2022  Basic Metabolic Panel    12/17/2022  COMP METABOLIC PANEL    12/15/2022  Basic Metabolic Panel    12/14/2022  Comp Metabolic Panel    12/07/2022  Basic Metabolic Panel    Only the first 5 history entries have been loaded, but more history exists.              BONE DENSITY (Every 5 Years) Next due on 7/17/2024 07/17/2019  DS-BONE DENSITY STUDY (DEXA)              MAMMOGRAM (Every 2 Years) Tentatively due on 1/11/2025 01/11/2023  MA-SCREENING MAMMO BILAT W/TOMOSYNTHESIS W/CAD    10/04/2021  MA-SCREENING MAMMO BILAT W/TOMOSYNTHESIS W/CAD    10/03/2020  MA-SCREENING MAMMO BILAT W/TOMOSYNTHESIS W/CAD    07/16/2019  MA-SCREENING MAMMO BILAT W/TOMOSYNTHESIS W/CAD    01/18/2018  MA-MAMMO SCREENING BILAT W/YANNI W/CAD    Only the first 5 history entries have been loaded, but more history exists.              COLORECTAL CANCER SCREENING (COLONOSCOPY - Every  10 Years) Next due on 2/9/2026 06/22/2022  OCCULT BLOOD FECES IMMUNOASSAY    02/09/2016  COLONOSCOPY (Done)              IMM DTaP/Tdap/Td Vaccine (3 - Td or Tdap) Next due on 6/26/2028 06/26/2018  Imm Admin: Tdap Vaccine    08/17/2012  Imm Admin: Tdap Vaccine              IMM PNEUMOCOCCAL VACCINE: 65+ Years (Series Information) Completed      09/08/2016  Imm Admin: Pneumococcal polysaccharide vaccine (PPSV-23)    11/17/2015  Imm Admin: Pneumococcal Conjugate Vaccine (Prevnar/PCV-13)              IMM ZOSTER VACCINES (Series Information) Completed      05/26/2020  Imm Admin: Zoster Vaccine Recombinant (RZV) (SHINGRIX)    06/26/2018  Imm Admin: Zoster Vaccine Recombinant (RZV) (SHINGRIX)    10/23/2013  Imm Admin: Zoster Vaccine Live (ZVL) (Zostavax) - HISTORICAL DATA              IMM MENINGOCOCCAL ACWY VACCINE (Series Information) Aged Out      No completion history exists for this topic.              Discontinued - IMM HEP B VACCINE  Discontinued      No completion history exists for this topic.                    Patient Care Team:  Danae Pritchett M.D. as PCP - General (Internal Medicine)  Oral Guzman M.D. as Consulting Physician (Neurology)  Zainab Ferreira, [facility] as    [facility] (Orthopedic Surgery)  Rosys Sanchez O.D. (Optometry)  Swapnil Cat M.D. (Endocrinology)  Ashanti Bradford M.D. (Psychiatry & Neurology Psychiatry)  Pratibha Silva R.N. as RN Care Coordinator   [facility] as Home Health Provider        Social History     Tobacco Use    Smoking status: Never    Smokeless tobacco: Never   Vaping Use    Vaping Use: Never used   Substance Use Topics    Alcohol use: Yes     Comment: Occasional glass of wine or april    Drug use: Not Currently     Family History   Problem Relation Age of Onset    Colon Cancer Mother     Heart Disease Mother         Cardiomyopathy    Lung Disease Mother         Emphysema from 2nd  hand smoke    Cancer Mother         Colon    Colorectal Cancer Mother     Diabetes Mother     Heart Attack Father     Heart Disease Father         Numerous heart attacks    Diabetes Father     No Known Problems Sister     Cancer Brother         Skin cancer    Other Brother         Gaulbladder issues    Cancer Maternal Grandmother         GI cancer    No Known Problems Maternal Grandfather     No Known Problems Paternal Grandmother     No Known Problems Paternal Grandfather     Other Daughter         Fibromyalgia    Bipolar disorder Daughter     ADHD Son     Depression Son     Hypertension Son      She  has a past medical history of Acute nasopharyngitis, Arthritis, Breath shortness, Cancer (HCC), Cerebral atrophy (HCC) (03/16/2022), Depression, Diabetes (HCC), Disorder of thyroid, Groin lump (06/09/2022), Gynecological disorder, Heart burn, Heart murmur, Heart valve disease, Hiatus hernia syndrome, High cholesterol, Hyperlipidemia, Muscle disorder, Osteoarthritis of right knee (06/15/2021), PONV (postoperative nausea and vomiting) (1970), Recurrent major depressive disorder, in remission ([facility]) (03/16/2022), SI (sacroiliac) pain (06/09/2022), Type 2 diabetes mellitus with stage 3 chronic kidney disease ([facility]) (06/15/2021), Urinary bladder disorder, and Urinary incontinence.    She has no past medical history of Anesthesia, Anginal syndrome (HCC), Arrhythmia, Asthma, Blood clotting disorder (HCC), Bowel habit changes, Bronchitis, Carcinoma in situ of respiratory system, Cataract, Continuous ambulatory peritoneal dialysis status ([facility]), Coughing blood, Dental disorder, Dialysis patient ([facility]), Emphysema of lung (HCC), Glaucoma, Hemorrhagic disorder (HCC), Hepatitis A, Hepatitis B, Hepatitis C, Indigestion, Infectious disease, Jaundice, Myocardial infarct (HCC), Pacemaker, Pneumonia, Pregnant, Rheumatic fever, Seizure (HCC), Sleep apnea, Stroke (HCC), or Tuberculosis.   Past Surgical History:   Procedure Laterality Date     PB TOTAL KNEE ARTHROPLASTY Right 12/14/2022    Procedure: RIGHT TOTAL KNEE REPLACEMENT;  Surgeon: Britton Cooney M.D.;  Location: SURGERY [facility];  Service: Orthopedics    GYN SURGERY  1970    Cervical polyp       Exam:   /74   Pulse (!) 101   Resp 12   Ht 1.524 m (5')   Wt 60.1 kg (132 lb 9.6 oz)   SpO2 96%  Body mass index is 25.9 kg/m².    Hearing good.    Dentition good  Alert, oriented in no acute distress.  Eye contact is good, speech goal directed, affect calm    Constitutional: Alert, no distress.  Skin: Warm, dry, good turgor, no rashes in visible areas.  Eye: Equal, round and reactive, conjunctiva clear, lids normal.  ENMT: Lips without lesions, good dentition, oropharynx clear.  Neck: Trachea midline, no masses, no thyromegaly. No cervical or supraclavicular lymphadenopathy  Respiratory: Unlabored respiratory effort, lungs clear to auscultation, no wheezes, no ronchi.  Cardiovascular: Normal S1, S2, no murmur, no edema.  Abdomen: Soft, non-tender, no masses, no hepatosplenomegaly.  Psych: Alert and oriented x3, normal affect and mood.  Breast:: No axillary lymphadenopathy, no breast lumps, no nipple changes.    Assessment and Plan. The following treatment and monitoring plan is recommended:      1. Need for vaccination  - Influenza Vaccine, High Dose (65+ Only)    1. Encounter for subsequent annual wellness visit (AWV) in Medicare patient    - Subsequent Annual Wellness Visit - [facility]  - DS-BONE DENSITY STUDY (DEXA); Future    2. Need for vaccination  Annual wellness visit.  - Influenza Vaccine, High Dose (65+ Only)  - Subsequent Annual Wellness Visit - Includes PPPS ()    3. Controlled type 2 diabetes mellitus without complication, without long-term current use of insulin (HCC)  Chronic.  Stable.  Continue with Synjardy per endocrinology.  - Subsequent Annual Wellness Visit - Includes PPPS ()    4. Cerebral atrophy (HCC)  Chronic.  Stable.  Continue to  monitor.  - Subsequent Annual Wellness Visit - Includes PPPS ()    5. Multiple sclerosis (HCC)  Chronic.  Stable.  Continue current plan of care per neurology.  - Subsequent Annual Wellness Visit - Includes PPPS ()    6. Dyslipidemia  Up-to-date with lipid panel.  Continue Zocor 40 mg daily.  - Subsequent Annual Wellness Visit - Includes PPPS ()    7. Mood disorder (HCC)  Chronic.  Stable.  Continue current plan of care.  - Subsequent Annual Wellness Visit - Includes PPPS ()    8. Need for hepatitis C screening test    - HEP C VIRUS ANTIBODY; Future  - Subsequent Annual Wellness Visit - Includes PPPS ()    9. Postoperative hypoxemia  Stable off O2.  Room air oxygen 95% in clinic today.  Keep appointment with pulmonology as scheduled.  - Subsequent Annual Wellness Visit - Includes PPPS ()    10. Osteopenia, unspecified location    - DS-BONE DENSITY STUDY (DEXA); Future    Services suggested: No services needed at this time  Health Care Screening: Age-appropriate preventive services recommended by USPTF and ACIP covered by Medicare were discussed today. Services ordered if indicated and agreed upon by the patient.  Referrals offered: Community-based lifestyle interventions to reduce health risks and promote self-management and wellness, fall prevention, nutrition, physical activity, tobacco-use cessation, weight loss, and mental health services as per orders if indicated.    Discussion today about general wellness and lifestyle habits:    Prevent falls and reduce trip hazards; Cautioned about securing or removing rugs.  Have a working fire alarm and carbon monoxide detector;   Engage in regular physical activity and social activities     Follow-up: No follow-ups on file.

## 2023-01-27 ENCOUNTER — OFFICE VISIT (OUTPATIENT)
Dept: SLEEP MEDICINE | Facility: MEDICAL CENTER | Age: 73
End: 2023-01-27
Payer: MEDICARE

## 2023-01-27 VITALS
OXYGEN SATURATION: 93 % | HEIGHT: 60 IN | RESPIRATION RATE: 16 BRPM | SYSTOLIC BLOOD PRESSURE: 120 MMHG | DIASTOLIC BLOOD PRESSURE: 70 MMHG | BODY MASS INDEX: 25.42 KG/M2 | WEIGHT: 129.5 LBS | HEART RATE: 95 BPM

## 2023-01-27 DIAGNOSIS — Z98.890 POSTOPERATIVE HYPOXEMIA: ICD-10-CM

## 2023-01-27 DIAGNOSIS — R09.02 POSTOPERATIVE HYPOXEMIA: ICD-10-CM

## 2023-01-27 PROCEDURE — 99202 OFFICE O/P NEW SF 15 MIN: CPT | Performed by: INTERNAL MEDICINE

## 2023-01-27 ASSESSMENT — ENCOUNTER SYMPTOMS
HEADACHES: 0
COUGH: 0
HEARTBURN: 1
SINUS PAIN: 0
CHILLS: 0
FEVER: 0
SPUTUM PRODUCTION: 0
WHEEZING: 0
PALPITATIONS: 1
SHORTNESS OF BREATH: 0
DIZZINESS: 0

## 2023-01-27 ASSESSMENT — FIBROSIS 4 INDEX: FIB4 SCORE: 1.608060504414739321

## 2023-01-27 NOTE — ASSESSMENT & PLAN NOTE
RESOLVED.  Plan:  - Patient reports intermittent tachycardia to 140s at rest and with exertion, needs cardiac follow up for possible monitor  - Hypoxia has resolved  - Ok to cancel PFT as patient has no history of pulmonary disease, no current symptoms and no smoking history  - Follow up PRN if symptoms recur

## 2023-02-03 ENCOUNTER — PATIENT OUTREACH (OUTPATIENT)
Dept: HEALTH INFORMATION MANAGEMENT | Facility: OTHER | Age: 73
End: 2023-02-03
Payer: MEDICARE

## 2023-02-03 DIAGNOSIS — E11.9 CONTROLLED TYPE 2 DIABETES MELLITUS WITHOUT COMPLICATION, WITHOUT LONG-TERM CURRENT USE OF INSULIN (HCC): ICD-10-CM

## 2023-02-03 NOTE — PROGRESS NOTES
2/3/23 9:10 am:  Nurse CM outreach call for monthly CCM assessment.  left with  contact number of [phone] requesting a call back.

## 2023-02-07 ENCOUNTER — PATIENT OUTREACH (OUTPATIENT)
Dept: HEALTH INFORMATION MANAGEMENT | Facility: OTHER | Age: 73
End: 2023-02-07
Payer: MEDICARE

## 2023-02-07 DIAGNOSIS — E11.9 CONTROLLED TYPE 2 DIABETES MELLITUS WITHOUT COMPLICATION, WITHOUT LONG-TERM CURRENT USE OF INSULIN (HCC): ICD-10-CM

## 2023-02-07 NOTE — PROGRESS NOTES
2/7/23 10:50am:  Nurse  outreach call for monthly CCM assessment. Patient answered telephone. Reports she has been doing okay. Reports her knee is better and that she has full mobility. Reports she is having some ongoing nerve pain. Pt reports she did have follow-up with pulmonary provider. Pt reports she was told to follow-up with cardiology. Pt has a follow-up scheduled for 6/7/23. Pt reports she has episodes of her HR going up to 130-140 when she is under stress. Reports no other symptoms. Pt states she was told to follow-up with cardiology. Nurse offered to schedule sooner appointment for pt. Patient states she want to know if cardiology will order any tests before appointment. Pt will need to check with cardiology to see if any tests to be completed before next appointment. Pt reports she is currently in a rush with her grandchild and will need to talk to nurse at a later time. Nurse will call patient back to see if pt wanting nurse to schedule sooner appt with cardiology. Pt is scheduled for  [facility] on 3/22/23.

## 2023-02-13 ENCOUNTER — HOSPITAL ENCOUNTER (OUTPATIENT)
Dept: LAB | Facility: MEDICAL CENTER | Age: 73
End: 2023-02-13
Attending: INTERNAL MEDICINE
Payer: MEDICARE

## 2023-02-13 DIAGNOSIS — Z11.59 NEED FOR HEPATITIS C SCREENING TEST: ICD-10-CM

## 2023-02-13 LAB — HCV AB SER QL: NORMAL

## 2023-02-13 PROCEDURE — 36415 COLL VENOUS BLD VENIPUNCTURE: CPT

## 2023-02-13 PROCEDURE — 86803 HEPATITIS C AB TEST: CPT

## 2023-02-15 PROCEDURE — RXMED WILLOW AMBULATORY MEDICATION CHARGE: Performed by: PSYCHIATRY & NEUROLOGY

## 2023-02-15 PROCEDURE — RXMED WILLOW AMBULATORY MEDICATION CHARGE: Performed by: INTERNAL MEDICINE

## 2023-02-15 RX ORDER — SIMVASTATIN 40 MG
40 TABLET ORAL NIGHTLY
Qty: 100 TABLET | Refills: 0 | Status: SHIPPED | OUTPATIENT
Start: 2023-02-15 | End: 2023-06-07

## 2023-02-15 NOTE — TELEPHONE ENCOUNTER
Received request via: Pharmacy    Was the patient seen in the last year in this department? Yes    Does the patient have an active prescription (recently filled or refills available) for medication(s) requested? No    Does the patient have long-term Plus and need 100 day supply (blood pressure, diabetes and cholesterol meds only)? Yes, quantity updated to 100 days

## 2023-02-15 NOTE — TELEPHONE ENCOUNTER
Requested Prescriptions     Pending Prescriptions Disp Refills   • simvastatin (ZOCOR) 40 MG Tab 100 Tablet 0     Sig: Take 1 Tablet by mouth every evening. Indications: High Amount of Fats in the Blood     Amarilis Ackerman M.D.

## 2023-02-16 ENCOUNTER — PHARMACY VISIT (OUTPATIENT)
Dept: PHARMACY | Facility: MEDICAL CENTER | Age: 73
End: 2023-02-16
Payer: COMMERCIAL

## 2023-02-24 PROCEDURE — RXMED WILLOW AMBULATORY MEDICATION CHARGE: Performed by: FAMILY MEDICINE

## 2023-03-07 ENCOUNTER — PHARMACY VISIT (OUTPATIENT)
Dept: PHARMACY | Facility: MEDICAL CENTER | Age: 73
End: 2023-03-07
Payer: COMMERCIAL

## 2023-03-10 ENCOUNTER — PATIENT OUTREACH (OUTPATIENT)
Dept: HEALTH INFORMATION MANAGEMENT | Facility: OTHER | Age: 73
End: 2023-03-10
Payer: MEDICARE

## 2023-03-10 DIAGNOSIS — Z91.81 RISK FOR FALLS: ICD-10-CM

## 2023-03-10 DIAGNOSIS — E11.9 CONTROLLED TYPE 2 DIABETES MELLITUS WITHOUT COMPLICATION, WITHOUT LONG-TERM CURRENT USE OF INSULIN (HCC): ICD-10-CM

## 2023-03-10 NOTE — PROGRESS NOTES
Nurse CM outreach call for monthly CCM assessment. Patient reports she has been doing good. Pt reports she completed physical therapy. Pt reports she hasn't had any falls over the past month. Last A1C 12/7/22 was 5.7. Pt reports she currently has all of her medications. Nurse reviewed upcoming appointments. Pt working towards goals on care plan. Pt reports no care management needs at this time.

## 2023-03-15 ENCOUNTER — TELEMEDICINE (OUTPATIENT)
Dept: MEDICAL GROUP | Facility: PHYSICIAN GROUP | Age: 73
End: 2023-03-15
Payer: MEDICARE

## 2023-03-15 VITALS
WEIGHT: 128 LBS | TEMPERATURE: 97 F | OXYGEN SATURATION: 95 % | HEART RATE: 110 BPM | BODY MASS INDEX: 25.13 KG/M2 | HEIGHT: 60 IN

## 2023-03-15 DIAGNOSIS — U07.1 COVID-19 VIRUS INFECTION: ICD-10-CM

## 2023-03-15 DIAGNOSIS — R05.3 CHRONIC COUGH: ICD-10-CM

## 2023-03-15 PROCEDURE — 99214 OFFICE O/P EST MOD 30 MIN: CPT | Mod: CS,95 | Performed by: INTERNAL MEDICINE

## 2023-03-15 RX ORDER — BENZONATATE 100 MG/1
100 CAPSULE ORAL 3 TIMES DAILY PRN
Qty: 60 CAPSULE | Refills: 0 | Status: SHIPPED | OUTPATIENT
Start: 2023-03-15 | End: 2023-04-20

## 2023-03-15 RX ORDER — AZITHROMYCIN 250 MG/1
250-500 TABLET, FILM COATED ORAL DAILY
Qty: 6 TABLET | Refills: 0 | Status: SHIPPED | OUTPATIENT
Start: 2023-03-15 | End: 2023-04-20

## 2023-03-15 RX ORDER — ALBUTEROL SULFATE 90 UG/1
2 AEROSOL, METERED RESPIRATORY (INHALATION) EVERY 4 HOURS PRN
Qty: 1 EACH | Refills: 0 | Status: SHIPPED | OUTPATIENT
Start: 2023-03-15 | End: 2023-03-28

## 2023-03-15 ASSESSMENT — FIBROSIS 4 INDEX: FIB4 SCORE: 1.63

## 2023-03-15 NOTE — PROGRESS NOTES
Virtual Visit: Established Patient   This visit was conducted via Zoom using secure and encrypted videoconferencing technology.   The patient was in their home in the [facility].    The patient's identity was confirmed and verbal consent was obtained for this virtual visit.    Subjective:   CC:   Chief Complaint   Patient presents with    Cough     Positive for covid 3 days ago      Melody Rodriguez is a 73 y.o. female presenting for evaluation and management of:    Problem   Covid-19 Virus Infection  Cough    She was volunteering at a [employer] and unfortunately had an exposure to COVID-19.  The 1 she is working with message on Friday that she had a positive test.  The following day on Saturday the patient started to develop symptoms including worsening of cough, headache, malaise, and worsening of her congestion.  On Sunday she tested herself for COVID-19 and this was positive on rapid test at home.  Her son went and purchased her DayQuil and NyQuil which has been helpful for symptoms however the cough is been unrelenting.  Of note she actually had a virus in December and then again in late January and the cough is never abated from these infections.  She had a knee replacement December and chest x-ray done at that time was nonacute.  Her oxygen level has been above 90%, she has recently had some issues with elevated heart rate and is going to be undergoing additional testing through cardiology.  She has multiple sclerosis and is taking teriflunomide 14 mg daily, she follows Dr. Guzman.          ROS   Headache, fatigue, congestion, productive cough, malaise    Current medicines (including changes today)  Current Outpatient Medications   Medication Sig Dispense Refill    azithromycin (ZITHROMAX) 250 MG Tab Take 1-2 Tablets by mouth every day. Take 2 tabs on day 1 and 1 tab daily thereafter until complete 6 Tablet 0    albuterol 108 (90 Base) MCG/ACT Aero Soln inhalation  aerosol Inhale 2 Puffs every four hours as needed for Shortness of Breath. 1 Each 0    benzonatate (TESSALON) 100 MG Cap Take 1 Capsule by mouth 3 times a day as needed for Cough. 60 Capsule 0    simvastatin (ZOCOR) 40 MG Tab Take 1 Tablet by mouth every evening. Indications: High Amount of Fats in the Blood 100 Tablet 0    buPROPion SR (WELLBUTRIN-SR) 150 MG TABLET SR 12 HR sustained-release tablet Take 1 tablet (150 mg) by mouth daily in the morning 90 Tablet 0    paroxetine (PAXIL) 40 MG tablet TAKE 1 TABLET(40 MG) BY MOUTH DAILY IN THE MORNING 90 Tablet 0    docusate sodium (COLACE) 100 MG Cap Take 100 mg by mouth 2 times a day. Indications: Constipation      aspirin EC 81 MG EC tablet Take 1 Tablet by mouth 2 times a day. 60 Tablet 0    zolpidem (AMBIEN) 5 MG Tab Take 1 tablet (5 mg) by mouth daily at bedtime as needed for insomnia 30 Tablet 0    Empagliflozin-metFORMIN HCl ER (SYNJARDY XR) 12.5-1000 MG TABLET SR 24 HR Take 1 tablet by mouth daily 100 Tablet 2    lamotrigine (LAMICTAL) 200 MG tablet Take 1 tablet (200 mg) by mouth daily 90 Tablet 2    paroxetine (PAXIL) 40 MG tablet Take 1 Tablet by mouth every day. 100 Tablet 1    omeprazole (PRILOSEC) 20 MG delayed-release capsule Take 1 capsule by mouth every morning, 30 minutes before breakfast. 100 Capsule 3    Teriflunomide (AUBAGIO) 14 MG Tab Take 14 mg by mouth every day. Indications: Relapsing, Remitting Multiple Sclerosis      buPROPion SR (WELLBUTRIN-SR) 150 MG TABLET SR 12 HR sustained-release tablet Take 1 tablet by mouth every morning. 90 Tablet 2    modafinil (PROVIGIL) 200 MG Tab Take 200 mg by mouth 1 time a day as needed. Indications: Tiredness associated with Multiple Sclerosis       No current facility-administered medications for this visit.       Patient Active Problem List    Diagnosis Date Noted    COVID-19 virus infection 03/15/2023    Chronic cough 03/15/2023    Hyponatremia 12/19/2022    Mixed hyperlipidemia 12/17/2022    Total knee  replacement status, right 12/14/2022    Postoperative hypoxemia 12/14/2022    SI (sacroiliac) pain 06/09/2022    BMI 26.0-26.9,adult 05/20/2022    Osteopenia of left thigh 05/20/2022    Hypokalemia 05/20/2022    History of renal disease 05/20/2022    Cerebral atrophy (HCC) 03/16/2022    Osteoarthritis of right knee 06/15/2021    Long term (current) use of oral hypoglycemic drugs 10/21/2020    Non-toxic multinodular goiter 01/29/2020    Risk for falls 07/12/2019    Dysphagia 03/29/2019    Controlled type 2 diabetes mellitus without complication, without long-term current use of insulin ([facility]) 01/06/2017    Dyslipidemia 01/06/2017    Multiple sclerosis ([facility]) 01/06/2017    Mood disorder ([facility]) 01/06/2017    Schatzki's ring 01/06/2017    Hiatal hernia 01/06/2017        Objective:   Pulse (!) 110   Temp 36.1 °C (97 °F) (Temporal)   Ht 1.524 m (5')   Wt 58.1 kg (128 lb)   SpO2 95%   BMI 25.00 kg/m²     Physical Exam:  Constitutional: Alert, appears fatigued and mildly ill  Skin: No rashes in visible areas.  Eye: Round. Conjunctiva clear, lids normal. No icterus.   ENMT: Lips pink without lesions, good dentition, moist mucous membranes. Phonation normal.  Neck: No masses, no thyromegaly.   Respiratory: recurrent cough throughout discussion, no conversational dyspnea  Psych: Alert and oriented, normal affect and mood.     Assessment and Plan:   The following treatment plan was discussed:     Problem List Items Addressed This Visit       COVID-19 virus infection  Chronic cough     New decompensated problem.  She is immunocompromise due to MS on teriflunomide, this is her third viral infection in the past 3 months.  The unrelenting cough has been the worst symptom.  She has had some improvement with over-the-counter treatment with DayQuil and NyQuil.  Has not yet retested since her positive COVID test on Sunday.  She worries about rebound COVID especially as she is finding childcare for her 4-year-old granddaughter as her  son and daughter-in-law are teachers.  As we are on day 5 we agreed to forego treatment with antiviral therapy and use supportive remedies.  We will send in prescription for albuterol inhaler as well as Tessalon Perles 100 mg 3 times daily.  Discussed in detail to not give Tessalon Perles to any children.  Due to her immunocompromise status and longevity of cough with production we will also cover her with azithromycin.  As this was a virtual visit I was unable to examine her to detect whether this is bronchitis or progressing into pneumonia however her oxygenation and description would favor bronchitis at this time.  Asked her to keep me updated over MyChart and we can follow-up with repeat chest x-ray and clinic visit if needed.  She was appreciative of the discussion.         Relevant Medications    albuterol 108 (90 Base) MCG/ACT Aero Soln inhalation aerosol    benzonatate (TESSALON) 100 MG Cap    azithromycin (ZITHROMAX) 250 MG Tab          Follow-up: Return if symptoms worsen or fail to improve.         I spent a total of 30 minutes with record review, exam, communication with the patient, communication with other providers, and documentation of this encounter.     Zandra Medel, DO  Geriatric and Internal Medicine  [facility]

## 2023-03-15 NOTE — ASSESSMENT & PLAN NOTE
New decompensated problem.  She is immunocompromise due to MS on teriflunomide, this is her third viral infection in the past 3 months.  The unrelenting cough has been the worst symptom.  She has had some improvement with over-the-counter treatment with DayQuil and NyQuil.  Has not yet retested since her positive COVID test on Sunday.  She worries about rebound COVID especially as she is finding childcare for her 4-year-old granddaughter as her son and daughter-in-law are teachers.  As we are on day 5 we agreed to forego treatment with antiviral therapy and use supportive remedies.  We will send in prescription for albuterol inhaler as well as Tessalon Perles 100 mg 3 times daily.  Discussed in detail to not give Tessalon Perles to any children.  Due to her immunocompromise status and longevity of cough with production we will also cover her with azithromycin.  As this was a virtual visit I was unable to examine her to detect whether this is bronchitis or progressing into pneumonia however her oxygenation and description would favor bronchitis at this time.  Asked her to keep me updated over MyChart and we can follow-up with repeat chest x-ray and clinic visit if needed.  She was appreciative of the discussion.

## 2023-03-22 PROBLEM — F31.70 BIPOLAR DISORDER IN FULL REMISSION (HCC): Status: ACTIVE | Noted: 2023-03-22

## 2023-03-22 PROBLEM — Z85.828 HISTORY OF SKIN CANCER: Status: ACTIVE | Noted: 2023-03-22

## 2023-03-22 PROBLEM — F51.01 PRIMARY INSOMNIA: Status: ACTIVE | Noted: 2023-03-22

## 2023-03-22 PROBLEM — F13.20 SEDATIVE DEPENDENCE WITH CURRENT USE (HCC): Status: ACTIVE | Noted: 2023-03-22

## 2023-03-22 PROBLEM — K21.9 GASTROESOPHAGEAL REFLUX DISEASE WITHOUT ESOPHAGITIS: Status: ACTIVE | Noted: 2023-03-22

## 2023-03-24 ENCOUNTER — HOSPITAL ENCOUNTER (OUTPATIENT)
Dept: RADIOLOGY | Facility: MEDICAL CENTER | Age: 73
End: 2023-03-24
Attending: INTERNAL MEDICINE
Payer: MEDICARE

## 2023-03-24 DIAGNOSIS — M85.80 OSTEOPENIA, UNSPECIFIED LOCATION: ICD-10-CM

## 2023-03-24 DIAGNOSIS — Z00.00 ENCOUNTER FOR SUBSEQUENT ANNUAL WELLNESS VISIT (AWV) IN MEDICARE PATIENT: ICD-10-CM

## 2023-03-24 PROCEDURE — 77080 DXA BONE DENSITY AXIAL: CPT

## 2023-04-10 ENCOUNTER — HOSPITAL ENCOUNTER (OUTPATIENT)
Dept: LAB | Facility: MEDICAL CENTER | Age: 73
End: 2023-04-10
Attending: INTERNAL MEDICINE
Payer: MEDICARE

## 2023-04-10 DIAGNOSIS — E78.5 DYSLIPIDEMIA: ICD-10-CM

## 2023-04-10 DIAGNOSIS — Z79.84 LONG TERM (CURRENT) USE OF ORAL HYPOGLYCEMIC DRUGS: ICD-10-CM

## 2023-04-10 DIAGNOSIS — E11.9 CONTROLLED TYPE 2 DIABETES MELLITUS WITHOUT COMPLICATION, WITHOUT LONG-TERM CURRENT USE OF INSULIN (HCC): ICD-10-CM

## 2023-04-10 DIAGNOSIS — E04.2 NON-TOXIC MULTINODULAR GOITER: ICD-10-CM

## 2023-04-10 LAB
25(OH)D3 SERPL-MCNC: 12 NG/ML (ref 30–100)
ALBUMIN SERPL BCP-MCNC: 4.3 G/DL (ref 3.2–4.9)
ALBUMIN/GLOB SERPL: 1.5 G/DL
ALP SERPL-CCNC: 137 U/L (ref 30–99)
ALT SERPL-CCNC: 12 U/L (ref 2–50)
ANION GAP SERPL CALC-SCNC: 11 MMOL/L (ref 7–16)
AST SERPL-CCNC: 15 U/L (ref 12–45)
BILIRUB SERPL-MCNC: 0.4 MG/DL (ref 0.1–1.5)
BUN SERPL-MCNC: 10 MG/DL (ref 8–22)
CALCIUM ALBUM COR SERPL-MCNC: 9 MG/DL (ref 8.5–10.5)
CALCIUM SERPL-MCNC: 9.2 MG/DL (ref 8.5–10.5)
CHLORIDE SERPL-SCNC: 107 MMOL/L (ref 96–112)
CHOLEST SERPL-MCNC: 187 MG/DL (ref 100–199)
CO2 SERPL-SCNC: 25 MMOL/L (ref 20–33)
CREAT SERPL-MCNC: 0.89 MG/DL (ref 0.5–1.4)
FASTING STATUS PATIENT QL REPORTED: NORMAL
GFR SERPLBLD CREATININE-BSD FMLA CKD-EPI: 68 ML/MIN/1.73 M 2
GLOBULIN SER CALC-MCNC: 2.8 G/DL (ref 1.9–3.5)
GLUCOSE SERPL-MCNC: 127 MG/DL (ref 65–99)
HDLC SERPL-MCNC: 50 MG/DL
LDLC SERPL CALC-MCNC: 104 MG/DL
POTASSIUM SERPL-SCNC: 4.6 MMOL/L (ref 3.6–5.5)
PROT SERPL-MCNC: 7.1 G/DL (ref 6–8.2)
SODIUM SERPL-SCNC: 143 MMOL/L (ref 135–145)
T4 FREE SERPL-MCNC: 0.83 NG/DL (ref 0.93–1.7)
TRIGL SERPL-MCNC: 165 MG/DL (ref 0–149)
TSH SERPL DL<=0.005 MIU/L-ACNC: 4.17 UIU/ML (ref 0.38–5.33)

## 2023-04-10 PROCEDURE — 36415 COLL VENOUS BLD VENIPUNCTURE: CPT

## 2023-04-10 PROCEDURE — 84443 ASSAY THYROID STIM HORMONE: CPT

## 2023-04-10 PROCEDURE — 82043 UR ALBUMIN QUANTITATIVE: CPT

## 2023-04-10 PROCEDURE — 80061 LIPID PANEL: CPT

## 2023-04-10 PROCEDURE — 82570 ASSAY OF URINE CREATININE: CPT

## 2023-04-10 PROCEDURE — 84439 ASSAY OF FREE THYROXINE: CPT

## 2023-04-10 PROCEDURE — 82306 VITAMIN D 25 HYDROXY: CPT

## 2023-04-10 PROCEDURE — 80053 COMPREHEN METABOLIC PANEL: CPT

## 2023-04-11 LAB
CREAT UR-MCNC: 53.23 MG/DL
MICROALBUMIN UR-MCNC: <1.2 MG/DL
MICROALBUMIN/CREAT UR: NORMAL MG/G (ref 0–30)

## 2023-04-13 ENCOUNTER — PATIENT OUTREACH (OUTPATIENT)
Dept: HEALTH INFORMATION MANAGEMENT | Facility: OTHER | Age: 73
End: 2023-04-13
Payer: MEDICARE

## 2023-04-13 DIAGNOSIS — E11.9 CONTROLLED TYPE 2 DIABETES MELLITUS WITHOUT COMPLICATION, WITHOUT LONG-TERM CURRENT USE OF INSULIN (HCC): ICD-10-CM

## 2023-04-13 PROCEDURE — 99490 CHRNC CARE MGMT STAFF 1ST 20: CPT | Performed by: INTERNAL MEDICINE

## 2023-04-13 NOTE — PROGRESS NOTES
4/13/23 9:45 am: Nurse CM outreach call for monthly CCM assessment. VM left with CM contact number requesting a call back to nurse at [phone].    4/13/23 9:50 am: Pt returned nurse CM call. Pt reports she is doing good. States she is over COVID infection and is feeling back to baseline. Pt reports she had a cough from a previous virus she had several months ago from December and January.  Reports the cough is now gone. Pt reports she completed labs several days ago. Reports her vitamin D level was low at 12. Pt reports she was on vitamin D in the past and will resume taking her supplement. States she was taking 5,000 units of vitamin D daily. Pt reports she is scheduled to see endocrinology on 4/20/23. Pt states she follows with endocrinology for diabetes and thyroid nodule. Pt states she also completed bone density scan as ordered by Dr. Pritchett. Message routed to PCP regarding labs and dexa scan follow-up. Pt reports she follows with Dr. Guzman for her MS. Reports no falls over the  past month.     Plan: Nurse will follow-up with pt next month for CCM outreach. Pt will follow-up with her specialists.

## 2023-04-17 PROCEDURE — RXMED WILLOW AMBULATORY MEDICATION CHARGE: Performed by: INTERNAL MEDICINE

## 2023-04-17 RX ORDER — ERGOCALCIFEROL 1.25 MG/1
50000 CAPSULE ORAL
Qty: 12 CAPSULE | Refills: 0 | Status: SHIPPED | OUTPATIENT
Start: 2023-04-17 | End: 2023-04-20

## 2023-04-18 ENCOUNTER — DOCUMENTATION (OUTPATIENT)
Dept: PHARMACY | Facility: MEDICAL CENTER | Age: 73
End: 2023-04-18
Payer: MEDICARE

## 2023-04-18 NOTE — PROGRESS NOTES
04/17/23: Spoke with [Melody]. She stated she was running low and was planning on calling the pharmacy to refill. She is doing well on the [Synjardy XR 12.5-1000mg tabs]. She reports no side effects to the medication and no new allergies. She reports that she has missed a few doses because sometimes she forgets and has about [a dozen] on hand. Sending delivery for [04/20] via [employer]. Okay to charge CC on file, $82 copay.

## 2023-04-19 ENCOUNTER — PHARMACY VISIT (OUTPATIENT)
Dept: PHARMACY | Facility: MEDICAL CENTER | Age: 73
End: 2023-04-19
Payer: COMMERCIAL

## 2023-04-20 ENCOUNTER — OFFICE VISIT (OUTPATIENT)
Dept: ENDOCRINOLOGY | Facility: MEDICAL CENTER | Age: 73
End: 2023-04-20
Attending: INTERNAL MEDICINE
Payer: MEDICARE

## 2023-04-20 VITALS
SYSTOLIC BLOOD PRESSURE: 122 MMHG | OXYGEN SATURATION: 99 % | BODY MASS INDEX: 25.52 KG/M2 | HEART RATE: 94 BPM | WEIGHT: 130 LBS | HEIGHT: 60 IN | DIASTOLIC BLOOD PRESSURE: 68 MMHG

## 2023-04-20 DIAGNOSIS — E55.9 VITAMIN D DEFICIENCY: ICD-10-CM

## 2023-04-20 DIAGNOSIS — Z79.84 LONG TERM (CURRENT) USE OF ORAL HYPOGLYCEMIC DRUGS: ICD-10-CM

## 2023-04-20 DIAGNOSIS — E03.8 SUBCLINICAL HYPOTHYROIDISM: ICD-10-CM

## 2023-04-20 DIAGNOSIS — E04.2 NON-TOXIC MULTINODULAR GOITER: ICD-10-CM

## 2023-04-20 DIAGNOSIS — E11.9 CONTROLLED TYPE 2 DIABETES MELLITUS WITHOUT COMPLICATION, WITHOUT LONG-TERM CURRENT USE OF INSULIN (HCC): ICD-10-CM

## 2023-04-20 DIAGNOSIS — E78.5 DYSLIPIDEMIA: ICD-10-CM

## 2023-04-20 LAB
HBA1C MFR BLD: 6.1 % (ref ?–5.8)
POCT INT CON NEG: NEGATIVE
POCT INT CON POS: POSITIVE

## 2023-04-20 PROCEDURE — RXMED WILLOW AMBULATORY MEDICATION CHARGE: Performed by: INTERNAL MEDICINE

## 2023-04-20 PROCEDURE — 83036 HEMOGLOBIN GLYCOSYLATED A1C: CPT | Performed by: INTERNAL MEDICINE

## 2023-04-20 PROCEDURE — 99214 OFFICE O/P EST MOD 30 MIN: CPT | Performed by: INTERNAL MEDICINE

## 2023-04-20 PROCEDURE — 99212 OFFICE O/P EST SF 10 MIN: CPT | Performed by: INTERNAL MEDICINE

## 2023-04-20 RX ORDER — ERGOCALCIFEROL 1.25 MG/1
50000 CAPSULE ORAL
Qty: 12 CAPSULE | Refills: 1 | Status: SHIPPED | OUTPATIENT
Start: 2023-04-20 | End: 2023-08-29

## 2023-04-20 RX ORDER — LEVOTHYROXINE SODIUM 0.03 MG/1
25 TABLET ORAL
Qty: 90 TABLET | Refills: 1 | Status: SHIPPED | OUTPATIENT
Start: 2023-04-20 | End: 2023-05-24 | Stop reason: SDUPTHER

## 2023-04-20 ASSESSMENT — FIBROSIS 4 INDEX: FIB4 SCORE: 1.3

## 2023-04-20 NOTE — PROGRESS NOTES
CHIEF COMPLAINT: Patient is here for follow up of Type 2 Diabetes Mellitus.      HPI:     Melody Rodriguez is a 73 y.o. female with Type 2 Diabetes Mellitus here for follow up.    Labs from 4/20/2023 show a1c is 6.1%  Labs from 10/19/2022 show a1c was 5.8%  Labs from 9/10/2021 show a1c was 6.2%  Labs from 3/16/2021 show a1c was 6.9%  Labs from 10/9/2020 show a1c was 6.8%  Labs from 12/18/2019 show a1c was 6.5%.    She previously saw KALEB Brock      She remains on Synjardy 12.5/1000 mg twice a day       She denies side effects with her medications  She had R TKR since visit and got covid 19 after that  She is doing better  We discussed her vitamin D and thyroid labs         She has hyperlipidemia and is taking simvastatin 40 mg daily.    She denies a history of coronary artery disease and cerebrovascular disease  She denies myalgias and cramps  LDL cholesterol was fair at 104 on 4/2023       She does not have signs of diabetic kidney disease   UACR was <30 on 4/2023       She had an eye exam on 10/19/2022        She has nontoxic multinodular goiter with   a 4 cm solid nodule in the right lower lobe;   a 2.0 cm solid nodule in the left upper lobe    a 1.5 cm solid nodule on the left mid to lower lobe and     Last ultrasound was on November 4, 2022 showed stable nodules      Biopsy of the 4 cm RML nodule on July 11, 2019 was nondiagnostic.   But repeat biopsy on January 12, 2021 came back benign  Biopsy of the LML nodule on July 11, 2019  came back as benign  Biopsy of the 2 cm ISA nodule on January 2021 was benign      She has been euthyroid on serial monitoring.    She still reports periodic  difficulty swallowing (mostly when she swallows a handful of pills)  She has Schatzi ring in her esophagus which has been dilated twice previously by GI    We now diagnosed with her subclinical hypothyroidism  TSH is 4.1 and free t4 is low at 0.83 on 4/10/2023      Her vitamin D is low at 12 on  4/2023            BG Diary:  Patient doesn't check her BGs    Weight has been stable    Diabetes Complications   Retinopathy: No known retinopathy.  Last eye exam: POCT exam today   Neuropathy: Denies paresthesias or numbness in hands or feet. Denies any foot wounds.  Exercise: Minimal.  Diet: Fair.  Patient's medications, allergies, and social histories were reviewed and updated as appropriate.    ROS:     CONS:     No fever, no chills   EYES:     No diplopia, no blurry vision   CV:           No chest pain, no palpitations   PULM:     No SOB, no cough, no hemoptysis.   GI:            No nausea, no vomiting, no diarrhea, no constipation   ENDO:     No polyuria, no polydipsia, no heat intolerance, no cold intolerance       Past Medical History:  Problem List:  2023-03: History of skin cancer  2023-03: Gastroesophageal reflux disease without esophagitis  2023-03: Bipolar disorder in full remission ([facility])  2023-03: Primary insomnia  2023-03: Sedative dependence with current use ([facility])  2023-03: COVID-19 virus infection  2023-03: Chronic cough  2022-12: Hyponatremia  2022-12: Mixed hyperlipidemia  2022-12: Respiratory failure ([facility])  2022-12: Acute hypoxemic respiratory failure ([facility])  2022-12: Total knee replacement status, right  2022-12: Postoperative hypoxemia  2022-06: Groin lump  2022-06: SI (sacroiliac) pain  2022-05: BMI 25.0-25.9,adult  2022-05: Osteopenia of left thigh  2022-05: Hypokalemia  2022-05: History of renal disease  2022-03: Cerebral atrophy ([facility])  2022-03: Recurrent major depressive disorder, in remission ([facility])  2021-06: Osteoarthritis of right knee  2021-06: Type 2 diabetes mellitus with stage 3 chronic kidney disease   ([facility])  2020-12: Stage 3a chronic kidney disease ([facility])  2020-10: Long term (current) use of oral hypoglycemic drugs  2020-01: Non-toxic multinodular goiter  2019-07: Risk for falls  2019-03: Dysphagia  2017-01: Controlled type 2 diabetes mellitus without complication,   without long-term  current use of insulin ([facility])  2017-01: Dyslipidemia  2017-01: Multiple sclerosis ([facility])  2017-01: Mood disorder ([facility])  2017-01: Schatzki's ring  2017-01: Hiatal hernia      Past Surgical History:  Past Surgical History:   Procedure Laterality Date    PB TOTAL KNEE ARTHROPLASTY Right 12/14/2022    Procedure: RIGHT TOTAL KNEE REPLACEMENT;  Surgeon: Britton Cooney M.D.;  Location: SURGERY [facility];  Service: Orthopedics    GYN SURGERY  1970    Cervical polyp        Allergies:  Patient has no known allergies.     Social History:  Social History     Tobacco Use    Smoking status: Never    Smokeless tobacco: Never   Vaping Use    Vaping Use: Never used   Substance Use Topics    Alcohol use: Yes     Comment: Occasional glass of wine or april    Drug use: Not Currently        Family History:   family history includes ADHD in her son; Bipolar disorder in her daughter; Cancer in her brother, maternal grandmother, and mother; Colon Cancer in her mother; Colorectal Cancer in her mother; Depression in her son; Diabetes in her father and mother; Heart Attack in her father; Heart Disease in her father and mother; Hypertension in her son; Lung Disease in her mother; No Known Problems in her maternal grandfather, paternal grandfather, paternal grandmother, and sister; Other in her brother and daughter.      PHYSICAL EXAM:   OBJECTIVE:  Vital signs: /68 (BP Location: Left arm, Patient Position: Sitting)   Pulse 94   Ht 1.524 m (5')   Wt 59 kg (130 lb)   SpO2 99%   BMI 25.39 kg/m²   GENERAL: Well-developed, well-nourished in no apparent distress.   EYE:  No ocular asymmetry, PERRLA  HENT: Pink, moist mucous membranes.    NECK: Thyroid is diffusely enlarged with palpable nodules bilaterally  CARDIOVASCULAR:  No murmurs  LUNGS: Clear breath sounds  ABDOMEN: Soft, nontender   EXTREMITIES: No clubbing, cyanosis, or edema.   NEUROLOGICAL: No gross focal motor abnormalities   LYMPH: No cervical adenopathy seen  SKIN:  No rashes, lesions.   Monofilament testing with a 10 gram force: sensation: intact bilaterally  Visual Inspection: Feet without maceration, ulcers, or fissures.  Pedal pulses: intact bilaterally        Labs:  Lab Results   Component Value Date/Time    HBA1C 6.1 (A) 2023 10:55 AM        Lab Results   Component Value Date/Time    WBC 9.8 2022 03:39 PM    RBC 3.87 (L) 2022 03:39 PM    HEMOGLOBIN 11.6 (L) 2022 03:39 PM    MCV 94.1 2022 03:39 PM    MCH 30.0 2022 03:39 PM    MCHC 31.9 (L) 2022 03:39 PM    RDW 47.4 2022 03:39 PM    MPV 8.8 (L) 2022 03:39 PM       Lab Results   Component Value Date/Time    SODIUM 143 04/10/2023 07:28 AM    POTASSIUM 4.6 04/10/2023 07:28 AM    CHLORIDE 107 04/10/2023 07:28 AM    CO2 25 04/10/2023 07:28 AM    ANION 11.0 04/10/2023 07:28 AM    GLUCOSE 127 (H) 04/10/2023 07:28 AM    BUN 10 04/10/2023 07:28 AM    CREATININE 0.89 04/10/2023 07:28 AM    CALCIUM 9.2 04/10/2023 07:28 AM    ASTSGOT 15 04/10/2023 07:28 AM    ALTSGPT 12 04/10/2023 07:28 AM    TBILIRUBIN 0.4 04/10/2023 07:28 AM    ALBUMIN 4.3 04/10/2023 07:28 AM    TOTPROTEIN 7.1 04/10/2023 07:28 AM    GLOBULIN 2.8 04/10/2023 07:28 AM    AGRATIO 1.5 04/10/2023 07:28 AM       Lab Results   Component Value Date/Time    CHOLSTRLTOT 179 2019 0813    TRIGLYCERIDE 145 2019 0813    HDL 39 (A) 2019 0813     (H) 2019 0813       Lab Results   Component Value Date/Time    MALBCRT see below 04/10/2023 07:28 AM    MICROALBUR <1.2 04/10/2023 07:28 AM        Lab Results   Component Value Date/Time    TSHULTRASEN 3.680 2019 0813     No results found for: FREEDIR  No results found for: FREET3  No results found for: THYSTIMIG    IMAGIN/4/2022 9:50 AM     HISTORY/REASON FOR EXAM:  Mass/Lump  Thyroid goiter     TECHNIQUE/EXAM DESCRIPTION:  Ultrasound of the soft tissues of the head and neck.     COMPARISON:  Thyroid ultrasound 2020     FINDINGS:  The  thyroid gland is heterogeneous.  Vascularity is normal.     The right lobe of the thyroid gland measures 2.47 cm x 5.44 cm x 2.75 cm. The contour and echogenicity are normal. No focal mass lesions are identified.  The left lobe of the thyroid gland measures 1.69 cm x 5.69 cm x 2.43 cm. The contour and echogenicity are normal. No focal mass lesions are identified.  The isthmus measures 0.36 cm.     Nodules >= 1cm:  3     Nodule #1  Location:  Right  lower  Size:  3.90 x 2.47 x 2.71 cm  Composition:  Mixed-1  Echogenicity:  Hypoechoic-2  Shape:  Wider than tall-0  Margins:  Smooth-0  Echogenic Foci:  None-0     ACR TIRADS points/category:  3 - TR3 - Mildly Suspicious     Nodule #2  Location:  Left  upper  Size:  1.99 x 1.61 x 0.98 cm  Composition:  Solid-2  Echogenicity:  Hypoechoic-2  Shape:  Wider than tall-0  Margins:  Smooth-0  Echogenic Foci:  None-0     ACR TIRADS points/category:  4 - TR4 - Moderately Suspicious     Nodule #3  Location:  Left  lower  Size:  1.53 x 1.48 x 1.12 cm  Composition:  Solid-2  Echogenicity:  Hypoechoic-2  Shape:  Wider than tall-0  Margins:  Smooth-0  Echogenic Foci:  None-0     ACR TIRADS points/category:  4 - TR4 - Moderately Suspicious     IMPRESSION:     Multinodular goiter with thyroid nodules x3.     ACR TI-RADS Recommendations  TR4 (>= 1.5cm) - Based solely on ultrasound findings, FNA could be considered     Recommendations based on the American College of Radiology Thyroid imaging, reporting and Data System (TI-RADS) 2017.     11/21/2020 4:42 PM     HISTORY/REASON FOR EXAM:  Follow up NTMNG 4 cm nodule RUL and 2 nodules LML LLL previous FNA r lobe non dx     TECHNIQUE/EXAM DESCRIPTION:  Ultrasound of the soft tissues of the head and neck.     COMPARISON:  Thyroid ultrasound 6/17/2019     FINDINGS:  The thyroid gland is heterogeneous.  Vascularity is normal.     The right lobe of the thyroid gland measures 2.18 cm x 5.21 cm x 2.41 cm.  The left lobe of the thyroid gland measures  1.56 cm x 5.91 cm x 2.07 cm.  The isthmus measures 0.18 cm.     Nodules >= 1cm:     Nodule #1  Location: Right lower pole  Size:  3.94 x 2.40 x 1.97 cm from 4.0 x 2.4 x 2.9 cm  Composition:  Mixed-1  Echogenicity:  Isoechoic-1  Shape:  Wider than tall-0  Margins:  Smooth-0  Echogenic Foci:  Macroscopic-1     ACR TIRADS points/category:  3 - TR3 - Mildly Suspicious     Nodule #2  Location:  Left interpolar  Size:  1.49 x 1.19 x 1.44 cm from 1.9 x 1.0 x 0.7  Composition:  Solid-2  Echogenicity:  Isoechoic-1  Shape:  Taller than wide-3  Margins:  Smooth-0  Echogenic Foci:  None-0     ACR TIRADS points/category:  6 - TR4 - Moderately Suspicious     Nodule #3  Location:  Left lower pole  Size:  1.69 x 1.50 x 1.07 cm from 1.9 x 1.0 x 1.5  Composition:  Mixed-1  Echogenicity:  Isoechoic-1  Shape:  Wider than tall-0  Margins:  Smooth-0  Echogenic Foci:  None-0     ACR TIRADS points/category:  2 - TR2 - Not Suspicious     Nodule #4  Location:  Left upper  Size:  2.21 x 1.46 x 1.43 cm  Composition:  Mixed-1  Echogenicity:  Isoechoic-1  Shape:  Wider than tall-0  Margins:  Smooth-0  Echogenic Foci:  None-0     ACR TIRADS points/category:  2 - TR2 - Not Suspicious     IMPRESSION:     Left interpolar mass has slightly enlarged and is moderately suspicious     ACR TI-RADS Recommendations  TR4 - follow up ultrasound in 1,2,3 and 5 years     Recommendations based on the American College of Radiology Thyroid imaging, reporting and Data System (TI-RADS) 2017.            ASSESSMENT/PLAN:     1. Controlled type 2 diabetes mellitus without complication, without long-term current use of insulin (HCC)  Stable controlled  Continue Synjardy XR 12.5/1000 mg twice a day  She is up-to-date with her labs  We are getting an eye exam today  Follow-up in 6 months      2. Non-toxic multinodular goiter  Stable  I personally reviewed the images of the ultrasound from November 2022.  Overall her nodules appear to be stable and repeat biopsy is not  medically necessary at this time  She denies concern or compressive symptoms  She does have new diagnosis of hypothyroidism for which we will start thyroid hormone I recommend observation for multinodular goiter and repeat her ultrasound in 1 year      3. Subclinical hypothyroidism  Uncontrolled  TSH is high Free T4 is low   start levothyroxine 25 mcg daily  Reviewed proper administration of thyroid hormone  Patient should take thyroid hormone 30-60 minutes before breakfast on an empty stomach plain water and not take it together with food, iron, calcium, and antacids.  Iron, calcium, and antacids should be taken at least 4 hours apart from thyroid hormone.  Repeat TSH levels in 3 to 6 months    4. Dyslipidemia  Fair control  Continue simvastatin  Repeat fasting lipids in 1 year    5. Vitamin D deficiency  Uncontrolled  Vitamin D is very low  Start ergocalciferol 50,000 units weekly  Continue monitoring    6. Long term (current) use of oral hypoglycemic drugs  Patient is on multiple oral agents for type 2 diabetes management      Return in about 4 months (around 8/20/2023).      Thank you kindly for allowing me to participate in the diabetes care plan for this patient.    Swapnil Cat MD, SAVANA, [facility]      CC:   Laya Cornelius M.D.

## 2023-04-20 NOTE — PROGRESS NOTES
"RN-CDE Note    Subjective:   Endocrinology Clinic Progress Note  PCP: Danae Pritchett M.D.    HPI:  Melody Rodriguez is a 73 y.o. old patient who is seen today by the Diabetes Nurse Specialist for review of her controlled type 2 diabetes.    Recent changes in health:  states she had knee replacement and covid since her last appointment.   DM:   Last A1c:   Lab Results   Component Value Date/Time    HBA1C 6.1 (A) 04/20/2023 10:55 AM      Previous A1c was 5.7 on 12/2/22  A1C GOAL: < 7    Diabetes Medications:   Synjardy 12.5/1000 mg bid      Exercise: sporadic irregular exercise, <half hour walking weekly  Diet: \"healthy\" diet  in general  Patient's body mass index is 25.39 kg/m². Exercise and nutrition counseling were performed at this visit.    Glucose monitoring frequency: on occasion    Hypoglycemic episodes: no  Last Retinal Exam: on file and up-to-date  Daily Foot Exam: Yes   Foot Exam:  Monofilament:  current  Lab Results   Component Value Date/Time    MALBCRT see below 04/10/2023 07:28 AM    MICROALBUR <1.2 04/10/2023 07:28 AM        ACR Albumin/Creatinine Ratio goal <30     HTN:   Blood pressure goal <130/<80 .   Currently Rx ACE/ARB: Not Indicated     Dyslipidemia:    Lab Results   Component Value Date/Time    CHOLSTRLTOT 187 04/10/2023 07:28 AM     (H) 04/10/2023 07:28 AM    HDL 50 04/10/2023 07:28 AM    TRIGLYCERIDE 165 (H) 04/10/2023 07:28 AM         Currently Rx Statin: Yes     She  reports that she has never smoked. She has never used smokeless tobacco.      Plan:     Discussed and educated on:   - All medications, side effects and compliance (discussed carefully)  - HbA1C: target  - Home glucose monitoring emphasized  - Weight control and daily exercise    Recommended medication changes: no changes.     "

## 2023-04-24 ENCOUNTER — PHARMACY VISIT (OUTPATIENT)
Dept: PHARMACY | Facility: MEDICAL CENTER | Age: 73
End: 2023-04-24
Payer: COMMERCIAL

## 2023-05-07 PROCEDURE — RXMED WILLOW AMBULATORY MEDICATION CHARGE: Performed by: PSYCHIATRY & NEUROLOGY

## 2023-05-09 ENCOUNTER — PHARMACY VISIT (OUTPATIENT)
Dept: PHARMACY | Facility: MEDICAL CENTER | Age: 73
End: 2023-05-09
Payer: COMMERCIAL

## 2023-05-16 ENCOUNTER — PATIENT OUTREACH (OUTPATIENT)
Dept: HEALTH INFORMATION MANAGEMENT | Facility: OTHER | Age: 73
End: 2023-05-16
Payer: MEDICARE

## 2023-05-16 DIAGNOSIS — E11.9 CONTROLLED TYPE 2 DIABETES MELLITUS WITHOUT COMPLICATION, WITHOUT LONG-TERM CURRENT USE OF INSULIN (HCC): ICD-10-CM

## 2023-05-16 DIAGNOSIS — Z91.81 RISK FOR FALLS: ICD-10-CM

## 2023-05-16 NOTE — PROGRESS NOTES
Nurse CM outreach call for monthly CCM assessment.    Assessment    Pt reports she currently has a cold. Reports cough, stuffy nose. Reports no fever, no shortness of breath. States she watches her grandchild and gets sick on occasion.  Pt reports she did have follow-up with Dr. Cat. Reports she is taking all of her medications as directed. Last A1C on 4/20 was 6.1. Pt reports she is taking thyroid medication as directed.    Education    Follow-up with specialists. Continue to monitor respiratory symptoms. If any worsening symptoms call for appt with PCP or go to urgent care for evaluation.     Plan of Care and Goals    Reviewed care plan. Pt not feeling well today because of cold.    Barriers:    Balance issues related to MS    Progress:    Continue to work on goals.    Next outreach: One Month  Nurse Care Coordinator: Pratibha Silva  [phone]

## 2023-05-24 DIAGNOSIS — E03.8 SUBCLINICAL HYPOTHYROIDISM: ICD-10-CM

## 2023-05-24 RX ORDER — LEVOTHYROXINE SODIUM 0.03 MG/1
25 TABLET ORAL
Qty: 90 TABLET | Refills: 1 | Status: SHIPPED | OUTPATIENT
Start: 2023-05-24 | End: 2023-08-29 | Stop reason: SDUPTHER

## 2023-06-01 ENCOUNTER — PHARMACY VISIT (OUTPATIENT)
Dept: PHARMACY | Facility: MEDICAL CENTER | Age: 73
End: 2023-06-01
Payer: COMMERCIAL

## 2023-06-01 PROCEDURE — RXMED WILLOW AMBULATORY MEDICATION CHARGE: Performed by: PSYCHIATRY & NEUROLOGY

## 2023-06-01 RX ORDER — LAMOTRIGINE 200 MG/1
TABLET ORAL
Qty: 90 TABLET | Refills: 2 | Status: SHIPPED | OUTPATIENT
Start: 2023-06-01 | End: 2023-10-18

## 2023-06-06 ENCOUNTER — TELEPHONE (OUTPATIENT)
Dept: HEALTH INFORMATION MANAGEMENT | Facility: OTHER | Age: 73
End: 2023-06-06
Payer: MEDICARE

## 2023-06-07 ENCOUNTER — OFFICE VISIT (OUTPATIENT)
Dept: CARDIOLOGY | Facility: MEDICAL CENTER | Age: 73
End: 2023-06-07
Attending: INTERNAL MEDICINE
Payer: MEDICARE

## 2023-06-07 VITALS
SYSTOLIC BLOOD PRESSURE: 130 MMHG | HEART RATE: 88 BPM | OXYGEN SATURATION: 96 % | BODY MASS INDEX: 25.72 KG/M2 | DIASTOLIC BLOOD PRESSURE: 80 MMHG | HEIGHT: 60 IN | RESPIRATION RATE: 16 BRPM | WEIGHT: 131 LBS

## 2023-06-07 DIAGNOSIS — E78.5 DYSLIPIDEMIA: ICD-10-CM

## 2023-06-07 DIAGNOSIS — Z82.49 FAMILY HISTORY OF PREMATURE CORONARY ARTERY DISEASE: ICD-10-CM

## 2023-06-07 DIAGNOSIS — I25.10 CORONARY ARTERY CALCIFICATION: ICD-10-CM

## 2023-06-07 DIAGNOSIS — I25.84 CORONARY ARTERY CALCIFICATION: ICD-10-CM

## 2023-06-07 PROCEDURE — 99212 OFFICE O/P EST SF 10 MIN: CPT | Performed by: INTERNAL MEDICINE

## 2023-06-07 PROCEDURE — 3075F SYST BP GE 130 - 139MM HG: CPT | Performed by: INTERNAL MEDICINE

## 2023-06-07 PROCEDURE — 3079F DIAST BP 80-89 MM HG: CPT | Performed by: INTERNAL MEDICINE

## 2023-06-07 PROCEDURE — 99213 OFFICE O/P EST LOW 20 MIN: CPT | Performed by: INTERNAL MEDICINE

## 2023-06-07 PROCEDURE — 99214 OFFICE O/P EST MOD 30 MIN: CPT | Performed by: INTERNAL MEDICINE

## 2023-06-07 RX ORDER — ROSUVASTATIN CALCIUM 40 MG/1
40 TABLET, COATED ORAL DAILY
Qty: 90 TABLET | Refills: 3 | Status: SHIPPED | OUTPATIENT
Start: 2023-06-07

## 2023-06-07 ASSESSMENT — FIBROSIS 4 INDEX: FIB4 SCORE: 1.3

## 2023-06-07 NOTE — PROGRESS NOTES
Cardiology Follow-Up Consultation Note    Date of note:    2023  Primary Care Provider: Danae Pritchett M.D.    Patient Name: Melody Rodriguez   YOB: 1950  MRN:              [id number]    Chief Complaint   Patient presents with    Other     F/V Dx: Controlled type 2 diabetes mellitus without complication, without long-term current use of insulin (HCC)    Dyslipidemia       History of Present Illness: Ms. Melody Rodriguez is a 72 y.o. female whose current medical problems include MS, diabetes, dyslipidemia and abnormal EKG who is here for follow-up of abnormal EKG.    Pertinent history:  Abnormal EKG: She was scheduled to undergo right total knee replacement on 2022.  Had preoperative EKG which was read as prior infarct and was told that she needs urgent cardiology evaluation before surgery can be performed.  From a cardiovascular perspective, patient denies prior history of MI.  Does get occasional bandlike tightness around her chest which is from multiple sclerosis.  FH: Father had MI at age 54 and  of MI at age 60    Last clinic visit: 2022    Interim events:  Since her last visit, patient underwent successful right knee replacement without any cardiac complications.  Has been feeling well without chest pain, pressure or shortness of breath.      Past Medical History:   Diagnosis Date    Acute nasopharyngitis     Recent chest xray clear    Arthritis     Right knee and thumb joints    Breath shortness     Comes and goes    Cancer (HCC)     Skin cancer squamous cell    Cerebral atrophy (HCC) 2022    COVID-19 virus infection 3/15/2023    Depression     Diabetes (HCC)     Disorder of thyroid     Nodules in both nodes. Biopsy indicates benign. Last ultrasound shows stable.    Groin lump 2022    Gynecological disorder     Cervical polyp in     Heart burn     Controlled with Omeprazole    Heart murmur     Minor leakage in 3 valves due to scarlet fever as a  child    Heart valve disease     Minor leakage in 3 valves due to scarlet fever as a child    Hiatus hernia syndrome     I think    High cholesterol     Taking Simvistatin    Hyperlipidemia     Muscle disorder     Osteoarthritis of right knee 06/15/2021    PONV (postoperative nausea and vomiting) 1970    Only time I’ve been under anesthesia    Recurrent major depressive disorder, in remission ([facility]) 03/16/2022    SI (sacroiliac) pain 06/09/2022    Type 2 diabetes mellitus with stage 3 chronic kidney disease (HCC) 06/15/2021    Urinary bladder disorder     MS issue    Urinary incontinence     During MS relapse. Has since resolved         Past Surgical History:   Procedure Laterality Date    PB TOTAL KNEE ARTHROPLASTY Right 12/14/2022    Procedure: RIGHT TOTAL KNEE REPLACEMENT;  Surgeon: Britton Cooney M.D.;  Location: SURGERY [facility];  Service: Orthopedics    GYN SURGERY  1970    Cervical polyp         Current Outpatient Medications   Medication Sig Dispense Refill    rosuvastatin (CRESTOR) 40 MG tablet Take 1 Tablet by mouth every day. 90 Tablet 3    lamotrigine (LAMICTAL) 200 MG tablet Take 1 tablet (200 mg) by mouth daily 90 Tablet 2    levothyroxine (SYNTHROID) 25 MCG Tab Take 1 Tablet by mouth every morning on an empty stomach. 90 Tablet 1    vitamin D2, Ergocalciferol, (DRISDOL) 1.25 MG (85724 UT) Cap capsule Take 1 Capsule by mouth every 7 days. 12 Capsule 1    buPROPion SR (WELLBUTRIN-SR) 150 MG TABLET SR 12 HR sustained-release tablet Take 1 tablet (150 mg) by mouth daily in the morning 90 Tablet 1    docusate sodium (COLACE) 100 MG Cap Take 100 mg by mouth 2 times a day. Indications: Constipation      zolpidem (AMBIEN) 5 MG Tab Take 1 tablet (5 mg) by mouth daily at bedtime as needed for insomnia 30 Tablet 0    Empagliflozin-metFORMIN HCl ER (SYNJARDY XR) 12.5-1000 MG TABLET SR 24 HR Take 1 tablet by mouth daily 100 Tablet 2    paroxetine (PAXIL) 40 MG tablet Take 1 Tablet by mouth every day. 100  Tablet 1    omeprazole (PRILOSEC) 20 MG delayed-release capsule Take 1 capsule by mouth every morning, 30 minutes before breakfast. 100 Capsule 3    Teriflunomide (AUBAGIO) 14 MG Tab Take 14 mg by mouth every day. Indications: Relapsing, Remitting Multiple Sclerosis      modafinil (PROVIGIL) 200 MG Tab Take 200 mg by mouth 1 time a day as needed. Indications: Tiredness associated with Multiple Sclerosis       No current facility-administered medications for this visit.         No Known Allergies      Family History   Problem Relation Age of Onset    Colon Cancer Mother     Heart Disease Mother         Cardiomyopathy    Lung Disease Mother         Emphysema from 2nd hand smoke    Cancer Mother         Colon    Colorectal Cancer Mother     Diabetes Mother     Heart Attack Father     Heart Disease Father         Numerous heart attacks    Diabetes Father     No Known Problems Sister     Cancer Brother         Skin cancer    Other Brother         Gaulbladder issues    Cancer Maternal Grandmother         GI cancer    No Known Problems Maternal Grandfather     No Known Problems Paternal Grandmother     No Known Problems Paternal Grandfather     Other Daughter         Fibromyalgia    Bipolar disorder Daughter     ADHD Son     Depression Son     Hypertension Son          Social History     Socioeconomic History    Marital status:      Spouse name: Not on file    Number of children: Not on file    Years of education: Not on file    Highest education level: 12th grade   Occupational History    Not on file   Tobacco Use    Smoking status: Never    Smokeless tobacco: Never   Vaping Use    Vaping Use: Never used   Substance and Sexual Activity    Alcohol use: Yes     Comment: Occasional glass of wine or april    Drug use: Not Currently    Sexual activity: Not on file   Other Topics Concern    Not on file   Social History Narrative    Not on file     Social Determinants of Health     Financial Resource Strain: Low Risk   (11/9/2022)    Overall Financial Resource Strain (CARDIA)     Difficulty of Paying Living Expenses: Not hard at all   Food Insecurity: No Food Insecurity (11/9/2022)    Hunger Vital Sign     Worried About Running Out of Food in the Last Year: Never true     Ran Out of Food in the Last Year: Never true   Transportation Needs: No Transportation Needs (11/9/2022)    PRAPARE - Transportation     Lack of Transportation (Medical): No     Lack of Transportation (Non-Medical): No   Physical Activity: Inactive (11/9/2022)    Exercise Vital Sign     Days of Exercise per Week: 0 days     Minutes of Exercise per Session: 0 min   Stress: Stress Concern Present (5/26/2020)    [employer] [place] [employer] - Occupational Stress Questionnaire     Feeling of Stress : To some extent   Social Connections: Moderately Isolated (5/26/2020)    Social Connection and Isolation Panel [NHANES]     Frequency of Communication with Friends and Family: More than three times a week     Frequency of Social Gatherings with Friends and Family: More than three times a week     Attends Mandaen Services: Never     Active Member of Clubs or Organizations: Yes     Attends Club or Organization Meetings: More than 4 times per year     Marital Status:    Intimate Partner Violence: Not on file   Housing Stability: Low Risk  (11/9/2022)    Housing Stability Vital Sign     Unable to Pay for Housing in the Last Year: No     Number of Places Lived in the Last Year: 1     Unstable Housing in the Last Year: No         Physical Exam:  Ambulatory Vitals  /80 (BP Location: Left arm, Patient Position: Sitting, BP Cuff Size: Adult)   Pulse 88   Resp 16   Ht 1.524 m (5')   Wt 59.4 kg (131 lb)   SpO2 96%    Oxygen Therapy:  Pulse Oximetry: 96 %  BP Readings from Last 4 Encounters:   06/07/23 130/80   04/20/23 122/68   03/22/23 130/80   01/27/23 120/70       Weight/BMI: Body mass index is 25.58 kg/m².  Wt Readings from Last 4 Encounters:    06/07/23 59.4 kg (131 lb)   04/20/23 59 kg (130 lb)   03/22/23 59.1 kg (130 lb 4.8 oz)   03/15/23 58.1 kg (128 lb)         General: Well appearing and in no apparent distress  Eyes: nl conjunctiva, no icteric sclera  ENT: normal external appearance of ears  Neck: no visible JVP,  no carotid bruits  Lungs: normal respiratory effort, CTAB  Heart: RRR, systolic murmur throughout the precordium, no rubs or gallops, no edema bilateral lower extremities. No LV/RV heave on cardiac palpatation. 2+ bilateral radial pulses.    Abdomen: soft, non tender, non distended, no masses, normal bowel sounds.  No HSM.  Extremities/MSK: no clubbing, no cyanosis  Neurological: No focal sensory deficits  Psychiatric: Appropriate affect, A/O x 3, intact judgement and insight  Skin: Warm extremities      Lab Data Review:  Lab Results   Component Value Date/Time    CHOLSTRLTOT 187 04/10/2023 07:28 AM     (H) 04/10/2023 07:28 AM    HDL 50 04/10/2023 07:28 AM    TRIGLYCERIDE 165 (H) 04/10/2023 07:28 AM       Lab Results   Component Value Date/Time    SODIUM 143 04/10/2023 07:28 AM    POTASSIUM 4.6 04/10/2023 07:28 AM    CHLORIDE 107 04/10/2023 07:28 AM    CO2 25 04/10/2023 07:28 AM    GLUCOSE 127 (H) 04/10/2023 07:28 AM    BUN 10 04/10/2023 07:28 AM    CREATININE 0.89 04/10/2023 07:28 AM     Lab Results   Component Value Date/Time    ALKPHOSPHAT 137 (H) 04/10/2023 07:28 AM    ASTSGOT 15 04/10/2023 07:28 AM    ALTSGPT 12 04/10/2023 07:28 AM    TBILIRUBIN 0.4 04/10/2023 07:28 AM      Lab Results   Component Value Date/Time    WBC 9.8 12/17/2022 03:39 PM     Lab Results   Component Value Date/Time    HBA1C 6.1 (A) 04/20/2023 10:55 AM    HBA1C 5.7 (H) 12/07/2022 09:37 AM         Cardiac Imaging and Procedures Review:    EKG dated 12/9/2022: My personal interpretation is sinus rhythm with anterior q-waves    EKG dated 12/7/2022: My personal interpretation is SR with anterior q-waves    Echo: Echocardiogram performed on 12/13/2022 was  personally interpreted by me which shows normal LV size and function, no WMA, no valve disease    Echo dated 6/14/2018:   CONCLUSIONS  No prior study is available for comparison.   Normal left ventricular size and systolic function.  Left ventricular ejection fraction is visually estimated to be 60%.  Normal diastolic function.  Normal inferior vena cava size and inspiratory collapse.  Aortic sclerosis without stenosis.      Radiology test Review:  CTA Chest 12/15/2022:  1.  No evidence pulmonary emboli.  2.  No airspace consolidation or pleural effusions.  3.  Coronary artery calcifications.    CXR: No acute cardiopulmonary abnormality noted.      Assessment & Plan     1. Dyslipidemia  rosuvastatin (CRESTOR) 40 MG tablet      2. Coronary artery calcification  rosuvastatin (CRESTOR) 40 MG tablet            Doing well from a cardiovascular perspective.  Palpitations have resolved.  We discussed doing cardiac event monitor if they recur.    Lipid panel reviewed with LDL above goal.  CTA chest shows coronary artery calcifications which we discussed.  Discontinue simvastatin and start rosuvastatin 40 mg daily.  We discussed goal LDL less than 70.      All of patient's excellent questions were answered to the best of my knowledge and to her satisfaction.  It was a pleasure seeing Ms. Melody Rodriguez in my clinic today. Return in about 1 year (around 6/7/2024). Patient is aware to call the cardiology clinic with any questions or concerns.      Oj Marino MD  [facility]  [facility], [facility]  [address] [address]  Phone: [phone]  Fax: [phone]    Please note that this dictation was created using voice recognition software. I have made every reasonable attempt to correct obvious errors, but it is possible there are errors of grammar and possibly content that I did not discover before finalizing the note.

## 2023-06-13 ENCOUNTER — PHARMACY VISIT (OUTPATIENT)
Dept: PHARMACY | Facility: MEDICAL CENTER | Age: 73
End: 2023-06-13
Payer: COMMERCIAL

## 2023-06-13 PROCEDURE — RXMED WILLOW AMBULATORY MEDICATION CHARGE: Performed by: INTERNAL MEDICINE

## 2023-06-15 ENCOUNTER — OFFICE VISIT (OUTPATIENT)
Dept: MEDICAL GROUP | Facility: PHYSICIAN GROUP | Age: 73
End: 2023-06-15
Payer: MEDICARE

## 2023-06-15 ENCOUNTER — PATIENT OUTREACH (OUTPATIENT)
Dept: HEALTH INFORMATION MANAGEMENT | Facility: OTHER | Age: 73
End: 2023-06-15

## 2023-06-15 VITALS
DIASTOLIC BLOOD PRESSURE: 70 MMHG | HEART RATE: 83 BPM | HEIGHT: 60 IN | OXYGEN SATURATION: 96 % | TEMPERATURE: 97.6 F | WEIGHT: 131 LBS | SYSTOLIC BLOOD PRESSURE: 126 MMHG | BODY MASS INDEX: 25.72 KG/M2

## 2023-06-15 DIAGNOSIS — G35 MULTIPLE SCLEROSIS (HCC): ICD-10-CM

## 2023-06-15 DIAGNOSIS — E11.9 CONTROLLED TYPE 2 DIABETES MELLITUS WITHOUT COMPLICATION, WITHOUT LONG-TERM CURRENT USE OF INSULIN (HCC): ICD-10-CM

## 2023-06-15 DIAGNOSIS — E55.9 VITAMIN D DEFICIENCY: ICD-10-CM

## 2023-06-15 DIAGNOSIS — Z91.81 RISK FOR FALLS: ICD-10-CM

## 2023-06-15 DIAGNOSIS — E78.5 DYSLIPIDEMIA: ICD-10-CM

## 2023-06-15 DIAGNOSIS — F31.70 BIPOLAR DISORDER IN FULL REMISSION, MOST RECENT EPISODE UNSPECIFIED TYPE (HCC): ICD-10-CM

## 2023-06-15 DIAGNOSIS — E03.9 ACQUIRED HYPOTHYROIDISM: ICD-10-CM

## 2023-06-15 PROCEDURE — 3078F DIAST BP <80 MM HG: CPT | Performed by: INTERNAL MEDICINE

## 2023-06-15 PROCEDURE — 99214 OFFICE O/P EST MOD 30 MIN: CPT | Performed by: INTERNAL MEDICINE

## 2023-06-15 PROCEDURE — 3074F SYST BP LT 130 MM HG: CPT | Performed by: INTERNAL MEDICINE

## 2023-06-15 PROCEDURE — 99999 PR NO CHARGE: CPT | Performed by: INTERNAL MEDICINE

## 2023-06-15 ASSESSMENT — FIBROSIS 4 INDEX: FIB4 SCORE: 1.3

## 2023-06-15 NOTE — PROGRESS NOTES
Nurse CM met with patient after PCP appointment today for monthly CCM assessment    Assessment    Pt reports she has been doing well. States she is here today for follow-up with PCP. Pt reports no falls over the past month. Reports sometimes she gets dizzy on occasion with certain movements. Reports she doesn't monitor blood pressure at home. Pt doesn't feel she needs to monitor at this time. Pt will update nurse if she is wanting blood pressure monitor to check readings at home. Pt had recent visit with cardiology. States she currently has all of her medications. Denies needing any assistance with care coordination needs today.    Education    Fall precautions reviewed    Plan of Care and Goals    Reviewed care plan and goals    Barriers:    History of falls    Progress:    Continue to work on progress    Next outreach:  One month  Nurse Care Coordinator:  Pratibha Silva  [phone]

## 2023-06-16 ENCOUNTER — PHARMACY VISIT (OUTPATIENT)
Dept: PHARMACY | Facility: MEDICAL CENTER | Age: 73
End: 2023-06-16
Payer: COMMERCIAL

## 2023-06-16 PROCEDURE — RXMED WILLOW AMBULATORY MEDICATION CHARGE: Performed by: INTERNAL MEDICINE

## 2023-06-16 NOTE — PROGRESS NOTES
CC: 5-month follow-up visit, medications, lab work    HPI:  Melody presents with the following    1. Bipolar disorder in full remission, most recent episode unspecified type (HCC)  Chronic.  Ongoing.  Her depression has worsened over the last few weeks.  Her Wellbutrin is increased to 150 mg twice daily per psychiatry.  She continues on her current dose of Lamictal and Paxil 40 mg daily.  She has an appointment with psychiatry in August.  Patient is under a great deal of stress, she will be taking on the care of her 2 great grandchildren in about 2 weeks, one of them a .    2. Controlled type 2 diabetes mellitus without complication, without long-term current use of insulin (HCC)  Chronic.  Stable.  Followed by endocrinology.  Currently being treated by Synjardy and her hemoglobin A1c is 6.1.  Follow-up with endocrinology in 6 months.    3. Dyslipidemia  Patient stable on Zocor 40 mg daily.    4. Acquired hypothyroidism  Chronic.  Patient with PMH of nontoxic multinodular goiter.  Patient completed ultrasound of her thyroid in 2022 and was reviewed by her endocrinologist.  Nodules appear stable.  No repeat biopsy needed.  Patient was found to be slightly hypothyroid and was started on levothyroxine 25 mcg daily.  Patient denies any symptoms.    5. Multiple sclerosis (HCC)  Chronic.  Stable.  Patient continues to be followed by neurology, continues on Aubagio and Provigil.    6. Vitamin D deficiency  Patient was found to have a vitamin D level of 12 in April and was started on ergocalciferol 50,000 units weekly.      Patient Active Problem List    Diagnosis Date Noted    Acquired hypothyroidism 06/15/2023    Vitamin D deficiency 06/15/2023    Coronary artery calcification 2023    Family history of premature coronary artery disease 2023    History of skin cancer 2023    Gastroesophageal reflux disease without esophagitis 2023    Bipolar disorder in full remission (HCC)  03/22/2023    Primary insomnia 03/22/2023    Sedative dependence with current use ([facility]) 03/22/2023    COVID-19 virus infection 03/15/2023    Chronic cough 03/15/2023    Hyponatremia 12/19/2022    Mixed hyperlipidemia 12/17/2022    Total knee replacement status, right 12/14/2022    Postoperative hypoxemia 12/14/2022    SI (sacroiliac) pain 06/09/2022    BMI 25.0-25.9,adult 05/20/2022    Osteopenia of left thigh 05/20/2022    Hypokalemia 05/20/2022    History of renal disease 05/20/2022    Cerebral atrophy ([facility]) 03/16/2022    Osteoarthritis of right knee 06/15/2021    Long term (current) use of oral hypoglycemic drugs 10/21/2020    Non-toxic multinodular goiter 01/29/2020    Risk for falls 07/12/2019    Dysphagia 03/29/2019    Controlled type 2 diabetes mellitus without complication, without long-term current use of insulin ([facility]) 01/06/2017    Dyslipidemia 01/06/2017    Multiple sclerosis ([facility]) 01/06/2017    Mood disorder ([facility]) 01/06/2017    Schatzki's ring 01/06/2017    Hiatal hernia 01/06/2017       Current Outpatient Medications   Medication Sig Dispense Refill    rosuvastatin (CRESTOR) 40 MG tablet Take 1 Tablet by mouth every day. 90 Tablet 3    lamotrigine (LAMICTAL) 200 MG tablet Take 1 tablet (200 mg) by mouth daily 90 Tablet 2    levothyroxine (SYNTHROID) 25 MCG Tab Take 1 Tablet by mouth every morning on an empty stomach. 90 Tablet 1    vitamin D2, Ergocalciferol, (DRISDOL) 1.25 MG (76678 UT) Cap capsule Take 1 Capsule by mouth every 7 days. 12 Capsule 1    buPROPion SR (WELLBUTRIN-SR) 150 MG TABLET SR 12 HR sustained-release tablet Take 1 tablet (150 mg) by mouth daily in the morning (Patient taking differently: Take 300 mg by mouth.) 90 Tablet 1    docusate sodium (COLACE) 100 MG Cap Take 100 mg by mouth 2 times a day. Indications: Constipation      zolpidem (AMBIEN) 5 MG Tab Take 1 tablet (5 mg) by mouth daily at bedtime as needed for insomnia 30 Tablet 0    Empagliflozin-metFORMIN HCl ER (SYNJARDY XR)  12.5-1000 MG TABLET SR 24 HR Take 1 tablet by mouth daily 100 Tablet 2    paroxetine (PAXIL) 40 MG tablet Take 1 Tablet by mouth every day. 100 Tablet 1    omeprazole (PRILOSEC) 20 MG delayed-release capsule Take 1 capsule by mouth every morning, 30 minutes before breakfast. 100 Capsule 3    Teriflunomide (AUBAGIO) 14 MG Tab Take 14 mg by mouth every day. Indications: Relapsing, Remitting Multiple Sclerosis      modafinil (PROVIGIL) 200 MG Tab Take 200 mg by mouth 1 time a day as needed. Indications: Tiredness associated with Multiple Sclerosis       No current facility-administered medications for this visit.         Allergies as of 06/15/2023    (No Known Allergies)        Social History     Socioeconomic History    Marital status:      Spouse name: Not on file    Number of children: Not on file    Years of education: Not on file    Highest education level: 12th grade   Occupational History    Not on file   Tobacco Use    Smoking status: Never    Smokeless tobacco: Never   Vaping Use    Vaping Use: Never used   Substance and Sexual Activity    Alcohol use: Yes     Comment: Occasional glass of wine or april    Drug use: Not Currently    Sexual activity: Not on file   Other Topics Concern    Not on file   Social History Narrative    Not on file     Social Determinants of Health     Financial Resource Strain: Low Risk  (11/9/2022)    Overall Financial Resource Strain (CARDIA)     Difficulty of Paying Living Expenses: Not hard at all   Food Insecurity: No Food Insecurity (11/9/2022)    Hunger Vital Sign     Worried About Running Out of Food in the Last Year: Never true     Ran Out of Food in the Last Year: Never true   Transportation Needs: No Transportation Needs (11/9/2022)    PRAPARE - Transportation     Lack of Transportation (Medical): No     Lack of Transportation (Non-Medical): No   Physical Activity: Inactive (11/9/2022)    Exercise Vital Sign     Days of Exercise per Week: 0 days     Minutes of  Exercise per Session: 0 min   Stress: Stress Concern Present (5/26/2020)    [employer] [place] [employer] - Occupational Stress Questionnaire     Feeling of Stress : To some extent   Social Connections: Moderately Isolated (5/26/2020)    Social Connection and Isolation Panel [NHANES]     Frequency of Communication with Friends and Family: More than three times a week     Frequency of Social Gatherings with Friends and Family: More than three times a week     Attends Muslim Services: Never     Active Member of Clubs or Organizations: Yes     Attends Club or Organization Meetings: More than 4 times per year     Marital Status:    Intimate Partner Violence: Not on file   Housing Stability: Low Risk  (11/9/2022)    Housing Stability Vital Sign     Unable to Pay for Housing in the Last Year: No     Number of Places Lived in the Last Year: 1     Unstable Housing in the Last Year: No       Family History   Problem Relation Age of Onset    Colon Cancer Mother     Heart Disease Mother         Cardiomyopathy    Lung Disease Mother         Emphysema from 2nd hand smoke    Cancer Mother         Colon    Colorectal Cancer Mother     Diabetes Mother     Heart Attack Father     Heart Disease Father         Numerous heart attacks    Diabetes Father     No Known Problems Sister     Cancer Brother         Skin cancer    Other Brother         Gaulbladder issues    Cancer Maternal Grandmother         GI cancer    No Known Problems Maternal Grandfather     No Known Problems Paternal Grandmother     No Known Problems Paternal Grandfather     Other Daughter         Fibromyalgia    Bipolar disorder Daughter     ADHD Son     Depression Son     Hypertension Son        Past Surgical History:   Procedure Laterality Date    PB TOTAL KNEE ARTHROPLASTY Right 12/14/2022    Procedure: RIGHT TOTAL KNEE REPLACEMENT;  Surgeon: Britton Cooney M.D.;  Location: SURGERY [facility];  Service: Orthopedics    GYN SURGERY  1970     Cervical polyp       ROS: Positive ROS per HPI.  Denies any Headache,Chest pain,  Shortness of breath,  Abdominal pain, Changes of bowel or bladder, Lower ext edema, Fevers, Nights sweats, Weight Changes, Focal weakness or numbness.  All other systems are negative.    /70   Pulse 83   Temp 36.4 °C (97.6 °F) (Temporal)   Ht 1.524 m (5')   Wt 59.4 kg (131 lb)   SpO2 96%   BMI 25.58 kg/m²      Constitutional: Alert, no distress, well-groomed.  Skin: Warm, dry, good turgor, no rashes in visible areas.  Eye: Equal, round and reactive, conjunctiva clear, lids normal.  ENMT: Lips without lesions, good dentition, moist mucous membranes.  Neck: Trachea midline, no masses, no thyromegaly.  Respiratory: Unlabored respiratory effort, no cough.  Abdomen: Soft, no gross masses.  MSK: Normal gait, moves all extremities.  Neuro: Grossly non-focal. No cranial nerve deficit. Strength and sensation intact.   Psych: Alert and oriented x3, normal affect and mood.      Assessment and Plan.   73 y.o. female presenting with the following.     1. Bipolar disorder in full remission, most recent episode unspecified type (HCC)  Chronic.  Stable.  Continue current management per psychiatry.  I recommend patient establish with a psychologist/therapist.    2. Controlled type 2 diabetes mellitus without complication, without long-term current use of insulin (HCC)  Chronic.  Stable.  Continue plan of care per endocrinology.    3. Dyslipidemia  Stable.  Continue statin.    4. Acquired hypothyroidism  Patient will continue levothyroxine 25 mcg's daily and follow-up with a thyroid function panel in 2 to 3 months.    5. Multiple sclerosis (HCC)  Chronic.  Stable.  Continue management per neurology.    6. Vitamin D deficiency  Continue ergocalciferol per endocrinology.    My total time spent caring for the patient on the day of the encounter was 33 minutes.   This does not include time spent on separately billable procedures/tests.

## 2023-07-01 DIAGNOSIS — F32.4 MAJOR DEPRESSIVE DISORDER WITH SINGLE EPISODE, IN PARTIAL REMISSION (HCC): ICD-10-CM

## 2023-07-01 PROCEDURE — RXMED WILLOW AMBULATORY MEDICATION CHARGE: Performed by: INTERNAL MEDICINE

## 2023-07-05 ENCOUNTER — TELEMEDICINE (OUTPATIENT)
Dept: MEDICAL GROUP | Facility: MEDICAL CENTER | Age: 73
End: 2023-07-05
Payer: MEDICARE

## 2023-07-05 VITALS — BODY MASS INDEX: 25.72 KG/M2 | HEIGHT: 60 IN | WEIGHT: 131 LBS

## 2023-07-05 DIAGNOSIS — J01.10 ACUTE NON-RECURRENT FRONTAL SINUSITIS: ICD-10-CM

## 2023-07-05 PROCEDURE — 99213 OFFICE O/P EST LOW 20 MIN: CPT | Mod: 95 | Performed by: FAMILY MEDICINE

## 2023-07-05 RX ORDER — AMOXICILLIN AND CLAVULANATE POTASSIUM 875; 125 MG/1; MG/1
1 TABLET, FILM COATED ORAL 2 TIMES DAILY
Qty: 14 TABLET | Refills: 0 | Status: SHIPPED | OUTPATIENT
Start: 2023-07-05 | End: 2023-07-05 | Stop reason: SDUPTHER

## 2023-07-05 RX ORDER — AMOXICILLIN AND CLAVULANATE POTASSIUM 875; 125 MG/1; MG/1
1 TABLET, FILM COATED ORAL 2 TIMES DAILY
Qty: 14 TABLET | Refills: 0 | Status: SHIPPED | OUTPATIENT
Start: 2023-07-05 | End: 2023-07-12

## 2023-07-05 ASSESSMENT — FIBROSIS 4 INDEX: FIB4 SCORE: 1.3

## 2023-07-05 NOTE — PROGRESS NOTES
Virtual Visit: Established Patient   This visit was conducted via Zoom using secure and encrypted videoconferencing technology.   The patient was in their home in the [facility].    The patient's identity was confirmed and verbal consent was obtained for this virtual visit.     Subjective:   CC:   Chief Complaint   Patient presents with    Sinus Problem       Melody Rodriguez is a 73 y.o. female presenting for evaluation of sinus problem.    She has been having cold symptoms since last month.  She is having sinus pain, pressure, congestion.  She reports that her eyes are hurting.  She has nasal congestion, postnasal drip and has been experiencing nausea symptoms.  She denies any ear pain, sore throat, fever, other GI symptoms.  Medications that she tried are DayQuil and Tylenol.    ROS   Positive for frontal sinus pain, pressure, nasal congestion, postnasal drip    Current medicines (including changes today)  Current Outpatient Medications   Medication Sig Dispense Refill    amoxicillin-clavulanate (AUGMENTIN) 875-125 MG Tab Take 1 Tablet by mouth 2 times a day for 7 days. 14 Tablet 0    rosuvastatin (CRESTOR) 40 MG tablet Take 1 Tablet by mouth every day. 90 Tablet 3    lamotrigine (LAMICTAL) 200 MG tablet Take 1 tablet (200 mg) by mouth daily 90 Tablet 2    levothyroxine (SYNTHROID) 25 MCG Tab Take 1 Tablet by mouth every morning on an empty stomach. 90 Tablet 1    vitamin D2, Ergocalciferol, (DRISDOL) 1.25 MG (51442 UT) Cap capsule Take 1 Capsule by mouth every 7 days. 12 Capsule 1    buPROPion SR (WELLBUTRIN-SR) 150 MG TABLET SR 12 HR sustained-release tablet Take 1 tablet (150 mg) by mouth daily in the morning (Patient taking differently: Take 300 mg by mouth.) 90 Tablet 1    docusate sodium (COLACE) 100 MG Cap Take 100 mg by mouth 2 times a day. Indications: Constipation      zolpidem (AMBIEN) 5 MG Tab Take 1 tablet (5 mg) by mouth daily at bedtime as needed for insomnia 30 Tablet 0     Empagliflozin-metFORMIN HCl ER (SYNJARDY XR) 12.5-1000 MG TABLET SR 24 HR Take 1 tablet by mouth daily 100 Tablet 2    paroxetine (PAXIL) 40 MG tablet Take 1 Tablet by mouth every day. 100 Tablet 1    omeprazole (PRILOSEC) 20 MG delayed-release capsule Take 1 capsule by mouth every morning, 30 minutes before breakfast. 100 Capsule 3    Teriflunomide (AUBAGIO) 14 MG Tab Take 14 mg by mouth every day. Indications: Relapsing, Remitting Multiple Sclerosis      modafinil (PROVIGIL) 200 MG Tab Take 200 mg by mouth 1 time a day as needed. Indications: Tiredness associated with Multiple Sclerosis       No current facility-administered medications for this visit.       Patient Active Problem List    Diagnosis Date Noted    Acquired hypothyroidism 06/15/2023    Vitamin D deficiency 06/15/2023    Coronary artery calcification 06/07/2023    Family history of premature coronary artery disease 06/07/2023    History of skin cancer 03/22/2023    Gastroesophageal reflux disease without esophagitis 03/22/2023    Bipolar disorder in full remission ([facility]) 03/22/2023    Primary insomnia 03/22/2023    Sedative dependence with current use ([facility]) 03/22/2023    COVID-19 virus infection 03/15/2023    Chronic cough 03/15/2023    Hyponatremia 12/19/2022    Mixed hyperlipidemia 12/17/2022    Total knee replacement status, right 12/14/2022    Postoperative hypoxemia 12/14/2022    SI (sacroiliac) pain 06/09/2022    BMI 25.0-25.9,adult 05/20/2022    Osteopenia of left thigh 05/20/2022    Hypokalemia 05/20/2022    History of renal disease 05/20/2022    Cerebral atrophy ([facility]) 03/16/2022    Osteoarthritis of right knee 06/15/2021    Long term (current) use of oral hypoglycemic drugs 10/21/2020    Non-toxic multinodular goiter 01/29/2020    Risk for falls 07/12/2019    Dysphagia 03/29/2019    Controlled type 2 diabetes mellitus without complication, without long-term current use of insulin ([facility]) 01/06/2017    Dyslipidemia 01/06/2017    Multiple  sclerosis (HCC) 01/06/2017    Mood disorder (HCC) 01/06/2017    Schatzki's ring 01/06/2017    Hiatal hernia 01/06/2017        Objective:   Ht 1.524 m (5')   Wt 59.4 kg (131 lb)   BMI 25.58 kg/m²     Physical Exam:  Constitutional: Alert, no distress, well-groomed.  Skin: No rashes in visible areas.  Eye: Round. Conjunctiva clear, lids normal. No icterus.   ENMT: Lips pink without lesions, good dentition, moist mucous membranes. Phonation normal.  Neck: No masses, no thyromegaly. Moves freely without pain.  Respiratory: Unlabored respiratory effort, no cough or audible wheeze  Psych: Alert, normal affect and mood.     Assessment and Plan:   The following treatment plan was discussed:     1. Acute non-recurrent frontal sinusitis  - amoxicillin-clavulanate (AUGMENTIN) 875-125 MG Tab; Take 1 Tablet by mouth 2 times a day for 7 days.  Dispense: 14 Tablet; Refill: 0    New problem, unstable, start Augmentin 1 tablet 2 times daily for sinus infection.  Discussed side effects of this medication or GI upset including diarrhea.  Recommended to use Flonase nasal spray.  Continue Tylenol as needed.  Discussed use Kristie pot over-the-counter for nasal and sinus rinses.    Follow-up: Return if symptoms worsen or fail to improve.

## 2023-07-06 RX ORDER — PAROXETINE HYDROCHLORIDE 40 MG/1
40 TABLET, FILM COATED ORAL DAILY
Qty: 100 TABLET | Refills: 1 | Status: SHIPPED | OUTPATIENT
Start: 2023-07-06

## 2023-07-07 ENCOUNTER — PHARMACY VISIT (OUTPATIENT)
Dept: PHARMACY | Facility: MEDICAL CENTER | Age: 73
End: 2023-07-07
Payer: COMMERCIAL

## 2023-07-07 PROCEDURE — RXMED WILLOW AMBULATORY MEDICATION CHARGE: Performed by: INTERNAL MEDICINE

## 2023-07-10 ENCOUNTER — PHARMACY VISIT (OUTPATIENT)
Dept: PHARMACY | Facility: MEDICAL CENTER | Age: 73
End: 2023-07-10
Payer: COMMERCIAL

## 2023-07-13 PROCEDURE — RXMED WILLOW AMBULATORY MEDICATION CHARGE: Performed by: PSYCHIATRY & NEUROLOGY

## 2023-07-14 ENCOUNTER — PHARMACY VISIT (OUTPATIENT)
Dept: PHARMACY | Facility: MEDICAL CENTER | Age: 73
End: 2023-07-14
Payer: COMMERCIAL

## 2023-07-17 ENCOUNTER — PATIENT OUTREACH (OUTPATIENT)
Dept: HEALTH INFORMATION MANAGEMENT | Facility: OTHER | Age: 73
End: 2023-07-17
Payer: MEDICARE

## 2023-07-17 DIAGNOSIS — E11.9 CONTROLLED TYPE 2 DIABETES MELLITUS WITHOUT COMPLICATION, WITHOUT LONG-TERM CURRENT USE OF INSULIN (HCC): ICD-10-CM

## 2023-07-17 PROCEDURE — 99490 CHRNC CARE MGMT STAFF 1ST 20: CPT | Performed by: INTERNAL MEDICINE

## 2023-07-17 NOTE — PROGRESS NOTES
7/17/23 2:30 pm:  Nurse CM  outreach call for monthly CCM assessment. VM left with CM contact number requesting a return call to nurse at [phone].    7/17/23 3:00 pm: Nurse CM received return call from patient.  Monthly CCM assessment completed.    Assessment    Pt reports she is doing okay today. States last week she had some episodes of having a racing heart with shortness of breath. Reports no further problems noted this week. Pt reports she did not contact cardiology. Pt follows with Dr. Marino of cardiology. Pt states she was informed PCP could do a heart monitor if episode happens again. Pt reports when the episode happened she checked her pulse oximetry and reading was in the 90's. Nurse discussed with pt if episodes of palpitations with feeling short of breath to go to ER for evaluation. Nurse also recommended that pt notify cardiology of episode that occurred last week as notes from recent cardiology appt mentioned doing cardiac event monitor if episodes recurred. Pt will notify cardiology of episodes that occurred last week.     Education    Continue to follow closely with cardiology. Notify cardiology of episode of racing heart last week. Drink plenty of fluids and stay hydrated during elevated temperatures. Stay indoors in cool environment. Follow heart healthy meal plan.Limit sodium in diet.     Plan of Care and Goals    Reviewed care plan and goals.    Barriers:    Balance issues related to MS    Progress:    Continue to work on progress    Next outreach:  One month  Nurse Care Coordinator:  Pratibha Silva  [phone]

## 2023-08-04 ENCOUNTER — DOCUMENTATION (OUTPATIENT)
Dept: PHARMACY | Facility: MEDICAL CENTER | Age: 73
End: 2023-08-04
Payer: MEDICARE

## 2023-08-04 ENCOUNTER — PHARMACY VISIT (OUTPATIENT)
Dept: PHARMACY | Facility: MEDICAL CENTER | Age: 73
End: 2023-08-04
Payer: COMMERCIAL

## 2023-08-04 PROCEDURE — RXMED WILLOW AMBULATORY MEDICATION CHARGE: Performed by: INTERNAL MEDICINE

## 2023-08-07 NOTE — PROGRESS NOTES
08/04/23: Spoke with Melody. She is doing well on the Synjardy XR 12.5-1000mg tabs. She reports no side effects to the medication and no new allergies. She reports a couple missed doses, as she sometimes forgets and has about 7 to 10 days of medication on hand. Sending delivery for 08/10 via [facility]. Okay to charge $102.50 to [facility].

## 2023-08-18 ENCOUNTER — HOSPITAL ENCOUNTER (OUTPATIENT)
Dept: LAB | Facility: MEDICAL CENTER | Age: 73
End: 2023-08-18
Attending: INTERNAL MEDICINE
Payer: MEDICARE

## 2023-08-18 DIAGNOSIS — Z79.84 LONG TERM (CURRENT) USE OF ORAL HYPOGLYCEMIC DRUGS: ICD-10-CM

## 2023-08-18 DIAGNOSIS — E55.9 VITAMIN D DEFICIENCY: ICD-10-CM

## 2023-08-18 DIAGNOSIS — E04.2 NON-TOXIC MULTINODULAR GOITER: ICD-10-CM

## 2023-08-18 DIAGNOSIS — E78.5 DYSLIPIDEMIA: ICD-10-CM

## 2023-08-18 DIAGNOSIS — E11.9 CONTROLLED TYPE 2 DIABETES MELLITUS WITHOUT COMPLICATION, WITHOUT LONG-TERM CURRENT USE OF INSULIN (HCC): ICD-10-CM

## 2023-08-18 DIAGNOSIS — E03.8 SUBCLINICAL HYPOTHYROIDISM: ICD-10-CM

## 2023-08-18 LAB
25(OH)D3 SERPL-MCNC: 103 NG/ML (ref 30–100)
ALBUMIN SERPL BCP-MCNC: 5 G/DL (ref 3.2–4.9)
ALBUMIN/GLOB SERPL: 1.9 G/DL
ALP SERPL-CCNC: 153 U/L (ref 30–99)
ALT SERPL-CCNC: 16 U/L (ref 2–50)
ANION GAP SERPL CALC-SCNC: 11 MMOL/L (ref 7–16)
AST SERPL-CCNC: 22 U/L (ref 12–45)
BILIRUB SERPL-MCNC: 0.4 MG/DL (ref 0.1–1.5)
BUN SERPL-MCNC: 23 MG/DL (ref 8–22)
CALCIUM ALBUM COR SERPL-MCNC: 8.9 MG/DL (ref 8.5–10.5)
CALCIUM SERPL-MCNC: 9.7 MG/DL (ref 8.5–10.5)
CHLORIDE SERPL-SCNC: 102 MMOL/L (ref 96–112)
CO2 SERPL-SCNC: 25 MMOL/L (ref 20–33)
CREAT SERPL-MCNC: 1.11 MG/DL (ref 0.5–1.4)
GFR SERPLBLD CREATININE-BSD FMLA CKD-EPI: 52 ML/MIN/1.73 M 2
GLOBULIN SER CALC-MCNC: 2.6 G/DL (ref 1.9–3.5)
GLUCOSE SERPL-MCNC: 97 MG/DL (ref 65–99)
POTASSIUM SERPL-SCNC: 4.6 MMOL/L (ref 3.6–5.5)
PROT SERPL-MCNC: 7.6 G/DL (ref 6–8.2)
SODIUM SERPL-SCNC: 138 MMOL/L (ref 135–145)
T4 FREE SERPL-MCNC: 0.84 NG/DL (ref 0.93–1.7)
TSH SERPL DL<=0.005 MIU/L-ACNC: 1.62 UIU/ML (ref 0.38–5.33)

## 2023-08-18 PROCEDURE — 84439 ASSAY OF FREE THYROXINE: CPT

## 2023-08-18 PROCEDURE — 80053 COMPREHEN METABOLIC PANEL: CPT

## 2023-08-18 PROCEDURE — 82306 VITAMIN D 25 HYDROXY: CPT

## 2023-08-18 PROCEDURE — 84443 ASSAY THYROID STIM HORMONE: CPT

## 2023-08-18 PROCEDURE — 36415 COLL VENOUS BLD VENIPUNCTURE: CPT

## 2023-08-22 ENCOUNTER — PATIENT OUTREACH (OUTPATIENT)
Dept: HEALTH INFORMATION MANAGEMENT | Facility: OTHER | Age: 73
End: 2023-08-22
Payer: MEDICARE

## 2023-08-22 DIAGNOSIS — Z91.81 RISK FOR FALLS: ICD-10-CM

## 2023-08-22 DIAGNOSIS — E11.9 CONTROLLED TYPE 2 DIABETES MELLITUS WITHOUT COMPLICATION, WITHOUT LONG-TERM CURRENT USE OF INSULIN (HCC): ICD-10-CM

## 2023-08-22 PROCEDURE — 99490 CHRNC CARE MGMT STAFF 1ST 20: CPT | Performed by: INTERNAL MEDICINE

## 2023-08-22 NOTE — PROGRESS NOTES
8/22/23 10:55 am: Nurse CM outreach call for monthly CCM assessment. VM left requesting a return call to nurse at [phone].    8/25/23 10:30 am: Nurse CM outreach call for monthly CCM assessment.    Assessment    Pt reports she has been feeling okay. States her cardiac concerns with heart racing at times has resolved. Report no further problems. Pt reports she continues to follow with Dr. Guzman for her MS. Reports she did have a small relapse but is now doing better. States she will call neurology office to make a follow-up appt. Pt scheduled to see endocrinology next week. States she will discuss her recent lab results with provider. Last A1C 4/20 was 6.1. Pt scheduled to see PCP in October. Pt will call nurse CM if needing sooner appt.    Education    Continue to follow with specialists. Follow heart healthy meal plan. Exercise as tolerated.    Plan of Care and Goals    Reviewed with pt    Barriers:    Chronic health conditions    Progress:    Continue to work on progress    Next outreach:  One month  Nurse Care Coordinator:  Pratibha Silva  [phone]

## 2023-08-29 ENCOUNTER — NON-PROVIDER VISIT (OUTPATIENT)
Dept: ENDOCRINOLOGY | Facility: MEDICAL CENTER | Age: 73
End: 2023-08-29
Attending: INTERNAL MEDICINE
Payer: MEDICARE

## 2023-08-29 VITALS
HEART RATE: 80 BPM | OXYGEN SATURATION: 97 % | DIASTOLIC BLOOD PRESSURE: 70 MMHG | SYSTOLIC BLOOD PRESSURE: 120 MMHG | BODY MASS INDEX: 26.11 KG/M2 | HEIGHT: 60 IN | WEIGHT: 133 LBS

## 2023-08-29 DIAGNOSIS — E03.8 SUBCLINICAL HYPOTHYROIDISM: ICD-10-CM

## 2023-08-29 DIAGNOSIS — E78.5 DYSLIPIDEMIA: ICD-10-CM

## 2023-08-29 DIAGNOSIS — E11.9 CONTROLLED TYPE 2 DIABETES MELLITUS WITHOUT COMPLICATION, WITHOUT LONG-TERM CURRENT USE OF INSULIN (HCC): ICD-10-CM

## 2023-08-29 DIAGNOSIS — E55.9 VITAMIN D DEFICIENCY: ICD-10-CM

## 2023-08-29 LAB
HBA1C MFR BLD: 6.1 % (ref ?–5.8)
POCT INT CON NEG: NEGATIVE
POCT INT CON POS: POSITIVE

## 2023-08-29 PROCEDURE — 99212 OFFICE O/P EST SF 10 MIN: CPT | Performed by: INTERNAL MEDICINE

## 2023-08-29 PROCEDURE — 83036 HEMOGLOBIN GLYCOSYLATED A1C: CPT

## 2023-08-29 RX ORDER — LEVOTHYROXINE SODIUM 0.03 MG/1
25 TABLET ORAL
Qty: 100 TABLET | Refills: 1 | Status: SHIPPED | OUTPATIENT
Start: 2023-08-29 | End: 2024-03-13

## 2023-08-29 ASSESSMENT — FIBROSIS 4 INDEX: FIB4 SCORE: 1.65

## 2023-08-29 NOTE — PROGRESS NOTES
RN-CDE Note    Subjective:   Endocrinology Clinic Progress Note  PCP: Danae Pritchett M.D.    HPI:  Melody Rodriguez is a 73 y.o. old patient who is seen today by the Diabetes Nurse Specialist for review of Type 2 Diabetes and Hypothyroidism.  Recent changes in health: Health good.    DM:   Last A1c:   Lab Results   Component Value Date/Time    HBA1C 6.1 (A) 08/29/2023 09:46 AM      Previous A1c was 6.1 on 4/20/23  A1C GOAL: < 7    Diabetes Medications:   Synjardy 12.5-1000 mg daily      Exercise: Caring for grand children.  She had a knee replacement, COVID, and the flu so hasn't gone to the gym  Diet: Drinking Ensure complete to get enough protein.  She is not cooking but has left overs from her son.  Patient's body mass index is 25.97 kg/m². Exercise and nutrition counseling were performed at this visit.    Glucose monitoring frequency: Not testing    Hypoglycemic episodes: no  Last Retinal Exam: on file and up-to-date  Daily Foot Exam: Yes   Foot Exam:  Monofilament: done  Lab Results   Component Value Date/Time    MALBCRT see below 04/10/2023 07:28 AM    MICROALBUR <1.2 04/10/2023 07:28 AM        ACR Albumin/Creatinine Ratio goal <30     HTN:   Blood pressure goal <130/<80 .   Currently Rx ACE/ARB: No     Dyslipidemia:    Lab Results   Component Value Date/Time    CHOLSTRLTOT 187 04/10/2023 07:28 AM     (H) 04/10/2023 07:28 AM    HDL 50 04/10/2023 07:28 AM    TRIGLYCERIDE 165 (H) 04/10/2023 07:28 AM         Currently Rx Statin: Yes     She  reports that she has never smoked. She has never used smokeless tobacco.      Plan:     Discussed and educated on:   - All medications, side effects and compliance (discussed carefully)  - Annual eye examinations at Ophthalmology  - Home glucose monitoring emphasized  - Weight control and daily exercise    Recommended medication changes: No changes at this time.  She will follow up with Dr. Cat in 6 months.

## 2023-09-07 PROCEDURE — RXMED WILLOW AMBULATORY MEDICATION CHARGE: Performed by: INTERNAL MEDICINE

## 2023-09-08 ENCOUNTER — PHARMACY VISIT (OUTPATIENT)
Dept: PHARMACY | Facility: MEDICAL CENTER | Age: 73
End: 2023-09-08
Payer: COMMERCIAL

## 2023-09-11 DIAGNOSIS — E11.9 CONTROLLED TYPE 2 DIABETES MELLITUS WITHOUT COMPLICATION, WITHOUT LONG-TERM CURRENT USE OF INSULIN (HCC): ICD-10-CM

## 2023-09-13 PROCEDURE — RXMED WILLOW AMBULATORY MEDICATION CHARGE: Performed by: PSYCHIATRY & NEUROLOGY

## 2023-09-20 ENCOUNTER — PHARMACY VISIT (OUTPATIENT)
Dept: PHARMACY | Facility: MEDICAL CENTER | Age: 73
End: 2023-09-20
Payer: COMMERCIAL

## 2023-09-21 ENCOUNTER — TELEPHONE (OUTPATIENT)
Dept: PHARMACY | Facility: MEDICAL CENTER | Age: 73
End: 2023-09-21
Payer: MEDICARE

## 2023-09-21 NOTE — TELEPHONE ENCOUNTER
Pt called stating she received Buproprion 300mg XL tabs the other day and wanted to verify the copay that was charged to her card. She states that she can fill this medication at [facility] or another pharmacy using goodrx discount, for a cheaper price. I informed her that she is more than welcome to fill the prescription at another pharmacy if she can get it at a lower cost, the pharmacy would have to contact [facility] to transfer the prescription.

## 2023-09-29 ENCOUNTER — PATIENT OUTREACH (OUTPATIENT)
Dept: HEALTH INFORMATION MANAGEMENT | Facility: OTHER | Age: 73
End: 2023-09-29
Payer: MEDICARE

## 2023-09-29 DIAGNOSIS — Z91.81 RISK FOR FALLS: ICD-10-CM

## 2023-09-29 DIAGNOSIS — E11.9 CONTROLLED TYPE 2 DIABETES MELLITUS WITHOUT COMPLICATION, WITHOUT LONG-TERM CURRENT USE OF INSULIN (HCC): ICD-10-CM

## 2023-09-29 PROCEDURE — 99999 PR NO CHARGE: CPT | Performed by: INTERNAL MEDICINE

## 2023-09-29 NOTE — PROGRESS NOTES
Nurse CM outreach call for monthly CCM assessment.    Assessment    Pt reports she has been doing okay. Pt had follow-up with endocrinology. Last A1C was 6.1 on 8/29/23. Pt reports ongoing problem with urinary incontinence. States this is related to multiple sclerosis. Pt reports she has this problem intermittently. Reports one fall over the past month. States no injury from fall. Pt has history of falls related to MS. Pt follows with neurology.     Education    Fall precautions. Continue to follow with specialists.    Plan of Care and Goals    Reviewed care plan and goals    Barriers:    Chronic health conditions    Progress:    Continue to work on progress    Next outreach:  One month  Nurse Care Coordinator:  Pratibha Silva  [phone]

## 2023-10-17 PROCEDURE — RXMED WILLOW AMBULATORY MEDICATION CHARGE: Performed by: INTERNAL MEDICINE

## 2023-10-18 ENCOUNTER — OFFICE VISIT (OUTPATIENT)
Dept: MEDICAL GROUP | Facility: PHYSICIAN GROUP | Age: 73
End: 2023-10-18
Payer: MEDICARE

## 2023-10-18 VITALS
TEMPERATURE: 97.7 F | HEART RATE: 85 BPM | SYSTOLIC BLOOD PRESSURE: 124 MMHG | DIASTOLIC BLOOD PRESSURE: 78 MMHG | HEIGHT: 60 IN | WEIGHT: 133.1 LBS | BODY MASS INDEX: 26.13 KG/M2 | OXYGEN SATURATION: 95 %

## 2023-10-18 DIAGNOSIS — E03.9 ACQUIRED HYPOTHYROIDISM: ICD-10-CM

## 2023-10-18 DIAGNOSIS — Z23 NEED FOR VACCINATION: ICD-10-CM

## 2023-10-18 DIAGNOSIS — E78.2 MIXED HYPERLIPIDEMIA: ICD-10-CM

## 2023-10-18 DIAGNOSIS — E55.9 VITAMIN D DEFICIENCY: ICD-10-CM

## 2023-10-18 DIAGNOSIS — E11.9 CONTROLLED TYPE 2 DIABETES MELLITUS WITHOUT COMPLICATION, WITHOUT LONG-TERM CURRENT USE OF INSULIN (HCC): ICD-10-CM

## 2023-10-18 DIAGNOSIS — G35 MULTIPLE SCLEROSIS (HCC): ICD-10-CM

## 2023-10-18 DIAGNOSIS — F31.70 BIPOLAR DISORDER IN FULL REMISSION, MOST RECENT EPISODE UNSPECIFIED TYPE (HCC): ICD-10-CM

## 2023-10-18 PROBLEM — R09.02 POSTOPERATIVE HYPOXEMIA: Status: RESOLVED | Noted: 2022-12-14 | Resolved: 2023-10-18

## 2023-10-18 PROBLEM — Z87.448 HISTORY OF RENAL DISEASE: Status: RESOLVED | Noted: 2022-05-20 | Resolved: 2023-10-18

## 2023-10-18 PROBLEM — U07.1 COVID-19 VIRUS INFECTION: Status: RESOLVED | Noted: 2023-03-15 | Resolved: 2023-10-18

## 2023-10-18 PROBLEM — I25.10 CORONARY ARTERY CALCIFICATION: Status: RESOLVED | Noted: 2023-06-07 | Resolved: 2023-10-18

## 2023-10-18 PROBLEM — Z98.890 POSTOPERATIVE HYPOXEMIA: Status: RESOLVED | Noted: 2022-12-14 | Resolved: 2023-10-18

## 2023-10-18 PROBLEM — I25.84 CORONARY ARTERY CALCIFICATION: Status: RESOLVED | Noted: 2023-06-07 | Resolved: 2023-10-18

## 2023-10-18 PROCEDURE — G0008 ADMIN INFLUENZA VIRUS VAC: HCPCS | Performed by: INTERNAL MEDICINE

## 2023-10-18 PROCEDURE — 90662 IIV NO PRSV INCREASED AG IM: CPT | Performed by: INTERNAL MEDICINE

## 2023-10-18 PROCEDURE — 3078F DIAST BP <80 MM HG: CPT | Performed by: INTERNAL MEDICINE

## 2023-10-18 PROCEDURE — 99214 OFFICE O/P EST MOD 30 MIN: CPT | Mod: 25 | Performed by: INTERNAL MEDICINE

## 2023-10-18 PROCEDURE — 3074F SYST BP LT 130 MM HG: CPT | Performed by: INTERNAL MEDICINE

## 2023-10-18 RX ORDER — TIZANIDINE 4 MG/1
4 TABLET ORAL 2 TIMES DAILY PRN
Qty: 200 TABLET | Refills: 0 | Status: SHIPPED | OUTPATIENT
Start: 2023-10-18

## 2023-10-18 ASSESSMENT — FIBROSIS 4 INDEX: FIB4 SCORE: 1.65

## 2023-10-18 NOTE — PROGRESS NOTES
CC: Follow-up lab work    HPI:  Melody presents with the following    1. Need for vaccination  Due for flu vaccine    2. Acquired hypothyroidism  Patient is treated with Synthroid 25 mcg daily.  TSH level 1.62.  Asymptomatic.    3. Bipolar disorder in full remission, most recent episode unspecified type ([facility])  6/15/2023:Chronic.  Ongoing.  Her depression has worsened over the last few weeks.  Her Wellbutrin is increased to 150 mg twice daily per psychiatry.  She continues on her current dose of Lamictal and Paxil 40 mg daily.  She has an appointment with psychiatry in August.  Patient is under a great deal of stress, she will be taking on the care of her 2 great grandchildren in about 2 weeks, one of them a .    10/18/2023:.  Patient is closely followed by psychiatry.  Patient decided to discontinue Lamictal as she believes it was causing her increased fatigue, depression and low motivation.  She continues on Paxil at current dose and her Wellbutrin XL was increased to 300 mg daily.    4. Controlled type 2 diabetes mellitus without complication, without long-term current use of insulin ([facility])  Chronic.  Stable.  Followed by endocrinology.  Currently being treated by Synjardy, hemoglobin A1c 6.1.  She has an appointment with endocrinology in March.  Doing well.    5. Vitamin D deficiency  Patient's follow-up vitamin D level is 103, up from 12.  Patient has been on high-dose cocoa Calciferol for the last few months, followed by endocrinology.  She has 2 tablets left for [place] Calciferol.    6. Mixed hyperlipidemia  She is due for lipid panel.  She has now been changed to Crestor 40 mg tablets daily.  Tolerating medication well.    7. Multiple sclerosis (HCC)  6/15/2023:Chronic.  Stable.  Patient continues to be followed by neurology, continues on Aubagio and Provigil.    10/18/2023:.  Patient continues on Aubagio.  Patient has been on Copaxone in the past.  Patient has not had a relapse in quite some time.   "Patient does have complaints of more incontinence lately and still has complaints about this \"MS munroe\" she describes it as a chest squeezing from the breastbone all the way to her back.  This has been going on for many years but now seems to be worsening.  Patient has a follow-up appointment with neurology in March with Dr. Guzman.       Patient Active Problem List    Diagnosis Date Noted    Acquired hypothyroidism 06/15/2023    Vitamin D deficiency 06/15/2023    Family history of premature coronary artery disease 06/07/2023    History of skin cancer 03/22/2023    Gastroesophageal reflux disease without esophagitis 03/22/2023    Bipolar disorder in full remission ([facility]) 03/22/2023    Primary insomnia 03/22/2023    Sedative dependence with current use ([facility]) 03/22/2023    Chronic cough 03/15/2023    Hyponatremia 12/19/2022    Mixed hyperlipidemia 12/17/2022    Total knee replacement status, right 12/14/2022    SI (sacroiliac) pain 06/09/2022    BMI 25.0-25.9,adult 05/20/2022    Osteopenia of left thigh 05/20/2022    Hypokalemia 05/20/2022    Cerebral atrophy ([facility]) 03/16/2022    Osteoarthritis of right knee 06/15/2021    Long term (current) use of oral hypoglycemic drugs 10/21/2020    Non-toxic multinodular goiter 01/29/2020    Risk for falls 07/12/2019    Dysphagia 03/29/2019    Controlled type 2 diabetes mellitus without complication, without long-term current use of insulin ([facility]) 01/06/2017    Dyslipidemia 01/06/2017    Multiple sclerosis ([facility]) 01/06/2017    Mood disorder ([facility]) 01/06/2017    Schatzki's ring 01/06/2017    Hiatal hernia 01/06/2017       Current Outpatient Medications   Medication Sig Dispense Refill    tizanidine (ZANAFLEX) 4 MG Tab Take 1 Tablet by mouth 2 times a day as needed (spasm). 200 Tablet 0    buPROPion (WELLBUTRIN XL) 300 MG XL tablet Take 1 tablet (300 mg) by mouth daily in the morning 90 Tablet 1    levothyroxine (SYNTHROID) 25 MCG Tab Take 1 Tablet by mouth every morning on an empty " stomach. 100 Tablet 1    paroxetine (PAXIL) 40 MG tablet Take 1 Tablet by mouth every day. Indications: Major Depressive Disorder 100 Tablet 1    rosuvastatin (CRESTOR) 40 MG tablet Take 1 Tablet by mouth every day. 90 Tablet 3    zolpidem (AMBIEN) 5 MG Tab Take 1 tablet (5 mg) by mouth daily at bedtime as needed for insomnia 30 Tablet 0    Empagliflozin-metFORMIN HCl ER (SYNJARDY XR) 12.5-1000 MG TABLET SR 24 HR Take 1 tablet by mouth daily 100 Tablet 2    omeprazole (PRILOSEC) 20 MG delayed-release capsule Take 1 capsule by mouth every morning, 30 minutes before breakfast. 100 Capsule 3    Teriflunomide (AUBAGIO) 14 MG Tab Take 14 mg by mouth every day. Indications: Relapsing, Remitting Multiple Sclerosis      modafinil (PROVIGIL) 200 MG Tab Take 200 mg by mouth 1 time a day as needed. Indications: Tiredness associated with Multiple Sclerosis       No current facility-administered medications for this visit.         Allergies as of 10/18/2023    (No Known Allergies)        Social History     Socioeconomic History    Marital status:      Spouse name: Not on file    Number of children: Not on file    Years of education: Not on file    Highest education level: 12th grade   Occupational History    Not on file   Tobacco Use    Smoking status: Never    Smokeless tobacco: Never   Vaping Use    Vaping Use: Never used   Substance and Sexual Activity    Alcohol use: Yes     Comment: Occasional glass of wine or april    Drug use: Not Currently    Sexual activity: Not on file   Other Topics Concern    Not on file   Social History Narrative    Not on file     Social Determinants of Health     Financial Resource Strain: Low Risk  (11/9/2022)    Overall Financial Resource Strain (CARDIA)     Difficulty of Paying Living Expenses: Not hard at all   Food Insecurity: No Food Insecurity (11/9/2022)    Hunger Vital Sign     Worried About Running Out of Food in the Last Year: Never true     Ran Out of Food in the Last Year:  Never true   Transportation Needs: No Transportation Needs (11/9/2022)    PRAPARE - Transportation     Lack of Transportation (Medical): No     Lack of Transportation (Non-Medical): No   Physical Activity: Inactive (11/9/2022)    Exercise Vital Sign     Days of Exercise per Week: 0 days     Minutes of Exercise per Session: 0 min   Stress: Stress Concern Present (5/26/2020)    [employer] [place] [employer] - Occupational Stress Questionnaire     Feeling of Stress : To some extent   Social Connections: Moderately Isolated (5/26/2020)    Social Connection and Isolation Panel [NHANES]     Frequency of Communication with Friends and Family: More than three times a week     Frequency of Social Gatherings with Friends and Family: More than three times a week     Attends Zoroastrian Services: Never     Active Member of Clubs or Organizations: Yes     Attends Club or Organization Meetings: More than 4 times per year     Marital Status:    Intimate Partner Violence: Not on file   Housing Stability: Low Risk  (11/9/2022)    Housing Stability Vital Sign     Unable to Pay for Housing in the Last Year: No     Number of Places Lived in the Last Year: 1     Unstable Housing in the Last Year: No       Family History   Problem Relation Age of Onset    Colon Cancer Mother     Heart Disease Mother         Cardiomyopathy    Lung Disease Mother         Emphysema from 2nd hand smoke    Cancer Mother         Colon    Colorectal Cancer Mother     Diabetes Mother     Heart Attack Father     Heart Disease Father         Numerous heart attacks    Diabetes Father     No Known Problems Sister     Cancer Brother         Skin cancer    Other Brother         Gaulbladder issues    Cancer Maternal Grandmother         GI cancer    No Known Problems Maternal Grandfather     No Known Problems Paternal Grandmother     No Known Problems Paternal Grandfather     Other Daughter         Fibromyalgia    Bipolar disorder Daughter     ADHD Son      Depression Son     Hypertension Son        Past Surgical History:   Procedure Laterality Date    PB TOTAL KNEE ARTHROPLASTY Right 12/14/2022    Procedure: RIGHT TOTAL KNEE REPLACEMENT;  Surgeon: Britton Cooney M.D.;  Location: SURGERY [facility];  Service: Orthopedics    GYN SURGERY  1970    Cervical polyp       ROS: Positive ROS per HPI.  Denies any Headache,Chest pain,  Shortness of breath,  Abdominal pain, Changes of bowel or bladder, Lower ext edema, Fevers, Nights sweats, Weight Changes, Focal weakness or numbness.  All other systems are negative.    /78 (BP Location: Right arm, Patient Position: Sitting, BP Cuff Size: Adult)   Pulse 85   Temp 36.5 °C (97.7 °F) (Temporal)   Ht 1.524 m (5')   Wt 60.4 kg (133 lb 1.6 oz)   SpO2 95%   BMI 25.99 kg/m²      Constitutional: Alert, no distress, well-groomed.  Skin: Warm, dry, good turgor, no rashes in visible areas.  Eye: Equal, round and reactive, conjunctiva clear, lids normal.  ENMT: Lips without lesions, good dentition, moist mucous membranes.  Neck: Trachea midline, no masses, no thyromegaly.  Respiratory: Unlabored respiratory effort, no cough.  Abdomen: Soft, no gross masses.  MSK: Normal gait, moves all extremities.  Neuro: Grossly non-focal. No cranial nerve deficit. Strength and sensation intact.   Psych: Alert and oriented x3, normal affect and mood.      Assessment and Plan.   73 y.o. female presenting with the following.     1. Need for vaccination    - Influenza Vaccine, High Dose (65+ Only)    2. Acquired hypothyroidism  Continue Synthroid at current dose.    3. Bipolar disorder in full remission, most recent episode unspecified type (HCC)  Chronic.  Stable.  Patient will continue follow-up with psychiatry.  Continue current plan of care.    4. Controlled type 2 diabetes mellitus without complication, without long-term current use of insulin (HCC)  Chronic.  Stable.  Continue current plan of care per endocrinology.    5. Vitamin D  deficiency  Recommend for patient to decrease her vitamin D 3 to 4000 to 5000 units daily and recheck her vitamin D level with endocrinology.    6. Mixed hyperlipidemia  Continue Crestor at current dose.  - Lipid Profile; Future    7. Multiple sclerosis (HCC)  Chronic.  Stable.  Trial with tizanidine 4 mg tablets as needed to help alleviate her symptoms.  Monitor.  Follow-up with neurology as scheduled.    My total time spent caring for the patient on the day of the encounter was 34 minutes.   This does not include time spent on separately billable procedures/tests.

## 2023-10-19 ENCOUNTER — PHARMACY VISIT (OUTPATIENT)
Dept: PHARMACY | Facility: MEDICAL CENTER | Age: 73
End: 2023-10-19
Payer: COMMERCIAL

## 2023-10-19 RX ORDER — OMEPRAZOLE 20 MG/1
CAPSULE, DELAYED RELEASE ORAL
Qty: 100 CAPSULE | Refills: 3 | Status: SHIPPED | OUTPATIENT
Start: 2023-10-19

## 2023-10-24 PROCEDURE — RXMED WILLOW AMBULATORY MEDICATION CHARGE: Performed by: INTERNAL MEDICINE

## 2023-10-26 ENCOUNTER — PHARMACY VISIT (OUTPATIENT)
Dept: PHARMACY | Facility: MEDICAL CENTER | Age: 73
End: 2023-10-26
Payer: COMMERCIAL

## 2023-10-31 ENCOUNTER — PATIENT OUTREACH (OUTPATIENT)
Dept: HEALTH INFORMATION MANAGEMENT | Facility: OTHER | Age: 73
End: 2023-10-31
Payer: MEDICARE

## 2023-10-31 DIAGNOSIS — E11.9 CONTROLLED TYPE 2 DIABETES MELLITUS WITHOUT COMPLICATION, WITHOUT LONG-TERM CURRENT USE OF INSULIN (HCC): ICD-10-CM

## 2023-10-31 DIAGNOSIS — E11.9 TYPE 2 DIABETES MELLITUS WITHOUT COMPLICATION, WITHOUT LONG-TERM CURRENT USE OF INSULIN (HCC): ICD-10-CM

## 2023-10-31 PROCEDURE — 99490 CHRNC CARE MGMT STAFF 1ST 20: CPT | Performed by: INTERNAL MEDICINE

## 2023-10-31 NOTE — PROGRESS NOTES
Nurse CM outreach call to patient for follow-up.     Assessment    Pt reports she is doing fair. Reports she spoke to her neurologist. Pt reports she will get MRI and follow-up with neurology. Reports she just received medication that PCP ordered, the Tizanidine. Diabetes has been in good control.  Last A1C was 6.1 on 8/23/23. Pt reports no current problems. Pt states she has no current care management needs. States she would like to discuss with PCP at next visit getting a mammogram on a yearly basis. Last mammogram was 1/2023.     Education  Continue to follow with specialists. Fall prevention. Follow heart healthy meal plan.       Plan of Care and Goals    Reviewed care plan and goals    Barriers:    Chronic health conditions    Progress:    Continue to work on progress    Next outreach:  One month

## 2023-11-02 DIAGNOSIS — E11.9 CONTROLLED TYPE 2 DIABETES MELLITUS WITHOUT COMPLICATION, WITHOUT LONG-TERM CURRENT USE OF INSULIN (HCC): ICD-10-CM

## 2023-11-05 PROCEDURE — RXMED WILLOW AMBULATORY MEDICATION CHARGE: Performed by: INTERNAL MEDICINE

## 2023-11-06 ENCOUNTER — TELEPHONE (OUTPATIENT)
Dept: ENDOCRINOLOGY | Facility: MEDICAL CENTER | Age: 73
End: 2023-11-06
Payer: MEDICARE

## 2023-11-06 RX ORDER — EMPAGLIFLOZIN, METFORMIN HYDROCHLORIDE 12.5; 1 MG/1; MG/1
TABLET, EXTENDED RELEASE ORAL
Qty: 100 TABLET | Refills: 2 | Status: SHIPPED | OUTPATIENT
Start: 2023-11-06

## 2023-11-06 NOTE — TELEPHONE ENCOUNTER
Received PA request via [employer] for Synjardy XR 12.5-1000mg tablets.    $117.50-100t/100D  $47-30t/30D    Insurance:[facility]    Pharmacy on File  [facility]     Is patient eligible to fill with [facility]? Yes-already fills

## 2023-11-07 ENCOUNTER — TELEPHONE (OUTPATIENT)
Dept: PHARMACY | Facility: MEDICAL CENTER | Age: 73
End: 2023-11-07
Payer: MEDICARE

## 2023-11-07 PROCEDURE — RXMED WILLOW AMBULATORY MEDICATION CHARGE: Performed by: INTERNAL MEDICINE

## 2023-11-08 ENCOUNTER — PHARMACY VISIT (OUTPATIENT)
Dept: PHARMACY | Facility: MEDICAL CENTER | Age: 73
End: 2023-11-08
Payer: COMMERCIAL

## 2023-11-08 NOTE — TELEPHONE ENCOUNTER
Contact:      Phone number: [phone], Mobile    Name of person spoken with and relationship to patient: Melody Roblesarest, Self  Patient’s Adherence:      How patient is doing on medication: Good    How many missed doses and reason: 0    Any new medications: No    Any new conditions: No    Any new allergies: No    Any new side effects: No    Any new diagnoses: No    How many doses remaininds-Synjardy, 0-Paroxetine    Did patient want to speak with pharmacist: No  Delivery:      Delivery date and method:  via     Needs by Date: -Paroxetine    Signature required: No    Any additional details for : None  Teach Appointment Date: 02/15/22  Shipping Address: [address] [place] [id number]  Medication (name, strength and dose): Paroxetine 40mg tabs, Synjardy XR 12.5-1000mg tabs  Copay: $92/100ds  Payment Method: [employer] -9119  Supplies: None  Additional Information: Pt states that she would like to get all medications on the same refill schedule eventually. Next call date: .

## 2023-11-29 ENCOUNTER — OFFICE VISIT (OUTPATIENT)
Dept: URGENT CARE | Facility: CLINIC | Age: 73
End: 2023-11-29
Payer: MEDICARE

## 2023-11-29 ENCOUNTER — HOSPITAL ENCOUNTER (EMERGENCY)
Facility: MEDICAL CENTER | Age: 73
End: 2023-11-29
Attending: STUDENT IN AN ORGANIZED HEALTH CARE EDUCATION/TRAINING PROGRAM
Payer: MEDICARE

## 2023-11-29 ENCOUNTER — PATIENT OUTREACH (OUTPATIENT)
Dept: HEALTH INFORMATION MANAGEMENT | Facility: OTHER | Age: 73
End: 2023-11-29

## 2023-11-29 ENCOUNTER — HOSPITAL ENCOUNTER (EMERGENCY)
Facility: MEDICAL CENTER | Age: 73
End: 2023-11-29
Payer: MEDICARE

## 2023-11-29 ENCOUNTER — PATIENT OUTREACH (OUTPATIENT)
Dept: HEALTH INFORMATION MANAGEMENT | Facility: OTHER | Age: 73
End: 2023-11-29
Payer: MEDICARE

## 2023-11-29 ENCOUNTER — APPOINTMENT (OUTPATIENT)
Dept: RADIOLOGY | Facility: MEDICAL CENTER | Age: 73
End: 2023-11-29
Attending: STUDENT IN AN ORGANIZED HEALTH CARE EDUCATION/TRAINING PROGRAM
Payer: MEDICARE

## 2023-11-29 VITALS
WEIGHT: 132.94 LBS | SYSTOLIC BLOOD PRESSURE: 143 MMHG | HEART RATE: 73 BPM | HEIGHT: 60 IN | OXYGEN SATURATION: 94 % | DIASTOLIC BLOOD PRESSURE: 71 MMHG | TEMPERATURE: 98 F | BODY MASS INDEX: 26.1 KG/M2 | RESPIRATION RATE: 13 BRPM

## 2023-11-29 VITALS
TEMPERATURE: 97.6 F | OXYGEN SATURATION: 97 % | HEIGHT: 60 IN | RESPIRATION RATE: 16 BRPM | HEART RATE: 95 BPM | BODY MASS INDEX: 25.72 KG/M2 | DIASTOLIC BLOOD PRESSURE: 82 MMHG | SYSTOLIC BLOOD PRESSURE: 140 MMHG | WEIGHT: 131 LBS

## 2023-11-29 DIAGNOSIS — R03.0 ELEVATED BLOOD PRESSURE READING: ICD-10-CM

## 2023-11-29 DIAGNOSIS — R00.2 PALPITATIONS: ICD-10-CM

## 2023-11-29 DIAGNOSIS — R06.09 DYSPNEA ON EXERTION: ICD-10-CM

## 2023-11-29 DIAGNOSIS — E11.9 CONTROLLED TYPE 2 DIABETES MELLITUS WITHOUT COMPLICATION, WITHOUT LONG-TERM CURRENT USE OF INSULIN (HCC): ICD-10-CM

## 2023-11-29 DIAGNOSIS — E11.9 TYPE 2 DIABETES MELLITUS WITHOUT COMPLICATION, WITHOUT LONG-TERM CURRENT USE OF INSULIN (HCC): ICD-10-CM

## 2023-11-29 DIAGNOSIS — E78.5 DYSLIPIDEMIA: ICD-10-CM

## 2023-11-29 LAB
ALBUMIN SERPL BCP-MCNC: 4.3 G/DL (ref 3.2–4.9)
ALBUMIN/GLOB SERPL: 1.8 G/DL
ALP SERPL-CCNC: 136 U/L (ref 30–99)
ALT SERPL-CCNC: 18 U/L (ref 2–50)
ANION GAP SERPL CALC-SCNC: 11 MMOL/L (ref 7–16)
AST SERPL-CCNC: 22 U/L (ref 12–45)
BASOPHILS # BLD AUTO: 0.6 % (ref 0–1.8)
BASOPHILS # BLD: 0.06 K/UL (ref 0–0.12)
BILIRUB SERPL-MCNC: 0.4 MG/DL (ref 0.1–1.5)
BUN SERPL-MCNC: 19 MG/DL (ref 8–22)
CALCIUM ALBUM COR SERPL-MCNC: 8.5 MG/DL (ref 8.5–10.5)
CALCIUM SERPL-MCNC: 8.7 MG/DL (ref 8.4–10.2)
CHLORIDE SERPL-SCNC: 105 MMOL/L (ref 96–112)
CO2 SERPL-SCNC: 22 MMOL/L (ref 20–33)
CREAT SERPL-MCNC: 0.9 MG/DL (ref 0.5–1.4)
D DIMER PPP IA.FEU-MCNC: 0.29 UG/ML (FEU) (ref 0–0.5)
EKG IMPRESSION: NORMAL
EOSINOPHIL # BLD AUTO: 0.26 K/UL (ref 0–0.51)
EOSINOPHIL NFR BLD: 2.6 % (ref 0–6.9)
ERYTHROCYTE [DISTWIDTH] IN BLOOD BY AUTOMATED COUNT: 43 FL (ref 35.9–50)
GFR SERPLBLD CREATININE-BSD FMLA CKD-EPI: 67 ML/MIN/1.73 M 2
GLOBULIN SER CALC-MCNC: 2.4 G/DL (ref 1.9–3.5)
GLUCOSE SERPL-MCNC: 142 MG/DL (ref 65–99)
HCT VFR BLD AUTO: 40.2 % (ref 37–47)
HGB BLD-MCNC: 13.2 G/DL (ref 12–16)
IMM GRANULOCYTES # BLD AUTO: 0.05 K/UL (ref 0–0.11)
IMM GRANULOCYTES NFR BLD AUTO: 0.5 % (ref 0–0.9)
LYMPHOCYTES # BLD AUTO: 1.76 K/UL (ref 1–4.8)
LYMPHOCYTES NFR BLD: 17.7 % (ref 22–41)
MAGNESIUM SERPL-MCNC: 1.9 MG/DL (ref 1.5–2.5)
MCH RBC QN AUTO: 29.7 PG (ref 27–33)
MCHC RBC AUTO-ENTMCNC: 32.8 G/DL (ref 32.2–35.5)
MCV RBC AUTO: 90.5 FL (ref 81.4–97.8)
MONOCYTES # BLD AUTO: 0.87 K/UL (ref 0–0.85)
MONOCYTES NFR BLD AUTO: 8.7 % (ref 0–13.4)
NEUTROPHILS # BLD AUTO: 6.95 K/UL (ref 1.82–7.42)
NEUTROPHILS NFR BLD: 69.9 % (ref 44–72)
NRBC # BLD AUTO: 0 K/UL
NRBC BLD-RTO: 0 /100 WBC (ref 0–0.2)
NT-PROBNP SERPL IA-MCNC: 88 PG/ML (ref 0–125)
PLATELET # BLD AUTO: 204 K/UL (ref 164–446)
PMV BLD AUTO: 8.9 FL (ref 9–12.9)
POTASSIUM SERPL-SCNC: 3.5 MMOL/L (ref 3.6–5.5)
PROT SERPL-MCNC: 6.7 G/DL (ref 6–8.2)
RBC # BLD AUTO: 4.44 M/UL (ref 4.2–5.4)
SODIUM SERPL-SCNC: 138 MMOL/L (ref 135–145)
TROPONIN T SERPL-MCNC: 11 NG/L (ref 6–19)
TSH SERPL DL<=0.005 MIU/L-ACNC: 2.44 UIU/ML (ref 0.38–5.33)
WBC # BLD AUTO: 10 K/UL (ref 4.8–10.8)

## 2023-11-29 PROCEDURE — 83735 ASSAY OF MAGNESIUM: CPT

## 2023-11-29 PROCEDURE — 80053 COMPREHEN METABOLIC PANEL: CPT

## 2023-11-29 PROCEDURE — 93000 ELECTROCARDIOGRAM COMPLETE: CPT | Performed by: NURSE PRACTITIONER

## 2023-11-29 PROCEDURE — 3079F DIAST BP 80-89 MM HG: CPT | Performed by: NURSE PRACTITIONER

## 2023-11-29 PROCEDURE — 99285 EMERGENCY DEPT VISIT HI MDM: CPT

## 2023-11-29 PROCEDURE — 85025 COMPLETE CBC W/AUTO DIFF WBC: CPT

## 2023-11-29 PROCEDURE — 84443 ASSAY THYROID STIM HORMONE: CPT

## 2023-11-29 PROCEDURE — 84484 ASSAY OF TROPONIN QUANT: CPT

## 2023-11-29 PROCEDURE — 83880 ASSAY OF NATRIURETIC PEPTIDE: CPT

## 2023-11-29 PROCEDURE — 93005 ELECTROCARDIOGRAM TRACING: CPT | Performed by: STUDENT IN AN ORGANIZED HEALTH CARE EDUCATION/TRAINING PROGRAM

## 2023-11-29 PROCEDURE — A9270 NON-COVERED ITEM OR SERVICE: HCPCS | Mod: JZ | Performed by: STUDENT IN AN ORGANIZED HEALTH CARE EDUCATION/TRAINING PROGRAM

## 2023-11-29 PROCEDURE — 36415 COLL VENOUS BLD VENIPUNCTURE: CPT

## 2023-11-29 PROCEDURE — 71046 X-RAY EXAM CHEST 2 VIEWS: CPT

## 2023-11-29 PROCEDURE — 700102 HCHG RX REV CODE 250 W/ 637 OVERRIDE(OP): Mod: JZ | Performed by: STUDENT IN AN ORGANIZED HEALTH CARE EDUCATION/TRAINING PROGRAM

## 2023-11-29 PROCEDURE — 99999 PR NO CHARGE: CPT | Performed by: INTERNAL MEDICINE

## 2023-11-29 PROCEDURE — 85379 FIBRIN DEGRADATION QUANT: CPT

## 2023-11-29 PROCEDURE — 99215 OFFICE O/P EST HI 40 MIN: CPT | Performed by: NURSE PRACTITIONER

## 2023-11-29 PROCEDURE — 93005 ELECTROCARDIOGRAM TRACING: CPT

## 2023-11-29 PROCEDURE — 3077F SYST BP >= 140 MM HG: CPT | Performed by: NURSE PRACTITIONER

## 2023-11-29 RX ORDER — POTASSIUM CHLORIDE 20 MEQ/1
40 TABLET, EXTENDED RELEASE ORAL ONCE
Status: COMPLETED | OUTPATIENT
Start: 2023-11-29 | End: 2023-11-29

## 2023-11-29 RX ADMIN — POTASSIUM CHLORIDE 40 MEQ: 1500 TABLET, EXTENDED RELEASE ORAL at 20:11

## 2023-11-29 ASSESSMENT — FIBROSIS 4 INDEX
FIB4 SCORE: 1.65
FIB4 SCORE: 1.65

## 2023-11-29 NOTE — PROGRESS NOTES
Nurse NIXON spoke to PCP regarding pt's symptoms of increased HR with exertion. PCP recommends pt get appt at urgent care this week or cardiology for follow-up and EKG. Nurse contacted cardiology office. Soonest appt is for 1/2/24. Nurse scheduled pt appt. Nurse contacted pt and informed PCP recommended urgent care visit to get EKG and follow-up. No appts available at [facility] this week for follow-up. Pt agreeable to go to urgent care for evaluation.

## 2023-11-29 NOTE — PROGRESS NOTES
Nurse CM outreach call for monthly CCM assessment. Pt answered telephone.    Assessment    Pt reports she has been experiencing ongoing problem with HR increasing with minimal exertion. States yesterday when putting her grandchild in a car seat, HR increased to 140. Pt reports she also has noticed she gets short of breath with minimal exertion. Reports unloading  will cause her HR to increase. Pt had last visit with cardiologist 6/23. Per notes pt was to return in 1 year. Pt reports she is scheduled to have MRI tomorrow of her spine and brain for her MS. Reports Dr. Guzman ordered the tests. Pt reports she has ongoing feeling of a bandlike tightness around her chest. Reports this has been happening for a long time. Reports it is related to her MS. Pt scheduled to see provider in March. Pt not sure if she should follow-up with sooner appt with PCP or with cardiology. PCP has no available slots this week for follow-up. Nurse will route message to PCP.     Education    Continue to follow closely with specialists. Follow heart healthy meal plan. Activity as tolerated. Continue to monitor HR.     Plan of Care and Goals    Reviewed care plan and goals.    Barriers:    Chronic health conditions    Progress:    Working on progress    Next outreach:  One month  Nurse Care Coordinator:  Pratibha Silva  [phone]

## 2023-11-30 ENCOUNTER — HOSPITAL ENCOUNTER (OUTPATIENT)
Dept: RADIOLOGY | Facility: MEDICAL CENTER | Age: 73
End: 2023-11-30
Attending: PSYCHIATRY & NEUROLOGY
Payer: MEDICARE

## 2023-11-30 DIAGNOSIS — Q35.1 CLEFT HARD PALATE: ICD-10-CM

## 2023-11-30 PROCEDURE — A9579 GAD-BASE MR CONTRAST NOS,1ML: HCPCS | Performed by: PSYCHIATRY & NEUROLOGY

## 2023-11-30 PROCEDURE — 70553 MRI BRAIN STEM W/O & W/DYE: CPT

## 2023-11-30 PROCEDURE — 700117 HCHG RX CONTRAST REV CODE 255: Performed by: PSYCHIATRY & NEUROLOGY

## 2023-11-30 PROCEDURE — 72156 MRI NECK SPINE W/O & W/DYE: CPT

## 2023-11-30 RX ADMIN — GADOTERIDOL 12 ML: 279.3 INJECTION, SOLUTION INTRAVENOUS at 08:58

## 2023-11-30 NOTE — ED PROVIDER NOTES
ED Provider Note    CHIEF COMPLAINT  Chief Complaint   Patient presents with    Sent from Urgent Care    Irregular Heart Beat     Pts heart rate is ranging from 's          EXTERNAL RECORDS REVIEWED  Outpatient Notes PCP visit from today are patient presented for EKG and was sent to the emergency room for further evaluation    HPI/ROS  LIMITATION TO HISTORY     OUTSIDE HISTORIAN(S):      Melody Rodriguez is a 73 y.o. female who presents with palpitations.  Patient says over the past year she has had intermittent elevated heart rates that she has noticed on her Apple Watch.  Patient says that time she has felt short of breath during these periods.  Patient denies chest pain, dizziness, syncope.  Patient denies other recent illness including fever, chills, cough, vomiting, diarrhea, abdominal pain, bloody stool.  Patient says she has upcoming appointment with cardiology.    PAST MEDICAL HISTORY   has a past medical history of Acquired hypothyroidism (6/15/2023), Acute nasopharyngitis, Arthritis, Breath shortness, Cancer ([facility]), Cerebral atrophy ([facility]) (03/16/2022), COVID-19 virus infection (3/15/2023), Depression, Diabetes (HCC), Disorder of thyroid, Groin lump (06/09/2022), Gynecological disorder, Heart burn, Heart murmur, Heart valve disease, Hiatus hernia syndrome, High cholesterol, Hyperlipidemia, Muscle disorder, Osteoarthritis of right knee (06/15/2021), PONV (postoperative nausea and vomiting) (1970), Recurrent major depressive disorder, in remission ([facility]) (03/16/2022), SI (sacroiliac) pain (06/09/2022), Type 2 diabetes mellitus with stage 3 chronic kidney disease ([facility]) (06/15/2021), Urinary bladder disorder, Urinary incontinence, and Vitamin D deficiency (6/15/2023).    SURGICAL HISTORY   has a past surgical history that includes gyn surgery (1970) and total knee arthroplasty (Right, 12/14/2022).    FAMILY HISTORY  Family History   Problem Relation Age of Onset    Colon Cancer Mother     Heart  Disease Mother         Cardiomyopathy    Lung Disease Mother         Emphysema from 2nd hand smoke    Cancer Mother         Colon    Colorectal Cancer Mother     Diabetes Mother     Heart Attack Father     Heart Disease Father         Numerous heart attacks    Diabetes Father     No Known Problems Sister     Cancer Brother         Skin cancer    Other Brother         Gaulbladder issues    Cancer Maternal Grandmother         GI cancer    No Known Problems Maternal Grandfather     No Known Problems Paternal Grandmother     No Known Problems Paternal Grandfather     Other Daughter         Fibromyalgia    Bipolar disorder Daughter     ADHD Son     Depression Son     Hypertension Son        SOCIAL HISTORY  Social History     Tobacco Use    Smoking status: Never    Smokeless tobacco: Never   Vaping Use    Vaping Use: Never used   Substance and Sexual Activity    Alcohol use: Yes     Comment: Occasional glass of wine or april    Drug use: Not Currently    Sexual activity: Not on file       CURRENT MEDICATIONS  Home Medications    **Home medications have not yet been reviewed for this encounter**         ALLERGIES  No Known Allergies    PHYSICAL EXAM  VITAL SIGNS: /73   Pulse 79   Temp 36.2 °C (97.2 °F) (Temporal)   Resp 15   Ht 1.524 m (5')   Wt 60.3 kg (132 lb 15 oz)   SpO2 93%   BMI 25.96 kg/m²    Constitutional: Alert in no apparent distress.  HENT: No signs of trauma, Bilateral external ears normal, Nose normal.   Eyes: Pupils are equal and reactive, Conjunctiva normal, Non-icteric.   Neck: Normal range of motion, No tenderness, Supple, No stridor.   Cardiovascular: Regular rate and rhythm  Thorax & Lungs: Normal breath sounds, No respiratory distress, No wheezing, No chest tenderness.   Abdomen: Bowel sounds normal, Soft, No tenderness, No masses, No pulsatile masses.   Skin: Warm, Dry, No erythema, No rash.   Back: No bony tenderness, No CVA tenderness.   Extremities: Intact distal pulses, No edema,  No tenderness, No cyanosis  Musculoskeletal: Good range of motion in all major joints. No tenderness to palpation or major deformities noted.   Neurologic: Alert , Normal motor function, Normal sensory function, No focal deficits noted.       DIAGNOSTIC STUDIES / PROCEDURES  EKG  I have independently interpreted this EKG  Interpreted by me: Sinus rhythm Inversion of T wave in lead aVL stable from previous otherwise no signs of acute ischemia    LABS  Labs Reviewed   CBC WITH DIFFERENTIAL - Abnormal; Notable for the following components:       Result Value    MPV 8.9 (*)     Lymphocytes 17.70 (*)     Monos (Absolute) 0.87 (*)     All other components within normal limits    Narrative:     Biotin intake of greater than 5 mg per day may interfere with  troponin levels, causing false low values.   COMP METABOLIC PANEL - Abnormal; Notable for the following components:    Potassium 3.5 (*)     Glucose 142 (*)     Alkaline Phosphatase 136 (*)     All other components within normal limits    Narrative:     Biotin intake of greater than 5 mg per day may interfere with  troponin levels, causing false low values.   TROPONIN    Narrative:     Biotin intake of greater than 5 mg per day may interfere with  troponin levels, causing false low values.   PROBRAIN NATRIURETIC PEPTIDE, NT    Narrative:     Biotin intake of greater than 5 mg per day may interfere with  troponin levels, causing false low values.   D-DIMER    Narrative:     Biotin intake of greater than 5 mg per day may interfere with  troponin levels, causing false low values.   TSH WITH REFLEX TO FT4    Narrative:     Biotin intake of greater than 5 mg per day may interfere with  troponin levels, causing false low values.   ESTIMATED GFR    Narrative:     Biotin intake of greater than 5 mg per day may interfere with  troponin levels, causing false low values.   MAGNESIUM         RADIOLOGY  I have independently interpreted the diagnostic imaging associated with this  visit and am waiting the final reading from the radiologist.   My preliminary interpretation is as follows: No pulmonary edema  Radiologist interpretation:   DX-CHEST-2 VIEWS   Final Result         1.  No acute cardiopulmonary disease.            COURSE & MEDICAL DECISION MAKING    ED Observation Status? No; Patient does not meet criteria for ED Observation.     INITIAL ASSESSMENT, COURSE AND PLAN  Care Narrative: Differential includes electrolyte abnormality, anemia, PE, ACS, heart failure.  ECG shows sinus rhythm with no signs of acute ischemia, WPW, prolonged QTc.  Will obtain blood work to assess for metabolic or hematologic derangements as well as cardiac biomarkers and D-dimer.  Overall lower suspicion for PE as patient is not hypoxemic no active chest pain or dyspnea.  No signs of heart failure or fluid overload on exam.    Chest x-ray without signs of pulmonary edema.  Blood work is unremarkable including normal range BMP, troponin and D-dimer.  Metabolic panel is unremarkable except for mild hypokalemia which was repleted.  No signs of anemia.  During observation of aspirin patient without abnormal heart rhythm.  Patient is currently asymptomatic.  Discussed with patient plan for close PCP follow-up, coordination of Holter monitoring and cardiology follow-up.  Discussed with patient return precautions which she is comfortable with.        ADDITIONAL PROBLEM LIST    DISPOSITION AND DISCUSSIONS      Decision tools and prescription drugs considered including, but not limited to: D-dimer and evaluation of PE .    FINAL DIAGNOSIS  1. Palpitations           Electronically signed by: Joss Dewey D.O., 11/29/2023 9:00 PM

## 2023-11-30 NOTE — ED TRIAGE NOTES
Chief Complaint   Patient presents with    Sent from Urgent Care    Irregular Heart Beat     Pts heart rate is ranging from 's        Pulse 95   Temp 36.2 °C (97.2 °F) (Temporal)   Resp 18   Ht 1.524 m (5')   Wt 60.3 kg (132 lb 15 oz)   SpO2 97%   BMI 25.96 kg/m²

## 2023-11-30 NOTE — PROGRESS NOTES
Melody Rodriguez is a 73 y.o. female who presents for Other (Pt states that her PCP wants her to get an EKG, states that she has situations where she isn't doing much and heart rate will go up, states she will get SOB, states its been on and off, states that her peak of her heart rate is getting higher, states that this has been an ongoing issue for a year, states over the last 6 months has noticed it getting higher, states she will get light headed when she gets up or walks around )      HPI  This is a new problem. Melody Rodriguez is a 73 y.o. patient who presents to urgent care with c/o:   Sent to  from her PCP office for EKG when she told them she was having intermittent racing heart rate with DWE with any type of activity. She sees her hr on her apple watch. hr up to 130 bpm with walking. Yesterday she was carrying a car seat when walking to her car her HR went up to 140 and she felt it racing in her chest. Feels sob at rest and with exertion. Becomes light headed where she has to hold onto the wall or sit down.   She does feel some chest pressure.  Denies diaphoresis or nausea.   Right now her apple watch says her HR is 98 bpm.    Hx MS - MRI scheduled tomorrow.     ROS See HPI    Allergies:     No Known Allergies    PMSFS Hx:  Past Medical History:   Diagnosis Date    Acquired hypothyroidism 6/15/2023    Acute nasopharyngitis     Recent chest xray clear    Arthritis     Right knee and thumb joints    Breath shortness     Comes and goes    Cancer (HCC)     Skin cancer squamous cell    Cerebral atrophy (HCC) 03/16/2022    COVID-19 virus infection 3/15/2023    Depression     Diabetes (HCC)     Disorder of thyroid     Nodules in both nodes. Biopsy indicates benign. Last ultrasound shows stable.    Groin lump 06/09/2022    Gynecological disorder     Cervical polyp in 1970    Heart burn     Controlled with Omeprazole    Heart murmur     Minor leakage in 3 valves due to scarlet fever as a child     Heart valve disease     Minor leakage in 3 valves due to scarlet fever as a child    Hiatus hernia syndrome     I think    High cholesterol     Taking Simvistatin    Hyperlipidemia     Muscle disorder     Osteoarthritis of right knee 06/15/2021    PONV (postoperative nausea and vomiting) 1970    Only time I’ve been under anesthesia    Recurrent major depressive disorder, in remission (HCC) 03/16/2022    SI (sacroiliac) pain 06/09/2022    Type 2 diabetes mellitus with stage 3 chronic kidney disease (HCC) 06/15/2021    Urinary bladder disorder     MS issue    Urinary incontinence     During MS relapse. Has since resolved    Vitamin D deficiency 6/15/2023     Past Surgical History:   Procedure Laterality Date    PB TOTAL KNEE ARTHROPLASTY Right 12/14/2022    Procedure: RIGHT TOTAL KNEE REPLACEMENT;  Surgeon: Britton Cooney M.D.;  Location: SURGERY [facility];  Service: Orthopedics    GYN SURGERY  1970    Cervical polyp     Family History   Problem Relation Age of Onset    Colon Cancer Mother     Heart Disease Mother         Cardiomyopathy    Lung Disease Mother         Emphysema from 2nd hand smoke    Cancer Mother         Colon    Colorectal Cancer Mother     Diabetes Mother     Heart Attack Father     Heart Disease Father         Numerous heart attacks    Diabetes Father     No Known Problems Sister     Cancer Brother         Skin cancer    Other Brother         Gaulbladder issues    Cancer Maternal Grandmother         GI cancer    No Known Problems Maternal Grandfather     No Known Problems Paternal Grandmother     No Known Problems Paternal Grandfather     Other Daughter         Fibromyalgia    Bipolar disorder Daughter     ADHD Son     Depression Son     Hypertension Son      Social History     Tobacco Use    Smoking status: Never    Smokeless tobacco: Never   Substance Use Topics    Alcohol use: Yes     Comment: Occasional glass of wine or april       Problems:   Patient Active Problem List    Diagnosis    Controlled type 2 diabetes mellitus without complication, without long-term current use of insulin (HCC)    Dyslipidemia    Multiple sclerosis (HCC)    Mood disorder (HCC)    Schatzki's ring    Hiatal hernia    Dysphagia    Risk for falls    Non-toxic multinodular goiter    Long term (current) use of oral hypoglycemic drugs    Osteoarthritis of right knee    Cerebral atrophy ([facility])    BMI 25.0-25.9,adult    Osteopenia of left thigh    Hypokalemia    SI (sacroiliac) pain    Total knee replacement status, right    Mixed hyperlipidemia    Hyponatremia    Chronic cough    History of skin cancer    Gastroesophageal reflux disease without esophagitis    Bipolar disorder in full remission ([facility])    Primary insomnia    Sedative dependence with current use ([facility])    Family history of premature coronary artery disease    Acquired hypothyroidism    Vitamin D deficiency       Medications:   Current Outpatient Medications on File Prior to Visit   Medication Sig Dispense Refill    Empagliflozin-metFORMIN HCl ER (SYNJARDY XR) 12.5-1000 MG TABLET SR 24 HR Take 1 tablet by mouth daily 100 Tablet 2    omeprazole (PRILOSEC) 20 MG delayed-release capsule Take 1 capsule by mouth every morning, 30 minutes before breakfast. 100 Capsule 3    tizanidine (ZANAFLEX) 4 MG Tab Take 1 Tablet by mouth 2 times a day as needed (spasm). 200 Tablet 0    buPROPion (WELLBUTRIN XL) 300 MG XL tablet Take 1 tablet (300 mg) by mouth daily in the morning 90 Tablet 1    levothyroxine (SYNTHROID) 25 MCG Tab Take 1 Tablet by mouth every morning on an empty stomach. 100 Tablet 1    paroxetine (PAXIL) 40 MG tablet Take 1 Tablet by mouth every day. Indications: Major Depressive Disorder 100 Tablet 1    rosuvastatin (CRESTOR) 40 MG tablet Take 1 Tablet by mouth every day. 90 Tablet 3    zolpidem (AMBIEN) 5 MG Tab Take 1 tablet (5 mg) by mouth daily at bedtime as needed for insomnia 30 Tablet 0    Teriflunomide (AUBAGIO) 14 MG Tab Take 14 mg by mouth  every day. Indications: Relapsing, Remitting Multiple Sclerosis      modafinil (PROVIGIL) 200 MG Tab Take 200 mg by mouth 1 time a day as needed. Indications: Tiredness associated with Multiple Sclerosis       No current facility-administered medications on file prior to visit.        Objective:     BP (!) 140/82   Pulse 95   Temp 36.4 °C (97.6 °F) (Temporal)   Resp 16   Ht 1.524 m (5')   Wt 59.4 kg (131 lb)   SpO2 97%   BMI 25.58 kg/m²     Physical Exam  Vitals and nursing note reviewed.   Constitutional:       Appearance: Normal appearance. She is normal weight.   HENT:      Mouth/Throat:      Mouth: Mucous membranes are moist.   Cardiovascular:      Rate and Rhythm: Normal rate and regular rhythm.      Pulses: Normal pulses.      Heart sounds: Normal heart sounds.   Pulmonary:      Effort: Pulmonary effort is normal.      Breath sounds: Normal breath sounds.   Musculoskeletal:      Right lower leg: No edema.      Left lower leg: No edema.   Skin:     General: Skin is warm.      Capillary Refill: Capillary refill takes less than 2 seconds.   Neurological:      Mental Status: She is alert and oriented to person, place, and time.   Psychiatric:         Mood and Affect: Mood normal.         Behavior: Behavior normal.         Thought Content: Thought content normal.         EKG Interpretation    Rhythm: normal sinus   Rate: normal  Axis: normal  Ectopy: none  Conduction: normal  ST Segments: no acute change  T Waves: no acute change  Q Waves: inferior leads    Clinical Impression: non-specific EKG    Assessment /Associated Orders:      1. Palpitations        2. Dyspnea on exertion        3. Controlled type 2 diabetes mellitus without complication, without long-term current use of insulin (HCC)        4. Elevated blood pressure reading              Medical Decision Making:    Pt's clinical presentation and exam today indicate a need for higher level of care with further evaluation and/or diagnostics.  Concern  for possible paroxysmal arrhythmias and / or acute cardiac etiology.    [facility] was  called to arrange transfer to higher level of care in ER.  Pt is to be transported via POV .   I have reiterated to patient that although an Urgent Care to ER transfer was made this will not necessarily expedite the ER process      Please note that this dictation was created using voice recognition software. I have worked with consultants from the vendor as well as technical experts from [facility] to optimize the interface. I have made every reasonable attempt to correct obvious errors, but I expect that there are errors of grammar and possibly content that I did not discover before finalizing the note.  This note was electronically signed by provider

## 2023-11-30 NOTE — DISCHARGE INSTRUCTIONS
As we discussed you should closely follow-up with your primary care provider.  You should discuss with Holter monitoring as well as earlier cardiology follow-up.  If develop shortness of breath, chest pain, passing out you should return to the emergency room.

## 2023-11-30 NOTE — ED NOTES
Discharged in stable condition, alert and oriented, ambulatory. follow up appointment instructed. Health education imparted. Instructed to come back once symptoms worsened. Pt verbalized understanding of the information given. Removed IV line and applied pressure.

## 2023-12-31 ENCOUNTER — OFFICE VISIT (OUTPATIENT)
Dept: URGENT CARE | Facility: CLINIC | Age: 73
End: 2023-12-31
Payer: MEDICARE

## 2023-12-31 ENCOUNTER — APPOINTMENT (OUTPATIENT)
Dept: RADIOLOGY | Facility: IMAGING CENTER | Age: 73
End: 2023-12-31
Attending: FAMILY MEDICINE
Payer: MEDICARE

## 2023-12-31 VITALS
RESPIRATION RATE: 16 BRPM | SYSTOLIC BLOOD PRESSURE: 134 MMHG | TEMPERATURE: 97.9 F | HEART RATE: 104 BPM | WEIGHT: 132 LBS | BODY MASS INDEX: 25.91 KG/M2 | DIASTOLIC BLOOD PRESSURE: 72 MMHG | HEIGHT: 60 IN | OXYGEN SATURATION: 96 %

## 2023-12-31 DIAGNOSIS — R06.02 SHORT OF BREATH ON EXERTION: ICD-10-CM

## 2023-12-31 DIAGNOSIS — R91.1 NODULE OF APEX OF LEFT LUNG: ICD-10-CM

## 2023-12-31 DIAGNOSIS — R91.8 INFILTRATE OF LOWER LOBE OF RIGHT LUNG PRESENT ON IMAGING STUDY: ICD-10-CM

## 2023-12-31 PROCEDURE — 71046 X-RAY EXAM CHEST 2 VIEWS: CPT | Mod: TC,FY | Performed by: FAMILY MEDICINE

## 2023-12-31 PROCEDURE — 3078F DIAST BP <80 MM HG: CPT | Performed by: FAMILY MEDICINE

## 2023-12-31 PROCEDURE — 3075F SYST BP GE 130 - 139MM HG: CPT | Performed by: FAMILY MEDICINE

## 2023-12-31 PROCEDURE — 99214 OFFICE O/P EST MOD 30 MIN: CPT | Performed by: FAMILY MEDICINE

## 2023-12-31 RX ORDER — LEVOFLOXACIN 500 MG/1
500 TABLET, FILM COATED ORAL DAILY
Qty: 7 TABLET | Refills: 0 | Status: SHIPPED | OUTPATIENT
Start: 2023-12-31 | End: 2024-01-07

## 2023-12-31 ASSESSMENT — ENCOUNTER SYMPTOMS
FEVER: 0
COUGH: 1
PALPITATIONS: 1

## 2023-12-31 ASSESSMENT — FIBROSIS 4 INDEX: FIB4 SCORE: 1.86

## 2023-12-31 NOTE — PROGRESS NOTES
"Subjective:   Melody Rodriguez is a 73 y.o. female who presents for Cough (X1 month\" Phlegm production seems to only be in the morning.\")      Cough  Pertinent negatives include no fever.       Review of Systems   Constitutional:  Negative for fever and malaise/fatigue.   HENT:  Negative for congestion and hearing loss.    Respiratory:  Positive for cough.    Cardiovascular:  Positive for palpitations.       Medications, Allergies, and current problem list reviewed today in Epic.     Objective:     /72 (BP Location: Right arm, Patient Position: Sitting, BP Cuff Size: Adult)   Pulse (!) 104   Temp 36.6 °C (97.9 °F) (Temporal)   Resp 16   Ht 1.524 m (5')   Wt 59.9 kg (132 lb)   SpO2 96%     Physical Exam  Vitals and nursing note reviewed.   Constitutional:       Appearance: Normal appearance.   HENT:      Head: Normocephalic and atraumatic.      Right Ear: Tympanic membrane normal.      Left Ear: Tympanic membrane normal.      Nose: Nose normal.      Mouth/Throat:      Pharynx: Oropharynx is clear.   Cardiovascular:      Rate and Rhythm: Regular rhythm. Tachycardia present.      Pulses: Normal pulses.      Heart sounds: Normal heart sounds.   Pulmonary:      Effort: Pulmonary effort is normal.      Breath sounds: Wheezing present. No rhonchi.   Abdominal:      General: Abdomen is flat. Bowel sounds are normal.      Palpations: Abdomen is soft.   Neurological:      Mental Status: She is alert.         Assessment/Plan:     Diagnosis and associated orders:     1. Short of breath on exertion  DX-CHEST-2 VIEWS      2. Infiltrate of lower lobe of right lung present on imaging study  levoFLOXacin (LEVAQUIN) 500 MG tablet      3. Nodule of apex of left lung           Comments/MDM:     Patient informed of xray findings and to follow up with primary for lung ct         Differential diagnosis, natural history, supportive care, and indications for immediate follow-up discussed.    Advised the patient to " follow-up with the primary care physician for recheck, reevaluation, and consideration of further management.    Please note that this dictation was created using voice recognition software. I have made a reasonable attempt to correct obvious errors, but I expect that there are errors of grammar and possibly content that I did not discover before finalizing the note.    This note was electronically signed by Sabino Butts M.D.

## 2024-01-02 ENCOUNTER — OFFICE VISIT (OUTPATIENT)
Dept: CARDIOLOGY | Facility: MEDICAL CENTER | Age: 74
End: 2024-01-02
Attending: NURSE PRACTITIONER
Payer: MEDICARE

## 2024-01-02 VITALS
BODY MASS INDEX: 25.72 KG/M2 | HEIGHT: 60 IN | HEART RATE: 98 BPM | RESPIRATION RATE: 14 BRPM | WEIGHT: 131 LBS | OXYGEN SATURATION: 98 % | DIASTOLIC BLOOD PRESSURE: 70 MMHG | SYSTOLIC BLOOD PRESSURE: 110 MMHG

## 2024-01-02 DIAGNOSIS — I47.10 PAROXYSMAL SUPRAVENTRICULAR TACHYCARDIA (HCC): ICD-10-CM

## 2024-01-02 DIAGNOSIS — R53.83 FATIGUE, UNSPECIFIED TYPE: Primary | ICD-10-CM

## 2024-01-02 DIAGNOSIS — R94.31 ABNORMAL EKG: ICD-10-CM

## 2024-01-02 DIAGNOSIS — R06.83 SNORING: ICD-10-CM

## 2024-01-02 DIAGNOSIS — R07.89 OTHER CHEST PAIN: ICD-10-CM

## 2024-01-02 PROCEDURE — 3074F SYST BP LT 130 MM HG: CPT | Performed by: NURSE PRACTITIONER

## 2024-01-02 PROCEDURE — 3078F DIAST BP <80 MM HG: CPT | Performed by: NURSE PRACTITIONER

## 2024-01-02 PROCEDURE — 99213 OFFICE O/P EST LOW 20 MIN: CPT | Performed by: NURSE PRACTITIONER

## 2024-01-02 PROCEDURE — 99214 OFFICE O/P EST MOD 30 MIN: CPT | Performed by: NURSE PRACTITIONER

## 2024-01-02 PROCEDURE — 93005 ELECTROCARDIOGRAM TRACING: CPT | Performed by: NURSE PRACTITIONER

## 2024-01-02 ASSESSMENT — ENCOUNTER SYMPTOMS
CONSTITUTIONAL NEGATIVE: 1
DIZZINESS: 0
MUSCULOSKELETAL NEGATIVE: 1
EYES NEGATIVE: 1
SENSORY CHANGE: 0
PND: 0
PALPITATIONS: 1
NERVOUS/ANXIOUS: 0
DEPRESSION: 0
ORTHOPNEA: 0
RESPIRATORY NEGATIVE: 1
SHORTNESS OF BREATH: 0
WHEEZING: 0
NEUROLOGICAL NEGATIVE: 1
BRUISES/BLEEDS EASILY: 0
GASTROINTESTINAL NEGATIVE: 1
CLAUDICATION: 0
NAUSEA: 0
HALLUCINATIONS: 0

## 2024-01-02 ASSESSMENT — FIBROSIS 4 INDEX: FIB4 SCORE: 1.86

## 2024-01-02 NOTE — PROGRESS NOTES
Electrophysiology Follow up  Note    DOS: 1/2/2024  [id number]  Melody Rodriguez    Chief complaint/Reason for consult: tachycardia, palpitations, and chest discomfort/pain    HPI: Pt is a 73 y.o. female who presents to the clinic today in follow up for tachycardia, palpitations, and chest discomfort/pain. Patient has a past medical history significant for but not limited to: type 2 diabetes, dyslipidemia, multiple sclerosis, osteoarthritis of right knee S/P total knee replacement, hypothyroidism. Patient has noticed heart rates into the 140's with little to no exertion intermittently. She also notices some chest discomfort/pain. 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 She had an echocardiogram in 2022 that looked good. We will get a 30 day monitor to assess for possible electrical arrhthymias and a stress test for perfusion aspects. Most recent TSH level was normal.       Past Medical History:   Diagnosis Date    Acquired hypothyroidism 6/15/2023    Acute nasopharyngitis     Recent chest xray clear    Arthritis     Right knee and thumb joints    Breath shortness     Comes and goes    Cancer (HCC)     Skin cancer squamous cell    Cerebral atrophy (HCC) 03/16/2022    COVID-19 virus infection 3/15/2023    Depression     Diabetes (HCC)     Disorder of thyroid     Nodules in both nodes. Biopsy indicates benign. Last ultrasound shows stable.    Groin lump 06/09/2022    Gynecological disorder     Cervical polyp in 1970    Heart burn     Controlled with Omeprazole    Heart murmur     Minor leakage in 3 valves due to scarlet fever as a child    Heart valve disease     Minor leakage in 3 valves due to scarlet fever as a child    Hiatus hernia syndrome     I think    High cholesterol     Taking Simvistatin    Hyperlipidemia     Muscle disorder     Osteoarthritis of right knee 06/15/2021    PONV (postoperative nausea and vomiting) 1970    Only time I’ve been under anesthesia    Recurrent  major depressive disorder, in remission (HCC) 03/16/2022    SI (sacroiliac) pain 06/09/2022    Type 2 diabetes mellitus with stage 3 chronic kidney disease ([facility]) 06/15/2021    Urinary bladder disorder     MS issue    Urinary incontinence     During MS relapse. Has since resolved    Vitamin D deficiency 6/15/2023       Past Surgical History:   Procedure Laterality Date    PB TOTAL KNEE ARTHROPLASTY Right 12/14/2022    Procedure: RIGHT TOTAL KNEE REPLACEMENT;  Surgeon: Britton Cooney M.D.;  Location: SURGERY [facility];  Service: Orthopedics    GYN SURGERY  1970    Cervical polyp       Social History     Socioeconomic History    Marital status:      Spouse name: Not on file    Number of children: Not on file    Years of education: Not on file    Highest education level: 12th grade   Occupational History    Not on file   Tobacco Use    Smoking status: Never    Smokeless tobacco: Never   Vaping Use    Vaping Use: Never used   Substance and Sexual Activity    Alcohol use: Yes     Comment: Occasional glass of wine or april    Drug use: Not Currently    Sexual activity: Not on file   Other Topics Concern    Not on file   Social History Narrative    Not on file     Social Determinants of Health     Financial Resource Strain: Low Risk  (11/9/2022)    Overall Financial Resource Strain (CARDIA)     Difficulty of Paying Living Expenses: Not hard at all   Food Insecurity: No Food Insecurity (11/9/2022)    Hunger Vital Sign     Worried About Running Out of Food in the Last Year: Never true     Ran Out of Food in the Last Year: Never true   Transportation Needs: No Transportation Needs (11/9/2022)    PRAPARE - Transportation     Lack of Transportation (Medical): No     Lack of Transportation (Non-Medical): No   Physical Activity: Inactive (11/9/2022)    Exercise Vital Sign     Days of Exercise per Week: 0 days     Minutes of Exercise per Session: 0 min   Stress: Stress Concern Present (5/26/2020)    [place]  [place] [employer] - Occupational Stress Questionnaire     Feeling of Stress : To some extent   Social Connections: Moderately Isolated (5/26/2020)    Social Connection and Isolation Panel [NHANES]     Frequency of Communication with Friends and Family: More than three times a week     Frequency of Social Gatherings with Friends and Family: More than three times a week     Attends Synagogue Services: Never     Active Member of Clubs or Organizations: Yes     Attends Club or Organization Meetings: More than 4 times per year     Marital Status:    Intimate Partner Violence: Not on file   Housing Stability: Low Risk  (11/9/2022)    Housing Stability Vital Sign     Unable to Pay for Housing in the Last Year: No     Number of Places Lived in the Last Year: 1     Unstable Housing in the Last Year: No       Family History   Problem Relation Age of Onset    Colon Cancer Mother     Heart Disease Mother         Cardiomyopathy    Lung Disease Mother         Emphysema from 2nd hand smoke    Cancer Mother         Colon    Colorectal Cancer Mother     Diabetes Mother     Heart Attack Father     Heart Disease Father         Numerous heart attacks    Diabetes Father     No Known Problems Sister     Cancer Brother         Skin cancer    Other Brother         Gaulbladder issues    Cancer Maternal Grandmother         GI cancer    No Known Problems Maternal Grandfather     No Known Problems Paternal Grandmother     No Known Problems Paternal Grandfather     Other Daughter         Fibromyalgia    Bipolar disorder Daughter     ADHD Son     Depression Son     Hypertension Son        No Known Allergies    Current Outpatient Medications   Medication Sig Dispense Refill    levoFLOXacin (LEVAQUIN) 500 MG tablet Take 1 Tablet by mouth every day for 7 days. 7 Tablet 0    Empagliflozin-metFORMIN HCl ER (SYNJARDY XR) 12.5-1000 MG TABLET SR 24 HR Take 1 tablet by mouth daily 100 Tablet 2    omeprazole (PRILOSEC) 20 MG  delayed-release capsule Take 1 capsule by mouth every morning, 30 minutes before breakfast. 100 Capsule 3    tizanidine (ZANAFLEX) 4 MG Tab Take 1 Tablet by mouth 2 times a day as needed (spasm). 200 Tablet 0    buPROPion (WELLBUTRIN XL) 300 MG XL tablet Take 1 tablet (300 mg) by mouth daily in the morning 90 Tablet 1    levothyroxine (SYNTHROID) 25 MCG Tab Take 1 Tablet by mouth every morning on an empty stomach. 100 Tablet 1    paroxetine (PAXIL) 40 MG tablet Take 1 Tablet by mouth every day. Indications: Major Depressive Disorder 100 Tablet 1    rosuvastatin (CRESTOR) 40 MG tablet Take 1 Tablet by mouth every day. 90 Tablet 3    zolpidem (AMBIEN) 5 MG Tab Take 1 tablet (5 mg) by mouth daily at bedtime as needed for insomnia 30 Tablet 0    Teriflunomide (AUBAGIO) 14 MG Tab Take 14 mg by mouth every day. Indications: Relapsing, Remitting Multiple Sclerosis      modafinil (PROVIGIL) 200 MG Tab Take 200 mg by mouth 1 time a day as needed. Indications: Tiredness associated with Multiple Sclerosis       No current facility-administered medications for this visit.       Vitals:    01/02/24 0920   BP: 110/70   Pulse: 98   Resp: 14   SpO2: 98%         Review of Systems   Constitutional: Negative.  Negative for malaise/fatigue.   HENT: Negative.     Eyes: Negative.    Respiratory: Negative.  Negative for shortness of breath and wheezing.    Cardiovascular:  Positive for chest pain and palpitations. Negative for orthopnea, claudication, leg swelling and PND.   Gastrointestinal: Negative.  Negative for nausea.   Genitourinary: Negative.    Musculoskeletal: Negative.    Skin: Negative.    Neurological: Negative.  Negative for dizziness and sensory change.   Endo/Heme/Allergies: Negative.  Does not bruise/bleed easily.   Psychiatric/Behavioral:  Negative for depression and hallucinations. The patient is not nervous/anxious.         Prior echo/stress results reviewed: LVEF 65% in 2022        EKG interpreted by me: sinus rhythm  "    Physical Exam  Constitutional:       Appearance: Normal appearance.   HENT:      Head: Normocephalic.   Eyes:      Pupils: Pupils are equal, round, and reactive to light.   Neck:      Vascular: No JVD.   Cardiovascular:      Rate and Rhythm: Normal rate and regular rhythm.      Pulses: Normal pulses.      Heart sounds: Normal heart sounds.   Pulmonary:      Effort: Pulmonary effort is normal.      Breath sounds: Normal breath sounds.   Abdominal:      General: Abdomen is flat.      Palpations: Abdomen is soft.   Musculoskeletal:      Cervical back: Normal range of motion.      Right lower leg: No edema.      Left lower leg: No edema.   Skin:     General: Skin is warm and dry.   Neurological:      Mental Status: She is alert and oriented to person, place, and time.   Psychiatric:         Mood and Affect: Mood normal.         Behavior: Behavior normal.          Data:  Lipids:   Lab Results   Component Value Date/Time    CHOLSTRLTOT 187 04/10/2023 07:28 AM    TRIGLYCERIDE 165 (H) 04/10/2023 07:28 AM    HDL 50 04/10/2023 07:28 AM     (H) 04/10/2023 07:28 AM        BMP:  Lab Results   Component Value Date/Time    SODIUM 138 11/29/2023 1930    POTASSIUM 3.5 (L) 11/29/2023 1930    CHLORIDE 105 11/29/2023 1930    CO2 22 11/29/2023 1930    GLUCOSE 142 (H) 11/29/2023 1930    BUN 19 11/29/2023 1930    CREATININE 0.90 11/29/2023 1930    CALCIUM 8.7 11/29/2023 1930    ANION 11.0 11/29/2023 1930       GFR:  Lab Results   Component Value Date/Time    IFAFRICA >60 03/11/2022 0612    IFNOTAFR >60 03/11/2022 0612        TSH:   Lab Results   Component Value Date/Time    TSHULTRASEN 2.440 11/29/2023 1930       MAGNESIUM:  Lab Results   Component Value Date/Time    MAGNESIUM 1.9 11/29/2023 1930        THYROXINE (T4):   No results found for: \"FREEDIR\"     CBC:   Lab Results   Component Value Date/Time    WBC 10.0 11/29/2023 07:30 PM    RBC 4.44 11/29/2023 07:30 PM    HEMOGLOBIN 13.2 11/29/2023 07:30 PM    HEMATOCRIT 40.2 " "11/29/2023 07:30 PM    MCV 90.5 11/29/2023 07:30 PM    MCH 29.7 11/29/2023 07:30 PM    MCHC 32.8 11/29/2023 07:30 PM    RDW 43.0 11/29/2023 07:30 PM    PLATELETCT 204 11/29/2023 07:30 PM    MPV 8.9 (L) 11/29/2023 07:30 PM    NEUTSPOLYS 69.90 11/29/2023 07:30 PM    LYMPHOCYTES 17.70 (L) 11/29/2023 07:30 PM    MONOCYTES 8.70 11/29/2023 07:30 PM    EOSINOPHILS 2.60 11/29/2023 07:30 PM    BASOPHILS 0.60 11/29/2023 07:30 PM    IMMGRAN 0.50 11/29/2023 07:30 PM    NRBC 0.00 11/29/2023 07:30 PM    NEUTS 6.95 11/29/2023 07:30 PM    LYMPHS 1.76 11/29/2023 07:30 PM    MONOS 0.87 (H) 11/29/2023 07:30 PM    EOS 0.26 11/29/2023 07:30 PM    BASO 0.06 11/29/2023 07:30 PM    IMMGRANAB 0.05 11/29/2023 07:30 PM    NRBCAB 0.00 11/29/2023 07:30 PM        CBC w/o DIFF  Lab Results   Component Value Date/Time    WBC 10.0 11/29/2023 07:30 PM    RBC 4.44 11/29/2023 07:30 PM    HEMOGLOBIN 13.2 11/29/2023 07:30 PM    MCV 90.5 11/29/2023 07:30 PM    MCH 29.7 11/29/2023 07:30 PM    MCHC 32.8 11/29/2023 07:30 PM    RDW 43.0 11/29/2023 07:30 PM    MPV 8.9 (L) 11/29/2023 07:30 PM       LIVER:  Lab Results   Component Value Date/Time    ALKPHOSPHAT 136 (H) 11/29/2023 07:30 PM    ASTSGOT 22 11/29/2023 07:30 PM    ALTSGPT 18 11/29/2023 07:30 PM    TBILIRUBIN 0.4 11/29/2023 07:30 PM       BNP:  No results found for: \"BNPBTYPENAT\"    PT/INR:  No results found for: \"PROTHROMBTM\", \"INR\"          Impression/Plan:  1. Fatigue, unspecified type  Overnight Home Sleep Study      2. Abnormal EKG  EKG      3. Paroxysmal supraventricular tachycardia  Holter Monitor Study      4. Other chest pain  NM-CARDIAC STRESS TEST      5. Snoring         - home sleep study for possible sleep apnea   - STOP-BANG score 3 intermediate risk   - Holter monitor for 30 days to assess electrical function  - treadmill nuclear stress test for perfusion function   - lipid panel well controlled continue current dose of rosuvastatin    - return to clinic in 8 weeks to discuss results " and longitudinal planning           A total of 30 minutes of time was spent on day of encounter reviewing medical record, performing history and examination, counseling, ordering medication/test/consults, collaborating with referring service, and documentation.    [facility]  Cardiac Electrophysiology

## 2024-01-03 ENCOUNTER — NON-PROVIDER VISIT (OUTPATIENT)
Dept: CARDIOLOGY | Facility: MEDICAL CENTER | Age: 74
End: 2024-01-03
Attending: NURSE PRACTITIONER
Payer: MEDICARE

## 2024-01-03 ENCOUNTER — PATIENT OUTREACH (OUTPATIENT)
Dept: HEALTH INFORMATION MANAGEMENT | Facility: OTHER | Age: 74
End: 2024-01-03

## 2024-01-03 DIAGNOSIS — R91.1 LUNG NODULE: ICD-10-CM

## 2024-01-03 DIAGNOSIS — E11.9 CONTROLLED TYPE 2 DIABETES MELLITUS WITHOUT COMPLICATION, WITHOUT LONG-TERM CURRENT USE OF INSULIN (HCC): ICD-10-CM

## 2024-01-03 DIAGNOSIS — I47.10 PAROXYSMAL SUPRAVENTRICULAR TACHYCARDIA (HCC): ICD-10-CM

## 2024-01-03 LAB — EKG IMPRESSION: NORMAL

## 2024-01-03 PROCEDURE — 99999 PR NO CHARGE: CPT | Performed by: INTERNAL MEDICINE

## 2024-01-03 PROCEDURE — 93010 ELECTROCARDIOGRAM REPORT: CPT | Performed by: INTERNAL MEDICINE

## 2024-01-03 NOTE — TELEPHONE ENCOUNTER
Pt scheduled to see Dr. Pritchett 3/20. Sooner appt available with Dr. Pritchett on 2/14. Pt did have a GFR done on 11/29/23. Result was 67.  I can see if pt wants sooner appt with Dr. Candelaria if she needs to see PCP.  Thank you

## 2024-01-03 NOTE — PROGRESS NOTES
Patient enrolled in the 30 day [employer] [facility] Monitoring program per EVETTE GONZALES APRN.  >In office hookup with [facility].  >Pending EOS  Serial number: [id number]

## 2024-01-03 NOTE — TELEPHONE ENCOUNTER
Pt would like you to order CT chest and GFR. Pt states she will complete imaging and then follow-up with PCP.  Thank you

## 2024-01-03 NOTE — PROGRESS NOTES
Nurse CM outreach call for monthly CCM assessment.    Assessment    Pt reports she is doing a little better today. States she was seen in the urgent care several days ago for cough. Reports she has been coughing for about a month. Reports she was prescribed antibiotics. Pt reports she has ongoing shortness of breath with any exertion. States she had follow-up with cardiologist and currently is wearing heart monitor. Pt reports she gets palpitations with any exertion. Pt reports she was told her chest x-ray showed a lung nodule. Pt is questioning if she needs additional work-up. Pt scheduled to see PCP in March. Nurse will route message to PCP.     Education    Fall precautions. Follow-up with specialists as scheduled. ER precautions reviewed if any worsening shortness of breath.     Plan of Care and Goals    Reviewed care plan and goals    Barriers:    History of falls, short of breath with exertion.    Progress:    Continue to work on progress    Next outreach:  One month  Nurse Care Coordinator:  Pratibha Silva  [phone]                         P

## 2024-01-03 NOTE — TELEPHONE ENCOUNTER
She needs CT chest with contrast, is she going to see Dr. MENDEZ anytime soon? Usually need GFR check before they can give contrast for imaging.

## 2024-01-05 ENCOUNTER — HOSPITAL ENCOUNTER (OUTPATIENT)
Dept: LAB | Facility: MEDICAL CENTER | Age: 74
End: 2024-01-05
Attending: INTERNAL MEDICINE
Payer: MEDICARE

## 2024-01-05 DIAGNOSIS — R91.1 LUNG NODULE: ICD-10-CM

## 2024-01-05 LAB
ANION GAP SERPL CALC-SCNC: 11 MMOL/L (ref 7–16)
BUN SERPL-MCNC: 14 MG/DL (ref 8–22)
CALCIUM SERPL-MCNC: 9.2 MG/DL (ref 8.5–10.5)
CHLORIDE SERPL-SCNC: 101 MMOL/L (ref 96–112)
CO2 SERPL-SCNC: 26 MMOL/L (ref 20–33)
CREAT SERPL-MCNC: 0.92 MG/DL (ref 0.5–1.4)
GFR SERPLBLD CREATININE-BSD FMLA CKD-EPI: 65 ML/MIN/1.73 M 2
GLUCOSE SERPL-MCNC: 160 MG/DL (ref 65–99)
POTASSIUM SERPL-SCNC: 4.2 MMOL/L (ref 3.6–5.5)
SODIUM SERPL-SCNC: 138 MMOL/L (ref 135–145)

## 2024-01-05 PROCEDURE — 36415 COLL VENOUS BLD VENIPUNCTURE: CPT

## 2024-01-05 PROCEDURE — 80048 BASIC METABOLIC PNL TOTAL CA: CPT

## 2024-01-09 PROCEDURE — RXMED WILLOW AMBULATORY MEDICATION CHARGE: Performed by: INTERNAL MEDICINE

## 2024-01-09 PROCEDURE — RXMED WILLOW AMBULATORY MEDICATION CHARGE: Performed by: PSYCHIATRY & NEUROLOGY

## 2024-01-17 ENCOUNTER — APPOINTMENT (OUTPATIENT)
Dept: RADIOLOGY | Facility: MEDICAL CENTER | Age: 74
End: 2024-01-17
Attending: INTERNAL MEDICINE
Payer: MEDICARE

## 2024-01-17 DIAGNOSIS — Z12.31 VISIT FOR SCREENING MAMMOGRAM: ICD-10-CM

## 2024-01-17 DIAGNOSIS — R91.1 LUNG NODULE: ICD-10-CM

## 2024-01-17 PROCEDURE — 77067 SCR MAMMO BI INCL CAD: CPT

## 2024-01-17 PROCEDURE — 700117 HCHG RX CONTRAST REV CODE 255: Performed by: INTERNAL MEDICINE

## 2024-01-17 PROCEDURE — 71260 CT THORAX DX C+: CPT

## 2024-01-17 RX ADMIN — IOHEXOL 75 ML: 350 INJECTION, SOLUTION INTRAVENOUS at 14:32

## 2024-01-19 ASSESSMENT — ENCOUNTER SYMPTOMS: GENERAL WELL-BEING: FAIR

## 2024-01-19 ASSESSMENT — ACTIVITIES OF DAILY LIVING (ADL): BATHING_REQUIRES_ASSISTANCE: 0

## 2024-01-19 ASSESSMENT — PATIENT HEALTH QUESTIONNAIRE - PHQ9: CLINICAL INTERPRETATION OF PHQ2 SCORE: 3

## 2024-01-23 NOTE — ASSESSMENT & PLAN NOTE
Chronic, stable. Her PHQ today was 9/27. She feels this is elevated more so due to symptoms related to her MS rather than depression. She feels her bipolar disorder is well controlled on paroxetine 40mg daily, bupropion 300mg daily. She follows with psychiatry every six months. Follow up per psychiatry per routine.

## 2024-01-23 NOTE — ASSESSMENT & PLAN NOTE
Chronic, stable. Followed by neurology. Maintains on Aubagio as well as modafinil for fatigue. She takes tizanidine as needed for muscular spasms. Follow up with neurology at least annually for continued monitoring and management.

## 2024-01-23 NOTE — ASSESSMENT & PLAN NOTE
Chronic, stable. She has a history of a brain MRI that demonstrated cerebral atrophy, as early per pt report. Cognitive screening today was completed without error but she feels she notes some cognitive changes over the years. Follow up with PCP per routine for continued monitoring and management.

## 2024-01-23 NOTE — ASSESSMENT & PLAN NOTE
Chronic, stable. Maintains on statin therapy without issue. Most recent lipid panel from 04/2023 with mild elevation of LDL at 104. She is admittedly not good about adhering to a healthy diet. Continue rosuvastatin 40mg daily. Recommend Mediterranean diet and regular physical activity. Follow up with PCP at least annually for continued monitoring and management.   Lab Results   Component Value Date/Time    CHOLSTRLTOT 187 04/10/2023 07:28 AM     (H) 04/10/2023 07:28 AM    HDL 50 04/10/2023 07:28 AM    TRIGLYCERIDE 165 (H) 04/10/2023 07:28 AM

## 2024-01-23 NOTE — ASSESSMENT & PLAN NOTE
Chronic, stable. Last A1c 6.1 as of Aug 2023. Latest foot exam: 8/2023 (normal), retinal exam is completed at her endocrinology office, urinalysis 4/2023 (normal). Continue to advise low carbohydrate, high protein diet with regular physical exercise. Continue current treatment regime: Synjardy XR 12-5-1000mg daily. Follow up with endocrinology at least annually for continued monitoring and management.

## 2024-01-24 ENCOUNTER — TELEPHONE (OUTPATIENT)
Dept: PHARMACY | Facility: MEDICAL CENTER | Age: 74
End: 2024-01-24

## 2024-01-24 ENCOUNTER — OFFICE VISIT (OUTPATIENT)
Dept: MEDICAL GROUP | Facility: PHYSICIAN GROUP | Age: 74
End: 2024-01-24
Attending: FAMILY MEDICINE
Payer: MEDICARE

## 2024-01-24 ENCOUNTER — PHARMACY VISIT (OUTPATIENT)
Dept: PHARMACY | Facility: MEDICAL CENTER | Age: 74
End: 2024-01-24
Payer: COMMERCIAL

## 2024-01-24 VITALS
DIASTOLIC BLOOD PRESSURE: 60 MMHG | WEIGHT: 132.1 LBS | SYSTOLIC BLOOD PRESSURE: 116 MMHG | BODY MASS INDEX: 22.01 KG/M2 | HEIGHT: 65 IN

## 2024-01-24 DIAGNOSIS — G31.9 CEREBRAL ATROPHY (HCC): ICD-10-CM

## 2024-01-24 DIAGNOSIS — M85.89 OSTEOPENIA OF MULTIPLE SITES: ICD-10-CM

## 2024-01-24 DIAGNOSIS — F31.70 BIPOLAR DISORDER IN FULL REMISSION, MOST RECENT EPISODE UNSPECIFIED TYPE (HCC): ICD-10-CM

## 2024-01-24 DIAGNOSIS — F51.01 PRIMARY INSOMNIA: ICD-10-CM

## 2024-01-24 DIAGNOSIS — E11.9 CONTROLLED TYPE 2 DIABETES MELLITUS WITHOUT COMPLICATION, WITHOUT LONG-TERM CURRENT USE OF INSULIN (HCC): ICD-10-CM

## 2024-01-24 DIAGNOSIS — Z91.81 RISK FOR FALLS: ICD-10-CM

## 2024-01-24 DIAGNOSIS — E78.2 MIXED HYPERLIPIDEMIA: ICD-10-CM

## 2024-01-24 DIAGNOSIS — G35 MULTIPLE SCLEROSIS (HCC): ICD-10-CM

## 2024-01-24 DIAGNOSIS — F13.20 SEDATIVE DEPENDENCE WITH CURRENT USE (HCC): ICD-10-CM

## 2024-01-24 DIAGNOSIS — E03.9 ACQUIRED HYPOTHYROIDISM: ICD-10-CM

## 2024-01-24 PROBLEM — E78.5 DYSLIPIDEMIA: Status: RESOLVED | Noted: 2017-01-06 | Resolved: 2024-01-24

## 2024-01-24 PROCEDURE — 3078F DIAST BP <80 MM HG: CPT | Performed by: PHYSICIAN ASSISTANT

## 2024-01-24 PROCEDURE — 3074F SYST BP LT 130 MM HG: CPT | Performed by: PHYSICIAN ASSISTANT

## 2024-01-24 PROCEDURE — G0439 PPPS, SUBSEQ VISIT: HCPCS | Performed by: PHYSICIAN ASSISTANT

## 2024-01-24 PROCEDURE — 1126F AMNT PAIN NOTED NONE PRSNT: CPT | Performed by: PHYSICIAN ASSISTANT

## 2024-01-24 SDOH — ECONOMIC STABILITY: FOOD INSECURITY: WITHIN THE PAST 12 MONTHS, YOU WORRIED THAT YOUR FOOD WOULD RUN OUT BEFORE YOU GOT MONEY TO BUY MORE.: NEVER TRUE

## 2024-01-24 SDOH — ECONOMIC STABILITY: INCOME INSECURITY: HOW HARD IS IT FOR YOU TO PAY FOR THE VERY BASICS LIKE FOOD, HOUSING, MEDICAL CARE, AND HEATING?: NOT HARD AT ALL

## 2024-01-24 SDOH — ECONOMIC STABILITY: FOOD INSECURITY: WITHIN THE PAST 12 MONTHS, THE FOOD YOU BOUGHT JUST DIDN'T LAST AND YOU DIDN'T HAVE MONEY TO GET MORE.: NEVER TRUE

## 2024-01-24 ASSESSMENT — PATIENT HEALTH QUESTIONNAIRE - PHQ9
SUM OF ALL RESPONSES TO PHQ QUESTIONS 1-9: 9
5. POOR APPETITE OR OVEREATING: 2 - MORE THAN HALF THE DAYS

## 2024-01-24 ASSESSMENT — PAIN SCALES - GENERAL: PAINLEVEL: NO PAIN

## 2024-01-24 ASSESSMENT — FIBROSIS 4 INDEX: FIB4 SCORE: 1.86

## 2024-01-24 NOTE — TELEPHONE ENCOUNTER
Contact:             Phone number: [phone]     Name of person spoken with and relationship to patient: NEREYDA JIMENEZ   Medication:   Patient’s Adherence:             How patient is doing on medication: well     How many missed doses and reason: 0     Any new medications: no     Any new conditions: no     Any new allergies: no     Any new side effects: no      Any new diagnoses: no      How many doses remainin     Did patient want to speak with pharmacist: no   Delivery:             Delivery date and method:        Needs by Date:      Signature required: no     Any additional details for : ALLAN Hatch Appointment Date: ALLAN   Shipping Address: [address] [place] [address] [place] [id number]

## 2024-01-24 NOTE — ASSESSMENT & PLAN NOTE
Chronic, worsening. Last DEXA March 2023 demonstrating worsening osteopenia of the left proximal femur and new osteopenia of the lumbar spine with T scores of -2.4 and -1.1 respectively.  Denies any history of fragility fracture. Pt maintains on vitamin D supplementation and calcium rich foods. Continue to advise weightbearing exercises. Follow up with PCP at least annually for continued monitoring and management.

## 2024-01-24 NOTE — PROGRESS NOTES
Comprehensive Health Assessment Program     Melody Rodriguez is a 73 y.o. here for her comprehensive health assessment.    Patient Active Problem List    Diagnosis Date Noted    Acquired hypothyroidism 06/15/2023    Vitamin D deficiency 06/15/2023    Family history of premature coronary artery disease 06/07/2023    History of skin cancer 03/22/2023    Gastroesophageal reflux disease without esophagitis 03/22/2023    Bipolar disorder in full remission ([facility]) 03/22/2023    Primary insomnia 03/22/2023    Sedative dependence with current use ([facility]) 03/22/2023    Chronic cough 03/15/2023    Hyponatremia 12/19/2022    Total knee replacement status, right 12/14/2022    SI (sacroiliac) pain 06/09/2022    BMI 25.0-25.9,adult 05/20/2022    Osteopenia of multiple sites 05/20/2022    Hypokalemia 05/20/2022    Cerebral atrophy ([facility]) 03/16/2022    Osteoarthritis of right knee 06/15/2021    Long term (current) use of oral hypoglycemic drugs 10/21/2020    Non-toxic multinodular goiter 01/29/2020    Risk for falls 07/12/2019    Dysphagia 03/29/2019    Controlled type 2 diabetes mellitus without complication, without long-term current use of insulin ([facility]) 01/06/2017    Mixed hyperlipidemia 01/06/2017    Multiple sclerosis ([facility]) 01/06/2017    Schatzki's ring 01/06/2017    Hiatal hernia 01/06/2017       Current Outpatient Medications   Medication Sig Dispense Refill    Empagliflozin-metFORMIN HCl ER (SYNJARDY XR) 12.5-1000 MG TABLET SR 24 HR Take 1 tablet by mouth daily 100 Tablet 2    omeprazole (PRILOSEC) 20 MG delayed-release capsule Take 1 capsule by mouth every morning, 30 minutes before breakfast. 100 Capsule 3    tizanidine (ZANAFLEX) 4 MG Tab Take 1 Tablet by mouth 2 times a day as needed (spasm). 200 Tablet 0    buPROPion (WELLBUTRIN XL) 300 MG XL tablet Take 1 tablet (300 mg) by mouth daily in the morning 90 Tablet 1    levothyroxine (SYNTHROID) 25 MCG Tab Take 1 Tablet by mouth every morning on an empty stomach. 100  Tablet 1    paroxetine (PAXIL) 40 MG tablet Take 1 Tablet by mouth every day. Indications: Major Depressive Disorder 100 Tablet 1    rosuvastatin (CRESTOR) 40 MG tablet Take 1 Tablet by mouth every day. 90 Tablet 3    zolpidem (AMBIEN) 5 MG Tab Take 1 tablet (5 mg) by mouth daily at bedtime as needed for insomnia 30 Tablet 0    Teriflunomide (AUBAGIO) 14 MG Tab Take 14 mg by mouth every day. Indications: Relapsing, Remitting Multiple Sclerosis      modafinil (PROVIGIL) 200 MG Tab Take 200 mg by mouth 1 time a day as needed. Indications: Tiredness associated with Multiple Sclerosis       No current facility-administered medications for this visit.          Current supplements as per medication list.     Allergies:   Patient has no known allergies.  Social History     Tobacco Use    Smoking status: Never    Smokeless tobacco: Never   Vaping Use    Vaping Use: Never used   Substance Use Topics    Alcohol use: Not Currently     Alcohol/week: 0.6 oz     Types: 1 Glasses of wine per week     Comment: Occasional glass of wine or april    Drug use: Not Currently     Family History   Problem Relation Age of Onset    Colon Cancer Mother     Heart Disease Mother         Cardiomyopathy    Lung Disease Mother         Emphysema from 2nd hand smoke    Cancer Mother         Colon    Colorectal Cancer Mother     Diabetes Mother     Heart Attack Father     Heart Disease Father         Numerous heart attacks    Diabetes Father     No Known Problems Sister     Cancer Brother         Skin cancer    Other Brother         Gaulbladder issues    Cancer Maternal Grandmother         GI cancer    No Known Problems Maternal Grandfather     No Known Problems Paternal Grandmother     No Known Problems Paternal Grandfather     Other Daughter         Fibromyalgia    Bipolar disorder Daughter     ADHD Son     Depression Son     Hypertension Son      Melody  has a past medical history of Acquired hypothyroidism (6/15/2023), Acute  nasopharyngitis, Arthritis, Breath shortness, Cancer (HCC), Cerebral atrophy (HCC) (03/16/2022), COVID-19 virus infection (3/15/2023), Depression, Diabetes (HCC), Disorder of thyroid, Groin lump (06/09/2022), Gynecological disorder, Heart burn, Heart murmur, Heart valve disease, Hiatus hernia syndrome, High cholesterol, Hyperlipidemia, Muscle disorder, Osteoarthritis of right knee (06/15/2021), PONV (postoperative nausea and vomiting) (1970), Recurrent major depressive disorder, in remission (HCC) (03/16/2022), SI (sacroiliac) pain (06/09/2022), Type 2 diabetes mellitus with stage 3 chronic kidney disease (HCC) (06/15/2021), Urinary bladder disorder, Urinary incontinence, and Vitamin D deficiency (6/15/2023).    She has no past medical history of Anesthesia, Anginal syndrome (HCC), Arrhythmia, Asthma, Blood clotting disorder (HCC), Bowel habit changes, Bronchitis, Carcinoma in situ of respiratory system, Cataract, Continuous ambulatory peritoneal dialysis status (HCC), Coughing blood, Dental disorder, Dialysis patient (HCC), Emphysema of lung (HCC), Glaucoma, Hemorrhagic disorder (HCC), Hepatitis A, Hepatitis B, Hepatitis C, Indigestion, Jaundice, Myocardial infarct (HCC), Pacemaker, Pneumonia, Pregnant, Rheumatic fever, Seizure (HCC), Sleep apnea, Stroke (HCC), or Tuberculosis.   Past Surgical History:   Procedure Laterality Date    PB TOTAL KNEE ARTHROPLASTY Right 12/14/2022    Procedure: RIGHT TOTAL KNEE REPLACEMENT;  Surgeon: Britton Cooney M.D.;  Location: SURGERY [facility];  Service: Orthopedics    GYN SURGERY  1970    Cervical polyp       Screening:  In the last six months have you experienced any leakage of urine? Yes    Depression Screening  Little interest or pleasure in doing things?  2 - more than half the days  Feeling down, depressed , or hopeless? 1 - several days  Trouble falling or staying asleep, or sleeping too much?  1 - several days  Feeling tired or having little energy?  2 - more than  half the days  Poor appetite or overeating?  2 - more than half the days  Feeling bad about yourself - or that you are a failure or have let yourself or your family down? 0 - not at all  Trouble concentrating on things, such as reading the newspaper or watching television? 1 - several days  Moving or speaking so slowly that other people could have noticed.  Or the opposite - being so fidgety or restless that you have been moving around a lot more than usual?  0 - not at all  Thoughts that you would be better off dead, or of hurting yourself?  0 - not at all  Patient Health Questionnaire Score: 9    If depressive symptoms identified deferred to follow up visit unless specifically addressed in assessment and plan.    Interpretation of PHQ-9 Total Score   Score Severity   1-4 No Depression   5-9 Mild Depression   10-14 Moderate Depression   15-19 Moderately Severe Depression   20-27 Severe Depression    Screening for Cognitive Impairment  Do you or any of your friends or family members have any concern about your memory? Yes  Three Minute Recall (Banana, Sunrise, Chair) 3/3    Tobi clock face with all 12 numbers and set the hands to show 20 past 8.  Yes 5/5  Cognitive concerns identified deferred for follow up unless specifically addressed in assessment and plan.    Fall Risk Assessment  Has the patient had two or more falls in the last year or any fall with injury in the last year?  No    Safety Assessment  Do you always wear your seatbelt?  Yes  Any changes to home needed to function safely? No  Difficulty hearing.  Yes  Patient counseled about all safety risks that were identified.    Functional Assessment ADLs  Are there any barriers preventing you from cooking for yourself or meeting nutritional needs?  No.    Are there any barriers preventing you from driving safely or obtaining transportation?  No.    Are there any barriers preventing you from using a telephone or calling for help?  No    Are there any barriers  preventing you from shopping?  No.    Are there any barriers preventing you from taking care of your own finances?  No    Are there any barriers preventing you from managing your medications?  No    Are there any barriers preventing you from showering, bathing or dressing yourself? No    Are there any barriers preventing you from doing housework or laundry? No    Are there any barriers preventing you from using the toilet?No    Are you currently engaging in any exercise or physical activity?  Yes. Takes care of her grandkids.     Self-Assessment of Health  What is your perception of your health? Fair    Do you sleep more than six hours a night? Yes    In the past 7 days, how much did pain keep you from doing your normal work? None    Do you spend quality time with family or friends (virtually or in person)? Yes    Do you usually eat a heart healthy diet that constists of a variety of fruits, vegetables, whole grains and fiber? No    Do you eat foods high in fat and/or Fast Food more than three times per week? No    How concerned are you that your medical conditions are not being well managed? a little    Are you worried that in the next 2 months, you may not have stable housing that you own, rent, or stay in as part of a household? No        Advance Care Planning  Do you have an Advance Directive, Living Will, Durable Power of , or POLST? Yes  Advance Directive       is on file      Health Maintenance Summary            Overdue - Diabetes: Retinopathy Screening (Yearly) Overdue since 10/25/2023      10/25/2022  RETINAL SCREENING RESULTS    10/19/2022  POCT Retinal Eye Exam    07/22/2021  REFERRAL FOR RETINAL SCREENING EXAM    06/02/2020  REFERRAL FOR RETINAL SCREENING EXAM    01/30/2020  REFERRAL FOR RETINAL SCREENING EXAM    Only the first 5 history entries have been loaded, but more history exists.              A1c Screening (Every 6 Months) Next due on 2/29/2024 08/29/2023  POCT  A1C    04/20/2023   POCT Hemoglobin A1C    12/07/2022  HEMOGLOBIN A1C    03/16/2022  POCT A1C    09/10/2021  HEMOGLOBIN A1C    Only the first 5 history entries have been loaded, but more history exists.              Ordered - Fasting Lipid Profile (Yearly) Ordered on 10/18/2023      04/10/2023  Lipid Profile    03/11/2022  Lipid Profile    03/16/2021  Lipid Profile    10/09/2020  Lipid Profile    06/12/2019  Lipid Profile    Only the first 5 history entries have been loaded, but more history exists.              Ordered - Diabetes: Urine Protein Screening (Yearly) Ordered on 8/29/2023      04/10/2023  MICROALBUMIN CREAT RATIO URINE    03/11/2022  MICROALBUMIN CREAT RATIO URINE    03/16/2021  MICROALBUMIN CREAT RATIO URINE    10/09/2020  MICROALBUMIN CREAT RATIO URINE    01/29/2020  MICROALBUMIN CREAT RATIO URINE    Only the first 5 history entries have been loaded, but more history exists.              Diabetes: Monofilament / LE Exam (Yearly) Tentatively due on 8/29/2024 08/29/2023  Diabetic Monofilament LE Exam    04/20/2023  SmartData: WORKFLOW - DIABETES - DIABETIC FOOT EXAM PERFORMED    10/19/2022  SmartData: WORKFLOW - DIABETES - DIABETIC FOOT EXAM PERFORMED    10/19/2022  Diabetic Monofilament LE Exam    09/22/2021  SmartData: WORKFLOW - DIABETES - DIABETIC FOOT EXAM PERFORMED    Only the first 5 history entries have been loaded, but more history exists.              SERUM CREATININE (Yearly) Next due on 1/5/2025 01/05/2024  Basic Metabolic Panel    11/29/2023  COMP METABOLIC PANEL    08/18/2023  Comp Metabolic Panel    04/10/2023  Comp Metabolic Panel    12/19/2022  Basic Metabolic Panel    Only the first 5 history entries have been loaded, but more history exists.              Annual Wellness Visit (Yearly) Next due on 1/24/2025 01/24/2024  Level of Service: [place] ANNUAL WELLNESS VISIT-INCLUDES PPPS SUBSEQUE*    03/22/2023  Level of Service: [place] ANNUAL WELLNESS VISIT-INCLUDES PPPS SUBSEQUE*    01/25/2023  Subsequent  Annual Wellness Visit - Includes PPPS ()    05/20/2022  Level of Service: [place] ANNUAL WELLNESS VISIT-INCLUDES PPPS SUBSEQUE*    10/05/2021  Subsequent Annual Wellness Visit - Includes PPPS ()    Only the first 5 history entries have been loaded, but more history exists.              Mammogram (Every 2 Years) Next due on 1/17/2026 01/17/2024  MA-SCREENING MAMMO BILAT W/TOMOSYNTHESIS W/CAD    01/11/2023  MA-SCREENING MAMMO BILAT W/TOMOSYNTHESIS W/CAD    10/04/2021  MA-SCREENING MAMMO BILAT W/TOMOSYNTHESIS W/CAD    10/03/2020  MA-SCREENING MAMMO BILAT W/TOMOSYNTHESIS W/CAD    07/16/2019  MA-SCREENING MAMMO BILAT W/TOMOSYNTHESIS W/CAD    Only the first 5 history entries have been loaded, but more history exists.              Colorectal Cancer Screening (Colonoscopy - Every 10 Years) Tentatively due on 2/9/2026 06/22/2022  OCCULT BLOOD FECES IMMUNOASSAY    02/09/2016  Colonoscopy (Done)              Bone Density Scan (Every 5 Years) Next due on 3/24/2028      03/24/2023  DS-BONE DENSITY STUDY (DEXA)    07/17/2019  DS-BONE DENSITY STUDY (DEXA)              IMM DTaP/Tdap/Td Vaccine (4 - Td or Tdap) Next due on 6/2/2033 06/02/2023  Imm Admin: Tdap Vaccine    06/26/2018  Imm Admin: Tdap Vaccine    08/17/2012  Imm Admin: Tdap Vaccine              Pneumococcal Vaccine: 65+ Years (Series Information) Completed      09/08/2016  Imm Admin: Pneumococcal polysaccharide vaccine (PPSV-23)    11/17/2015  Imm Admin: Pneumococcal Conjugate Vaccine (Prevnar/PCV-13)              Zoster (Shingles) Vaccines (Series Information) Completed      05/26/2020  Imm Admin: Zoster Vaccine Recombinant (RZV) (SHINGRIX)    06/26/2018  Imm Admin: Zoster Vaccine Recombinant (RZV) (SHINGRIX)    10/23/2013  Imm Admin: Zoster Vaccine Live (ZVL) (Zostavax) - HISTORICAL DATA              Hepatitis C Screening  Tentatively Complete      02/13/2023  Hepatitis C Antibody component of HEP C VIRUS ANTIBODY              COVID-19 Vaccine  (Series Information) Completed      10/03/2023  Imm Admin: Covid-19 Mrna (Spikevax) Moderna 12+ Years    02/03/2023  Imm Admin: PFIZER BIVALENT SARS-COV-2 VACCINE (12+)    04/23/2022  Imm Admin: PFIZER PURPLE CAP SARS-COV-2 VACCINATION (12+)    12/17/2021  Imm Admin: PFIZER PURPLE CAP SARS-COV-2 VACCINATION (12+)    02/13/2021  Imm Admin: PFIZER PURPLE CAP SARS-COV-2 VACCINATION (12+)    Only the first 5 history entries have been loaded, but more history exists.              Influenza Vaccine (Series Information) Completed      10/18/2023  Imm Admin: Influenza Vaccine Adult HD    01/25/2023  Imm Admin: Influenza Vaccine Adult HD    10/05/2021  Imm Admin: Influenza Vaccine Adult HD    10/01/2020  Imm Admin: Influenza Vaccine Adult HD    12/18/2019  Imm Admin: Influenza Vaccine Adult HD    Only the first 5 history entries have been loaded, but more history exists.              Hepatitis A Vaccine (Hep A) (Series Information) Aged Out      No completion history exists for this topic.              HPV Vaccines (Series Information) Aged Out      No completion history exists for this topic.              Polio Vaccine (Inactivated Polio) (Series Information) Aged Out      No completion history exists for this topic.              Meningococcal Immunization (Series Information) Aged Out      No completion history exists for this topic.              Discontinued - Hepatitis B Vaccine (Hep B)  Discontinued      No completion history exists for this topic.                    Patient Care Team:  Danae Pritchett M.D. as PCP - General (Internal Medicine)  Oral Guzman M.D. as Consulting Physician (Neurology)  Zainab Ferreira [facility] as    [facility] (Orthopedic Surgery)  Rossy Sanchez O.D. (Optometry)  Swapnil Cat M.D. (Endocrinology)  Ashanti Bradford M.D. (Psychiatry & Neurology Psychiatry)  Pratibha Silva R.N. as RN Care Coordinator   [facility]  "as Home Health Provider    Financial Resource Strain: Low Risk  (1/24/2024)    Overall Financial Resource Strain (CARDIA)     Difficulty of Paying Living Expenses: Not hard at all      Transportation Needs: No Transportation Needs (1/24/2024)    PRAPARE - Transportation     Lack of Transportation (Medical): No     Lack of Transportation (Non-Medical): No      Food Insecurity: No Food Insecurity (1/24/2024)    Hunger Vital Sign     Worried About Running Out of Food in the Last Year: Never true     Ran Out of Food in the Last Year: Never true        Encounter Vitals  Blood Pressure : 116/60  Weight: 59.9 kg (132 lb 1.6 oz)  Height: 165.1 cm (5' 5\")  BMI (Calculated): 21.98  Pain Score: No pain     Alert, oriented in no acute distress.  Eye contact is good, speech goal directed, affect calm.    Assessment and Plan. The following treatment and monitoring plan is recommended:  Bipolar disorder in full remission (HCC)  Chronic, stable. Her PHQ today was 9/27. She feels this is elevated more so due to symptoms related to her MS rather than depression. She feels her bipolar disorder is well controlled on paroxetine 40mg daily, bupropion 300mg daily. She follows with psychiatry every six months. Follow up per psychiatry per routine.     Cerebral atrophy (HCC)  Chronic, stable. She has a history of a brain MRI that demonstrated cerebral atrophy, as early per pt report. Cognitive screening today was completed without error but she feels she notes some cognitive changes over the years. Follow up with PCP per routine for continued monitoring and management.    Controlled type 2 diabetes mellitus without complication, without long-term current use of insulin (HCC)  Chronic, stable. Last A1c 6.1 as of Aug 2023. Latest foot exam: 8/2023 (normal), retinal exam is completed at her endocrinology office, urinalysis 4/2023 (normal). Continue to advise low carbohydrate, high protein diet with regular physical exercise. Continue current " treatment regime: Synjardy XR 12-5-1000mg daily. Follow up with endocrinology at least annually for continued monitoring and management.    Mixed hyperlipidemia  Chronic, stable. Maintains on statin therapy without issue. Most recent lipid panel from 04/2023 with mild elevation of LDL at 104. She is admittedly not good about adhering to a healthy diet. Continue rosuvastatin 40mg daily. Recommend Mediterranean diet and regular physical activity. Follow up with PCP at least annually for continued monitoring and management.   Lab Results   Component Value Date/Time    CHOLSTRLTOT 187 04/10/2023 07:28 AM     (H) 04/10/2023 07:28 AM    HDL 50 04/10/2023 07:28 AM    TRIGLYCERIDE 165 (H) 04/10/2023 07:28 AM       Multiple sclerosis (HCC)  Chronic, stable. Followed by neurology. Maintains on Aubagio as well as modafinil for fatigue. She takes tizanidine as needed for muscular spasms. Follow up with neurology at least annually for continued monitoring and management.    Sedative dependence with current use (HCC)  Chronic, stable. Pt is dependent upon zolpidem 5mg due to insomnia. Follow up with PCP per routine for continued monitoring and management.    Osteopenia of multiple sites  Chronic, worsening. Last DEXA March 2023 demonstrating worsening osteopenia of the left proximal femur and new osteopenia of the lumbar spine with T scores of -2.4 and -1.1 respectively.  Denies any history of fragility fracture. Pt maintains on vitamin D supplementation and calcium rich foods. Continue to advise weightbearing exercises. Follow up with PCP at least annually for continued monitoring and management.    Acquired hypothyroidism  Chronic, stable. Pt maintains on levothyroxine 25mcg daily with TSH WNL. Follow up with PCP at least annually for continued monitoring and management.       Latest Reference Range & Units 11/29/23 19:30   TSH 0.380 - 5.330 uIU/mL 2.440       Primary insomnia  Chronic, stable. Well controlled with  Ambien which she takes most nights of the week. She denies issues with this medication. Follow up with PCP for continued monitoring and management.    Risk for falls  Chronic, stable. Pt reports a history of frequent falls but nothing over the last year. She states falls are secondary to altered equilibrium secondary to MS. Pt is able to minimize episodes by slowing down and careful movement. She is aware of trip risks in her home and minimizes these. Follow up with PCP and/or neurology for continued management.     Services suggested: No services needed at this time  Health Care Screening: Age-appropriate preventive services recommended by USPTF and ACIP covered by Medicare were discussed today. Services ordered if indicated and agreed upon by the patient.  Referrals offered: Community-based lifestyle interventions to reduce health risks and promote self-management and wellness, fall prevention, nutrition, physical activity, tobacco-use cessation, weight loss, and mental health services as per orders if indicated.    Discussion today about general wellness and lifestyle habits:    Prevent falls and reduce trip hazards; Cautioned about securing or removing rugs.  Have a working fire alarm and carbon monoxide detector.  Engage in regular physical activity and social activities.    Follow-up: Return for follow up visit with PCP as previously scheduled.

## 2024-01-24 NOTE — ASSESSMENT & PLAN NOTE
Chronic, stable. Pt is dependent upon zolpidem 5mg due to insomnia. Follow up with PCP per routine for continued monitoring and management.

## 2024-01-25 NOTE — ASSESSMENT & PLAN NOTE
Chronic, stable. Pt maintains on levothyroxine 25mcg daily with TSH WNL. Follow up with PCP at least annually for continued monitoring and management.       Latest Reference Range & Units 11/29/23 19:30   TSH 0.380 - 5.330 uIU/mL 2.440

## 2024-01-25 NOTE — ASSESSMENT & PLAN NOTE
Chronic, stable. Well controlled with Ambien which she takes most nights of the week. She denies issues with this medication. Follow up with PCP for continued monitoring and management.

## 2024-02-05 ENCOUNTER — TELEPHONE (OUTPATIENT)
Dept: CARDIOLOGY | Facility: MEDICAL CENTER | Age: 74
End: 2024-02-05
Payer: MEDICARE

## 2024-02-06 ENCOUNTER — PATIENT OUTREACH (OUTPATIENT)
Dept: HEALTH INFORMATION MANAGEMENT | Facility: OTHER | Age: 74
End: 2024-02-06
Payer: MEDICARE

## 2024-02-06 ENCOUNTER — PATIENT OUTREACH (OUTPATIENT)
Dept: HEALTH INFORMATION MANAGEMENT | Facility: OTHER | Age: 74
End: 2024-02-06

## 2024-02-06 DIAGNOSIS — E78.5 DYSLIPIDEMIA: ICD-10-CM

## 2024-02-06 DIAGNOSIS — E11.9 CONTROLLED TYPE 2 DIABETES MELLITUS WITHOUT COMPLICATION, WITHOUT LONG-TERM CURRENT USE OF INSULIN (HCC): ICD-10-CM

## 2024-02-06 PROCEDURE — 99490 CHRNC CARE MGMT STAFF 1ST 20: CPT | Performed by: INTERNAL MEDICINE

## 2024-02-06 NOTE — PROGRESS NOTES
Nurse NIXON spoke to PCP regarding pt's recent CT chest  results. PCP recommended pt follow-up with Dr. Cat regarding CT imaging report and thyroid mass. Pt reports she has follow-up with Dr. Cat and will follow-up regarding results. Pt has no additional questions. Pt will follow-up with PCP in March as scheduled.

## 2024-02-06 NOTE — PROGRESS NOTES
2/6/24 9:30 am: Nurse CM outreach call to pt for monthly CCM assessment. VM left requesting a return call to nurse at [phone].    2/6/24 09:45 am Nurse CM received return call from patient. Monthly CCM assessment completed.    Assessment    Pt reports she is doing okay. Reports she is over her cough and respiratory symptoms she was having in December. Reports she did complete CT scan of chest. States she received results and will follow-up with Dr. Pritchett as scheduled in March. Pt not sure if she needs sooner appt. Nurse will route message to PCP. Pt reports she completed her heart monitor test. Reports she didn't feel she had palpitations while wearing the monitor. Pt is scheduled for NM stress test tomorrow and will follow-up with cardiology. Pt has history of DM. Last A1C was 6.1 on 8/29/23. Pt follows with Dr. Cat and is scheduled to see provider in March. Pt reports no falls over the past month. Pt reports no current CM needs.    Education    Fall precautions. Continue to follow closely with specialists as scheduled. If needing sooner appt with PCP please contact nurse CM. Follow heart healthy meal plan.    Plan of Care and Goals    Reviewed care plan and goals    Barriers:    Chronic health conditions    Progress:    Continue to work on progress    Next outreach:  One month  Nurse Care Coordinator:  Pratibha Silva  [phone]

## 2024-02-07 ENCOUNTER — HOSPITAL ENCOUNTER (OUTPATIENT)
Dept: RADIOLOGY | Facility: MEDICAL CENTER | Age: 74
End: 2024-02-07
Attending: NURSE PRACTITIONER
Payer: MEDICARE

## 2024-02-07 DIAGNOSIS — R07.89 OTHER CHEST PAIN: ICD-10-CM

## 2024-02-07 PROCEDURE — 93018 CV STRESS TEST I&R ONLY: CPT | Performed by: INTERNAL MEDICINE

## 2024-02-07 PROCEDURE — A9502 TC99M TETROFOSMIN: HCPCS

## 2024-02-07 PROCEDURE — 78452 HT MUSCLE IMAGE SPECT MULT: CPT | Mod: 26 | Performed by: INTERNAL MEDICINE

## 2024-02-12 ENCOUNTER — TELEPHONE (OUTPATIENT)
Dept: CARDIOLOGY | Facility: MEDICAL CENTER | Age: 74
End: 2024-02-12
Payer: MEDICARE

## 2024-02-12 NOTE — TELEPHONE ENCOUNTER
Phone Number Called: [phone]    Call outcome: Spoke to patient regarding message below.    Message: Called to follow up with patient on her upcoming appointment. Patient just wanted to let Carmella know she moved up her appointment to Friday. Patient is feeling well.

## 2024-02-12 NOTE — TELEPHONE ENCOUNTER
MT    Caller: Melody Rodriguez    Topic/issue: MEDICAL ADVICE    Melody would like a call back to discuss her upcoming appointment. Please advise.    Thank you,  Toño MORELAND    Callback Number: [phone]

## 2024-02-14 PROCEDURE — 93228 REMOTE 30 DAY ECG REV/REPORT: CPT | Performed by: INTERNAL MEDICINE

## 2024-02-16 ENCOUNTER — OFFICE VISIT (OUTPATIENT)
Dept: CARDIOLOGY | Facility: MEDICAL CENTER | Age: 74
End: 2024-02-16
Attending: OBSTETRICS & GYNECOLOGY
Payer: MEDICARE

## 2024-02-16 VITALS
SYSTOLIC BLOOD PRESSURE: 132 MMHG | HEART RATE: 90 BPM | WEIGHT: 131 LBS | RESPIRATION RATE: 16 BRPM | BODY MASS INDEX: 21.83 KG/M2 | DIASTOLIC BLOOD PRESSURE: 68 MMHG | OXYGEN SATURATION: 97 % | HEIGHT: 65 IN

## 2024-02-16 DIAGNOSIS — I47.10 PAROXYSMAL SUPRAVENTRICULAR TACHYCARDIA (HCC): Primary | ICD-10-CM

## 2024-02-16 PROCEDURE — 99213 OFFICE O/P EST LOW 20 MIN: CPT | Performed by: NURSE PRACTITIONER

## 2024-02-16 PROCEDURE — 3078F DIAST BP <80 MM HG: CPT | Performed by: NURSE PRACTITIONER

## 2024-02-16 PROCEDURE — 3075F SYST BP GE 130 - 139MM HG: CPT | Performed by: NURSE PRACTITIONER

## 2024-02-16 RX ORDER — METOPROLOL SUCCINATE 25 MG/1
12.5 TABLET, EXTENDED RELEASE ORAL NIGHTLY
Qty: 45 TABLET | Refills: 3 | Status: SHIPPED | OUTPATIENT
Start: 2024-02-16

## 2024-02-16 ASSESSMENT — ENCOUNTER SYMPTOMS
CLAUDICATION: 0
MUSCULOSKELETAL NEGATIVE: 1
SENSORY CHANGE: 0
NAUSEA: 0
RESPIRATORY NEGATIVE: 1
NERVOUS/ANXIOUS: 0
NEUROLOGICAL NEGATIVE: 1
CONSTITUTIONAL NEGATIVE: 1
ORTHOPNEA: 0
PND: 0
WHEEZING: 0
DEPRESSION: 0
GASTROINTESTINAL NEGATIVE: 1
DIZZINESS: 0
BRUISES/BLEEDS EASILY: 0
SHORTNESS OF BREATH: 0
EYES NEGATIVE: 1
HALLUCINATIONS: 0

## 2024-02-16 ASSESSMENT — FIBROSIS 4 INDEX: FIB4 SCORE: 1.86

## 2024-02-16 NOTE — PROGRESS NOTES
Electrophysiology Follow up  Note    DOS: 2/16/2024   [id number]  Melody Rodriguez    Chief complaint/Reason for consult: palpitations and post monitor and stress test    HPI: Pt is a 73 y.o. female who presents to the clinic today in follow up for post testing. Patient has a past medical history significant for but not limited to: type 2 diabetes, dyslipidemia, multiple sclerosis, osteoarthritis of right knee S/P total knee replacement, hypothyroidism. Patient recently had 30 day monitor showing low burden of PAC/PVC and some short salvos of SVT. She did not have any symptomatic events during monitor. Patient also underwent stress testing which showed normal LVEF motion and function  and no signs of ischemia. WE are going to do low dose metoprolol succinate nightly for beta blockade pacification of ectopy.     Past Medical History:   Diagnosis Date    Acquired hypothyroidism 6/15/2023    Acute nasopharyngitis     Recent chest xray clear    Arthritis     Right knee and thumb joints    Breath shortness     Comes and goes    Cancer (HCC)     Skin cancer squamous cell    Cerebral atrophy (HCC) 03/16/2022    COVID-19 virus infection 3/15/2023    Depression     Diabetes (HCC)     Disorder of thyroid     Nodules in both nodes. Biopsy indicates benign. Last ultrasound shows stable.    Groin lump 06/09/2022    Gynecological disorder     Cervical polyp in 1970    Heart burn     Controlled with Omeprazole    Heart murmur     Minor leakage in 3 valves due to scarlet fever as a child    Heart valve disease     Minor leakage in 3 valves due to scarlet fever as a child    Hiatus hernia syndrome     I think    High cholesterol     Taking Simvistatin    Hyperlipidemia     Muscle disorder     Osteoarthritis of right knee 06/15/2021    PONV (postoperative nausea and vomiting) 1970    Only time I’ve been under anesthesia    Recurrent major depressive disorder, in remission ([facility]) 03/16/2022    SI (sacroiliac) pain 06/09/2022    Type  2 diabetes mellitus with stage 3 chronic kidney disease (HCC) 06/15/2021    Urinary bladder disorder     MS issue    Urinary incontinence     During MS relapse. Has since resolved    Vitamin D deficiency 6/15/2023       Past Surgical History:   Procedure Laterality Date    PB TOTAL KNEE ARTHROPLASTY Right 12/14/2022    Procedure: RIGHT TOTAL KNEE REPLACEMENT;  Surgeon: Britton Cooney M.D.;  Location: SURGERY [facility];  Service: Orthopedics    GYN SURGERY  1970    Cervical polyp       Social History     Socioeconomic History    Marital status:      Spouse name: Not on file    Number of children: Not on file    Years of education: Not on file    Highest education level: 12th grade   Occupational History    Not on file   Tobacco Use    Smoking status: Never    Smokeless tobacco: Never   Vaping Use    Vaping Use: Never used   Substance and Sexual Activity    Alcohol use: Not Currently     Alcohol/week: 0.6 oz     Types: 1 Glasses of wine per week     Comment: Occasional glass of wine or april    Drug use: Not Currently    Sexual activity: Not Currently     Partners: Male   Other Topics Concern    Not on file   Social History Narrative    Not on file     Social Determinants of Health     Financial Resource Strain: Low Risk  (1/24/2024)    Overall Financial Resource Strain (CARDIA)     Difficulty of Paying Living Expenses: Not hard at all   Food Insecurity: No Food Insecurity (1/24/2024)    Hunger Vital Sign     Worried About Running Out of Food in the Last Year: Never true     Ran Out of Food in the Last Year: Never true   Transportation Needs: No Transportation Needs (1/24/2024)    PRAPARE - Transportation     Lack of Transportation (Medical): No     Lack of Transportation (Non-Medical): No   Physical Activity: Inactive (11/9/2022)    Exercise Vital Sign     Days of Exercise per Week: 0 days     Minutes of Exercise per Session: 0 min   Stress: Stress Concern Present (5/26/2020)    [employer]  of Occupational Health - Occupational Stress Questionnaire     Feeling of Stress : To some extent   Social Connections: Moderately Isolated (5/26/2020)    Social Connection and Isolation Panel [NHANES]     Frequency of Communication with Friends and Family: More than three times a week     Frequency of Social Gatherings with Friends and Family: More than three times a week     Attends Religion Services: Never     Active Member of Clubs or Organizations: Yes     Attends Club or Organization Meetings: More than 4 times per year     Marital Status:    Intimate Partner Violence: Not on file   Housing Stability: Low Risk  (11/9/2022)    Housing Stability Vital Sign     Unable to Pay for Housing in the Last Year: No     Number of Places Lived in the Last Year: 1     Unstable Housing in the Last Year: No       Family History   Problem Relation Age of Onset    Colon Cancer Mother     Heart Disease Mother         Cardiomyopathy    Lung Disease Mother         Emphysema from 2nd hand smoke    Cancer Mother         Colon    Colorectal Cancer Mother     Diabetes Mother     Heart Attack Father     Heart Disease Father         Numerous heart attacks    Diabetes Father     No Known Problems Sister     Cancer Brother         Skin cancer    Other Brother         Gaulbladder issues    Cancer Maternal Grandmother         GI cancer    No Known Problems Maternal Grandfather     No Known Problems Paternal Grandmother     No Known Problems Paternal Grandfather     Other Daughter         Fibromyalgia    Bipolar disorder Daughter     ADHD Son     Depression Son     Hypertension Son        No Known Allergies    Current Outpatient Medications   Medication Sig Dispense Refill    metoprolol SR (TOPROL XL) 25 MG TABLET SR 24 HR Take 0.5 Tablets by mouth every evening. 45 Tablet 3    Empagliflozin-metFORMIN HCl ER (SYNJARDY XR) 12.5-1000 MG TABLET SR 24 HR Take 1 tablet by mouth daily 100 Tablet 2    omeprazole (PRILOSEC) 20 MG  delayed-release capsule Take 1 capsule by mouth every morning, 30 minutes before breakfast. 100 Capsule 3    tizanidine (ZANAFLEX) 4 MG Tab Take 1 Tablet by mouth 2 times a day as needed (spasm). 200 Tablet 0    buPROPion (WELLBUTRIN XL) 300 MG XL tablet Take 1 tablet (300 mg) by mouth daily in the morning 90 Tablet 1    levothyroxine (SYNTHROID) 25 MCG Tab Take 1 Tablet by mouth every morning on an empty stomach. 100 Tablet 1    paroxetine (PAXIL) 40 MG tablet Take 1 Tablet by mouth every day. Indications: Major Depressive Disorder 100 Tablet 1    rosuvastatin (CRESTOR) 40 MG tablet Take 1 Tablet by mouth every day. 90 Tablet 3    zolpidem (AMBIEN) 5 MG Tab Take 1 tablet (5 mg) by mouth daily at bedtime as needed for insomnia 30 Tablet 0    Teriflunomide (AUBAGIO) 14 MG Tab Take 14 mg by mouth every day. Indications: Relapsing, Remitting Multiple Sclerosis      modafinil (PROVIGIL) 200 MG Tab Take 200 mg by mouth 1 time a day as needed. Indications: Tiredness associated with Multiple Sclerosis       No current facility-administered medications for this visit.       Vitals:    02/16/24 1425   BP: 132/68   Pulse: 90   Resp: 16   SpO2: 97%         Review of Systems   Constitutional: Negative.  Negative for malaise/fatigue.   HENT: Negative.     Eyes: Negative.    Respiratory: Negative.  Negative for shortness of breath and wheezing.    Cardiovascular:  Negative for orthopnea, claudication, leg swelling and PND.   Gastrointestinal: Negative.  Negative for nausea.   Genitourinary: Negative.    Musculoskeletal: Negative.    Skin: Negative.    Neurological: Negative.  Negative for dizziness and sensory change.   Endo/Heme/Allergies: Negative.  Does not bruise/bleed easily.   Psychiatric/Behavioral:  Negative for depression and hallucinations. The patient is not nervous/anxious.         Prior echo/stress results reviewed: LVEF 65% in 2022    Stress test: no signs of ischemia, normal LVEF function    EKG interpreted by me:  "sinus rhythm     Physical Exam  Constitutional:       Appearance: Normal appearance.   HENT:      Head: Normocephalic.   Eyes:      Pupils: Pupils are equal, round, and reactive to light.   Neck:      Vascular: No JVD.   Cardiovascular:      Rate and Rhythm: Normal rate and regular rhythm.      Pulses: Normal pulses.      Heart sounds: Normal heart sounds.   Pulmonary:      Effort: Pulmonary effort is normal.      Breath sounds: Normal breath sounds.   Abdominal:      General: Abdomen is flat.      Palpations: Abdomen is soft.   Musculoskeletal:      Cervical back: Normal range of motion.      Right lower leg: No edema.      Left lower leg: No edema.   Skin:     General: Skin is warm and dry.   Neurological:      Mental Status: She is alert and oriented to person, place, and time.   Psychiatric:         Mood and Affect: Mood normal.         Behavior: Behavior normal.          Data:  Lipids:   Lab Results   Component Value Date/Time    CHOLSTRLTOT 187 04/10/2023 07:28 AM    TRIGLYCERIDE 165 (H) 04/10/2023 07:28 AM    HDL 50 04/10/2023 07:28 AM     (H) 04/10/2023 07:28 AM        BMP:  Lab Results   Component Value Date/Time    SODIUM 138 11/29/2023 1930    POTASSIUM 3.5 (L) 11/29/2023 1930    CHLORIDE 105 11/29/2023 1930    CO2 22 11/29/2023 1930    GLUCOSE 142 (H) 11/29/2023 1930    BUN 19 11/29/2023 1930    CREATININE 0.90 11/29/2023 1930    CALCIUM 8.7 11/29/2023 1930    ANION 11.0 11/29/2023 1930       GFR:  Lab Results   Component Value Date/Time    IFAFRICA >60 03/11/2022 0612    IFNOTAFR >60 03/11/2022 0612        TSH:   Lab Results   Component Value Date/Time    TSHULTRASEN 2.440 11/29/2023 1930       MAGNESIUM:  Lab Results   Component Value Date/Time    MAGNESIUM 1.9 11/29/2023 1930        THYROXINE (T4):   No results found for: \"FREEDIR\"     CBC:   Lab Results   Component Value Date/Time    WBC 10.0 11/29/2023 07:30 PM    RBC 4.44 11/29/2023 07:30 PM    HEMOGLOBIN 13.2 11/29/2023 07:30 PM    " "HEMATOCRIT 40.2 11/29/2023 07:30 PM    MCV 90.5 11/29/2023 07:30 PM    MCH 29.7 11/29/2023 07:30 PM    MCHC 32.8 11/29/2023 07:30 PM    RDW 43.0 11/29/2023 07:30 PM    PLATELETCT 204 11/29/2023 07:30 PM    MPV 8.9 (L) 11/29/2023 07:30 PM    NEUTSPOLYS 69.90 11/29/2023 07:30 PM    LYMPHOCYTES 17.70 (L) 11/29/2023 07:30 PM    MONOCYTES 8.70 11/29/2023 07:30 PM    EOSINOPHILS 2.60 11/29/2023 07:30 PM    BASOPHILS 0.60 11/29/2023 07:30 PM    IMMGRAN 0.50 11/29/2023 07:30 PM    NRBC 0.00 11/29/2023 07:30 PM    NEUTS 6.95 11/29/2023 07:30 PM    LYMPHS 1.76 11/29/2023 07:30 PM    MONOS 0.87 (H) 11/29/2023 07:30 PM    EOS 0.26 11/29/2023 07:30 PM    BASO 0.06 11/29/2023 07:30 PM    IMMGRANAB 0.05 11/29/2023 07:30 PM    NRBCAB 0.00 11/29/2023 07:30 PM        CBC w/o DIFF  Lab Results   Component Value Date/Time    WBC 10.0 11/29/2023 07:30 PM    RBC 4.44 11/29/2023 07:30 PM    HEMOGLOBIN 13.2 11/29/2023 07:30 PM    MCV 90.5 11/29/2023 07:30 PM    MCH 29.7 11/29/2023 07:30 PM    MCHC 32.8 11/29/2023 07:30 PM    RDW 43.0 11/29/2023 07:30 PM    MPV 8.9 (L) 11/29/2023 07:30 PM       LIVER:  Lab Results   Component Value Date/Time    ALKPHOSPHAT 136 (H) 11/29/2023 07:30 PM    ASTSGOT 22 11/29/2023 07:30 PM    ALTSGPT 18 11/29/2023 07:30 PM    TBILIRUBIN 0.4 11/29/2023 07:30 PM       BNP:  No results found for: \"BNPBTYPENAT\"    PT/INR:  No results found for: \"PROTHROMBTM\", \"INR\"          Impression/Plan:  Paroxysmal supraventricular tachycardia   - metoprolol succinate 12.5mg nightly   - return in 6 months               A total of 22 minutes of time was spent on day of encounter reviewing medical record, performing history and examination, counseling, ordering medication/test/consults, collaborating with referring service, and documentation.    [facility]  Cardiac Electrophysiology    "

## 2024-02-20 ENCOUNTER — TELEPHONE (OUTPATIENT)
Dept: PHARMACY | Facility: MEDICAL CENTER | Age: 74
End: 2024-02-20
Payer: MEDICARE

## 2024-02-20 PROCEDURE — RXMED WILLOW AMBULATORY MEDICATION CHARGE: Performed by: NURSE PRACTITIONER

## 2024-02-21 NOTE — TELEPHONE ENCOUNTER
Spoke to Melody regarding a refill of Synjardy. She has declined a refill of Synjardy at this time stating she has maybe about a month on hand. She reports taking 1 tablet daily and missing a few doses as she sometimes forgets. She has scheduled a follow up call in about a month but instructed to call us if she runs out of the medication sooner. She has requested to fill the new prescription of Metoprolol at this time. She is aware I do need to order the medication with an estimated delivery date by the end of the week to the [facility] address. Okay to charge $6.64 to the [facility].

## 2024-02-23 ENCOUNTER — PHARMACY VISIT (OUTPATIENT)
Dept: PHARMACY | Facility: MEDICAL CENTER | Age: 74
End: 2024-02-23
Payer: COMMERCIAL

## 2024-02-24 ENCOUNTER — HOSPITAL ENCOUNTER (OUTPATIENT)
Dept: LAB | Facility: MEDICAL CENTER | Age: 74
End: 2024-02-24
Attending: INTERNAL MEDICINE
Payer: MEDICARE

## 2024-02-24 DIAGNOSIS — E78.5 DYSLIPIDEMIA: ICD-10-CM

## 2024-02-24 DIAGNOSIS — E11.9 CONTROLLED TYPE 2 DIABETES MELLITUS WITHOUT COMPLICATION, WITHOUT LONG-TERM CURRENT USE OF INSULIN (HCC): ICD-10-CM

## 2024-02-24 DIAGNOSIS — E55.9 VITAMIN D DEFICIENCY: ICD-10-CM

## 2024-02-24 DIAGNOSIS — E78.2 MIXED HYPERLIPIDEMIA: ICD-10-CM

## 2024-02-24 DIAGNOSIS — E03.8 SUBCLINICAL HYPOTHYROIDISM: ICD-10-CM

## 2024-02-24 LAB
25(OH)D3 SERPL-MCNC: 62 NG/ML (ref 30–100)
ALBUMIN SERPL BCP-MCNC: 4.3 G/DL (ref 3.2–4.9)
ALBUMIN/GLOB SERPL: 1.4 G/DL
ALP SERPL-CCNC: 152 U/L (ref 30–99)
ALT SERPL-CCNC: 20 U/L (ref 2–50)
ANION GAP SERPL CALC-SCNC: 13 MMOL/L (ref 7–16)
AST SERPL-CCNC: 32 U/L (ref 12–45)
BILIRUB SERPL-MCNC: 0.3 MG/DL (ref 0.1–1.5)
BUN SERPL-MCNC: 13 MG/DL (ref 8–22)
CALCIUM ALBUM COR SERPL-MCNC: 9.3 MG/DL (ref 8.5–10.5)
CALCIUM SERPL-MCNC: 9.5 MG/DL (ref 8.5–10.5)
CHLORIDE SERPL-SCNC: 104 MMOL/L (ref 96–112)
CHOLEST SERPL-MCNC: 120 MG/DL (ref 100–199)
CHOLEST SERPL-MCNC: 122 MG/DL (ref 100–199)
CO2 SERPL-SCNC: 24 MMOL/L (ref 20–33)
CREAT SERPL-MCNC: 0.87 MG/DL (ref 0.5–1.4)
CREAT UR-MCNC: 67 MG/DL
GFR SERPLBLD CREATININE-BSD FMLA CKD-EPI: 70 ML/MIN/1.73 M 2
GLOBULIN SER CALC-MCNC: 3.1 G/DL (ref 1.9–3.5)
GLUCOSE SERPL-MCNC: 93 MG/DL (ref 65–99)
HDLC SERPL-MCNC: 55 MG/DL
HDLC SERPL-MCNC: 55 MG/DL
LDLC SERPL CALC-MCNC: 54 MG/DL
LDLC SERPL CALC-MCNC: 56 MG/DL
MICROALBUMIN UR-MCNC: 1.7 MG/DL
MICROALBUMIN/CREAT UR: 25 MG/G (ref 0–30)
POTASSIUM SERPL-SCNC: 4.3 MMOL/L (ref 3.6–5.5)
PROT SERPL-MCNC: 7.4 G/DL (ref 6–8.2)
SODIUM SERPL-SCNC: 141 MMOL/L (ref 135–145)
T4 FREE SERPL-MCNC: 0.68 NG/DL (ref 0.93–1.7)
TRIGL SERPL-MCNC: 55 MG/DL (ref 0–149)
TRIGL SERPL-MCNC: 56 MG/DL (ref 0–149)
TSH SERPL DL<=0.005 MIU/L-ACNC: 2.92 UIU/ML (ref 0.38–5.33)

## 2024-02-24 PROCEDURE — 80061 LIPID PANEL: CPT | Mod: 91

## 2024-02-24 PROCEDURE — 84439 ASSAY OF FREE THYROXINE: CPT

## 2024-02-24 PROCEDURE — 84443 ASSAY THYROID STIM HORMONE: CPT

## 2024-02-24 PROCEDURE — 80053 COMPREHEN METABOLIC PANEL: CPT

## 2024-02-24 PROCEDURE — 80061 LIPID PANEL: CPT

## 2024-02-24 PROCEDURE — 82306 VITAMIN D 25 HYDROXY: CPT

## 2024-02-24 PROCEDURE — 82043 UR ALBUMIN QUANTITATIVE: CPT

## 2024-02-24 PROCEDURE — 36415 COLL VENOUS BLD VENIPUNCTURE: CPT

## 2024-02-24 PROCEDURE — 82570 ASSAY OF URINE CREATININE: CPT

## 2024-02-28 ENCOUNTER — OFFICE VISIT (OUTPATIENT)
Dept: MEDICAL GROUP | Facility: PHYSICIAN GROUP | Age: 74
End: 2024-02-28
Payer: MEDICARE

## 2024-02-28 VITALS
TEMPERATURE: 96.1 F | HEIGHT: 65 IN | OXYGEN SATURATION: 97 % | DIASTOLIC BLOOD PRESSURE: 60 MMHG | BODY MASS INDEX: 21.99 KG/M2 | WEIGHT: 132 LBS | HEART RATE: 93 BPM | SYSTOLIC BLOOD PRESSURE: 120 MMHG

## 2024-02-28 DIAGNOSIS — K22.2 SCHATZKI'S RING: ICD-10-CM

## 2024-02-28 DIAGNOSIS — R05.1 ACUTE COUGH: ICD-10-CM

## 2024-02-28 DIAGNOSIS — K44.9 HIATAL HERNIA: ICD-10-CM

## 2024-02-28 DIAGNOSIS — R13.10 DYSPHAGIA, UNSPECIFIED TYPE: ICD-10-CM

## 2024-02-28 DIAGNOSIS — R91.8 ABNORMAL FINDING ON LUNG IMAGING: ICD-10-CM

## 2024-02-28 DIAGNOSIS — E07.9 THYROID MASS: ICD-10-CM

## 2024-02-28 DIAGNOSIS — E78.2 MIXED HYPERLIPIDEMIA: ICD-10-CM

## 2024-02-28 DIAGNOSIS — E11.9 CONTROLLED TYPE 2 DIABETES MELLITUS WITHOUT COMPLICATION, WITHOUT LONG-TERM CURRENT USE OF INSULIN (HCC): ICD-10-CM

## 2024-02-28 PROBLEM — E87.1 HYPONATREMIA: Status: RESOLVED | Noted: 2022-12-19 | Resolved: 2024-02-28

## 2024-02-28 PROBLEM — R05.3 CHRONIC COUGH: Status: RESOLVED | Noted: 2023-03-15 | Resolved: 2024-02-28

## 2024-02-28 PROBLEM — E87.6 HYPOKALEMIA: Status: RESOLVED | Noted: 2022-05-20 | Resolved: 2024-02-28

## 2024-02-28 LAB
HBA1C MFR BLD: 6.4 % (ref ?–5.8)
POCT INT CON NEG: NEGATIVE
POCT INT CON POS: POSITIVE

## 2024-02-28 PROCEDURE — 3078F DIAST BP <80 MM HG: CPT | Performed by: STUDENT IN AN ORGANIZED HEALTH CARE EDUCATION/TRAINING PROGRAM

## 2024-02-28 PROCEDURE — 99214 OFFICE O/P EST MOD 30 MIN: CPT | Mod: 25 | Performed by: STUDENT IN AN ORGANIZED HEALTH CARE EDUCATION/TRAINING PROGRAM

## 2024-02-28 PROCEDURE — 83036 HEMOGLOBIN GLYCOSYLATED A1C: CPT | Performed by: STUDENT IN AN ORGANIZED HEALTH CARE EDUCATION/TRAINING PROGRAM

## 2024-02-28 PROCEDURE — 3074F SYST BP LT 130 MM HG: CPT | Performed by: STUDENT IN AN ORGANIZED HEALTH CARE EDUCATION/TRAINING PROGRAM

## 2024-02-28 PROCEDURE — 96372 THER/PROPH/DIAG INJ SC/IM: CPT | Performed by: STUDENT IN AN ORGANIZED HEALTH CARE EDUCATION/TRAINING PROGRAM

## 2024-02-28 RX ORDER — AZITHROMYCIN 500 MG/1
500 TABLET, FILM COATED ORAL DAILY
Qty: 3 TABLET | Refills: 0 | Status: SHIPPED | OUTPATIENT
Start: 2024-02-28 | End: 2024-03-02

## 2024-02-28 RX ORDER — TRIAMCINOLONE ACETONIDE 40 MG/ML
40 INJECTION, SUSPENSION INTRA-ARTICULAR; INTRAMUSCULAR ONCE
Status: COMPLETED | OUTPATIENT
Start: 2024-02-28 | End: 2024-02-28

## 2024-02-28 RX ADMIN — TRIAMCINOLONE ACETONIDE 40 MG: 40 INJECTION, SUSPENSION INTRA-ARTICULAR; INTRAMUSCULAR at 09:54

## 2024-02-28 ASSESSMENT — ENCOUNTER SYMPTOMS
DIZZINESS: 0
BLOOD IN STOOL: 0
SPEECH CHANGE: 0
PALPITATIONS: 0
WEAKNESS: 0
SENSORY CHANGE: 0
FEVER: 0
HEMOPTYSIS: 0
DIARRHEA: 0
EYE PAIN: 0
COUGH: 1
EYE DISCHARGE: 0
CHILLS: 0
SHORTNESS OF BREATH: 0
FOCAL WEAKNESS: 0

## 2024-02-28 ASSESSMENT — FIBROSIS 4 INDEX: FIB4 SCORE: 2.6

## 2024-02-28 NOTE — PROGRESS NOTES
CC:  Diagnoses of Controlled type 2 diabetes mellitus without complication, without long-term current use of insulin (HCC), Acute cough, Abnormal finding on lung imaging, Thyroid mass, Hiatal hernia, Mixed hyperlipidemia, Dysphagia, unspecified type, and Schatzki's ring were pertinent to this visit.    HISTORY OF THE PRESENT ILLNESS: Patient is a 74 y.o. female. This pleasant patient is here today for follow up visit.  Patient has been sick for 1 week.  Her grandkids were sick recently.  No fever or chills.  No hemoptysis.  No chest pain or palpitation.  No pleuritic chest pain.  Prior to this, patient states that she was good for 1 month without feeling sick. Patient states that DayQuil helps with the cough.  Patient has a history of recurrent cough in the past; she had a CT scan in January 2024 that showed tree-in-bud lung.  This imaging finding may have been from getting sick with infection. Patient has pulmonology referral, she will make the appointment.       Problem   Thyroid Mass    shown on recent CT scan; was getting ultrasound to further evaluate, but pt states that endocrinology is following  Has a history of Multinodular goiter with thyroid nodules x3.      Acute Cough    Patient got sick again due to sick contact.  I am giving her Z-Amborcio 500 mg for 3 days and gave her Kenalog shot 40 mg today.  I told her to use incentive spirometer regularly.     Abnormal Finding On Lung Imaging    Chronic, stable,  Patient has a history of dysphagia and Schatzki ring I wonder if this may be causing aspiration; I will talk with patient over [employer] message a little more; patient might benefit from speech therapy referral or swallow study  Patient has a history of recurrent cough in the past; she had a CT scan in January 2024 that showed tree-in-bud lung.  This imaging finding may have been from getting sick with infection. Patient has pulmonology referral, she will make the appointment.  I am treating patient with  "antibiotic and steroid today and also having her use incentive spirometry     Dysphagia    Patient reports history of dysphagia; I wonder if this is causing microaspiration which led to recurrent cough and tree-in-bud imaging finding     Controlled Type 2 Diabetes Mellitus Without Complication, Without Long-Term Current Use of Insulin (Hcc)    Chronic, stable, A1c in clinic is 6.1 today  Continue following with endocrinology and current regimen     Hiatal Hernia    Shown on recent CT scan     Chronic Cough (Resolved)   Hyponatremia (Resolved)   Hypokalemia (Resolved)           ROS:   Review of Systems   Constitutional:  Negative for chills and fever.   HENT:  Negative for ear discharge and ear pain.    Eyes:  Negative for pain and discharge.   Respiratory:  Positive for cough. Negative for hemoptysis and shortness of breath.    Cardiovascular:  Negative for chest pain and palpitations.   Gastrointestinal:  Negative for blood in stool, diarrhea and melena.   Neurological:  Negative for dizziness, sensory change, speech change, focal weakness and weakness.       /60 (BP Location: Left arm, Patient Position: Sitting, BP Cuff Size: Adult)   Pulse 93   Temp (!) 35.6 °C (96.1 °F) (Temporal)   Ht 1.651 m (5' 5\")   Wt 59.9 kg (132 lb)   SpO2 97%   BMI 21.97 kg/m² Body mass index is 21.97 kg/m².    Physical Exam  Vitals reviewed.   Constitutional:       General: She is not in acute distress.     Appearance: She is ill-appearing. She is not diaphoretic.      Comments: Wearing mask   HENT:      Head: Normocephalic and atraumatic.      Right Ear: External ear normal.      Left Ear: External ear normal.      Nose: No rhinorrhea.      Mouth/Throat:      Pharynx: No oropharyngeal exudate.   Eyes:      General: No scleral icterus.        Right eye: No discharge.         Left eye: No discharge.      Extraocular Movements: Extraocular movements intact.   Cardiovascular:      Rate and Rhythm: Normal rate and regular " rhythm.      Heart sounds: Normal heart sounds. No murmur heard.  Pulmonary:      Effort: Pulmonary effort is normal. No respiratory distress.      Breath sounds: Normal breath sounds. No stridor. No wheezing, rhonchi or rales.   Abdominal:      General: There is no distension.      Palpations: Abdomen is soft. There is no mass.      Tenderness: There is no abdominal tenderness. There is no guarding or rebound.   Musculoskeletal:         General: No tenderness. Normal range of motion.      Cervical back: No rigidity or tenderness.      Right lower leg: No edema.      Left lower leg: No edema.   Lymphadenopathy:      Cervical: No cervical adenopathy.   Skin:     Capillary Refill: Capillary refill takes less than 2 seconds.      Findings: No bruising, erythema, lesion or rash.   Neurological:      General: No focal deficit present.      Mental Status: She is alert and oriented to person, place, and time. Mental status is at baseline.      Cranial Nerves: No cranial nerve deficit.      Sensory: No sensory deficit.      Motor: No weakness.   Psychiatric:         Mood and Affect: Mood normal.         Behavior: Behavior normal.         Thought Content: Thought content normal.         Judgment: Judgment normal.           Lab Results   Component Value Date/Time    CHOLSTRLTOT 120 02/24/2024 07:33 AM    LDL 54 02/24/2024 07:33 AM    HDL 55 02/24/2024 07:33 AM    TRIGLYCERIDE 56 02/24/2024 07:33 AM       Lab Results   Component Value Date/Time    SODIUM 141 02/24/2024 07:33 AM    POTASSIUM 4.3 02/24/2024 07:33 AM    CHLORIDE 104 02/24/2024 07:33 AM    CO2 24 02/24/2024 07:33 AM    GLUCOSE 93 02/24/2024 07:33 AM    BUN 13 02/24/2024 07:33 AM    CREATININE 0.87 02/24/2024 07:33 AM     Lab Results   Component Value Date/Time    ALKPHOSPHAT 152 (H) 02/24/2024 07:33 AM    ASTSGOT 32 02/24/2024 07:33 AM    ALTSGPT 20 02/24/2024 07:33 AM    TBILIRUBIN 0.3 02/24/2024 07:33 AM       Lab Results   Component Value Date/Time    HBA1C 6.4  (A) 02/28/2024 09:52 AM    HBA1C 6.1 (A) 08/29/2023 09:46 AM    HBA1C 6.1 (A) 04/20/2023 10:55 AM      Note: I have reviewed all pertinent labs and diagnostic tests associated with this visit with specific comments listed under the assessment and plan below    Assessment and Plan  74 y.o. female with the following -    Problem   Thyroid Mass    shown on recent CT scan; was getting ultrasound to further evaluate, but pt states that endocrinology is following  Has a history of Multinodular goiter with thyroid nodules x3.      Acute Cough    Patient got sick again due to sick contact.  I am giving her Z-Ambrocio 500 mg for 3 days and gave her Kenalog shot 40 mg today.  I told her to use incentive spirometer regularly.     Abnormal Finding On Lung Imaging    Chronic, stable,  Patient has a history of dysphagia and Schatzki ring I wonder if this may be causing aspiration; I will talk with patient over [employer] message a little more; patient might benefit from speech therapy referral or swallow study  Patient has a history of recurrent cough in the past; she had a CT scan in January 2024 that showed tree-in-bud lung.  This imaging finding may have been from getting sick with infection. Patient has pulmonology referral, she will make the appointment.  I am treating patient with antibiotic and steroid today and also having her use incentive spirometry     Dysphagia    Patient reports history of dysphagia; I wonder if this is causing microaspiration which led to recurrent cough and tree-in-bud imaging finding     Controlled Type 2 Diabetes Mellitus Without Complication, Without Long-Term Current Use of Insulin (Hcc)    Chronic, stable, A1c in clinic is 6.1 today  Continue following with endocrinology and current regimen     Hiatal Hernia    Shown on recent CT scan     Chronic Cough (Resolved)   Hyponatremia (Resolved)   Hypokalemia (Resolved)        1. Controlled type 2 diabetes mellitus without complication, without long-term  current use of insulin (HCC)  POCT  A1C    CANCELED: POCT  A1C      2. Acute cough        3. Abnormal finding on lung imaging  Referral to Speech Therapy      4. Thyroid mass  CANCELED: US-THYROID      5. Hiatal hernia        6. Mixed hyperlipidemia        7. Dysphagia, unspecified type  Referral to Speech Therapy      8. Schatzki's ring  Referral to Speech Therapy           I spent a total of 35 minutes with record review, exam, communication with the patient, communication with other providers, and documentation of this encounter.    Return if symptoms worsen or fail to improve.  Follow up with PCP as scheduled in 4 weeks    Please note that this dictation was created using voice recognition software. I have made every reasonable attempt to correct obvious errors, but I expect that there are errors of grammar and possibly content that I did not discover before finalizing the note.

## 2024-03-05 PROCEDURE — RXMED WILLOW AMBULATORY MEDICATION CHARGE: Performed by: INTERNAL MEDICINE

## 2024-03-07 ENCOUNTER — PHARMACY VISIT (OUTPATIENT)
Dept: PHARMACY | Facility: MEDICAL CENTER | Age: 74
End: 2024-03-07
Payer: COMMERCIAL

## 2024-03-08 ENCOUNTER — TELEPHONE (OUTPATIENT)
Dept: HEALTH INFORMATION MANAGEMENT | Facility: OTHER | Age: 74
End: 2024-03-08
Payer: MEDICARE

## 2024-03-12 ENCOUNTER — PATIENT OUTREACH (OUTPATIENT)
Dept: HEALTH INFORMATION MANAGEMENT | Facility: OTHER | Age: 74
End: 2024-03-12
Payer: MEDICARE

## 2024-03-12 DIAGNOSIS — E11.9 CONTROLLED TYPE 2 DIABETES MELLITUS WITHOUT COMPLICATION, WITHOUT LONG-TERM CURRENT USE OF INSULIN (HCC): ICD-10-CM

## 2024-03-12 NOTE — PROGRESS NOTES
Nurse CM outreach call for monthly CCM assessment. Pt answered telephone.    Assessment    Pt reports she has been doing well. States she is no longer sick after recent respiratory infection. Pt reports she has follow-up appt with endocrinology tomorrow and will discuss results of CT and thyroid mass. Pt was recently referred to speech therapy. Pt reports she will discuss with endocrinology if she needs to follow-up with speech therapy for swallowing evaluation. Pt has history of diabetes. Last A1C was 6.8 on 2/28/24. Pt reports she currently has all of her medications. Denies any falls over the past month.    Education    Follow-up with specialists as scheduled. Fall precautions. Contact nurse CM if needing any assistance. Reviewed upcoming appts.     Plan of Care and Goals    Reviewed care plan and goals    Barriers:    Chronic health conditions    Progress:    Continue to work on progress    Next outreach:  One month  Nurse Care Coordinator:  Pratibha Silva  [phone]

## 2024-03-13 ENCOUNTER — OFFICE VISIT (OUTPATIENT)
Dept: ENDOCRINOLOGY | Facility: MEDICAL CENTER | Age: 74
End: 2024-03-13
Attending: INTERNAL MEDICINE
Payer: MEDICARE

## 2024-03-13 VITALS
DIASTOLIC BLOOD PRESSURE: 80 MMHG | HEART RATE: 80 BPM | SYSTOLIC BLOOD PRESSURE: 132 MMHG | BODY MASS INDEX: 26.5 KG/M2 | WEIGHT: 135 LBS | HEIGHT: 60 IN | OXYGEN SATURATION: 94 %

## 2024-03-13 DIAGNOSIS — E03.8 SUBCLINICAL HYPOTHYROIDISM: ICD-10-CM

## 2024-03-13 DIAGNOSIS — Z79.84 LONG TERM (CURRENT) USE OF ORAL HYPOGLYCEMIC DRUGS: ICD-10-CM

## 2024-03-13 DIAGNOSIS — E55.9 VITAMIN D DEFICIENCY: ICD-10-CM

## 2024-03-13 DIAGNOSIS — E04.2 NON-TOXIC MULTINODULAR GOITER: ICD-10-CM

## 2024-03-13 DIAGNOSIS — E78.5 DYSLIPIDEMIA: ICD-10-CM

## 2024-03-13 DIAGNOSIS — E11.9 CONTROLLED TYPE 2 DIABETES MELLITUS WITHOUT COMPLICATION, WITHOUT LONG-TERM CURRENT USE OF INSULIN (HCC): ICD-10-CM

## 2024-03-13 PROCEDURE — 3075F SYST BP GE 130 - 139MM HG: CPT | Performed by: INTERNAL MEDICINE

## 2024-03-13 PROCEDURE — 99215 OFFICE O/P EST HI 40 MIN: CPT | Performed by: INTERNAL MEDICINE

## 2024-03-13 PROCEDURE — 3079F DIAST BP 80-89 MM HG: CPT | Performed by: INTERNAL MEDICINE

## 2024-03-13 PROCEDURE — 99211 OFF/OP EST MAY X REQ PHY/QHP: CPT | Performed by: INTERNAL MEDICINE

## 2024-03-13 RX ORDER — LEVOTHYROXINE SODIUM 0.05 MG/1
50 TABLET ORAL
Qty: 90 TABLET | Refills: 2 | Status: SHIPPED | OUTPATIENT
Start: 2024-03-13

## 2024-03-13 ASSESSMENT — FIBROSIS 4 INDEX: FIB4 SCORE: 2.6

## 2024-03-13 NOTE — PROGRESS NOTES
CHIEF COMPLAINT: Patient is here for follow up of Type 2 Diabetes Mellitus.      HPI:     Melody Rodriguez is a 74 y.o. female with Type 2 Diabetes Mellitus here for follow up.    Labs from 2/28/2024 show A1c is 6.4%  Labs from 4/20/2023 show a1c is 6.1%  Labs from 10/19/2022 show a1c was 5.8%  Labs from 9/10/2021 show a1c was 6.2%  Labs from 3/16/2021 show a1c was 6.9%  Labs from 10/9/2020 show a1c was 6.8%  Labs from 12/18/2019 show a1c was 6.5%.    She previously saw KALEB Brock      She remains on Synjardy 12.5/1000 mg twice a day       She denies side effects with her medications  She was diagnosed with SVT and was placed on a BB (metoprolol XL 12.5mg daily)  She is no longer short of breath and is gradually getting better        She has hyperlipidemia and is taking simvastatin 40 mg daily.    She denies a history of coronary artery disease and cerebrovascular disease  She denies myalgias and cramps   Latest Reference Range & Units 02/24/24 07:33   Cholesterol,Tot 100 - 199 mg/dL 120   Triglycerides 0 - 149 mg/dL 56   HDL >=40 mg/dL 55   LDL <100 mg/dL 54         She does not have signs of diabetic kidney disease    Latest Reference Range & Units 02/24/24 07:33   Micro Alb Creat Ratio 0 - 30 mg/g 25   Creatinine, Urine mg/dL 67.00   Microalbumin, Urine Random mg/dL 1.7         She had an eye exam on 10/19/2022  WE tried to do an eye exam in the office but it failed       She has nontoxic multinodular goiter with   a 4 cm solid nodule in the right lower lobe;   a 2.0 cm solid nodule in the left upper lobe    a 1.5 cm solid nodule on the left mid to lower lobe and     Last ultrasound was on November 4, 2022 showed stable nodules      Biopsy of the 4 cm RML nodule on July 11, 2019 was nondiagnostic.   But repeat biopsy on January 12, 2021 came back benign  Biopsy of the 1.5 cm LML nodule on July 11, 2019  came back as benign  Biopsy of the 2 cm ISA nodule on January 2021 was benign        She still  reports periodic  difficulty swallowing (mostly when she swallows a handful of pills)  She has Schatzi ring in her esophagus which has been dilated twice previously by GI        We diagnosed with her subclinical hypothyroidism in 2023  TSH is 4.1 and free t4 is low at 0.83 on 4/10/2023  She is now on Levothyroxine 25mcg daily  Her labs  are improved but are still suboptimal   Latest Reference Range & Units 02/24/24 07:33   TSH 0.380 - 5.330 uIU/mL 2.920   Free T-4 0.93 - 1.70 ng/dL 0.68 (L)       Her vitamin D is improved   Latest Reference Range & Units 02/24/24 07:33   25-Hydroxy   Vitamin D 25 30 - 100 ng/mL 62             BG Diary:  Patient doesn't check her BGs    Weight has been stable    Diabetes Complications   Retinopathy: No known retinopathy.  Last eye exam: She is overdue for an eye exam  Neuropathy: Denies paresthesias or numbness in hands or feet. Denies any foot wounds.  Exercise: Minimal.  Diet: Fair.  Patient's medications, allergies, and social histories were reviewed and updated as appropriate.    ROS:     CONS:     No fever, no chills   EYES:     No diplopia, no blurry vision   CV:           No chest pain, no palpitations   PULM:     No SOB, no cough, no hemoptysis.   GI:            No nausea, no vomiting, no diarrhea, no constipation   ENDO:     No polyuria, no polydipsia, no heat intolerance, no cold intolerance       Past Medical History:  Problem List:  2024-02: Thyroid mass  2024-02: Acute cough  2024-02: Abnormal finding on lung imaging  2024-02: Paroxysmal supraventricular tachycardia  2023-06: Acquired hypothyroidism  2023-06: Vitamin D deficiency  2023-06: Coronary artery calcification  2023-06: Family history of premature coronary artery disease  2023-03: History of skin cancer  2023-03: Gastroesophageal reflux disease without esophagitis  2023-03: Bipolar disorder in full remission ([facility])  2023-03: Primary insomnia  2023-03: Sedative dependence with current use ([facility])  2023-03: COVID-19  virus infection  2023-03: Chronic cough  2022-12: Hyponatremia  2022-12: Respiratory failure (HCC)  2022-12: Acute hypoxemic respiratory failure ([facility])  2022-12: Total knee replacement status, right  2022-12: Postoperative hypoxemia  2022-06: Groin lump  2022-06: SI (sacroiliac) pain  2022-05: BMI 25.0-25.9,adult  2022-05: Osteopenia of multiple sites  2022-05: Hypokalemia  2022-05: History of renal disease  2022-03: Cerebral atrophy ([facility])  2022-03: Recurrent major depressive disorder, in remission ([facility])  2021-06: Osteoarthritis of right knee  2021-06: Type 2 diabetes mellitus with stage 3 chronic kidney disease   ([facility])  2020-12: Stage 3a chronic kidney disease ([facility])  2020-10: Long term (current) use of oral hypoglycemic drugs  2020-01: Non-toxic multinodular goiter  2019-07: Risk for falls  2019-03: Dysphagia  2017-01: Controlled type 2 diabetes mellitus without complication,   without long-term current use of insulin ([facility])  2017-01: Mixed hyperlipidemia  2017-01: Multiple sclerosis ([facility])  2017-01: Schatzki's ring  2017-01: Hiatal hernia      Past Surgical History:  Past Surgical History:   Procedure Laterality Date    PB TOTAL KNEE ARTHROPLASTY Right 12/14/2022    Procedure: RIGHT TOTAL KNEE REPLACEMENT;  Surgeon: Britton Cooney M.D.;  Location: SURGERY [facility];  Service: Orthopedics    GYN SURGERY  1970    Cervical polyp        Allergies:  Patient has no known allergies.     Social History:  Social History     Tobacco Use    Smoking status: Never    Smokeless tobacco: Never   Vaping Use    Vaping Use: Never used   Substance Use Topics    Alcohol use: Not Currently     Alcohol/week: 0.6 oz     Types: 1 Glasses of wine per week     Comment: Occasional glass of wine or april    Drug use: Not Currently        Family History:   family history includes ADHD in her son; Bipolar disorder in her daughter; Cancer in her brother, maternal grandmother, and mother; Colon Cancer in her mother; Colorectal Cancer  in her mother; Depression in her son; Diabetes in her father and mother; Heart Attack in her father; Heart Disease in her father and mother; Hypertension in her son; Lung Disease in her mother; No Known Problems in her maternal grandfather, paternal grandfather, paternal grandmother, and sister; Other in her brother and daughter.      PHYSICAL EXAM:   OBJECTIVE:  Vital signs: /80 (BP Location: Right arm, Patient Position: Sitting, BP Cuff Size: Adult)   Pulse 80   Ht 1.524 m (5')   Wt 61.2 kg (135 lb)   SpO2 94%   BMI 26.37 kg/m²   GENERAL: Well-developed, well-nourished in no apparent distress.   EYE:  No ocular asymmetry, PERRLA  HENT: Pink, moist mucous membranes.    NECK: Thyroid is diffusely enlarged with palpable nodules bilaterally  CARDIOVASCULAR:  No murmurs  LUNGS: Clear breath sounds  ABDOMEN: Soft, nontender   EXTREMITIES: No clubbing, cyanosis, or edema.   NEUROLOGICAL: No gross focal motor abnormalities   LYMPH: No cervical adenopathy seen  SKIN: No rashes, lesions.         Labs:  Lab Results   Component Value Date/Time    HBA1C 6.4 (A) 02/28/2024 09:52 AM        Lab Results   Component Value Date/Time    WBC 10.0 11/29/2023 07:30 PM    RBC 4.44 11/29/2023 07:30 PM    HEMOGLOBIN 13.2 11/29/2023 07:30 PM    MCV 90.5 11/29/2023 07:30 PM    MCH 29.7 11/29/2023 07:30 PM    MCHC 32.8 11/29/2023 07:30 PM    RDW 43.0 11/29/2023 07:30 PM    MPV 8.9 (L) 11/29/2023 07:30 PM       Lab Results   Component Value Date/Time    SODIUM 141 02/24/2024 07:33 AM    POTASSIUM 4.3 02/24/2024 07:33 AM    CHLORIDE 104 02/24/2024 07:33 AM    CO2 24 02/24/2024 07:33 AM    ANION 13.0 02/24/2024 07:33 AM    GLUCOSE 93 02/24/2024 07:33 AM    BUN 13 02/24/2024 07:33 AM    CREATININE 0.87 02/24/2024 07:33 AM    CALCIUM 9.5 02/24/2024 07:33 AM    ASTSGOT 32 02/24/2024 07:33 AM    ALTSGPT 20 02/24/2024 07:33 AM    TBILIRUBIN 0.3 02/24/2024 07:33 AM    ALBUMIN 4.3 02/24/2024 07:33 AM    TOTPROTEIN 7.4 02/24/2024 07:33 AM     GLOBULIN 3.1 2024 07:33 AM    AGRATIO 1.4 2024 07:33 AM       Lab Results   Component Value Date/Time    CHOLSTRLTOT 179 2019 0813    TRIGLYCERIDE 145 2019 0813    HDL 39 (A) 2019 0813     (H) 2019 0813       Lab Results   Component Value Date/Time    MALBCRT 25 2024 07:33 AM    MICROALBUR 1.7 2024 07:33 AM        Lab Results   Component Value Date/Time    TSHULTRASEN 3.680 2019 0813     No results found for: FREEDIR  No results found for: FREET3  No results found for: THYSTIMIG    IMAGIN/4/2022 9:50 AM     HISTORY/REASON FOR EXAM:  Mass/Lump  Thyroid goiter     TECHNIQUE/EXAM DESCRIPTION:  Ultrasound of the soft tissues of the head and neck.     COMPARISON:  Thyroid ultrasound 2020     FINDINGS:  The thyroid gland is heterogeneous.  Vascularity is normal.     The right lobe of the thyroid gland measures 2.47 cm x 5.44 cm x 2.75 cm. The contour and echogenicity are normal. No focal mass lesions are identified.  The left lobe of the thyroid gland measures 1.69 cm x 5.69 cm x 2.43 cm. The contour and echogenicity are normal. No focal mass lesions are identified.  The isthmus measures 0.36 cm.     Nodules >= 1cm:  3     Nodule #1  Location:  Right  lower  Size:  3.90 x 2.47 x 2.71 cm  Composition:  Mixed-1  Echogenicity:  Hypoechoic-2  Shape:  Wider than tall-0  Margins:  Smooth-0  Echogenic Foci:  None-0     ACR TIRADS points/category:  3 - TR3 - Mildly Suspicious     Nodule #2  Location:  Left  upper  Size:  1.99 x 1.61 x 0.98 cm  Composition:  Solid-2  Echogenicity:  Hypoechoic-2  Shape:  Wider than tall-0  Margins:  Smooth-0  Echogenic Foci:  None-0     ACR TIRADS points/category:  4 - TR4 - Moderately Suspicious     Nodule #3  Location:  Left  lower  Size:  1.53 x 1.48 x 1.12 cm  Composition:  Solid-2  Echogenicity:  Hypoechoic-2  Shape:  Wider than tall-0  Margins:  Smooth-0  Echogenic Foci:  None-0     ACR TIRADS points/category:  4 -  TR4 - Moderately Suspicious     IMPRESSION:     Multinodular goiter with thyroid nodules x3.     ACR TI-RADS Recommendations  TR4 (>= 1.5cm) - Based solely on ultrasound findings, FNA could be considered     Recommendations based on the American College of [employer] Thyroid imaging, reporting and Data System (TI-RADS) 2017.     11/21/2020 4:42 PM     HISTORY/REASON FOR EXAM:  Follow up NTMNG 4 cm nodule RUL and 2 nodules LML LLL previous FNA r lobe non dx     TECHNIQUE/EXAM DESCRIPTION:  Ultrasound of the soft tissues of the head and neck.     COMPARISON:  Thyroid ultrasound 6/17/2019     FINDINGS:  The thyroid gland is heterogeneous.  Vascularity is normal.     The right lobe of the thyroid gland measures 2.18 cm x 5.21 cm x 2.41 cm.  The left lobe of the thyroid gland measures 1.56 cm x 5.91 cm x 2.07 cm.  The isthmus measures 0.18 cm.     Nodules >= 1cm:     Nodule #1  Location: Right lower pole  Size:  3.94 x 2.40 x 1.97 cm from 4.0 x 2.4 x 2.9 cm  Composition:  Mixed-1  Echogenicity:  Isoechoic-1  Shape:  Wider than tall-0  Margins:  Smooth-0  Echogenic Foci:  Macroscopic-1     ACR TIRADS points/category:  3 - TR3 - Mildly Suspicious     Nodule #2  Location:  Left interpolar  Size:  1.49 x 1.19 x 1.44 cm from 1.9 x 1.0 x 0.7  Composition:  Solid-2  Echogenicity:  Isoechoic-1  Shape:  Taller than wide-3  Margins:  Smooth-0  Echogenic Foci:  None-0     ACR TIRADS points/category:  6 - TR4 - Moderately Suspicious     Nodule #3  Location:  Left lower pole  Size:  1.69 x 1.50 x 1.07 cm from 1.9 x 1.0 x 1.5  Composition:  Mixed-1  Echogenicity:  Isoechoic-1  Shape:  Wider than tall-0  Margins:  Smooth-0  Echogenic Foci:  None-0     ACR TIRADS points/category:  2 - TR2 - Not Suspicious     Nodule #4  Location:  Left upper  Size:  2.21 x 1.46 x 1.43 cm  Composition:  Mixed-1  Echogenicity:  Isoechoic-1  Shape:  Wider than tall-0  Margins:  Smooth-0  Echogenic Foci:  None-0     ACR TIRADS points/category:  2 - TR2 - Not  Suspicious     IMPRESSION:     Left interpolar mass has slightly enlarged and is moderately suspicious     ACR TI-RADS Recommendations  TR4 - follow up ultrasound in 1,2,3 and 5 years     Recommendations based on the American College of Radiology Thyroid imaging, reporting and Data System (TI-RADS) 2017.            ASSESSMENT/PLAN:     1. Controlled type 2 diabetes mellitus without complication, without long-term current use of insulin (HCC)  Stable controlled  Continue Synjardy XR 12.5/1000 mg twice a day  She is up-to-date with her labs  Recommend eye exam at [facility] as POC testing failed  Follow-up in 3-6 months      2. Non-toxic multinodular goiter  Stable  I personally reviewed the images of the ultrasound from November 2022.  Overall her nodules appear to be stable and repeat biopsy is not medically necessary at this time  She denies concern or compressive symptoms  She does have new diagnosis of hypothyroidism  ( see plan below)  Will schedule for US with radiology and update her       3. Subclinical hypothyroidism  Uncontrolled  TSH is normal but  Free T4 is low   Increase levothyroxine 50 mcg daily  Reviewed proper administration of thyroid hormone  Patient should take thyroid hormone 30-60 minutes before breakfast on an empty stomach plain water and not take it together with food, iron, calcium, and antacids.  Iron, calcium, and antacids should be taken at least 4 hours apart from thyroid hormone.  Repeat TSH levels in 3 months    4. Dyslipidemia  Fair control  Continue simvastatin  Repeat fasting lipids in 1 year    5. Vitamin D deficiency  Controlled  Vitamin D is very low  Continue ergocalciferol 50,000 units weekly  Continue monitoring    6. Long term (current) use of oral hypoglycemic drugs  Patient is on multiple oral agents for type 2 diabetes management      Return in about 3 months (around 6/13/2024).      Total time spent on day of service was over 60 minutes which included obtaining a  detailed history and physical exam, ordering labs, coordinating care and scheduling future follow-up    Thank you kindly for allowing me to participate in the diabetes care plan for this patient.    Swapnil Cat MD, FACE, [facility]      CC:   Danae Pritchett M.D.

## 2024-03-15 ENCOUNTER — HOME STUDY (OUTPATIENT)
Dept: SLEEP MEDICINE | Facility: MEDICAL CENTER | Age: 74
End: 2024-03-15
Attending: NURSE PRACTITIONER
Payer: MEDICARE

## 2024-03-15 DIAGNOSIS — R53.83 FATIGUE, UNSPECIFIED TYPE: ICD-10-CM

## 2024-03-15 PROCEDURE — G0400 HOME SLEEP TEST/TYPE 4 PORTA: HCPCS | Performed by: STUDENT IN AN ORGANIZED HEALTH CARE EDUCATION/TRAINING PROGRAM

## 2024-03-17 NOTE — TELEPHONE ENCOUNTER
"Vancomycin Dosing by Pharmacy- FOLLOW UP    Charla Bowles is a 33 y.o. year old female who Pharmacy has been consulted for vancomycin dosing for line infections. Based on the patient's indication and renal status this patient is being dosed based on a goal trough/random level of 15-20.     Currently receiving SLED.    Current vancomycin dose: 1750 x1 on 3/16 at 1219    Most recent trough level: 26 mcg/mL    Visit Vitals  BP 85/68   Pulse (!) 129   Temp 36.6 °C (97.9 °F) (Temporal)   Resp 20        Lab Results   Component Value Date    CREATININE 0.31 (L) 03/17/2024    CREATININE 1.27 (H) 03/16/2024    CREATININE 0.75 03/16/2024    CREATININE 0.51 03/16/2024        Patient weight is No results found for: \"PTWEIGHT\"    No results found for: \"CULTURE\"     I/O last 3 completed shifts:  In: 2283.5 (22.9 mL/kg) [P.O.:220; I.V.:551.5 (5.5 mL/kg); Blood:562; IV Piggyback:950]  Out: 5000 (50.2 mL/kg) [Other:5000]  Dosing Weight: 99.6 kg   [unfilled]    Lab Results   Component Value Date    PATIENTTEMP 37.0 03/16/2024    PATIENTTEMP 37.0 03/15/2024    PATIENTTEMP 37.0 03/15/2024        Assessment/Plan    Above random/trough goal.  Will not re-dose today.  The next level will be obtained on 3/18 at AM labs. May be obtained sooner if clinically indicated.   Will continue to monitor renal function daily while on vancomycin and order serum creatinine at least every 48 hours if not already ordered.  Follow for continued vancomycin needs, clinical response, and signs/symptoms of toxicity.       Evangelina Tavares, PharmD           " Nonfasting bmp and CT chest W contrast both ordered

## 2024-03-20 ENCOUNTER — OFFICE VISIT (OUTPATIENT)
Dept: MEDICAL GROUP | Facility: PHYSICIAN GROUP | Age: 74
End: 2024-03-20
Payer: MEDICARE

## 2024-03-20 VITALS
OXYGEN SATURATION: 96 % | DIASTOLIC BLOOD PRESSURE: 70 MMHG | RESPIRATION RATE: 16 BRPM | SYSTOLIC BLOOD PRESSURE: 120 MMHG | BODY MASS INDEX: 26.45 KG/M2 | WEIGHT: 134.7 LBS | TEMPERATURE: 97.9 F | HEART RATE: 83 BPM | HEIGHT: 60 IN

## 2024-03-20 DIAGNOSIS — G47.33 OSA (OBSTRUCTIVE SLEEP APNEA): ICD-10-CM

## 2024-03-20 DIAGNOSIS — I47.10 PAROXYSMAL SUPRAVENTRICULAR TACHYCARDIA (HCC): ICD-10-CM

## 2024-03-20 DIAGNOSIS — F31.70 BIPOLAR DISORDER IN FULL REMISSION, MOST RECENT EPISODE UNSPECIFIED TYPE (HCC): ICD-10-CM

## 2024-03-20 DIAGNOSIS — E03.9 ACQUIRED HYPOTHYROIDISM: ICD-10-CM

## 2024-03-20 DIAGNOSIS — R91.8 ABNORMAL FINDING ON LUNG IMAGING: ICD-10-CM

## 2024-03-20 DIAGNOSIS — R13.10 DYSPHAGIA, UNSPECIFIED TYPE: ICD-10-CM

## 2024-03-20 DIAGNOSIS — E07.9 THYROID MASS: ICD-10-CM

## 2024-03-20 PROCEDURE — 3074F SYST BP LT 130 MM HG: CPT | Performed by: INTERNAL MEDICINE

## 2024-03-20 PROCEDURE — 3078F DIAST BP <80 MM HG: CPT | Performed by: INTERNAL MEDICINE

## 2024-03-20 PROCEDURE — 99214 OFFICE O/P EST MOD 30 MIN: CPT | Performed by: INTERNAL MEDICINE

## 2024-03-20 ASSESSMENT — FIBROSIS 4 INDEX: FIB4 SCORE: 2.6

## 2024-03-20 NOTE — PROCEDURES
DIAGNOSTIC HOME SLEEP TEST (HST) REPORT WatchPAT      PATIENT ID:  NAME:  Melody Rodriguez  MRN:               [id number]  YOB: 1950  DATE OF STUDY: 03/17/2024      Impression:     This study shows evidence of:      1. Mild obstructive sleep apnea with 4% PAT apnea hypopnea index(pAHI) of 12.8 per hour.  PAT respiratory disturbance index (pRDI) was 29.4 per hour. These findings are based on 7 channels recording of PAT signal with sleep staging, heart rate, pulse oximetry, actigraphy, body position, snoring and respiratory movement.     2. Oxygenation O2 Sat. mean O2 sat was 92%,  lewis was 79%,  and maximum O2 at 97 %. O2 sat was at or  below 88% for 1.5 min of evaluation time. Oxygen Desaturation (>=4%) Index was 12.1/hr. AVG HR was 64 BPM.      TECHNICAL DESCRIPTION: Patient underwent home sleep apnea testing with peripheral arterial tone signal (WatchPAT™). This is a Type IV portable monitor and device per Medicare. Monitoring was done with 7 channels recording of PAT signal with sleep staging, heart rate, pulse oximetry, actigraphy, body position, snoring and respiratory movement. Prior to using the device, the patient received verbal and written instructions for its application and was provided with the help desk phone number for additional telephonic instruction with 24-hour availability of qualified personnel to answer questions.    Respiratory events:        General sleep summary: . Total recording time is 7 hours and 45 minutes and total Sleep time is 6 hours and 57 minutes. The patient spent 91 minutes in the supine position and 326.8 minutes in the nonsupine position.      Recommendations:    1. CPAP titration study vs Auto CPAP trial.  If starting auto CPAP would recommend minimum pressure 5 cmH2O maximum pressure 12 cmH2O  2.   In general patients with sleep apnea are advised to avoid alcohol and sedatives and to not operate a motor vehicle while drowsy. In some cases  alternative treatment options may prove effective in resolving sleep apnea in these options include upper airway surgery, the use of a dental orthotic or weight loss and positional therapy. Clinical correlation is required.         Jonathan Munoz MD

## 2024-03-21 ENCOUNTER — TELEPHONE (OUTPATIENT)
Dept: CARDIOLOGY | Facility: MEDICAL CENTER | Age: 74
End: 2024-03-21
Payer: MEDICARE

## 2024-03-21 DIAGNOSIS — G47.30 MILD SLEEP APNEA: ICD-10-CM

## 2024-03-27 ENCOUNTER — APPOINTMENT (OUTPATIENT)
Dept: SLEEP MEDICINE | Facility: MEDICAL CENTER | Age: 74
End: 2024-03-27
Attending: NURSE PRACTITIONER
Payer: MEDICARE

## 2024-03-28 ENCOUNTER — APPOINTMENT (OUTPATIENT)
Dept: RADIOLOGY | Facility: MEDICAL CENTER | Age: 74
End: 2024-03-28
Attending: INTERNAL MEDICINE
Payer: MEDICARE

## 2024-03-28 DIAGNOSIS — E04.2 NON-TOXIC MULTINODULAR GOITER: ICD-10-CM

## 2024-03-28 PROCEDURE — 76536 US EXAM OF HEAD AND NECK: CPT

## 2024-04-02 NOTE — PROGRESS NOTES
CC: Abnormal imaging.    HPI:  Melody presents with the following    1. HILARY (obstructive sleep apnea)  Referral to sleep medicine placed by cardiology.    2. Paroxysmal supraventricular tachycardia  Chronic.  Stable.  Followed by cardiology.  Continue with metoprolol 12.5 mg nightly.    3. Thyroid mass  2.8 cm right thyroid mass noted on CT scan of the thorax.  Patient with a PMH of multinodular goiter followed by endocrinology.    4. Acquired hypothyroidism  Chronic.  Stable.  Patient continued on Synthroid 50 mcg daily.  TSH 2.9, free T4 slightly decreased at 0.68.  Followed by endocrinology.    5. Abnormal finding on lung imaging  CT scan of the thorax completed in January 2024 showed no pulmonary nodules however a small area of airspace disease in the right middle lobe.  History of dysphagia.    6. Bipolar disorder in full remission, most recent episode unspecified type (HCC)  Chronic.  Stable.  Patient's Lamictal was discontinued, doing much better off of this medication.  Continues on Paxil 40 mg daily and Wellbutrin increased to 300 mg daily.          Patient Active Problem List    Diagnosis Date Noted    HILARY (obstructive sleep apnea) 03/20/2024    Thyroid mass 02/28/2024    Acute cough 02/28/2024    Abnormal finding on lung imaging 02/28/2024    Paroxysmal supraventricular tachycardia (HCC) 02/16/2024    Acquired hypothyroidism 06/15/2023    Vitamin D deficiency 06/15/2023    Family history of premature coronary artery disease 06/07/2023    History of skin cancer 03/22/2023    Gastroesophageal reflux disease without esophagitis 03/22/2023    Bipolar disorder in full remission (HCC) 03/22/2023    Primary insomnia 03/22/2023    Sedative dependence with current use (HCC) 03/22/2023    Total knee replacement status, right 12/14/2022    SI (sacroiliac) pain 06/09/2022    BMI 25.0-25.9,adult 05/20/2022    Osteopenia of multiple sites 05/20/2022    Cerebral atrophy (HCC) 03/16/2022    Osteoarthritis of right  knee 06/15/2021    Long term (current) use of oral hypoglycemic drugs 10/21/2020    Non-toxic multinodular goiter 01/29/2020    Risk for falls 07/12/2019    Dysphagia 03/29/2019    Controlled type 2 diabetes mellitus without complication, without long-term current use of insulin ([facility]) 01/06/2017    Mixed hyperlipidemia 01/06/2017    Multiple sclerosis (HCC) 01/06/2017    Schatzki's ring 01/06/2017    Hiatal hernia 01/06/2017       Current Outpatient Medications   Medication Sig Dispense Refill    levothyroxine (SYNTHROID) 50 MCG Tab Take 1 Tablet by mouth every morning on an empty stomach. 90 Tablet 2    buPROPion (WELLBUTRIN XL) 300 MG XL tablet Take 1 tablet (300 mg) by mouth daily in the morning 90 Tablet 2    paroxetine (PAXIL) 40 MG tablet TAKE 1 TABLET(40 MG) BY MOUTH DAILY IN THE MORNING 90 Tablet 2    metoprolol SR (TOPROL XL) 25 MG TABLET SR 24 HR Take 0.5 Tablets by mouth every evening. 45 Tablet 3    Empagliflozin-metFORMIN HCl ER (SYNJARDY XR) 12.5-1000 MG TABLET SR 24 HR Take 1 tablet by mouth daily 100 Tablet 2    omeprazole (PRILOSEC) 20 MG delayed-release capsule Take 1 capsule by mouth every morning, 30 minutes before breakfast. 100 Capsule 3    tizanidine (ZANAFLEX) 4 MG Tab Take 1 Tablet by mouth 2 times a day as needed (spasm). 200 Tablet 0    paroxetine (PAXIL) 40 MG tablet Take 1 Tablet by mouth every day. Indications: Major Depressive Disorder 100 Tablet 1    rosuvastatin (CRESTOR) 40 MG tablet Take 1 Tablet by mouth every day. 90 Tablet 3    zolpidem (AMBIEN) 5 MG Tab Take 1 tablet (5 mg) by mouth daily at bedtime as needed for insomnia 30 Tablet 0    Teriflunomide (AUBAGIO) 14 MG Tab Take 14 mg by mouth every day. Indications: Relapsing, Remitting Multiple Sclerosis      modafinil (PROVIGIL) 200 MG Tab Take 200 mg by mouth 1 time a day as needed. Indications: Tiredness associated with Multiple Sclerosis      amoxicillin (AMOXIL) 500 MG Cap Take 4 capsules by mouth one hour prior to dental  appt 16 Capsule 0     No current facility-administered medications for this visit.         Allergies as of 03/20/2024    (No Known Allergies)        Social History     Socioeconomic History    Marital status:      Spouse name: Not on file    Number of children: Not on file    Years of education: Not on file    Highest education level: 12th grade   Occupational History    Not on file   Tobacco Use    Smoking status: Never    Smokeless tobacco: Never   Vaping Use    Vaping Use: Never used   Substance and Sexual Activity    Alcohol use: Not Currently     Alcohol/week: 0.6 oz     Types: 1 Glasses of wine per week     Comment: Occasional glass of wine or april    Drug use: Not Currently    Sexual activity: Not Currently     Partners: Male   Other Topics Concern    Not on file   Social History Narrative    Not on file     Social Determinants of Health     Financial Resource Strain: Low Risk  (1/24/2024)    Overall Financial Resource Strain (CARDIA)     Difficulty of Paying Living Expenses: Not hard at all   Food Insecurity: No Food Insecurity (1/24/2024)    Hunger Vital Sign     Worried About Running Out of Food in the Last Year: Never true     Ran Out of Food in the Last Year: Never true   Transportation Needs: No Transportation Needs (1/24/2024)    PRAPARE - Transportation     Lack of Transportation (Medical): No     Lack of Transportation (Non-Medical): No   Physical Activity: Inactive (11/9/2022)    Exercise Vital Sign     Days of Exercise per Week: 0 days     Minutes of Exercise per Session: 0 min   Stress: Stress Concern Present (5/26/2020)    [employer] - Occupational Stress Questionnaire     Feeling of Stress : To some extent   Social Connections: Moderately Isolated (5/26/2020)    Social Connection and Isolation Panel [NHANES]     Frequency of Communication with Friends and Family: More than three times a week     Frequency of Social Gatherings with Friends and Family:  More than three times a week     Attends [employer] Services: Never     Active Member of Clubs or Organizations: Yes     Attends Club or Organization Meetings: More than 4 times per year     Marital Status:    Intimate Partner Violence: Not on file   Housing Stability: Low Risk  (11/9/2022)    Housing Stability Vital Sign     Unable to Pay for Housing in the Last Year: No     Number of Places Lived in the Last Year: 1     Unstable Housing in the Last Year: No       Family History   Problem Relation Age of Onset    Colon Cancer Mother     Heart Disease Mother         Cardiomyopathy    Lung Disease Mother         Emphysema from 2nd hand smoke    Cancer Mother         Colon    Colorectal Cancer Mother     Diabetes Mother     Heart Attack Father     Heart Disease Father         Numerous heart attacks    Diabetes Father     No Known Problems Sister     Cancer Brother         Skin cancer    Other Brother         Gaulbladder issues    Cancer Maternal Grandmother         GI cancer    No Known Problems Maternal Grandfather     No Known Problems Paternal Grandmother     No Known Problems Paternal Grandfather     Other Daughter         Fibromyalgia    Bipolar disorder Daughter     ADHD Son     Depression Son     Hypertension Son        Past Surgical History:   Procedure Laterality Date    PB TOTAL KNEE ARTHROPLASTY Right 12/14/2022    Procedure: RIGHT TOTAL KNEE REPLACEMENT;  Surgeon: Britton Cooney M.D.;  Location: SURGERY [facility];  Service: Orthopedics    GYN SURGERY  1970    Cervical polyp       ROS: Positive ROS per HPI.  Denies any Headache,Chest pain,  Shortness of breath,  Abdominal pain, Changes of bowel or bladder, Lower ext edema, Fevers, Nights sweats, Weight Changes, Focal weakness or numbness.  All other systems are negative.    /70 (BP Location: Right arm, Patient Position: Sitting)   Pulse 83   Temp 36.6 °C (97.9 °F) (Temporal)   Resp 16   Ht 1.524 m (5')   Wt 61.1 kg (134 lb 11.2 oz)    SpO2 96%   BMI 26.31 kg/m²      Physical Exam:  Gen:         Alert and oriented, No apparent distress.  HEENT:   [place], TM clear,  Oralpharynx no erythema or exudates.  Neck:       No Jugular venous distension, Lymphadenopathy, Thyromegaly, Bruits.  Lungs:     Clear to auscultation bilaterally, no wheezing rhonchi or crackles.  CV:          Regular rate and rhythm. No murmurs, rubs or gallops.  Abd:         Soft non tender, non distended. Normal active bowel sounds.             Ext:          No clubbing, cyanosis, edema.      Assessment and Plan.   74 y.o. female presenting with the following.     1. HILARY (obstructive sleep apnea)    - Referral to Pulmonary and Sleep Medicine    2. Paroxysmal supraventricular tachycardia  Chronic.  Stable.  Continue with cardiology.    3. Thyroid mass  Followed by endocrinology.    4. Acquired hypothyroidism  Continue current plan of care.    5. Abnormal finding on lung imaging    - Referral to Pulmonary and Sleep Medicine    6. Bipolar disorder in full remission, most recent episode unspecified type (HCC)  Chronic.  Stable.  Continue current plan of care and medications reviewed.      My total time spent caring for the patient on the day of the encounter was 32 minutes.   This does not include time spent on separately billable procedures/tests.

## 2024-04-10 PROCEDURE — RXMED WILLOW AMBULATORY MEDICATION CHARGE: Performed by: PSYCHIATRY & NEUROLOGY

## 2024-04-12 ENCOUNTER — PATIENT OUTREACH (OUTPATIENT)
Dept: HEALTH INFORMATION MANAGEMENT | Facility: OTHER | Age: 74
End: 2024-04-12
Payer: MEDICARE

## 2024-04-12 DIAGNOSIS — E11.9 TYPE 2 DIABETES MELLITUS WITHOUT COMPLICATION, WITHOUT LONG-TERM CURRENT USE OF INSULIN (HCC): ICD-10-CM

## 2024-04-12 DIAGNOSIS — Z91.81 RISK FOR FALLS: ICD-10-CM

## 2024-04-12 NOTE — PROGRESS NOTES
Nurse CM outreach call for monthly CCM assessment.   Assessment    Pt reports she has been doing fair. Reports she wasn't feeling great on Monday. States she felt short of breath. States this is not a  new symptoms and happens sometimes. Pt has been previously referred to pulmonary and will follow-up. States she is feeling better today. Pt reports she had some back pain earlier in the week. Reports it is a little better today. Reports she isn't sure if it is related to her MS.  Pt states she doesn't need appt to see PCP. Pt had visit with PCP on 3/22/24. Reports no new symptoms or problems. Pt reports no falls over the past month. States she tries to be careful to avoid falls.  Pt has history of DM. Last A1C was 6.4 on 2/28. Pt will be seeing endocrinology in June. States she is taking her medications as directed.     Education    Fall precautions. Continue to follow-up with specialists. Follow heart healthy meal plan. Exercise as tolerated.     Plan of Care and Goals    Pt has been enrolled in the CCM program since 11/3/22. Pt has met goals including no recent falls. Pt agreeable to discharge from CCM program at this time.     Barriers:    Chronic health conditions    Progress:    Continue to work on progress    Next outreach:  One month  Nurse Care Coordinator: Pratibha Silva  [phone]

## 2024-04-15 PROCEDURE — RXMED WILLOW AMBULATORY MEDICATION CHARGE: Performed by: DENTIST

## 2024-04-16 ENCOUNTER — PHARMACY VISIT (OUTPATIENT)
Dept: PHARMACY | Facility: MEDICAL CENTER | Age: 74
End: 2024-04-16
Payer: COMMERCIAL

## 2024-04-16 NOTE — TELEPHONE ENCOUNTER
RPM Notification: Disconnect  Actions: Contacted Patient    Total time (minutes) spent communicating with patient: 8    Pt became disconnected at  1659 . Contacted patient and patient reported she was doing well her light was flashing green. She stated she would reconnect back to bluetooth. Her light was solid blue but she was still not reconnected. I asked her to turn her phone off and turn it back on in about a minute and then log into [facility]. She said she would do that and once she did she reconnected. Patient reconnected at 1710 with SpO2 of 98%.     Standing/Walking/Toileting/Moving from bed to stretcher

## 2024-04-17 ENCOUNTER — APPOINTMENT (OUTPATIENT)
Dept: SPEECH THERAPY | Facility: REHABILITATION | Age: 74
End: 2024-04-17
Attending: STUDENT IN AN ORGANIZED HEALTH CARE EDUCATION/TRAINING PROGRAM
Payer: MEDICARE

## 2024-04-18 ENCOUNTER — TELEPHONE (OUTPATIENT)
Dept: PHARMACY | Facility: MEDICAL CENTER | Age: 74
End: 2024-04-18
Payer: MEDICARE

## 2024-04-18 PROCEDURE — RXMED WILLOW AMBULATORY MEDICATION CHARGE: Performed by: PSYCHIATRY & NEUROLOGY

## 2024-04-18 PROCEDURE — RXMED WILLOW AMBULATORY MEDICATION CHARGE: Performed by: INTERNAL MEDICINE

## 2024-04-19 NOTE — TELEPHONE ENCOUNTER
Contact:  Phone number:[phone] (mobile)    Name of person spoken with and relationship to patient: Melody patient    Patient’s Adherence:  How patient is doing on medication: Very Well    How many missed doses and reason: 0 N/A    Any new medications: No    Any new conditions: No    Any new allergies: No    Any new side effects: No    Any new diagnoses: No    How many doses remainin    Did patient want to speak with pharmacist: No   Delivery:  Delivery date and method: 24 via     Needs by Date: 24    Signature required: No     Any additional details for : N/A   Teach Appointment Date:  N/A   Shipping Address:  [address] [place] [id number]   Medication(name,strength and dose):  Synjardy Tablet 12.5-1000 MG, BuPROPion HCl ER (XL) Tablet Extended Release 24 Hour 300 MG   Copay:  $118.00   Payment Method:  Credit card on file   Supplies:  Alcohol Swabs    Additional Information:  NONE     Mariaelena Ibrahim, Pharmacy Liaison/ RX Coordinator

## 2024-04-22 ENCOUNTER — PHARMACY VISIT (OUTPATIENT)
Dept: PHARMACY | Facility: MEDICAL CENTER | Age: 74
End: 2024-04-22
Payer: COMMERCIAL

## 2024-04-29 PROCEDURE — RXMED WILLOW AMBULATORY MEDICATION CHARGE: Performed by: NURSE PRACTITIONER

## 2024-05-03 ENCOUNTER — PHARMACY VISIT (OUTPATIENT)
Dept: PHARMACY | Facility: MEDICAL CENTER | Age: 74
End: 2024-05-03
Payer: COMMERCIAL

## 2024-05-22 NOTE — ASSESSMENT & PLAN NOTE
Chronic, stable. Pt reports a history of frequent falls but nothing over the last year. She states falls are secondary to altered equilibrium secondary to MS. Pt is able to minimize episodes by slowing down and careful movement. She is aware of trip risks in her home and minimizes these. Follow up with PCP and/or neurology for continued management.    pt c/o feeling dizzy and off balance x 4 days, accompanied by headache, denies visual changes.

## 2024-06-01 ENCOUNTER — APPOINTMENT (OUTPATIENT)
Dept: LAB | Facility: MEDICAL CENTER | Age: 74
End: 2024-06-01
Payer: MEDICARE

## 2024-06-01 DIAGNOSIS — E55.9 VITAMIN D DEFICIENCY: ICD-10-CM

## 2024-06-01 DIAGNOSIS — Z79.84 LONG TERM (CURRENT) USE OF ORAL HYPOGLYCEMIC DRUGS: ICD-10-CM

## 2024-06-01 DIAGNOSIS — E03.8 SUBCLINICAL HYPOTHYROIDISM: ICD-10-CM

## 2024-06-01 DIAGNOSIS — E78.5 DYSLIPIDEMIA: ICD-10-CM

## 2024-06-01 DIAGNOSIS — E11.9 CONTROLLED TYPE 2 DIABETES MELLITUS WITHOUT COMPLICATION, WITHOUT LONG-TERM CURRENT USE OF INSULIN (HCC): ICD-10-CM

## 2024-06-01 DIAGNOSIS — E04.2 NON-TOXIC MULTINODULAR GOITER: ICD-10-CM

## 2024-06-01 LAB
25(OH)D3 SERPL-MCNC: 61 NG/ML (ref 30–100)
ALBUMIN SERPL BCP-MCNC: 4.3 G/DL (ref 3.2–4.9)
ALBUMIN/GLOB SERPL: 1.7 G/DL
ALP SERPL-CCNC: 129 U/L (ref 30–99)
ALT SERPL-CCNC: 17 U/L (ref 2–50)
ANION GAP SERPL CALC-SCNC: 12 MMOL/L (ref 7–16)
AST SERPL-CCNC: 22 U/L (ref 12–45)
BILIRUB SERPL-MCNC: 0.5 MG/DL (ref 0.1–1.5)
BUN SERPL-MCNC: 13 MG/DL (ref 8–22)
CALCIUM ALBUM COR SERPL-MCNC: 9 MG/DL (ref 8.5–10.5)
CALCIUM SERPL-MCNC: 9.2 MG/DL (ref 8.5–10.5)
CHLORIDE SERPL-SCNC: 102 MMOL/L (ref 96–112)
CO2 SERPL-SCNC: 25 MMOL/L (ref 20–33)
CREAT SERPL-MCNC: 1.03 MG/DL (ref 0.5–1.4)
GFR SERPLBLD CREATININE-BSD FMLA CKD-EPI: 57 ML/MIN/1.73 M 2
GLOBULIN SER CALC-MCNC: 2.5 G/DL (ref 1.9–3.5)
GLUCOSE SERPL-MCNC: 117 MG/DL (ref 65–99)
POTASSIUM SERPL-SCNC: 4.3 MMOL/L (ref 3.6–5.5)
PROT SERPL-MCNC: 6.8 G/DL (ref 6–8.2)
SODIUM SERPL-SCNC: 139 MMOL/L (ref 135–145)
T4 FREE SERPL-MCNC: 1.03 NG/DL (ref 0.93–1.7)
TSH SERPL DL<=0.005 MIU/L-ACNC: 1.42 UIU/ML (ref 0.38–5.33)

## 2024-06-01 PROCEDURE — 80053 COMPREHEN METABOLIC PANEL: CPT

## 2024-06-01 PROCEDURE — 84439 ASSAY OF FREE THYROXINE: CPT

## 2024-06-01 PROCEDURE — 36415 COLL VENOUS BLD VENIPUNCTURE: CPT

## 2024-06-01 PROCEDURE — 82306 VITAMIN D 25 HYDROXY: CPT

## 2024-06-01 PROCEDURE — 84443 ASSAY THYROID STIM HORMONE: CPT

## 2024-06-17 ENCOUNTER — NON-PROVIDER VISIT (OUTPATIENT)
Dept: ENDOCRINOLOGY | Facility: MEDICAL CENTER | Age: 74
End: 2024-06-17
Attending: INTERNAL MEDICINE
Payer: MEDICARE

## 2024-06-17 VITALS
WEIGHT: 135 LBS | OXYGEN SATURATION: 95 % | BODY MASS INDEX: 26.5 KG/M2 | HEIGHT: 60 IN | SYSTOLIC BLOOD PRESSURE: 106 MMHG | DIASTOLIC BLOOD PRESSURE: 60 MMHG | HEART RATE: 72 BPM

## 2024-06-17 DIAGNOSIS — E11.9 CONTROLLED TYPE 2 DIABETES MELLITUS WITHOUT COMPLICATION, WITHOUT LONG-TERM CURRENT USE OF INSULIN (HCC): ICD-10-CM

## 2024-06-17 DIAGNOSIS — E03.8 SUBCLINICAL HYPOTHYROIDISM: ICD-10-CM

## 2024-06-17 DIAGNOSIS — E04.2 NON-TOXIC MULTINODULAR GOITER: ICD-10-CM

## 2024-06-17 LAB
HBA1C MFR BLD: 6.1 % (ref ?–5.8)
POCT INT CON NEG: NEGATIVE
POCT INT CON POS: POSITIVE

## 2024-06-17 PROCEDURE — 83036 HEMOGLOBIN GLYCOSYLATED A1C: CPT

## 2024-06-17 PROCEDURE — 99212 OFFICE O/P EST SF 10 MIN: CPT | Performed by: INTERNAL MEDICINE

## 2024-06-17 ASSESSMENT — FIBROSIS 4 INDEX: FIB4 SCORE: 1.94

## 2024-06-17 NOTE — PROGRESS NOTES
RN-CDE Note    Subjective:   Endocrinology Clinic Progress Note  PCP: Danae Pritchett M.D.    HPI:  Melody Rodriguez is a 74 y.o. old patient who is seen today by the Diabetes Nurse Specialist for review of Type 2 Diabetes and Hypothyroidism.  Recent changes in health: Has a chronic respiratory infection and coughing.  DM:   Last A1c:   Lab Results   Component Value Date/Time    HBA1C 6.1 (A) 06/17/2024 02:08 PM      Previous A1c was 6.4 on 2/28/24  A1C GOAL: < 7    Diabetes Medications:   Synjardy XR 12.5-1000 mg daily      Exercise: Watching grand kids  Diet: Decreased appetite so snacks on yogurt, bagel, and chips.  Patient's body mass index is 26.37 kg/m². Exercise and nutrition counseling were performed at this visit.    Glucose monitoring frequency: Not testing    Hypoglycemic episodes: no  Last Retinal Exam: on file and up-to-date  Daily Foot Exam: Yes   Foot Exam:  Monofilament: done  Monofilament testing with a 10 gram force: sensation intact: intact bilaterally  Visual Inspection: Feet without maceration, ulcers, fissures.  Pedal pulses: intact bilaterally   Lab Results   Component Value Date/Time    MALBCRT 25 02/24/2024 07:33 AM    MICROALBUR 1.7 02/24/2024 07:33 AM        ACR Albumin/Creatinine Ratio goal <30     HTN:   Blood pressure goal <130/<80 .   Currently Rx ACE/ARB: No     Dyslipidemia:    Lab Results   Component Value Date/Time    CHOLSTRLTOT 120 02/24/2024 07:33 AM    LDL 54 02/24/2024 07:33 AM    HDL 55 02/24/2024 07:33 AM    TRIGLYCERIDE 56 02/24/2024 07:33 AM         Currently Rx Statin: Yes       She  reports that she has never smoked. She has never used smokeless tobacco.      Plan:     Discussed and educated on:   - All medications, side effects and compliance (discussed carefully)  - Annual eye examinations at Ophthalmology  - Home glucose monitoring emphasized  - Weight control and daily exercise    Recommended medication changes: No changes at this time.  She will continue  Synjardy XR 12.5-1000 mg po daily.  She will schedule a thyroid biopsy with Dr. Cat.  She will follow up in 6 months with Dr. Cat.

## 2024-06-21 NOTE — TELEPHONE ENCOUNTER
From: Melody Rodriguez  To: Laya Cornelius M.D.  Sent: 6/4/2018 9:57 AM PDT  Subject: Non-Urgent Medical Question    I have a wellness visit scheduled for later this month but I haven't received any orders for labs. Could you please let me know if any lab orders have been generated.    Thanks,    Nishi Rodriguez   
normal...

## 2024-07-17 PROCEDURE — RXMED WILLOW AMBULATORY MEDICATION CHARGE: Performed by: PSYCHIATRY & NEUROLOGY

## 2024-07-17 PROCEDURE — RXMED WILLOW AMBULATORY MEDICATION CHARGE: Performed by: INTERNAL MEDICINE

## 2024-07-18 ENCOUNTER — PHARMACY VISIT (OUTPATIENT)
Dept: PHARMACY | Facility: MEDICAL CENTER | Age: 74
End: 2024-07-18
Payer: COMMERCIAL

## 2024-07-25 ENCOUNTER — APPOINTMENT (OUTPATIENT)
Dept: MEDICAL GROUP | Facility: PHYSICIAN GROUP | Age: 74
End: 2024-07-25
Payer: MEDICARE

## 2024-07-25 VITALS
SYSTOLIC BLOOD PRESSURE: 118 MMHG | HEART RATE: 62 BPM | OXYGEN SATURATION: 93 % | DIASTOLIC BLOOD PRESSURE: 62 MMHG | RESPIRATION RATE: 14 BRPM | BODY MASS INDEX: 25.91 KG/M2 | WEIGHT: 132 LBS | HEIGHT: 60 IN | TEMPERATURE: 96.8 F

## 2024-07-25 DIAGNOSIS — E04.1 THYROID NODULE: ICD-10-CM

## 2024-07-25 DIAGNOSIS — E11.9 CONTROLLED TYPE 2 DIABETES MELLITUS WITHOUT COMPLICATION, WITHOUT LONG-TERM CURRENT USE OF INSULIN (HCC): ICD-10-CM

## 2024-07-25 DIAGNOSIS — G35 MULTIPLE SCLEROSIS (HCC): ICD-10-CM

## 2024-07-25 DIAGNOSIS — I47.10 SVT (SUPRAVENTRICULAR TACHYCARDIA) (HCC): ICD-10-CM

## 2024-07-25 DIAGNOSIS — R26.89 BALANCE DISORDER: ICD-10-CM

## 2024-07-25 PROCEDURE — 99214 OFFICE O/P EST MOD 30 MIN: CPT | Performed by: INTERNAL MEDICINE

## 2024-07-25 PROCEDURE — 3074F SYST BP LT 130 MM HG: CPT | Performed by: INTERNAL MEDICINE

## 2024-07-25 PROCEDURE — 3078F DIAST BP <80 MM HG: CPT | Performed by: INTERNAL MEDICINE

## 2024-07-25 ASSESSMENT — FIBROSIS 4 INDEX: FIB4 SCORE: 1.94

## 2024-08-02 ENCOUNTER — PHARMACY VISIT (OUTPATIENT)
Dept: PHARMACY | Facility: MEDICAL CENTER | Age: 74
End: 2024-08-02
Payer: COMMERCIAL

## 2024-08-02 DIAGNOSIS — I25.84 CORONARY ARTERY CALCIFICATION: ICD-10-CM

## 2024-08-02 DIAGNOSIS — I25.10 CORONARY ARTERY CALCIFICATION: ICD-10-CM

## 2024-08-02 DIAGNOSIS — E78.5 DYSLIPIDEMIA: ICD-10-CM

## 2024-08-02 PROCEDURE — RXMED WILLOW AMBULATORY MEDICATION CHARGE: Performed by: INTERNAL MEDICINE

## 2024-08-02 NOTE — TELEPHONE ENCOUNTER
Received request via: Pharmacy    Was the patient seen in the last year in this department? Yes    Does the patient have an active prescription (recently filled or refills available) for medication(s) requested? No    Pharmacy Name: [facility]    Does the patient have MCFP Plus and need 100 day supply (blood pressure, diabetes and cholesterol meds only)? Yes, quantity updated to 100 days

## 2024-08-06 ENCOUNTER — TELEPHONE (OUTPATIENT)
Dept: PHARMACY | Facility: MEDICAL CENTER | Age: 74
End: 2024-08-06
Payer: MEDICARE

## 2024-08-06 PROCEDURE — RXMED WILLOW AMBULATORY MEDICATION CHARGE: Performed by: PSYCHIATRY & NEUROLOGY

## 2024-08-06 PROCEDURE — RXMED WILLOW AMBULATORY MEDICATION CHARGE: Performed by: INTERNAL MEDICINE

## 2024-08-06 RX ORDER — ROSUVASTATIN CALCIUM 40 MG/1
40 TABLET, COATED ORAL DAILY
Qty: 100 TABLET | Refills: 3 | Status: SHIPPED | OUTPATIENT
Start: 2024-08-06

## 2024-08-07 NOTE — TELEPHONE ENCOUNTER
Contact:  Phone number:[phone] (mobile)    Name of person spoken with and relationship to patient: Melody patient   Patient’s Adherence:  How patient is doing on medication: Very Well    How many missed doses and reason: 0 N/A    Any new medications: No    Any new conditions: No    Any new allergies: No    Any new side effects: No    Any new diagnoses: No    How many doses remainin    Did patient want to speak with pharmacist: No   Delivery:  Delivery date and method: 2024 via     Needs by Date: 2024    Signature required: No     Any additional details for : N/A   Teach Appointment Date:  N/A   Shipping Address:  [address] [place] [id number]   Medication(name,strength and dose):  buPROPion HCl ER (XL) Tablet Extended Release 24 Hour 300 MG Rosuvastatin Calcium Tablet 40 MG    Copay:  $41.33   Payment Method:  Credit card on file   Supplies:  NONE   Additional Information:  NONE     Mariaelena Ibrahim, Pharmacy Liaison/ RX Coordinator

## 2024-08-08 ENCOUNTER — PHARMACY VISIT (OUTPATIENT)
Dept: PHARMACY | Facility: MEDICAL CENTER | Age: 74
End: 2024-08-08
Payer: COMMERCIAL

## 2024-08-09 ENCOUNTER — OFFICE VISIT (OUTPATIENT)
Dept: CARDIOLOGY | Facility: MEDICAL CENTER | Age: 74
End: 2024-08-09
Attending: NURSE PRACTITIONER
Payer: MEDICARE

## 2024-08-09 VITALS
OXYGEN SATURATION: 97 % | WEIGHT: 138 LBS | SYSTOLIC BLOOD PRESSURE: 124 MMHG | RESPIRATION RATE: 14 BRPM | HEIGHT: 60 IN | BODY MASS INDEX: 27.09 KG/M2 | DIASTOLIC BLOOD PRESSURE: 78 MMHG | HEART RATE: 62 BPM

## 2024-08-09 DIAGNOSIS — I47.10 SVT (SUPRAVENTRICULAR TACHYCARDIA) (HCC): ICD-10-CM

## 2024-08-09 LAB — EKG IMPRESSION: NORMAL

## 2024-08-09 PROCEDURE — 93010 ELECTROCARDIOGRAM REPORT: CPT | Performed by: INTERNAL MEDICINE

## 2024-08-09 PROCEDURE — 93005 ELECTROCARDIOGRAM TRACING: CPT | Performed by: NURSE PRACTITIONER

## 2024-08-09 PROCEDURE — 99213 OFFICE O/P EST LOW 20 MIN: CPT | Performed by: NURSE PRACTITIONER

## 2024-08-09 PROCEDURE — 3078F DIAST BP <80 MM HG: CPT | Performed by: NURSE PRACTITIONER

## 2024-08-09 PROCEDURE — 3074F SYST BP LT 130 MM HG: CPT | Performed by: NURSE PRACTITIONER

## 2024-08-09 ASSESSMENT — ENCOUNTER SYMPTOMS
DEPRESSION: 0
SHORTNESS OF BREATH: 0
GASTROINTESTINAL NEGATIVE: 1
DIZZINESS: 0
CONSTITUTIONAL NEGATIVE: 1
BRUISES/BLEEDS EASILY: 0
ORTHOPNEA: 0
RESPIRATORY NEGATIVE: 1
SENSORY CHANGE: 0
PND: 0
WHEEZING: 0
NEUROLOGICAL NEGATIVE: 1
EYES NEGATIVE: 1
MUSCULOSKELETAL NEGATIVE: 1
NAUSEA: 0
NERVOUS/ANXIOUS: 0
CLAUDICATION: 0
HALLUCINATIONS: 0

## 2024-08-09 ASSESSMENT — FIBROSIS 4 INDEX: FIB4 SCORE: 1.94

## 2024-08-09 NOTE — ASSESSMENT & PLAN NOTE
- continue metoprolol   - can use additional half tablet of metoprolol if necessary   - annual follow up

## 2024-08-21 ENCOUNTER — OFFICE VISIT (OUTPATIENT)
Dept: URGENT CARE | Facility: CLINIC | Age: 74
End: 2024-08-21
Payer: MEDICARE

## 2024-08-21 VITALS
TEMPERATURE: 97.3 F | SYSTOLIC BLOOD PRESSURE: 122 MMHG | RESPIRATION RATE: 19 BRPM | DIASTOLIC BLOOD PRESSURE: 80 MMHG | HEART RATE: 74 BPM | HEIGHT: 60 IN | OXYGEN SATURATION: 94 % | BODY MASS INDEX: 26.82 KG/M2 | WEIGHT: 136.6 LBS

## 2024-08-21 DIAGNOSIS — Z87.01 HISTORY OF PNEUMONIA: ICD-10-CM

## 2024-08-21 DIAGNOSIS — J06.9 BACTERIAL URI: Primary | ICD-10-CM

## 2024-08-21 DIAGNOSIS — B96.89 BACTERIAL URI: Primary | ICD-10-CM

## 2024-08-21 DIAGNOSIS — R91.1 NODULE OF RIGHT LUNG: ICD-10-CM

## 2024-08-21 DIAGNOSIS — R05.1 ACUTE COUGH: ICD-10-CM

## 2024-08-21 PROCEDURE — 3079F DIAST BP 80-89 MM HG: CPT

## 2024-08-21 PROCEDURE — 3074F SYST BP LT 130 MM HG: CPT

## 2024-08-21 PROCEDURE — 99213 OFFICE O/P EST LOW 20 MIN: CPT

## 2024-08-21 RX ORDER — GUAIFENESIN 600 MG/1
600 TABLET, EXTENDED RELEASE ORAL EVERY 12 HOURS
Qty: 28 TABLET | Refills: 0 | Status: SHIPPED | OUTPATIENT
Start: 2024-08-21 | End: 2024-09-04

## 2024-08-21 RX ORDER — DOXYCYCLINE HYCLATE 100 MG
100 TABLET ORAL 2 TIMES DAILY
Qty: 14 TABLET | Refills: 0 | Status: SHIPPED | OUTPATIENT
Start: 2024-08-21 | End: 2024-08-28

## 2024-08-21 RX ORDER — BENZONATATE 100 MG/1
100 CAPSULE ORAL 3 TIMES DAILY PRN
Qty: 60 CAPSULE | Refills: 0 | Status: SHIPPED | OUTPATIENT
Start: 2024-08-21

## 2024-08-21 RX ORDER — PREDNISONE 10 MG/1
20 TABLET ORAL DAILY
Qty: 6 TABLET | Refills: 0 | Status: SHIPPED | OUTPATIENT
Start: 2024-08-21 | End: 2024-08-24

## 2024-08-21 ASSESSMENT — ENCOUNTER SYMPTOMS
COUGH: 1
DIARRHEA: 0
ABDOMINAL PAIN: 0
SHORTNESS OF BREATH: 0
NAUSEA: 0
DIZZINESS: 0
PALPITATIONS: 0
HEMOPTYSIS: 0
CHILLS: 0
EYE PAIN: 0
EYE REDNESS: 0
STRIDOR: 0
SPUTUM PRODUCTION: 1
EYE DISCHARGE: 0
SORE THROAT: 0
MYALGIAS: 0
WHEEZING: 0
HEADACHES: 0
VOMITING: 0
FEVER: 0

## 2024-08-21 ASSESSMENT — FIBROSIS 4 INDEX: FIB4 SCORE: 1.94

## 2024-08-21 NOTE — PROGRESS NOTES
Subjective:   Melody Rodriguez is a 74 y.o. female who presents for Cough (Covid test negative and cough for 7 days.)          I introduced myself to the patient and informed them that I am a Family Nurse Practitioner.    HPI:Melody is a 74 year-old female  with a hx of MS, HILARY, DM 2,  who comes in today c/o persistent nonproductive cough, chest congestion. Onset was 8  days ago.  Patient states it started as a cold/viral URI, other symptoms have resolved, but the cough has persisted.  Patient describes symptoms as constant. They describe the cough as nonproductive. Aggravating factors include worse at night, worse when lying flat. Relieving factors include sleeping propped up. Treatments tried at home include  Dayquil, Niquil with minimal effect . They describe their symptoms as moderate.  Denies any known exposure to Covid, Flu, RSV, strep. Denies anyone else is sick at home presently. Denies any history of asthma, COPD, pneumonia.       Review of Systems   Constitutional:  Positive for malaise/fatigue. Negative for chills and fever.   HENT:  Negative for congestion, ear pain and sore throat.    Eyes:  Negative for pain, discharge and redness.   Respiratory:  Positive for cough and sputum production. Negative for hemoptysis, shortness of breath, wheezing and stridor.    Cardiovascular:  Negative for chest pain and palpitations.   Gastrointestinal:  Negative for abdominal pain, diarrhea, nausea and vomiting.   Genitourinary:  Negative for dysuria.   Musculoskeletal:  Negative for myalgias.   Skin:  Negative for rash.   Neurological:  Negative for dizziness and headaches.       Medications: amoxicillin Caps  Aubagio Tabs  buPROPion  levothyroxine Tabs  metoprolol SR Tb24  modafinil Tabs  omeprazole  paroxetine  rosuvastatin  Synjardy XR Tb24  tizanidine Tabs  zolpidem Tabs     Allergies: Patient has no known allergies.    Problem List: does not have any pertinent problems on file.    Surgical History:  Past  Surgical History:   Procedure Laterality Date    PB TOTAL KNEE ARTHROPLASTY Right 12/14/2022    Procedure: RIGHT TOTAL KNEE REPLACEMENT;  Surgeon: Britton Cooney M.D.;  Location: SURGERY [facility];  Service: Orthopedics    GYN SURGERY  1970    Cervical polyp       Past Social Hx:   reports that she has never smoked. She has never used smokeless tobacco. She reports that she does not currently use alcohol after a past usage of about 0.6 oz of alcohol per week. She reports that she does not currently use drugs.     Past Family Hx:   family history includes ADHD in her son; Bipolar disorder in her daughter; Cancer in her brother, maternal grandmother, and mother; Colon Cancer in her mother; Colorectal Cancer in her mother; Depression in her son; Diabetes in her father and mother; Heart Attack in her father; Heart Disease in her father and mother; Hypertension in her son; Lung Disease in her mother; No Known Problems in her maternal grandfather, paternal grandfather, paternal grandmother, and sister; Other in her brother and daughter.     Problem list, medications, and allergies reviewed by myself today in Epic.   I have documented what I find to be significant in regards to past medical, social, family and surgical history  in my HPI or under PMH/PSH/FH review section, otherwise it is noncontributory     Objective:     /80   Pulse 74   Temp 36.3 °C (97.3 °F) (Temporal)   Resp 19   Ht 1.524 m (5')   Wt 62 kg (136 lb 9.6 oz)   SpO2 94%   BMI 26.68 kg/m²     During this visit, appropriate PPE was worn, and hand hygiene was performed.    Physical Exam  Vitals reviewed.   Constitutional:       General: She is not in acute distress.     Appearance: Normal appearance. She is not ill-appearing or toxic-appearing.   HENT:      Head: Normocephalic and atraumatic.      Right Ear: Tympanic membrane, ear canal and external ear normal. There is no impacted cerumen.      Left Ear: Tympanic membrane, ear canal  and external ear normal. There is no impacted cerumen.      Nose: No congestion or rhinorrhea.      Mouth/Throat:      Pharynx: Oropharynx is clear. No oropharyngeal exudate or posterior oropharyngeal erythema.   Eyes:      General: No scleral icterus.        Right eye: No discharge.         Left eye: No discharge.      Conjunctiva/sclera: Conjunctivae normal.      Pupils: Pupils are equal, round, and reactive to light.   Cardiovascular:      Rate and Rhythm: Normal rate and regular rhythm.      Heart sounds: Normal heart sounds. No murmur heard.     No friction rub. No gallop.   Pulmonary:      Effort: Pulmonary effort is normal. No tachypnea, accessory muscle usage or respiratory distress.      Breath sounds: No stridor. Examination of the right-upper field reveals rhonchi. Examination of the left-upper field reveals rhonchi. Examination of the right-middle field reveals rhonchi. Examination of the left-middle field reveals rhonchi. Rhonchi present. No decreased breath sounds, wheezing or rales.   Abdominal:      General: There is no distension.   Musculoskeletal:         General: Normal range of motion.      Cervical back: Normal range of motion. No rigidity.      Right lower leg: No edema.      Left lower leg: No edema.   Lymphadenopathy:      Cervical: No cervical adenopathy.   Skin:     General: Skin is warm and dry.      Coloration: Skin is not jaundiced.   Neurological:      General: No focal deficit present.      Mental Status: She is alert and oriented to person, place, and time. Mental status is at baseline.   Psychiatric:         Mood and Affect: Mood normal.         Behavior: Behavior normal.         Thought Content: Thought content normal.         Judgment: Judgment normal.         Assessment/Plan:     Diagnosis and associated orders:     1. Bacterial URI  predniSONE (DELTASONE) 10 MG Tab    doxycycline (VIBRAMYCIN) 100 MG Tab    benzonatate (TESSALON) 100 MG Cap    guaiFENesin ER (MUCINEX) 600 MG  TABLET SR 12 HR      2. Acute cough  benzonatate (TESSALON) 100 MG Cap      3. Nodule of right lung        4. History of pneumonia  doxycycline (VIBRAMYCIN) 100 MG Tab         Comments/MDM:     1. Acute cough  - benzonatate (TESSALON) 100 MG Cap; Take 1 Capsule by mouth 3 times a day as needed for Cough.  Dispense: 60 Capsule; Refill: 0    2. Bacterial URI   I informed patient that I believe they have a  bacterial infection secondary to viral URI at this time and I discussed the treatment plan with them.  Discussed getting a CXR, patient refuses    I  instructed them regarding supportive care measures-we discussed cough/deep breathing exercises, drink plenty of fluids, get plenty of rest, try a humidifier in bedroom at night to help them sleep, gargle with salt water to help ease sore throat, NeilMed [employer]i bottle sinus wash and warm compresses to sinuses to help with sinus pain.    I instr patient they may use OTC tylenol alternated with ibuprofen for pain/fever - may take tylenol 1000mg every 6 hours, do not exceed 3000 mg in 24 hours; ibuprofen start with lowest effective dose, 200mg every 8 hours, may increase to up to 800 mg every 8 hours if needed.  Patient was instructed that they should start to feel better after 48 to 72 hours of antibiotics, but to return to clinic if symptoms do not improve or worsen.   Strict ER precautions were given if they get fever that doesn't respond to tylenol/ibuprofen, or any chest pain or difficulty breathing.   Patient was encouraged to get a pharmacy consult when picking up any medication's.   I did print written instructions for patient, and did go over the diagnosis including differentials, all medications prescribed including purpose, side effects, precautions, and answered their questions to the best of my ability.   Patient states they have good understanding of instructions, and are agreeable with the plan of care.   - predniSONE (DELTASONE) 10 MG Tab; Take 2 Tablets by  mouth every day for 3 days.  Dispense: 6 Tablet; Refill: 0  - doxycycline (VIBRAMYCIN) 100 MG Tab; Take 1 Tablet by mouth 2 times a day for 7 days.  Dispense: 14 Tablet; Refill: 0  - benzonatate (TESSALON) 100 MG Cap; Take 1 Capsule by mouth 3 times a day as needed for Cough.  Dispense: 60 Capsule; Refill: 0  - guaiFENesin ER (MUCINEX) 600 MG TABLET SR 12 HR; Take 1 Tablet by mouth every 12 hours for 14 days.  Dispense: 28 Tablet; Refill: 0    3. Nodule of right lung    4. History of pneumonia  - doxycycline (VIBRAMYCIN) 100 MG Tab; Take 1 Tablet by mouth 2 times a day for 7 days.  Dispense: 14 Tablet; Refill: 0         Pt is clinically stable at today's acute urgent care visit. Vital signs are normal and reassuring.  No acute distress noted. Appropriate for outpatient management at this time.        I personally reviewed prior external notes and test results pertinent to today's visit.  I have independently reviewed and interpreted all diagnostics ordered during this urgent care acute visit.        Please note that this dictation was created using voice recognition software. I have made a reasonable attempt to correct obvious errors, but I expect that there are errors of grammar and possibly content that I did not discover before finalizing the note.    This note was electronically signed by Nico KENDALL, NORMA, MITCHELL, SAMATNHA

## 2024-08-23 ENCOUNTER — TELEPHONE (OUTPATIENT)
Dept: PHARMACY | Facility: MEDICAL CENTER | Age: 74
End: 2024-08-23
Payer: MEDICARE

## 2024-08-23 PROCEDURE — RXMED WILLOW AMBULATORY MEDICATION CHARGE: Performed by: INTERNAL MEDICINE

## 2024-08-23 NOTE — TELEPHONE ENCOUNTER
Contact:  Phone number:[phone] (mobile)    Name of person spoken with and relationship to patient: Melody patient   Patient’s Adherence:  How patient is doing on medication: Very Well    How many missed doses and reason: 0 N/A    Any new medications: No    Any new conditions: No    Any new allergies: No    Any new side effects: No    Any new diagnoses: No    How many doses remainin    Did patient want to speak with pharmacist: No   Delivery:  Delivery date and method: 2024 via     Needs by Date: 2024    Signature required: No     Any additional details for : N/A   Teach Appointment Date:  N/A   Shipping Address:  [address] [place] [id number]   Medication(name,strength and dose):  SYNJARDY TABLET 12.5-1000 MG   Copay:  $ 75.35    Payment Method:  [place] [employer] will be taking care of the copay   Supplies:  NONE   Additional Information:  NONE     Mariaelena Ibrahim, Pharmacy Liaison/ RX Coordinator

## 2024-08-26 ENCOUNTER — HOSPITAL ENCOUNTER (OUTPATIENT)
Facility: MEDICAL CENTER | Age: 74
End: 2024-08-26
Attending: INTERNAL MEDICINE
Payer: MEDICARE

## 2024-08-26 ENCOUNTER — PHARMACY VISIT (OUTPATIENT)
Dept: PHARMACY | Facility: MEDICAL CENTER | Age: 74
End: 2024-08-26
Payer: COMMERCIAL

## 2024-08-26 ENCOUNTER — PROCEDURE VISIT (OUTPATIENT)
Dept: ENDOCRINOLOGY | Facility: MEDICAL CENTER | Age: 74
End: 2024-08-26
Attending: INTERNAL MEDICINE
Payer: MEDICARE

## 2024-08-26 DIAGNOSIS — E04.2 NON-TOXIC MULTINODULAR GOITER: ICD-10-CM

## 2024-08-26 DIAGNOSIS — Z79.84 LONG TERM (CURRENT) USE OF ORAL HYPOGLYCEMIC DRUGS: ICD-10-CM

## 2024-08-26 DIAGNOSIS — E55.9 VITAMIN D DEFICIENCY: ICD-10-CM

## 2024-08-26 DIAGNOSIS — E03.8 SUBCLINICAL HYPOTHYROIDISM: ICD-10-CM

## 2024-08-26 DIAGNOSIS — E78.5 DYSLIPIDEMIA: ICD-10-CM

## 2024-08-26 DIAGNOSIS — E11.9 CONTROLLED TYPE 2 DIABETES MELLITUS WITHOUT COMPLICATION, WITHOUT LONG-TERM CURRENT USE OF INSULIN (HCC): ICD-10-CM

## 2024-08-26 LAB — PATHOLOGY CONSULT NOTE: NORMAL

## 2024-08-26 PROCEDURE — 88173 CYTOPATH EVAL FNA REPORT: CPT

## 2024-08-26 NOTE — PROGRESS NOTES
POC US guided FNA procedure was completed today for the LLL (polar) 2.2 cm solid nodule  (Patient has a newly detected nodule LLL pole aside from previusly detected LLL solid nodule)  Please see endocrinologist procedure note  Patient tolerated procedure well without complaints.  Patient was advised that she will be contacted regarding the results and next plan  Patient was advised to follow up as planned with labs prior   Patient was advised to take tylenol or ibuprofen for pain  No restrictions were advised post procedure   Patient was advised to call for any issues post biopsy       Swapnil Cat M.D.

## 2024-08-28 ENCOUNTER — OFFICE VISIT (OUTPATIENT)
Dept: SLEEP MEDICINE | Facility: MEDICAL CENTER | Age: 74
End: 2024-08-28
Attending: NURSE PRACTITIONER
Payer: MEDICARE

## 2024-08-28 VITALS
HEART RATE: 75 BPM | WEIGHT: 136.5 LBS | HEIGHT: 60 IN | DIASTOLIC BLOOD PRESSURE: 68 MMHG | RESPIRATION RATE: 16 BRPM | SYSTOLIC BLOOD PRESSURE: 110 MMHG | OXYGEN SATURATION: 95 % | BODY MASS INDEX: 26.8 KG/M2

## 2024-08-28 DIAGNOSIS — G47.8 ABNORMAL REM SLEEP: ICD-10-CM

## 2024-08-28 DIAGNOSIS — G47.33 OSA (OBSTRUCTIVE SLEEP APNEA): ICD-10-CM

## 2024-08-28 DIAGNOSIS — R06.02 SHORTNESS OF BREATH ON EXERTION: ICD-10-CM

## 2024-08-28 DIAGNOSIS — G47.9 RESTLESS SLEEPER: ICD-10-CM

## 2024-08-28 DIAGNOSIS — G35 MULTIPLE SCLEROSIS (HCC): ICD-10-CM

## 2024-08-28 PROCEDURE — 3078F DIAST BP <80 MM HG: CPT | Performed by: PREVENTIVE MEDICINE

## 2024-08-28 PROCEDURE — 3074F SYST BP LT 130 MM HG: CPT | Performed by: PREVENTIVE MEDICINE

## 2024-08-28 PROCEDURE — 99213 OFFICE O/P EST LOW 20 MIN: CPT | Performed by: PREVENTIVE MEDICINE

## 2024-08-28 PROCEDURE — 99204 OFFICE O/P NEW MOD 45 MIN: CPT | Performed by: PREVENTIVE MEDICINE

## 2024-08-28 ASSESSMENT — FIBROSIS 4 INDEX: FIB4 SCORE: 1.94

## 2024-08-28 NOTE — PROGRESS NOTES
"CHIEF COMPLIANT: \"Establish care.\"  HISTORY OF PRESENT ILLNESS:  Melody Rodriguez is a 74 y.o. female kindly referred by Danae Pritchett M.D. and  is here for a sleep medicine consultation.     Sleep History:  Melody Rodriguez {kyhas:15583::\"has never been\"} tested for sleep apnea. The patient reports {kyreports:77084}.  The patient goes to bed at *** pm and wakes up at *** am. It usually takes the patient approximately *** mins to fall asleep. The patient {kydoes:69481} feel refreshed after sleeping and {kydoes:95533} nap during the day. The patient has never used used tobacco.  This pt is a {kyformer:34489} smoker. *** Patient {kydoes:88769}  use cannabis. This patient {kydoes:38602}  drink *** alcoholic beverages per week and {kydoes:61749} drink caffeinated beverages daily (***).     The patient denies any symptoms of narcolepsy such as sleep paralysis or cataplexy, or any symptoms that suggest parasomnias such as sleep walking or acting out of dreams.    Melody Rodriguez is a retired ***     {kyendorses:35975} family members who use PAP therapy/snore.     EPWORTH SCORE:    ***/24, this {kyconsistent:94733} with EDS.       TYPE:  []HST   []In Lab split   []In Lab diagnostic   []In Lab titration  DATE: ***  Diagnostic:AHI: ***  Diagnostic  Oxygen Jake: ***% with ***mins at or under 88%   TREATMENT AHI: ***  TREATMENT Oxygen Jake: ***% with ***mins at or under 88%   TITRATION (CWP): []CPAP, []BiPAP, []ASV, []iVAPS       Significant comorbidities and modifying factors: see below    PAST MEDICAL HISTORY:  Past Medical History:   Diagnosis Date    Acquired hypothyroidism 06/15/2023    Acute nasopharyngitis     Recent chest xray clear    Arthritis     Right knee and thumb joints    Breath shortness     Comes and goes    Bronchitis     Cancer (HCC)     Skin cancer squamous cell    Cerebral atrophy (HCC) 03/16/2022    Chickenpox     Chronic cough 03/15/2023    Cough     COVID-19 virus infection " 03/15/2023    Daytime sleepiness     Depression     Diabetes ([facility])     Difficulty swallowing     Disorder of thyroid     Nodules in both nodes. Biopsy indicates benign. Last ultrasound shows stable.    Dizziness     Eye drainage     Fatigue     Groin lump 06/09/2022    Gynecological disorder     Cervical polyp in 1970    Hearing difficulty     Heart burn     Controlled with Omeprazole    Heart murmur     Minor leakage in 3 valves due to scarlet fever as a child    Heart valve disease     Minor leakage in 3 valves due to scarlet fever as a child    Heartburn     Hiatus hernia syndrome     I think    High cholesterol     Taking Simvistatin    Hyperlipidemia     Hypokalemia 05/20/2022    Hyponatremia 12/19/2022    Hypothyroidism     Influenza     Morning headache     Mumps     Muscle disorder     HILARY (obstructive sleep apnea) 03/20/2024    HILARY (obstructive sleep apnea) 03/20/2024    Osteoarthritis of right knee 06/15/2021    Palpitations     Pneumonia     PONV (postoperative nausea and vomiting) 1970    Only time I’ve been under anesthesia    Recurrent major depressive disorder, in remission ([facility]) 03/16/2022    Ringing in ears     Scarlet fever     Shortness of breath     SI (sacroiliac) pain 06/09/2022    Snoring     Tonsillitis     Toothache     Type 2 diabetes mellitus with stage 3 chronic kidney disease ([facility]) 06/15/2021    Urinary bladder disorder     MS issue    Urinary incontinence     During MS relapse. Has since resolved    Vitamin D deficiency 06/15/2023    Wears glasses       PROBLEM LIST:  Patient Active Problem List    Diagnosis Date Noted    HILARY (obstructive sleep apnea) 03/20/2024    Thyroid nodule 02/28/2024    Acute cough 02/28/2024    Abnormal finding on lung imaging 02/28/2024    SVT (supraventricular tachycardia) ([facility]) 02/16/2024    Acquired hypothyroidism 06/15/2023    Vitamin D deficiency 06/15/2023    Family history of premature coronary artery disease 06/07/2023    History of skin cancer  03/22/2023    Gastroesophageal reflux disease without esophagitis 03/22/2023    Bipolar disorder in full remission ([facility]) 03/22/2023    Primary insomnia 03/22/2023    Sedative dependence with current use ([facility]) 03/22/2023    Total knee replacement status, right 12/14/2022    SI (sacroiliac) pain 06/09/2022    BMI 25.0-25.9,adult 05/20/2022    Osteopenia of multiple sites 05/20/2022    Cerebral atrophy ([facility]) 03/16/2022    Osteoarthritis of right knee 06/15/2021    Long term (current) use of oral hypoglycemic drugs 10/21/2020    Non-toxic multinodular goiter 01/29/2020    Risk for falls 07/12/2019    Dysphagia 03/29/2019    Controlled type 2 diabetes mellitus without complication, without long-term current use of insulin ([facility]) 01/06/2017    Mixed hyperlipidemia 01/06/2017    Multiple sclerosis ([facility]) 01/06/2017    Schatzki's ring 01/06/2017    Hiatal hernia 01/06/2017     PAST SOCIAL HISTORY:  Past Surgical History:   Procedure Laterality Date    PB TOTAL KNEE ARTHROPLASTY Right 12/14/2022    Procedure: RIGHT TOTAL KNEE REPLACEMENT;  Surgeon: Britton Cooney M.D.;  Location: SURGERY [facility];  Service: Orthopedics    ARTHROSCOPY, KNEE      GYN SURGERY  1970    Cervical polyp     PAST FAMILY HISTORY:  Family History   Problem Relation Age of Onset    Colon Cancer Mother     Heart Disease Mother         Cardiomyopathy    Lung Disease Mother         Emphysema from 2nd hand smoke    Cancer Mother         Colon    Colorectal Cancer Mother     Diabetes Mother     Heart Attack Father     Heart Disease Father         Numerous heart attacks    Diabetes Father     No Known Problems Sister     Cancer Brother         Skin, prostate    Prostate cancer Brother     Other Brother         Gaulbladder issues    Cancer Brother         Prostate, unknown but closely related to endometrial sarcoma. He is not transgender and is only one of a handful of known cases worldwide    Prostate cancer Brother     Respiratory Disease Brother          Asthma    Cancer Maternal Grandmother         Cancer but don’t know specific type. Didn’t talk anout it in the 1960’s    No Known Problems Maternal Grandfather     No Known Problems Paternal Grandmother     No Known Problems Paternal Grandfather     Other Daughter         Fibromyalgia    Bipolar disorder Daughter     ADHD Son     Depression Son     Hypertension Son      SOCIAL HISTORY:  Social History     Socioeconomic History    Marital status:      Spouse name: Not on file    Number of children: Not on file    Years of education: Not on file    Highest education level: 12th grade   Occupational History    Not on file   Tobacco Use    Smoking status: Never    Smokeless tobacco: Never   Vaping Use    Vaping status: Never Used   Substance and Sexual Activity    Alcohol use: Yes     Alcohol/week: 0.6 oz     Comment: Occasional glass of wine or april    Drug use: Not Currently    Sexual activity: Not Currently     Partners: Male   Other Topics Concern    Not on file   Social History Narrative    Not on file     Social Determinants of Health     Financial Resource Strain: Low Risk  (1/24/2024)    Overall Financial Resource Strain (CARDIA)     Difficulty of Paying Living Expenses: Not hard at all   Food Insecurity: No Food Insecurity (1/24/2024)    Hunger Vital Sign     Worried About Running Out of Food in the Last Year: Never true     Ran Out of Food in the Last Year: Never true   Transportation Needs: No Transportation Needs (1/24/2024)    PRAPARE - Transportation     Lack of Transportation (Medical): No     Lack of Transportation (Non-Medical): No   Physical Activity: Inactive (11/9/2022)    Exercise Vital Sign     Days of Exercise per Week: 0 days     Minutes of Exercise per Session: 0 min   Stress: Stress Concern Present (5/26/2020)    [employer] [place] [employer] - Occupational Stress Questionnaire     Feeling of Stress : To some extent   Social Connections: Moderately Isolated  (5/26/2020)    Social Connection and Isolation Panel [NHANES]     Frequency of Communication with Friends and Family: More than three times a week     Frequency of Social Gatherings with Friends and Family: More than three times a week     Attends Latter day Services: Never     Active Member of Clubs or Organizations: Yes     Attends Club or Organization Meetings: More than 4 times per year     Marital Status:    Intimate Partner Violence: Not on file   Housing Stability: Low Risk  (11/9/2022)    Housing Stability Vital Sign     Unable to Pay for Housing in the Last Year: No     Number of Places Lived in the Last Year: 1     Unstable Housing in the Last Year: No     ALLERGIES: Patient has no known allergies.  MEDICATIONS:  Current Outpatient Medications   Medication Sig Dispense Refill    doxycycline (VIBRAMYCIN) 100 MG Tab Take 1 Tablet by mouth 2 times a day for 7 days. 14 Tablet 0    benzonatate (TESSALON) 100 MG Cap Take 1 Capsule by mouth 3 times a day as needed for Cough. 60 Capsule 0    rosuvastatin (CRESTOR) 40 MG tablet Take 1 Tablet by mouth every day. 100 Tablet 3    levothyroxine (SYNTHROID) 50 MCG Tab Take 1 Tablet by mouth every morning on an empty stomach. 90 Tablet 2    buPROPion (WELLBUTRIN XL) 300 MG XL tablet Take 1 tablet (300 mg) by mouth daily in the morning 90 Tablet 2    paroxetine (PAXIL) 40 MG tablet TAKE 1 TABLET(40 MG) BY MOUTH DAILY IN THE MORNING 90 Tablet 2    metoprolol SR (TOPROL XL) 25 MG TABLET SR 24 HR Take 0.5 Tablets by mouth every evening. 45 Tablet 3    Empagliflozin-metFORMIN HCl ER (SYNJARDY XR) 12.5-1000 MG TABLET SR 24 HR Take 1 tablet by mouth daily 100 Tablet 2    omeprazole (PRILOSEC) 20 MG delayed-release capsule Take 1 capsule by mouth every morning, 30 minutes before breakfast. 100 Capsule 3    tizanidine (ZANAFLEX) 4 MG Tab Take 1 Tablet by mouth 2 times a day as needed (spasm). 200 Tablet 0    paroxetine (PAXIL) 40 MG tablet Take 1 Tablet by mouth every day.  "Indications: Major Depressive Disorder 100 Tablet 1    zolpidem (AMBIEN) 5 MG Tab Take 1 tablet (5 mg) by mouth daily at bedtime as needed for insomnia 30 Tablet 0    Teriflunomide (AUBAGIO) 14 MG Tab Take 14 mg by mouth every day. Indications: Relapsing, Remitting Multiple Sclerosis      modafinil (PROVIGIL) 200 MG Tab Take 200 mg by mouth 1 time a day as needed. Indications: Tiredness associated with Multiple Sclerosis      guaiFENesin ER (MUCINEX) 600 MG TABLET SR 12 HR Take 1 Tablet by mouth every 12 hours for 14 days. 28 Tablet 0     No current facility-administered medications for this visit.    \"CURRENT RX\"    REVIEW OF SYSTEMS:  Constitutional: Denies weight loss, endorses chronic daytime fatigue --also see HPI    PHYSICAL EXAM/VITALS:  /68 (BP Location: Left arm, Patient Position: Sitting, BP Cuff Size: Adult)   Pulse 75   Resp 16   Ht 1.524 m (5')   Wt 61.9 kg (136 lb 8 oz)   SpO2 95%   BMI 26.66 kg/m²   Neck circumference (inches): 14  Appearance: Well-nourished, well-developed,  looks stated age, no acute distress  Eyes:   EOMI  ENMT:   Hard palate narrow: No   Hard palate high: No   Soft palate/uvula (Mallampati score): 3  Tongue Scalloping: No   Retrognathia:  No   Micrognathia:  No    Neck: Supple, trachea midline  Respiratory effort:  No intercostal retractions or use of accessory muscles  Lung auscultation:  No wheezes rhonchi rubs or rales  Cardiac: No murmurs, rubs, or gallops; regular rhythm, normal rate; no edema  Musculoskeletal:  Grossly normal; gait and station normal  Neurologic:  oriented to person, time, place, and purpose; judgement intact  Psychiatric:  No depression, anxiety, agitation    MEDICAL DECISION MAKING:  The medical record was reviewed as it pertains to this referral. This includes records from primary care,consultants notes, referral request, hospital records, labs and imaging. Any available diagnostic and titration nocturnal polysomnograms, home sleep apnea " tests, continuous nocturnal oximetry results, multiple sleep latency tests, and recent compliance reports were reviewed with the patient.    ASSESSMENT/PLAN:  Melody Rodriguez is a 74 y.o. female  who has been diagnosed with obstructive sleep apnea.        Change medication for sleep  to [place]    DIAGNOSES :    1. HILARY (obstructive sleep apnea)  - Polysomnography, 4 or More; Future    2. Abnormal REM sleep  - Polysomnography, 4 or More; Future    3. Multiple sclerosis (HCC)  - Referral to Pulmonary and Sleep Medicine  - Polysomnography, 4 or More; Future    4. Shortness of breath on exertion  - Referral to Pulmonary and Sleep Medicine    5. Restless sleeper  - Polysomnography, 4 or More; Future    The patient has signs and symptoms consistent with obstructive sleep apnea hypopnea syndrome. Will schedule a nocturnal polysomnogram or a home sleep apnea test (please see plan).  The risks of untreated sleep apnea were discussed with the patient at length. Patients with HILARY are at increased risk of cardiovascular disease including coronary artery disease, systemic arterial hypertension, pulmonary arterial hypertension, cardiac arrhythmias, and stroke. HILARY patients have an increased risk of motor vehicle accidents, type 2 diabetes, chronic kidney disease, and non-alcoholic liver disease. The patient was advised to avoid driving a motor vehicle when drowsy.  Have advised the patient to follow up with the appropriate healthcare practitioners for all other medical problems and issues.    RETURN TO CLINIC: Return for 3 weeks after SS - discuss results w/ any provider.    My total time spent caring for the patient on the day of the encounter was 40 minutes. This includes time spent on a thorough chart review including other physician notes, all sleep studies, as well as critical labs and pulmonary and cardiac studies.  Additionally, it includes a thorough discussion of good sleep hygiene and stimulus control, as well  as  the need for consistency in terms of sleep preparation and practice.    Please note that this dictation was created using voice recognition software.  I have made every reasonable attempt to correct obvious errors, I expect that there are errors of grammar and possibly content that I did not discover before finalizing this note.                        Answers submitted by the patient for this visit:  Sleep Center Questionnaire (Submitted on 8/25/2024)  Year of your last physical exam: 2024  Results of exam: Chronic conditions are stable  Occupation : Retired  Height: 5’  Current weight: 135  6 months ago: 132  At age 20: Maybe 115-120. Been a long time.  What is the reason for your visit today?: Results of sleep study. Question about chrinic pneumonia  Have you ever been hospitalized?: Yes  Reason, year, and hospital in which you were hospitalized:: 2022 [facility] following total knee replacement. Failure of oxygen level to stabilize.  Have you ever had problems with anesthesia?: Yes  Have you experienced post-operative delirium?: No  Any complications with surgery?: No  What year did you receive your last Flu shot?: 2023  What year did you receive you last Pneumonia shot?: 2018  Have you had a TB skin test? If so, please list the year and result:: 2021  Have you had Allergy skin testing? If so, please list the year and result:: No  Please briefly describe your sleep problem and how old you were when it began.: Unclear. Tested due to fatigue  How does this affect your daily life and activities? Please also rate how serious of a problem this is (1 = Not at all, 10 = Very Serious).: Fatigue 8-9 but not necessarily 100% due to sleep disorder  Have you had any previous evaluations, examinations, or treatment for this sleep problem or any other problems with your sleep? If so, please describe the evaluation, treatment, and results.: No  Have you used any medications (prescribed or otherwise) to help your sleep problem?  If yes, include name, amount, frequency, and the prescribing physician.: Zolpidem 5mg. As needed. Dr. Ashanti Bradford  What time do you usually go to bed and wake up on: Weekdays? Weekends?: 10 pm-6:30 am weekdays. Weekends vary  Do you have a regular bed partner?: No  How many minutes does it usually take to fall asleep at night after turning off the lights?: No clue. Often I fall asleep with the lights still on. Less often I can’t fall asleep for the life if me.  What do you ordinarily do just prior to turning out the lights and attempting to go to sleep (e.g., reading, TV, baths, etc.)?: Reading  On average, how many times do you wake up during the night?: 1-2  On average, how many times do you wake up to use the bathroom?: 1  Do you often wake up too early in the morning and are unable to return to sleep?: During some periods often at around 3 am.  On average, how many hours of sleep do you get per night?: August 6 hr 13 min. July 5 hrs 7 min  How do you usually awaken?  Alarm, spontaneously, or other?: Varies. Alarm but more recently spontaneously  Is it difficult for you to awaken and get out of bed after sleeping? (Not at all, Sometimes, Very): Very  Do you nap or return to bed after arising?: Yes often  Are you bothered by sleepiness during the day?: Yes  Do you feel that you get too much sleep at night?: No  Do you feel that you get too little sleep at night?: Yes  Do you usually feel tired during the day? If so, what do you attribute this to?: Always. MS?  Do you find yourself falling asleep when you don't mean to? : Yes  If yes, how long does your sleep episode last?: Minutes to hours  Do you feel rested or refreshed after the sleep episode?: No  Have you ever suddenly fallen?: Yes  Have you ever experienced sudden body weakness?: No  Have you ever experienced weakness or paralysis upon going to sleep?: No  Have you ever experienced weakness or paralysis upon awakening from sleep?: No  Have you ever  "experienced seeing things or hearing voices/noises: That weren't real? On going to sleep? During the night? On awakening from sleep? During the day?: Dreaming. When young I used to sleepwalk. I still have active dreams.  Do you have difficulty breathing at night? If yes, briefly describe.: No  Have you been told you snore while asleep? If so, does it disturb a bed partner (or someone in the same room), or someone in the next room?: Absolutely. It’s been stressed to me how loud I can be. I shared a room with a friend who went outside to sleep in the car.  Have you ever experienced doing something without being aware of the action? If yes, please describe.: Not in a very long time  Have you ever experienced upon lying in bed before sleep or on awakening from sleep: Restlessness of legs, \"nervous legs\", \"creeping crawling\" sensation of legs, or twitching of legs?: No  Have you ever been told that your arms or legs jerk or twitch while you are asleep? If yes, how many times per night does this occur?: No  Do you know, or have you ever been told that you do any of the following while sleeping: talk, walk, grit teeth, wet the bed, wake up screaming or seemingly afraid, have disturbing dreams, have unusual movements, wake up with headaches, (males) have erections? If yes to any of these, please indicate how many times per week, age started, last occurrence, treatment received.: I still have occasional episodes of active dreaming, talking during sleep, difficulty telling dream from reality. Often wake with a headache.    "

## 2024-09-14 ENCOUNTER — SLEEP STUDY (OUTPATIENT)
Dept: SLEEP MEDICINE | Facility: MEDICAL CENTER | Age: 74
End: 2024-09-14
Attending: PREVENTIVE MEDICINE
Payer: MEDICARE

## 2024-09-14 DIAGNOSIS — G47.8 ABNORMAL REM SLEEP: ICD-10-CM

## 2024-09-14 DIAGNOSIS — G35 MULTIPLE SCLEROSIS (HCC): ICD-10-CM

## 2024-09-14 DIAGNOSIS — G47.33 OSA (OBSTRUCTIVE SLEEP APNEA): ICD-10-CM

## 2024-09-14 DIAGNOSIS — G47.9 RESTLESS SLEEPER: ICD-10-CM

## 2024-09-14 PROCEDURE — 95810 POLYSOM 6/> YRS 4/> PARAM: CPT | Performed by: PREVENTIVE MEDICINE

## 2024-09-16 NOTE — PROCEDURES
Physician:   Patient: NEREYDA JIMENEZ  ID: [id number] Date: 9/14/2024 Exam No.:   MONTAGE: Standard  STUDY TYPE: Diagnostic  RECORDING TECHNIQUE:   After the scalp was prepared, gold plated electrodes were applied to the scalp according to the International 10-20 System. EEG (electroencephalogram) was continuously monitored from the O1-M2, O2-M1, C3-M2, C4-M1, F3-M2, and F4-M1. EOGs (electrooculograms) were monitored by electrodes placed at the left and right outer canthi. Chin EMG (electromyogram) was monitored by electrodes placed on the mentalis and sub-mentalis muscles. Nasal and oral airflow were monitored using a triple port thermocouple as well as oronasal pressure transducer. Respiratory effort was measured by inductive plethysmography technology employing abdominal and thoracic belts. Blood oxygen saturation and pulse were monitored by pulse oximetry. Heart rhythm was monitored by surface electrocardiogram. Leg EMG was studied using surface electrodes placed on left and right anterior tibialis. A microphone was used to monitor tracheal sounds and snoring. Body position was monitored and documented by technician observation.   SCORING CRITERIA:   A modification of the AASM manual for scoring of sleep and associated events was used. Obstructive apneas were scored by cessation of airflow for at least 10 seconds with continuing respiratory effort. Central apneas were scored by cessation of airflow for at least 10 seconds with no respiratory effort. Hypopneas were scored by a 30% or more reduction in airflow for at least 10 seconds accompanied by arterial oxygen desaturation of 3% or an arousal. For CMS (Medicare) patients, per AASM rule 1B, hypopneas are scored by 30% with mild reduction in airflow for at least 10 seconds accompanied by arterial saturation decreased at 4%.  Study start time was 08:46:51 PM. Diagnostic recording time was 9h 20.0m with a total sleep time of 8h 37.5m resulting in a sleep  efficiency of 92.41%%. Sleep latency from the start of the study was 15 minutes and the latency from sleep to REM was 259 minutes. In total,103 arousals were scored for an arousal index of 11.9.  Respiratory:  There were a total of 10 apneas consisting of 10 obstructive apneas, 0 mixed apneas, and 0 central apneas. A total of 77 hypopneas were scored. The apnea index was 1.16 per hour and the hypopnea index was 8.93 per hour resulting in an overall AHI of 10.09. AHI during REM was 6.1 and AHI while supine was 0.00.  Oximetry:  There was a mean oxygen saturation of 93.0%. The minimum oxygen saturation in NREM was 85.0 % and in REM was 88.0%. The patient spent 2.5 minutes of TST with SaO2 <88%.  Cardiac:  The highest heart rate seen while awake was 87 BPM while the highest heart rate during sleep was 82 BPM with an average sleeping heart rate of 60 BPM.  Limb Movements:  There were a total of 0 PLMs during sleep which resulted in a PLMS index of 0.0. Of these, 1 were associated with arousals which resulted in a PLMS arousal index of 0.1.  Assessment:   ***Obstructive Sleep Apnea Hypopnea - AHI*** ***Nocturnal desaturation - lewis saturation ***% - saturations <88% below for *** minutes of TST.  Recommendation:

## 2024-10-28 ENCOUNTER — APPOINTMENT (OUTPATIENT)
Dept: RADIOLOGY | Facility: IMAGING CENTER | Age: 74
End: 2024-10-28
Attending: PHYSICIAN ASSISTANT
Payer: MEDICARE

## 2024-10-28 ENCOUNTER — OFFICE VISIT (OUTPATIENT)
Dept: URGENT CARE | Facility: CLINIC | Age: 74
End: 2024-10-28
Payer: MEDICARE

## 2024-10-28 VITALS
WEIGHT: 132 LBS | BODY MASS INDEX: 25.91 KG/M2 | DIASTOLIC BLOOD PRESSURE: 88 MMHG | RESPIRATION RATE: 19 BRPM | TEMPERATURE: 97.6 F | HEIGHT: 60 IN | OXYGEN SATURATION: 100 % | HEART RATE: 95 BPM | SYSTOLIC BLOOD PRESSURE: 134 MMHG

## 2024-10-28 DIAGNOSIS — S05.11XA ORBITAL CONTUSION, RIGHT, INITIAL ENCOUNTER: ICD-10-CM
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
## 2024-10-28 PROCEDURE — 3079F DIAST BP 80-89 MM HG: CPT | Performed by: PHYSICIAN ASSISTANT

## 2024-10-28 PROCEDURE — 3075F SYST BP GE 130 - 139MM HG: CPT | Performed by: PHYSICIAN ASSISTANT

## 2024-10-28 PROCEDURE — 99214 OFFICE O/P EST MOD 30 MIN: CPT | Performed by: PHYSICIAN ASSISTANT

## 2024-10-28 PROCEDURE — 70200 X-RAY EXAM OF EYE SOCKETS: CPT | Mod: TC,FY,RT | Performed by: PHYSICIAN ASSISTANT

## 2024-10-28 ASSESSMENT — ENCOUNTER SYMPTOMS
NAUSEA: 0
FOCAL WEAKNESS: 0
DIZZINESS: 0
LOSS OF CONSCIOUSNESS: 0
PHOTOPHOBIA: 0
WEAKNESS: 0
FALLS: 1
HEADACHES: 0
BACK PAIN: 0
EYE PAIN: 0
TINGLING: 0
SENSORY CHANGE: 0
ABDOMINAL PAIN: 0
NECK PAIN: 0
TREMORS: 0
BLURRED VISION: 0
BRUISES/BLEEDS EASILY: 0
SPEECH CHANGE: 0
VOMITING: 0
DOUBLE VISION: 0

## 2024-10-28 ASSESSMENT — FIBROSIS 4 INDEX: FIB4 SCORE: 1.94

## 2024-11-05 ENCOUNTER — APPOINTMENT (OUTPATIENT)
Dept: MEDICAL GROUP | Facility: PHYSICIAN GROUP | Age: 74
End: 2024-11-05
Payer: MEDICARE

## 2024-11-05 VITALS
OXYGEN SATURATION: 99 % | DIASTOLIC BLOOD PRESSURE: 72 MMHG | SYSTOLIC BLOOD PRESSURE: 120 MMHG | HEIGHT: 60 IN | WEIGHT: 131.4 LBS | RESPIRATION RATE: 14 BRPM | HEART RATE: 78 BPM | BODY MASS INDEX: 25.8 KG/M2 | TEMPERATURE: 96.9 F

## 2024-11-05 DIAGNOSIS — G47.33 OSA (OBSTRUCTIVE SLEEP APNEA): ICD-10-CM

## 2024-11-05 DIAGNOSIS — F51.01 PRIMARY INSOMNIA: ICD-10-CM

## 2024-11-05 DIAGNOSIS — R29.6 FALLS: ICD-10-CM

## 2024-11-05 DIAGNOSIS — Z23 NEED FOR VACCINATION: ICD-10-CM

## 2024-11-05 DIAGNOSIS — F90.8 OTHER SPECIFIED ATTENTION DEFICIT HYPERACTIVITY DISORDER (ADHD): ICD-10-CM

## 2024-11-05 DIAGNOSIS — G35 MULTIPLE SCLEROSIS (HCC): ICD-10-CM

## 2024-11-05 DIAGNOSIS — E11.9 CONTROLLED TYPE 2 DIABETES MELLITUS WITHOUT COMPLICATION, WITHOUT LONG-TERM CURRENT USE OF INSULIN (HCC): ICD-10-CM

## 2024-11-05 PROBLEM — R05.1 ACUTE COUGH: Status: RESOLVED | Noted: 2024-02-28 | Resolved: 2024-11-05

## 2024-11-05 PROCEDURE — G0008 ADMIN INFLUENZA VIRUS VAC: HCPCS

## 2024-11-05 PROCEDURE — 99214 OFFICE O/P EST MOD 30 MIN: CPT | Mod: 25 | Performed by: INTERNAL MEDICINE

## 2024-11-05 PROCEDURE — 90662 IIV NO PRSV INCREASED AG IM: CPT

## 2024-11-05 PROCEDURE — 3074F SYST BP LT 130 MM HG: CPT | Performed by: INTERNAL MEDICINE

## 2024-11-05 PROCEDURE — 3078F DIAST BP <80 MM HG: CPT | Performed by: INTERNAL MEDICINE

## 2024-11-05 ASSESSMENT — FIBROSIS 4 INDEX: FIB4 SCORE: 1.94

## 2024-11-05 NOTE — PROGRESS NOTES
"    CC: Recent fall, MS    HPI:  Melody presents with the following    1. Need for vaccination  Due for flu vaccine    2. Controlled type 2 diabetes mellitus without complication, without long-term current use of insulin ([facility])  7/25/2024:Chronic. Stable. Followed by endocrinology. Patient currently treated with Synjardy and her hemoglobin A1c is 6.1 down from 6.4.     11/5/2024:.  Patient has an appointment with endocrinology in 2 months.  No recent medication changes.  CMP ordered along with A1c per endocrinology.    3. Multiple sclerosis (HCC)  6/15/2023:Chronic.  Stable.  Patient continues to be followed by neurology, continues on Aubagio and Provigil.     10/18/2023:.  Patient continues on Aubagio.  Patient has been on Copaxone in the past.  Patient has not had a relapse in quite some time.  Patient does have complaints of more incontinence lately and still has complaints about this \"MS shaneka\" she describes it as a chest squeezing from the breastbone all the way to her back.  This has been going on for many years but now seems to be worsening.  Patient has a follow-up appointment with neurology in March with Dr. Guzman.      7/25/2024:.  Patient follows up with neurology every 6 months.  Patient continues to be treated with Aubagio and recently received funds through a katina for another years supply.  Patient continues to struggle with fatigue and urinary incontinence.  Also patient's balance and strength have also worsened.  She took a fall 10 days ago when she lost her balance.  She feels most unstable when she takes a quick turn.  She is considering starting a yoga class.    11/5/2024:.  Patient continues with treatment with Aubagio.  Patient missed her appointment and is now scheduled for December of next year.  Patient not comfortable waiting that long.  On a waiting list.  Patient would like to consider referral to neurology with [facility].  Currently being seen by Dr. Guzman.     4. HILARY (obstructive " sleep apnea)  Patient is followed by pulmonology.  Recently completed a sleep study.    5. Primary insomnia  Requesting refill on Ambien 5 mg tablets that she uses as needed for insomnia.  Last filled in August 2024 through psychiatry.    6. Falls  Patient recently fell on October 28 and went to urgent care.  Patient was pumping gas and jumped over a hose tripped and fell onto the right side and hit her right orbit.  X-rays okay.  Patient started physical therapy for balance and strengthening.  Started PT over the summer.    7. Other specified attention deficit hyperactivity disorder (ADHD)  Chronic history.  Followed by psychiatry every 6 months.  Currently taking Wellbutrin 300 mg daily however not helping with her ADHD symptoms.  Per psychiatry, patient is not a candidate for certain medications.      Patient Active Problem List    Diagnosis Date Noted    Falls 11/05/2024    Other specified attention deficit hyperactivity disorder (ADHD) 11/05/2024    HILARY (obstructive sleep apnea) 03/20/2024    Thyroid nodule 02/28/2024    Abnormal finding on lung imaging 02/28/2024    SVT (supraventricular tachycardia) ([facility]) 02/16/2024    Acquired hypothyroidism 06/15/2023    Vitamin D deficiency 06/15/2023    Family history of premature coronary artery disease 06/07/2023    History of skin cancer 03/22/2023    Gastroesophageal reflux disease without esophagitis 03/22/2023    Bipolar disorder in full remission ([facility]) 03/22/2023    Primary insomnia 03/22/2023    Sedative dependence with current use ([facility]) 03/22/2023    Total knee replacement status, right 12/14/2022    SI (sacroiliac) pain 06/09/2022    BMI 25.0-25.9,adult 05/20/2022    Osteopenia of multiple sites 05/20/2022    Cerebral atrophy (HCC) 03/16/2022    Osteoarthritis of right knee 06/15/2021    Long term (current) use of oral hypoglycemic drugs 10/21/2020    Non-toxic multinodular goiter 01/29/2020    Risk for falls 07/12/2019    Dysphagia 03/29/2019    Controlled type  2 diabetes mellitus without complication, without long-term current use of insulin (HCC) 01/06/2017    Mixed hyperlipidemia 01/06/2017    Multiple sclerosis (HCC) 01/06/2017    Schatzki's ring 01/06/2017    Hiatal hernia 01/06/2017       Current Outpatient Medications   Medication Sig Dispense Refill    rosuvastatin (CRESTOR) 40 MG tablet Take 1 Tablet by mouth every day. 100 Tablet 3    levothyroxine (SYNTHROID) 50 MCG Tab Take 1 Tablet by mouth every morning on an empty stomach. 90 Tablet 2    buPROPion (WELLBUTRIN XL) 300 MG XL tablet Take 1 tablet (300 mg) by mouth daily in the morning 90 Tablet 2    paroxetine (PAXIL) 40 MG tablet TAKE 1 TABLET(40 MG) BY MOUTH DAILY IN THE MORNING 90 Tablet 2    metoprolol SR (TOPROL XL) 25 MG TABLET SR 24 HR Take 0.5 Tablets by mouth every evening. 45 Tablet 3    Empagliflozin-metFORMIN HCl ER (SYNJARDY XR) 12.5-1000 MG TABLET SR 24 HR Take 1 tablet by mouth daily 100 Tablet 2    omeprazole (PRILOSEC) 20 MG delayed-release capsule Take 1 capsule by mouth every morning, 30 minutes before breakfast. 100 Capsule 3    tizanidine (ZANAFLEX) 4 MG Tab Take 1 Tablet by mouth 2 times a day as needed (spasm). 200 Tablet 0    paroxetine (PAXIL) 40 MG tablet Take 1 Tablet by mouth every day. Indications: Major Depressive Disorder 100 Tablet 1    zolpidem (AMBIEN) 5 MG Tab Take 1 tablet (5 mg) by mouth daily at bedtime as needed for insomnia 30 Tablet 0    Teriflunomide (AUBAGIO) 14 MG Tab Take 14 mg by mouth every day. Indications: Relapsing, Remitting Multiple Sclerosis      modafinil (PROVIGIL) 200 MG Tab Take 200 mg by mouth 1 time a day as needed. Indications: Tiredness associated with Multiple Sclerosis      benzonatate (TESSALON) 100 MG Cap Take 1 Capsule by mouth 3 times a day as needed for Cough. 60 Capsule 0     No current facility-administered medications for this visit.         Allergies as of 11/05/2024    (No Known Allergies)        Social History     Socioeconomic History     Marital status:      Spouse name: Not on file    Number of children: Not on file    Years of education: Not on file    Highest education level: 12th grade   Occupational History    Not on file   Tobacco Use    Smoking status: Never    Smokeless tobacco: Never   Vaping Use    Vaping status: Never Used   Substance and Sexual Activity    Alcohol use: Yes     Alcohol/week: 0.6 oz     Comment: Occasional glass of wine or april    Drug use: Not Currently    Sexual activity: Not Currently     Partners: Male   Other Topics Concern    Not on file   Social History Narrative    Not on file     Social Drivers of Health     Financial Resource Strain: Low Risk  (1/24/2024)    Overall Financial Resource Strain (CARDIA)     Difficulty of Paying Living Expenses: Not hard at all   Food Insecurity: No Food Insecurity (1/24/2024)    Hunger Vital Sign     Worried About Running Out of Food in the Last Year: Never true     Ran Out of Food in the Last Year: Never true   Transportation Needs: No Transportation Needs (1/24/2024)    PRAPARE - Transportation     Lack of Transportation (Medical): No     Lack of Transportation (Non-Medical): No   Physical Activity: Inactive (11/9/2022)    Exercise Vital Sign     Days of Exercise per Week: 0 days     Minutes of Exercise per Session: 0 min   Stress: Stress Concern Present (5/26/2020)    [employer] [place] [employer] - Occupational Stress Questionnaire     Feeling of Stress : To some extent   Social Connections: Moderately Isolated (5/26/2020)    Social Connection and Isolation Panel [NHANES]     Frequency of Communication with Friends and Family: More than three times a week     Frequency of Social Gatherings with Friends and Family: More than three times a week     Attends Sabianist Services: Never     Active Member of Clubs or Organizations: Yes     Attends Club or Organization Meetings: More than 4 times per year     Marital Status:    Intimate Partner Violence: Not  on file   Housing Stability: Low Risk  (11/9/2022)    Housing Stability Vital Sign     Unable to Pay for Housing in the Last Year: No     Number of Places Lived in the Last Year: 1     Unstable Housing in the Last Year: No       Family History   Problem Relation Age of Onset    Colon Cancer Mother     Heart Disease Mother         Cardiomyopathy    Lung Disease Mother         Emphysema from 2nd hand smoke    Cancer Mother         Colon    Colorectal Cancer Mother     Diabetes Mother     Heart Attack Father     Heart Disease Father         Numerous heart attacks    Diabetes Father     No Known Problems Sister     Cancer Brother         Skin, prostate    Prostate cancer Brother     Other Brother         Gaulbladder issues    Cancer Brother         Prostate, unknown but closely related to endometrial sarcoma. He is not transgender and is only one of a handful of known cases worldwide    Prostate cancer Brother     Respiratory Disease Brother         Asthma    Cancer Maternal Grandmother         Cancer but don’t know specific type. Didn’t talk anout it in the 1960’s    No Known Problems Maternal Grandfather     No Known Problems Paternal Grandmother     No Known Problems Paternal Grandfather     Other Daughter         Fibromyalgia    Bipolar disorder Daughter     ADHD Son     Depression Son     Hypertension Son        Past Surgical History:   Procedure Laterality Date    PB TOTAL KNEE ARTHROPLASTY Right 12/14/2022    Procedure: RIGHT TOTAL KNEE REPLACEMENT;  Surgeon: Britton Cooney M.D.;  Location: SURGERY [facility];  Service: Orthopedics    ARTHROSCOPY, KNEE      GYN SURGERY  1970    Cervical polyp       ROS:  Denies any Headache,Chest pain,  Shortness of breath,  Abdominal pain, Changes of bowel or bladder, Lower ext edema, Fevers, Nights sweats, Weight Changes, Focal weakness or numbness.  All other systems are negative.    /72 (BP Location: Left arm, Patient Position: Sitting)   Pulse 78   Temp 36.1  °C (96.9 °F)   Resp 14   Ht 1.524 m (5')   Wt 59.6 kg (131 lb 6.4 oz)   SpO2 99%   BMI 25.66 kg/m²      Constitutional: Alert, no distress, well-groomed.  Skin: Warm, dry, good turgor, no rashes in visible areas.  Eye: Equal, round and reactive, conjunctiva clear, lids normal.  ENMT: Lips without lesions, good dentition, moist mucous membranes.  Neck: Trachea midline, no masses, no thyromegaly.  Respiratory: Unlabored respiratory effort, no cough.  Abdomen: Soft, no gross masses.  MSK: Normal gait, moves all extremities.  Neuro: Grossly non-focal. No cranial nerve deficit. Strength and sensation intact.   Psych: Alert and oriented x3, normal affect and mood.    Assessment and Plan.   74 y.o. female presenting with the following.     1. Need for vaccination    - Influenza Vaccine, High Dose (65+ Only)    2. Controlled type 2 diabetes mellitus without complication, without long-term current use of insulin (HCC)  Continue current plan of care per endocrinology.  Complete lab work.  - CBC WITH DIFFERENTIAL; Future    3. Multiple sclerosis (HCC)    - CBC WITH DIFFERENTIAL; Future  - Referral to Neurology    4. HILARY (obstructive sleep apnea)  Follow-up with pulmonology to review sleep study results.    5. Primary insomnia  Patient will continue to fill her Ambien through psychiatry.    6. Falls  Continue with physical therapy and discussed options for HEP.    7. Other specified attention deficit hyperactivity disorder (ADHD)  Counseled patient on tools to manage ADHD symptoms.  Consider follow-up with psychiatry every 3 months as her symptoms affecting her daily activities.  Continued Wellbutrin at this time.

## 2024-11-15 ENCOUNTER — TELEPHONE (OUTPATIENT)
Dept: PHARMACY | Facility: MEDICAL CENTER | Age: 74
End: 2024-11-15
Payer: MEDICARE

## 2024-11-15 DIAGNOSIS — E11.9 CONTROLLED TYPE 2 DIABETES MELLITUS WITHOUT COMPLICATION, WITHOUT LONG-TERM CURRENT USE OF INSULIN (HCC): ICD-10-CM

## 2024-11-15 PROCEDURE — RXMED WILLOW AMBULATORY MEDICATION CHARGE: Performed by: PSYCHIATRY & NEUROLOGY

## 2024-11-15 PROCEDURE — RXMED WILLOW AMBULATORY MEDICATION CHARGE: Performed by: INTERNAL MEDICINE

## 2024-11-15 NOTE — TELEPHONE ENCOUNTER
Contact:  Phone number:[phone] (mobile)    Name of person spoken with and relationship to patient: Nishi patient   Patient’s Adherence:  How patient is doing on medication: Very Well    How many missed doses and reason: 0 N/A    Any new medications: No    Any new conditions: No    Any new allergies: No    Any new side effects: No    Any new diagnoses: No    How many doses remainin    Did patient want to speak with pharmacist: No   Delivery:  Delivery date and method: 2024 via     Needs by Date: 2024    Signature required: No     Any additional details for : N/A   Teach Appointment Date:  N/A   Shipping Address:  [address] [place] [id number]   Medication(name,strength and dose):  Synjardy 12.5-1000 MG buPROPion 300 MG * Rosuvastatin 40 MG * Synthroid 50 MCG * PARoxetine 40 MG    Copay:  $0   Payment Method:  n/a $0 copay   Supplies:  NONE   Additional Information:  NONE     Mariaelena Ibrahim, Pharmacy Liaison/ RX Coordinator

## 2024-11-17 RX ORDER — EMPAGLIFLOZIN, METFORMIN HYDROCHLORIDE 12.5; 1 MG/1; MG/1
TABLET, EXTENDED RELEASE ORAL
Qty: 100 TABLET | Refills: 2 | Status: SHIPPED | OUTPATIENT
Start: 2024-11-17

## 2024-11-18 PROCEDURE — RXMED WILLOW AMBULATORY MEDICATION CHARGE: Performed by: INTERNAL MEDICINE

## 2024-11-19 ENCOUNTER — TELEPHONE (OUTPATIENT)
Dept: HEALTH INFORMATION MANAGEMENT | Facility: OTHER | Age: 74
End: 2024-11-19
Payer: MEDICARE

## 2024-11-19 ENCOUNTER — PHARMACY VISIT (OUTPATIENT)
Dept: PHARMACY | Facility: MEDICAL CENTER | Age: 74
End: 2024-11-19
Payer: COMMERCIAL

## 2024-11-26 ENCOUNTER — OFFICE VISIT (OUTPATIENT)
Dept: SLEEP MEDICINE | Facility: MEDICAL CENTER | Age: 74
End: 2024-11-26
Payer: MEDICARE

## 2024-11-26 VITALS
DIASTOLIC BLOOD PRESSURE: 66 MMHG | HEIGHT: 60 IN | BODY MASS INDEX: 25.91 KG/M2 | OXYGEN SATURATION: 95 % | SYSTOLIC BLOOD PRESSURE: 128 MMHG | HEART RATE: 75 BPM | WEIGHT: 132 LBS

## 2024-11-26 DIAGNOSIS — G47.33 OSA (OBSTRUCTIVE SLEEP APNEA): ICD-10-CM

## 2024-11-26 DIAGNOSIS — G47.33 OSA ON CPAP: ICD-10-CM

## 2024-11-26 PROCEDURE — 99213 OFFICE O/P EST LOW 20 MIN: CPT

## 2024-11-26 PROCEDURE — 3078F DIAST BP <80 MM HG: CPT

## 2024-11-26 PROCEDURE — 3074F SYST BP LT 130 MM HG: CPT

## 2024-11-26 ASSESSMENT — FIBROSIS 4 INDEX: FIB4 SCORE: 1.94

## 2024-11-26 NOTE — PROGRESS NOTES
[facility] Follow-up Visit    Date of Visit: 11/26/2024     CC:  Follow-up for HILARY management      HPI:  Melody Rodriguez is a very pleasant 74 y.o. year old female never smoker, with a PMHx of HILARY, GERD, SVT, who presented to the Sleep [facility] for a regular follow up. Last seen in the office on 8/28/2024 with Dr. Hughes.     Patient presents for sleep study results.  Patient states that morning headaches, daytime drowsiness, issues, sleep, snoring, and dry mouth.  She denies any significant drowsiness while driving, gasping, apneas, or palpitations.  On typical night patient will sleep 6 to 7 hours and awaken once to use the bathroom and will rarely have issues when back to sleep she will rarely nap.    Sleep History:  PSG 9/14/2024-         Patient Active Problem List    Diagnosis Date Noted    HILARY (obstructive sleep apnea) 03/20/2024    Falls 11/05/2024    Other specified attention deficit hyperactivity disorder (ADHD) 11/05/2024    Thyroid nodule 02/28/2024    Abnormal finding on lung imaging 02/28/2024    SVT (supraventricular tachycardia) (HCC) 02/16/2024    Acquired hypothyroidism 06/15/2023    Vitamin D deficiency 06/15/2023    Family history of premature coronary artery disease 06/07/2023    History of skin cancer 03/22/2023    Gastroesophageal reflux disease without esophagitis 03/22/2023    Bipolar disorder in full remission (HCC) 03/22/2023    Primary insomnia 03/22/2023    Sedative dependence with current use (HCC) 03/22/2023    Total knee replacement status, right 12/14/2022    SI (sacroiliac) pain 06/09/2022    BMI 25.0-25.9,adult 05/20/2022    Osteopenia of multiple sites 05/20/2022    Cerebral atrophy (HCC) 03/16/2022    Osteoarthritis of right knee 06/15/2021    Long term (current) use of oral hypoglycemic drugs 10/21/2020    Non-toxic multinodular goiter 01/29/2020    Risk for falls 07/12/2019    Dysphagia 03/29/2019    Controlled type 2 diabetes mellitus without complication,  without long-term current use of insulin ([facility]) 01/06/2017    Mixed hyperlipidemia 01/06/2017    Multiple sclerosis ([facility]) 01/06/2017    Schatzki's ring 01/06/2017    Hiatal hernia 01/06/2017     Past Medical History:   Diagnosis Date    Acquired hypothyroidism 06/15/2023    Acute nasopharyngitis     Recent chest xray clear    Arthritis     Right knee and thumb joints    Breath shortness     Comes and goes    Bronchitis     Cancer ([facility])     Skin cancer squamous cell    Cerebral atrophy ([facility]) 03/16/2022    Chickenpox     Chronic cough 03/15/2023    Cough     COVID-19 virus infection 03/15/2023    Daytime sleepiness     Depression     Diabetes ([facility])     Difficulty swallowing     Disorder of thyroid     Nodules in both nodes. Biopsy indicates benign. Last ultrasound shows stable.    Dizziness     Eye drainage     Falls 11/05/2024    Fatigue     Groin lump 06/09/2022    Gynecological disorder     Cervical polyp in 1970    Hearing difficulty     Heart burn     Controlled with Omeprazole    Heart murmur     Minor leakage in 3 valves due to scarlet fever as a child    Heart valve disease     Minor leakage in 3 valves due to scarlet fever as a child    Heartburn     Hiatus hernia syndrome     I think    High cholesterol     Taking Simvistatin    Hyperlipidemia     Hypokalemia 05/20/2022    Hyponatremia 12/19/2022    Hypothyroidism     Influenza     Morning headache     Mumps     Muscle disorder     HILARY (obstructive sleep apnea) 03/20/2024    HILARY (obstructive sleep apnea) 03/20/2024    Osteoarthritis of right knee 06/15/2021    Other specified attention deficit hyperactivity disorder (ADHD) 11/05/2024    Palpitations     Pneumonia     PONV (postoperative nausea and vomiting) 1970    Only time I’ve been under anesthesia    Recurrent major depressive disorder, in remission ([facility]) 03/16/2022    Ringing in ears     Scarlet fever     Shortness of breath     SI (sacroiliac) pain 06/09/2022    Snoring     Tonsillitis     Toothache      Type 2 diabetes mellitus with stage 3 chronic kidney disease (HCC) 06/15/2021    Urinary bladder disorder     MS issue    Urinary incontinence     During MS relapse. Has since resolved    Vitamin D deficiency 06/15/2023    Wears glasses       Past Surgical History:   Procedure Laterality Date    PB TOTAL KNEE ARTHROPLASTY Right 12/14/2022    Procedure: RIGHT TOTAL KNEE REPLACEMENT;  Surgeon: Britton Cooney M.D.;  Location: SURGERY [facility];  Service: Orthopedics    ARTHROSCOPY, KNEE      GYN SURGERY  1970    Cervical polyp     Family History   Problem Relation Age of Onset    Colon Cancer Mother     Heart Disease Mother         Cardiomyopathy    Lung Disease Mother         Emphysema from 2nd hand smoke    Cancer Mother         Colon    Colorectal Cancer Mother     Diabetes Mother     Heart Attack Father     Heart Disease Father         Numerous heart attacks    Diabetes Father     No Known Problems Sister     Cancer Brother         Skin, prostate    Prostate cancer Brother     Other Brother         Gaulbladder issues    Cancer Brother         Prostate, unknown but closely related to endometrial sarcoma. He is not transgender and is only one of a handful of known cases worldwide    Prostate cancer Brother     Respiratory Disease Brother         Asthma    Cancer Maternal Grandmother         Cancer but don’t know specific type. Didn’t talk anout it in the 1960’s    No Known Problems Maternal Grandfather     No Known Problems Paternal Grandmother     No Known Problems Paternal Grandfather     Other Daughter         Fibromyalgia    Bipolar disorder Daughter     ADHD Son     Depression Son     Hypertension Son      Social History     Socioeconomic History    Marital status:      Spouse name: Not on file    Number of children: Not on file    Years of education: Not on file    Highest education level: 12th grade   Occupational History    Not on file   Tobacco Use    Smoking status: Never    Smokeless  tobacco: Never   Vaping Use    Vaping status: Never Used   Substance and Sexual Activity    Alcohol use: Yes     Alcohol/week: 0.6 oz     Comment: Occasional glass of wine or april    Drug use: Not Currently    Sexual activity: Not Currently     Partners: Male   Other Topics Concern    Not on file   Social History Narrative    Not on file     Social Drivers of Health     Financial Resource Strain: Low Risk  (1/24/2024)    Overall Financial Resource Strain (CARDIA)     Difficulty of Paying Living Expenses: Not hard at all   Food Insecurity: No Food Insecurity (1/24/2024)    Hunger Vital Sign     Worried About Running Out of Food in the Last Year: Never true     Ran Out of Food in the Last Year: Never true   Transportation Needs: No Transportation Needs (1/24/2024)    PRAPARE - Transportation     Lack of Transportation (Medical): No     Lack of Transportation (Non-Medical): No   Physical Activity: Inactive (11/9/2022)    Exercise Vital Sign     Days of Exercise per Week: 0 days     Minutes of Exercise per Session: 0 min   Stress: Stress Concern Present (5/26/2020)    [employer] [place] [employer] - Occupational Stress Questionnaire     Feeling of Stress : To some extent   Social Connections: Moderately Isolated (5/26/2020)    Social Connection and Isolation Panel [NHANES]     Frequency of Communication with Friends and Family: More than three times a week     Frequency of Social Gatherings with Friends and Family: More than three times a week     Attends Christianity Services: Never     Active Member of Clubs or Organizations: Yes     Attends Club or Organization Meetings: More than 4 times per year     Marital Status:    Intimate Partner Violence: Not on file   Housing Stability: Low Risk  (11/9/2022)    Housing Stability Vital Sign     Unable to Pay for Housing in the Last Year: No     Number of Places Lived in the Last Year: 1     Unstable Housing in the Last Year: No     Current Outpatient  Medications   Medication Sig Dispense Refill    Empagliflozin-metFORMIN HCl ER (SYNJARDY XR) 12.5-1000 MG TABLET SR 24 HR Take 1 tablet by mouth daily 100 Tablet 2    rosuvastatin (CRESTOR) 40 MG tablet Take 1 Tablet by mouth every day. 100 Tablet 3    levothyroxine (SYNTHROID) 50 MCG Tab Take 1 Tablet by mouth every morning on an empty stomach. 90 Tablet 2    buPROPion (WELLBUTRIN XL) 300 MG XL tablet Take 1 tablet (300 mg) by mouth daily in the morning 90 Tablet 2    paroxetine (PAXIL) 40 MG tablet TAKE 1 TABLET(40 MG) BY MOUTH DAILY IN THE MORNING 90 Tablet 2    metoprolol SR (TOPROL XL) 25 MG TABLET SR 24 HR Take 0.5 Tablets by mouth every evening. 45 Tablet 3    omeprazole (PRILOSEC) 20 MG delayed-release capsule Take 1 capsule by mouth every morning, 30 minutes before breakfast. 100 Capsule 3    tizanidine (ZANAFLEX) 4 MG Tab Take 1 Tablet by mouth 2 times a day as needed (spasm). 200 Tablet 0    paroxetine (PAXIL) 40 MG tablet Take 1 Tablet by mouth every day. Indications: Major Depressive Disorder 100 Tablet 1    zolpidem (AMBIEN) 5 MG Tab Take 1 tablet (5 mg) by mouth daily at bedtime as needed for insomnia 30 Tablet 0    Teriflunomide (AUBAGIO) 14 MG Tab Take 14 mg by mouth every day. Indications: Relapsing, Remitting Multiple Sclerosis      modafinil (PROVIGIL) 200 MG Tab Take 200 mg by mouth 1 time a day as needed. Indications: Tiredness associated with Multiple Sclerosis      benzonatate (TESSALON) 100 MG Cap Take 1 Capsule by mouth 3 times a day as needed for Cough. 60 Capsule 0     No current facility-administered medications for this visit.      ALLERGIES: Patient has no known allergies.    ROS:  Constitutional: Denies fever, chills, sweats,  weight loss, fatigue  Cardiovascular: Denies chest pain, tightness, palpitations, swelling in legs/feet  Respiratory: Denies shortness of breath, cough, sputum, wheezing, painful breathing   Sleep: per HPI  Gastrointestinal: Denies  difficulty swallowing,  nausea, abdominal pain, diarrhea, constipation, heartburn.  Musculoskeletal: Denies painful joints, sore muscles,       PHYSICAL EXAM:  /66 (BP Location: Right arm, Patient Position: Sitting, BP Cuff Size: Adult)   Pulse 75   Ht 1.524 m (5')   Wt 59.9 kg (132 lb)   SpO2 95%   BMI 25.78 kg/m²   Appearance: Well-nourished, well-developed, no acute distress  Eyes:  No scleral icterus , EOMI  ENMT: No redness of the oropharynx  Lung auscultation:  No wheezes rhonchi rubs or rales  Cardiac: No murmurs, rubs, or gallops; regular rhythm, normal rate; no edema  Musculoskeletal:  Grossly normal; gait and station normal; digits and nails normal  Skin:  No rashes, petechiae, cyanosis  Neurologic: without focal signs; oriented to person, time, place, and purpose; judgement intact  Psychiatric:  No depression, anxiety, agitation  Mallampati score: Class II    Assessment and Plan:    The medical record was reviewed.    Diagnostic and titration nocturnal polysomnogram's, home sleep apnea tests, continuous nocturnal oximetry results, multiple sleep latency tests, and compliance reports reviewed.    Problem List Items Addressed This Visit          Pulmonary/Sleep Medicine Problems    HILARY (obstructive sleep apnea)     Sleep Apnea:    The pathophysiology of sleep anea and the increased risk of cardiovascular morbidity from untreated sleep apnea is discussed in detail with the patient.  Urged to avoid supine sleep, weight gain and alcoholic beverages since all of these can worsen sleep apnea. Cautioned against drowsy driving. If feeling sleepy while driving, pull over for a break/nap, rather than persist on the road, in the interest of own safety and that of others on the road.  The risks of untreated sleep apnea were discussed with the patient at length. Patients with sleep apnea are at increased risk of cardiovascular disease including coronary artery disease, systemic arterial hypertension, pulmonary arterial  hypertension, cardiac arrythmias, and stroke.  Positive airway pressure will favorably impact many of the adverse conditions and effects provoked by sleep apnea.    Plan:    PSG was reviewed and discussed with the patient.  We discussed in detail sleep apnea, risk of untreated sleep apnea, different treatment options, process of obtaining CPAP machine, and follow-up parameters.  We will set the patient up with auto CPAP and see her back in 3 months for first compliance.  Patient does states that she may end up using her friends machine.  I did discuss with her that she would need to bring it into the [employer] company to make sure that the machine was disinfected correctly as well as set up with the current settings.  I also explained that machine motors  after 5 years.    - Order placed for AutoCPAP 5-15 CWP  - Compliance was reinforced  - Clean supplies a least once a week with dish soap and water and air dry  - Recommended the patient against the use of Ozone , such as SoClean  - Recommended the patient change out supplies as recommended for best mask fit and usage of the machine  - Equipment replacement schedule:  Mask cushion every month  Nasal pillows 2 times per month  Mask every 6 months  Head gear every 6 months  Tubing every 3 months  Ultra-fine filters 2 times per month  Foam filter every 6 months  Humidifier chamber every 6 months  Chin strap every 6 months    Has been advised to start CPAP therapy, clean equipment frequently, and get new mask and supplies as allowed by insurance and DME. Recommend an earlier appointment, if significant treatment barriers develop.    Advised patient to reach out via [employer] if any questions or concerns should arise.           Other Visit Diagnoses       HILARY on CPAP        Relevant Orders    DME CPAP          Have advised the patient to follow up with the appropriate healthcare practitioners for all other medical problems and issues.    Return in about 3 months  (around 2/26/2025), or if symptoms worsen or fail to improve, for 1st compliance, with [facility].    Please note portions of this record was created using voice recognition software. I have made every reasonable attempt to correct obvious errors, but I expect that there are errors of grammar and possibly content I did not discover before finalizing the note.    Time spent in record review prior to patient arrival, reviewing results, and in face-to-face encounter totaled 20 min.  __________  FAIZA Staples  Pulmonary & Sleep Medicine  [facility]

## 2024-11-26 NOTE — PATIENT INSTRUCTIONS
Please make sure that you are seen within 90 days of receiving your machine, with at least 30 days of use.  Please call scheduling at [phone] if your appointment needs to be moved to meet these parameters.     To meet compliance requirements for insurance please ensure that you use the machine at least 6/7 days a week for at least 4 or more hours a night.    I advise patients to research different mask options before picking up the new machine.  You should also ask what the [employer] company's return policy is for the mask because if you do not like the mask and don't return it within that time frame, you will have to wait 6 months for insurance to cover another mask.     Please bring your entire machine and chip to your first appointment, regardless if the machine is set up for wireless access.    Please do not return the machine without discussing any issues with a sleep provider, as you would be forfeiting therapy and would have to restart the testing process over.

## 2024-11-26 NOTE — ASSESSMENT & PLAN NOTE
Sleep Apnea:    The pathophysiology of sleep anea and the increased risk of cardiovascular morbidity from untreated sleep apnea is discussed in detail with the patient.  Urged to avoid supine sleep, weight gain and alcoholic beverages since all of these can worsen sleep apnea. Cautioned against drowsy driving. If feeling sleepy while driving, pull over for a break/nap, rather than persist on the road, in the interest of own safety and that of others on the road.  The risks of untreated sleep apnea were discussed with the patient at length. Patients with sleep apnea are at increased risk of cardiovascular disease including coronary artery disease, systemic arterial hypertension, pulmonary arterial hypertension, cardiac arrythmias, and stroke.  Positive airway pressure will favorably impact many of the adverse conditions and effects provoked by sleep apnea.    Plan:    PSG was reviewed and discussed with the patient.  We discussed in detail sleep apnea, risk of untreated sleep apnea, different treatment options, process of obtaining CPAP machine, and follow-up parameters.  We will set the patient up with auto CPAP and see her back in 3 months for first compliance.  Patient does states that she may end up using her friends machine.  I did discuss with her that she would need to bring it into the [employer] company to make sure that the machine was disinfected correctly as well as set up with the current settings.  I also explained that machine motors  after 5 years.    - Order placed for AutoCPAP 5-15 CWP  - Compliance was reinforced  - Clean supplies a least once a week with dish soap and water and air dry  - Recommended the patient against the use of Ozone , such as SoClean  - Recommended the patient change out supplies as recommended for best mask fit and usage of the machine  - Equipment replacement schedule:  Mask cushion every month  Nasal pillows 2 times per month  Mask every 6 months  Head gear every 6  months  Tubing every 3 months  Ultra-fine filters 2 times per month  Foam filter every 6 months  Humidifier chamber every 6 months  Chin strap every 6 months    Has been advised to start CPAP therapy, clean equipment frequently, and get new mask and supplies as allowed by insurance and DME. Recommend an earlier appointment, if significant treatment barriers develop.    Advised patient to reach out via [employer] if any questions or concerns should arise.

## 2024-12-11 ENCOUNTER — TELEPHONE (OUTPATIENT)
Dept: HEALTH INFORMATION MANAGEMENT | Facility: OTHER | Age: 74
End: 2024-12-11
Payer: MEDICARE

## 2024-12-19 PROCEDURE — RXMED WILLOW AMBULATORY MEDICATION CHARGE: Performed by: INTERNAL MEDICINE

## 2024-12-19 NOTE — TELEPHONE ENCOUNTER
Received request via: Pharmacy    Was the patient seen in the last year in this department? Yes    Does the patient have an active prescription (recently filled or refills available) for medication(s) requested? No    Does the patient have assisted Plus and need 100-day supply? (This applies to ALL medications) Yes, quantity updated to 100 days

## 2024-12-20 ENCOUNTER — PHARMACY VISIT (OUTPATIENT)
Dept: PHARMACY | Facility: MEDICAL CENTER | Age: 74
End: 2024-12-20
Payer: COMMERCIAL

## 2024-12-31 ENCOUNTER — TELEPHONE (OUTPATIENT)
Dept: HEALTH INFORMATION MANAGEMENT | Facility: OTHER | Age: 74
End: 2024-12-31
Payer: MEDICARE

## 2025-01-04 ENCOUNTER — HOSPITAL ENCOUNTER (OUTPATIENT)
Dept: LAB | Facility: MEDICAL CENTER | Age: 75
End: 2025-01-04
Attending: INTERNAL MEDICINE
Payer: MEDICARE

## 2025-01-04 DIAGNOSIS — E11.9 CONTROLLED TYPE 2 DIABETES MELLITUS WITHOUT COMPLICATION, WITHOUT LONG-TERM CURRENT USE OF INSULIN (HCC): ICD-10-CM

## 2025-01-04 DIAGNOSIS — Z79.84 LONG TERM (CURRENT) USE OF ORAL HYPOGLYCEMIC DRUGS: ICD-10-CM

## 2025-01-04 DIAGNOSIS — E03.8 SUBCLINICAL HYPOTHYROIDISM: ICD-10-CM

## 2025-01-04 DIAGNOSIS — G35 MULTIPLE SCLEROSIS (HCC): ICD-10-CM

## 2025-01-04 DIAGNOSIS — E55.9 VITAMIN D DEFICIENCY: ICD-10-CM

## 2025-01-04 DIAGNOSIS — E78.5 DYSLIPIDEMIA: ICD-10-CM

## 2025-01-04 DIAGNOSIS — E04.2 NON-TOXIC MULTINODULAR GOITER: ICD-10-CM

## 2025-01-04 LAB
25(OH)D3 SERPL-MCNC: 50 NG/ML (ref 30–100)
ALBUMIN SERPL BCP-MCNC: 4.3 G/DL (ref 3.2–4.9)
ALBUMIN/GLOB SERPL: 1.5 G/DL
ALP SERPL-CCNC: 120 U/L (ref 30–99)
ALT SERPL-CCNC: 15 U/L (ref 2–50)
ANION GAP SERPL CALC-SCNC: 10 MMOL/L (ref 7–16)
AST SERPL-CCNC: 23 U/L (ref 12–45)
BASOPHILS # BLD AUTO: 1.3 % (ref 0–1.8)
BASOPHILS # BLD: 0.11 K/UL (ref 0–0.12)
BILIRUB SERPL-MCNC: 0.5 MG/DL (ref 0.1–1.5)
BUN SERPL-MCNC: 19 MG/DL (ref 8–22)
CALCIUM ALBUM COR SERPL-MCNC: 9.2 MG/DL (ref 8.5–10.5)
CALCIUM SERPL-MCNC: 9.4 MG/DL (ref 8.5–10.5)
CHLORIDE SERPL-SCNC: 102 MMOL/L (ref 96–112)
CHOLEST SERPL-MCNC: 129 MG/DL (ref 100–199)
CO2 SERPL-SCNC: 26 MMOL/L (ref 20–33)
CREAT SERPL-MCNC: 0.92 MG/DL (ref 0.5–1.4)
CREAT UR-MCNC: 77.95 MG/DL
EOSINOPHIL # BLD AUTO: 0.41 K/UL (ref 0–0.51)
EOSINOPHIL NFR BLD: 4.8 % (ref 0–6.9)
ERYTHROCYTE [DISTWIDTH] IN BLOOD BY AUTOMATED COUNT: 47.8 FL (ref 35.9–50)
FASTING STATUS PATIENT QL REPORTED: NORMAL
GFR SERPLBLD CREATININE-BSD FMLA CKD-EPI: 65 ML/MIN/1.73 M 2
GLOBULIN SER CALC-MCNC: 2.8 G/DL (ref 1.9–3.5)
GLUCOSE SERPL-MCNC: 127 MG/DL (ref 65–99)
HCT VFR BLD AUTO: 45.9 % (ref 37–47)
HDLC SERPL-MCNC: 41 MG/DL
HGB BLD-MCNC: 14 G/DL (ref 12–16)
IMM GRANULOCYTES # BLD AUTO: 0.05 K/UL (ref 0–0.11)
IMM GRANULOCYTES NFR BLD AUTO: 0.6 % (ref 0–0.9)
LDLC SERPL CALC-MCNC: 62 MG/DL
LYMPHOCYTES # BLD AUTO: 1.52 K/UL (ref 1–4.8)
LYMPHOCYTES NFR BLD: 17.8 % (ref 22–41)
MCH RBC QN AUTO: 29.2 PG (ref 27–33)
MCHC RBC AUTO-ENTMCNC: 30.5 G/DL (ref 32.2–35.5)
MCV RBC AUTO: 95.6 FL (ref 81.4–97.8)
MICROALBUMIN UR-MCNC: <1.2 MG/DL
MICROALBUMIN/CREAT UR: NORMAL MG/G (ref 0–30)
MONOCYTES # BLD AUTO: 0.99 K/UL (ref 0–0.85)
MONOCYTES NFR BLD AUTO: 11.6 % (ref 0–13.4)
NEUTROPHILS # BLD AUTO: 5.47 K/UL (ref 1.82–7.42)
NEUTROPHILS NFR BLD: 63.9 % (ref 44–72)
NRBC # BLD AUTO: 0 K/UL
NRBC BLD-RTO: 0 /100 WBC (ref 0–0.2)
PLATELET # BLD AUTO: 314 K/UL (ref 164–446)
PMV BLD AUTO: 9 FL (ref 9–12.9)
POTASSIUM SERPL-SCNC: 4.5 MMOL/L (ref 3.6–5.5)
PROT SERPL-MCNC: 7.1 G/DL (ref 6–8.2)
RBC # BLD AUTO: 4.8 M/UL (ref 4.2–5.4)
SODIUM SERPL-SCNC: 138 MMOL/L (ref 135–145)
T4 FREE SERPL-MCNC: 0.73 NG/DL (ref 0.93–1.7)
TRIGL SERPL-MCNC: 130 MG/DL (ref 0–149)
TSH SERPL-ACNC: 3.83 UIU/ML (ref 0.35–5.5)
WBC # BLD AUTO: 8.6 K/UL (ref 4.8–10.8)

## 2025-01-04 PROCEDURE — 80053 COMPREHEN METABOLIC PANEL: CPT

## 2025-01-04 PROCEDURE — 80061 LIPID PANEL: CPT

## 2025-01-04 PROCEDURE — 82570 ASSAY OF URINE CREATININE: CPT

## 2025-01-04 PROCEDURE — 84443 ASSAY THYROID STIM HORMONE: CPT

## 2025-01-04 PROCEDURE — 85025 COMPLETE CBC W/AUTO DIFF WBC: CPT

## 2025-01-04 PROCEDURE — 82043 UR ALBUMIN QUANTITATIVE: CPT

## 2025-01-04 PROCEDURE — 84439 ASSAY OF FREE THYROXINE: CPT

## 2025-01-04 PROCEDURE — 36415 COLL VENOUS BLD VENIPUNCTURE: CPT

## 2025-01-04 PROCEDURE — 82306 VITAMIN D 25 HYDROXY: CPT

## 2025-01-06 ENCOUNTER — OFFICE VISIT (OUTPATIENT)
Dept: URGENT CARE | Facility: CLINIC | Age: 75
End: 2025-01-06
Payer: MEDICARE

## 2025-01-06 ENCOUNTER — OFFICE VISIT (OUTPATIENT)
Dept: NEUROLOGY | Facility: MEDICAL CENTER | Age: 75
End: 2025-01-06
Attending: SPECIALIST
Payer: MEDICARE

## 2025-01-06 VITALS
TEMPERATURE: 97.9 F | OXYGEN SATURATION: 97 % | DIASTOLIC BLOOD PRESSURE: 58 MMHG | RESPIRATION RATE: 20 BRPM | BODY MASS INDEX: 25.13 KG/M2 | HEART RATE: 112 BPM | SYSTOLIC BLOOD PRESSURE: 108 MMHG | HEIGHT: 60 IN | WEIGHT: 128 LBS

## 2025-01-06 VITALS
TEMPERATURE: 95.8 F | HEIGHT: 60 IN | BODY MASS INDEX: 25.32 KG/M2 | HEART RATE: 112 BPM | RESPIRATION RATE: 20 BRPM | OXYGEN SATURATION: 98 % | DIASTOLIC BLOOD PRESSURE: 52 MMHG | WEIGHT: 128.97 LBS | SYSTOLIC BLOOD PRESSURE: 112 MMHG

## 2025-01-06 DIAGNOSIS — R05.1 ACUTE COUGH: ICD-10-CM

## 2025-01-06 DIAGNOSIS — J98.8 RESPIRATORY TRACT INFECTION: ICD-10-CM

## 2025-01-06 DIAGNOSIS — G35 MULTIPLE SCLEROSIS (HCC): ICD-10-CM

## 2025-01-06 PROCEDURE — 3078F DIAST BP <80 MM HG: CPT | Performed by: SPECIALIST

## 2025-01-06 PROCEDURE — 3078F DIAST BP <80 MM HG: CPT | Performed by: PHYSICIAN ASSISTANT

## 2025-01-06 PROCEDURE — 3074F SYST BP LT 130 MM HG: CPT | Performed by: SPECIALIST

## 2025-01-06 PROCEDURE — 99213 OFFICE O/P EST LOW 20 MIN: CPT | Performed by: PHYSICIAN ASSISTANT

## 2025-01-06 PROCEDURE — 3074F SYST BP LT 130 MM HG: CPT | Performed by: PHYSICIAN ASSISTANT

## 2025-01-06 PROCEDURE — 99203 OFFICE O/P NEW LOW 30 MIN: CPT | Performed by: SPECIALIST

## 2025-01-06 PROCEDURE — 99212 OFFICE O/P EST SF 10 MIN: CPT | Performed by: SPECIALIST

## 2025-01-06 RX ORDER — TERIFLUNOMIDE 14 MG/1
14 TABLET, FILM COATED ORAL DAILY
Qty: 30 TABLET | Refills: 11 | Status: SHIPPED | OUTPATIENT
Start: 2025-01-06

## 2025-01-06 RX ORDER — DOXYCYCLINE HYCLATE 100 MG
100 TABLET ORAL 2 TIMES DAILY
Qty: 14 TABLET | Refills: 0 | Status: SHIPPED | OUTPATIENT
Start: 2025-01-06 | End: 2025-01-13

## 2025-01-06 ASSESSMENT — ENCOUNTER SYMPTOMS
VOMITING: 0
WHEEZING: 0
MYALGIAS: 0
HEADACHES: 1
SHORTNESS OF BREATH: 0
EYE DISCHARGE: 0
EYE REDNESS: 0
FEVER: 0
DIARRHEA: 0
SORE THROAT: 0
COUGH: 1
NAUSEA: 0

## 2025-01-06 ASSESSMENT — FIBROSIS 4 INDEX
FIB4 SCORE: 1.4
FIB4 SCORE: 1.4

## 2025-01-06 ASSESSMENT — PATIENT HEALTH QUESTIONNAIRE - PHQ9
CLINICAL INTERPRETATION OF PHQ2 SCORE: 1
5. POOR APPETITE OR OVEREATING: 2 - MORE THAN HALF THE DAYS
SUM OF ALL RESPONSES TO PHQ QUESTIONS 1-9: 8

## 2025-01-06 NOTE — PROGRESS NOTES
Subjective     Nishi Rodriguez is a 74 y.o. female who presents with Cough (Would like to be tested for RSV, new baby in the family and family member was recently quarantined and now has cold/cough x 10 days now)            Cough  This is a new problem. Episode onset: x 10 days ago. The problem has been unchanged. The cough is Productive of sputum. Associated symptoms include headaches and nasal congestion. Pertinent negatives include no chest pain, ear pain, eye redness, fever, myalgias, sore throat, shortness of breath or wheezing. She has tried OTC cough suppressant (OTC NyQuil) for the symptoms.     The patient states her granddaughter was recently diagnosed with RSV and strep pharyngitis.  The patient states she was caring for her granddaughter while she was sick due to a new baby in the family.  The patient states she has had her RSV vaccine.    PMH:  has a past medical history of Acquired hypothyroidism (06/15/2023), Acute nasopharyngitis, Arthritis, Breath shortness, Bronchitis, Cancer (HCC), Cerebral atrophy (Formerly [facility]) (03/16/2022), Chickenpox, Chronic cough (03/15/2023), Cough, COVID-19 virus infection (03/15/2023), Daytime sleepiness, Depression, Diabetes (HCC), Difficulty swallowing, Disorder of thyroid, Dizziness, Eye drainage, Falls (11/05/2024), Fatigue, Groin lump (06/09/2022), Gynecological disorder, Hearing difficulty, Heart burn, Heart murmur, Heart valve disease, Heartburn, Hiatus hernia syndrome, High cholesterol, Hyperlipidemia, Hypokalemia (05/20/2022), Hyponatremia (12/19/2022), Hypothyroidism, Influenza, Morning headache, Mumps, Muscle disorder, HILARY (obstructive sleep apnea) (03/20/2024), HILARY (obstructive sleep apnea) (03/20/2024), Osteoarthritis of right knee (06/15/2021), Other specified attention deficit hyperactivity disorder (ADHD) (11/05/2024), Palpitations, Pneumonia, PONV (postoperative nausea and vomiting) (1970), Recurrent major depressive disorder, in remission (Formerly [facility]) (03/16/2022),  Ringing in ears, Scarlet fever, Shortness of breath, SI (sacroiliac) pain (06/09/2022), Snoring, Tonsillitis, Toothache, Type 2 diabetes mellitus with stage 3 chronic kidney disease (HCC) (06/15/2021), Urinary bladder disorder, Urinary incontinence, Vitamin D deficiency (06/15/2023), and Wears glasses.    She has no past medical history of Anesthesia, Anginal syndrome (HCC), Arrhythmia, Asthma, Blood clotting disorder (HCC), Bowel habit changes, Carcinoma in situ of respiratory system, Cataract, Continuous ambulatory peritoneal dialysis status (HCC), Coughing blood, Dental disorder, Dialysis patient (HCC), Emphysema of lung (HCC), Glaucoma, Hemorrhagic disorder (HCC), Hepatitis A, Hepatitis B, Hepatitis C, Indigestion, Jaundice, Myocardial infarct (HCC), Pacemaker, Pregnant, Rheumatic fever, Seizure (HCC), Stroke (HCC), or Tuberculosis.  MEDS:   Current Outpatient Medications:     omeprazole (PRILOSEC) 20 MG delayed-release capsule, Take 1 capsule by mouth every morning, 30 minutes before breakfast., Disp: 100 Capsule, Rfl: 3    Empagliflozin-metFORMIN HCl ER (SYNJARDY XR) 12.5-1000 MG TABLET SR 24 HR, Take 1 tablet by mouth daily, Disp: 100 Tablet, Rfl: 2    rosuvastatin (CRESTOR) 40 MG tablet, Take 1 Tablet by mouth every day., Disp: 100 Tablet, Rfl: 3    levothyroxine (SYNTHROID) 50 MCG Tab, Take 1 Tablet by mouth every morning on an empty stomach., Disp: 90 Tablet, Rfl: 2    buPROPion (WELLBUTRIN XL) 300 MG XL tablet, Take 1 tablet (300 mg) by mouth daily in the morning, Disp: 90 Tablet, Rfl: 2    paroxetine (PAXIL) 40 MG tablet, TAKE 1 TABLET(40 MG) BY MOUTH DAILY IN THE MORNING, Disp: 90 Tablet, Rfl: 2    metoprolol SR (TOPROL XL) 25 MG TABLET SR 24 HR, Take 0.5 Tablets by mouth every evening., Disp: 45 Tablet, Rfl: 3    tizanidine (ZANAFLEX) 4 MG Tab, Take 1 Tablet by mouth 2 times a day as needed (spasm)., Disp: 200 Tablet, Rfl: 0    paroxetine (PAXIL) 40 MG tablet, Take 1 Tablet by mouth every day.  Indications: Major Depressive Disorder, Disp: 100 Tablet, Rfl: 1    zolpidem (AMBIEN) 5 MG Tab, Take 1 tablet (5 mg) by mouth daily at bedtime as needed for insomnia, Disp: 30 Tablet, Rfl: 0    Teriflunomide (AUBAGIO) 14 MG Tab, Take 14 mg by mouth every day. Indications: Relapsing, Remitting Multiple Sclerosis, Disp: , Rfl:     modafinil (PROVIGIL) 200 MG Tab, Take 200 mg by mouth 1 time a day as needed. Indications: Tiredness associated with Multiple Sclerosis, Disp: , Rfl:     benzonatate (TESSALON) 100 MG Cap, Take 1 Capsule by mouth 3 times a day as needed for Cough., Disp: 60 Capsule, Rfl: 0  ALLERGIES: No Known Allergies  SURGHX:   Past Surgical History:   Procedure Laterality Date    PB TOTAL KNEE ARTHROPLASTY Right 12/14/2022    Procedure: RIGHT TOTAL KNEE REPLACEMENT;  Surgeon: Britton Cooney M.D.;  Location: SURGERY [facility];  Service: Orthopedics    ARTHROSCOPY, KNEE      GYN SURGERY  1970    Cervical polyp     SOCHX:  reports that she has never smoked. She has never used smokeless tobacco. She reports current alcohol use of about 0.6 oz of alcohol per week. She reports that she does not currently use drugs.  FH: Family history was reviewed, no pertinent findings to report      Review of Systems   Constitutional:  Negative for fever.   HENT:  Positive for congestion. Negative for ear pain and sore throat.    Eyes:  Negative for discharge and redness.   Respiratory:  Positive for cough. Negative for shortness of breath and wheezing.    Cardiovascular:  Negative for chest pain.   Gastrointestinal:  Negative for diarrhea, nausea and vomiting.   Musculoskeletal:  Negative for myalgias.   Neurological:  Positive for headaches.              Objective     /58 (BP Location: Left arm, Patient Position: Sitting, BP Cuff Size: Adult)   Pulse (!) 112   Temp 36.6 °C (97.9 °F) (Temporal)   Resp 20   Ht 1.524 m (5')   Wt 58.1 kg (128 lb)   SpO2 97%   BMI 25.00 kg/m²      Physical  Exam  Constitutional:       General: She is not in acute distress.     Appearance: Normal appearance. She is not ill-appearing.   HENT:      Head: Normocephalic and atraumatic.      Right Ear: Tympanic membrane, ear canal and external ear normal.      Left Ear: Tympanic membrane, ear canal and external ear normal.      Nose: Nose normal.      Mouth/Throat:      Mouth: Mucous membranes are moist.      Pharynx: Oropharynx is clear. No posterior oropharyngeal erythema.   Eyes:      Extraocular Movements: Extraocular movements intact.      Conjunctiva/sclera: Conjunctivae normal.   Cardiovascular:      Rate and Rhythm: Regular rhythm. Tachycardia present.      Heart sounds: Normal heart sounds.   Pulmonary:      Effort: Pulmonary effort is normal. No respiratory distress.      Breath sounds: Normal breath sounds. No wheezing.   Musculoskeletal:         General: Normal range of motion.      Cervical back: Normal range of motion and neck supple.   Skin:     General: Skin is warm and dry.   Neurological:      Mental Status: She is alert and oriented to person, place, and time.                  Progress:  The patient declined x-ray imaging at this time.           Assessment & Plan        Assessment & Plan  Respiratory tract infection    Orders:    doxycycline (VIBRAMYCIN) 100 MG Tab; Take 1 Tablet by mouth 2 times a day for 7 days.    Acute cough               The patient's presenting symptoms and physical exam findings are consistent with acute respiratory tract infection with an associated cough.  The patient's physical exam today in clinic was normal, with the exception of an elevated heart rate.  The patient's lungs were clear to auscultation without wheezing or rhonchi, and her pulse ox was within normal limits.  The patient is nontoxic and appears in no acute distress.  The patient's vital signs are stable and within normal limits, with the exception of her elevated heart rate as previously mentioned. The patient  reports a history of an elevated heart rate, which is worse with exertion.  The patient states she is currently prescribed metoprolol for her elevated heart rate.  She is afebrile today in clinic.  I offered the patient x-ray imaging today in clinic to further evaluate her ongoing symptoms.  The patient declined a chest x-ray at this time.  Based on the patient's presenting symptoms, will prescribe the patient doxycycline for presumed respiratory tract infection.  Advised the patient to monitor for worsening signs or symptoms.  Recommend OTC medications and supportive care for symptomatic management.  Recommend patient follow-up with primary care as needed.  Discussed return precautions with the patient, and she verbalized understanding.    Differential diagnoses, supportive care, and indications for immediate follow-up discussed with patient.   Instructed to return to clinic or nearest emergency department for any change in condition, further concerns, or worsening of symptoms.    OTC Tylenol or Motrin for fever/discomfort.  OTC cough/cold medication for symptomatic relief  OTC Supportive Care for Congestion - saline nasal spray or neti pot  Drink plenty of fluids  Follow-up with PCP  Return to clinic or go to the ED if symptoms worsen or fail to improve, or if the patient should develop worsening/increasing cough, congestion, ear pain, sore throat, shortness of breath, wheezing, chest pain, fever/chills, and/or any concerning symptoms.    Discussed plan with the patient, and she agrees to the above.     I personally reviewed prior external notes and test results pertinent to today's visit.  I have independently reviewed and interpreted all diagnostics ordered during this urgent care visit.     Please note that this dictation was created using voice recognition software. I have made every reasonable attempt to correct obvious errors, but I expect that there may be errors of grammar and possibly content that I did  not discover before finalizing the note.     This note was electronically signed by Brittney Mariscal PA-C

## 2025-01-07 NOTE — ASSESSMENT & PLAN NOTE
Orders:    Teriflunomide (AUBAGIO) 14 MG Tab; Take 14 mg by mouth every day.    CBC WITH DIFFERENTIAL; Future

## 2025-01-07 NOTE — PROGRESS NOTES
"Subjective     Nishi Rodriguez is a 74 y.o. female who presents with New Patient (Multiple sclerosis)            HPI  Mrs. Nishi Rodriguez is a 74-year-old woman referred by Danae Pritchett for evaluation of a diagnosis of multiple sclerosis.  In 1988 and 89 she developed numbness in her face and tongue which tended to come and go.  Following that she developed numbness in her torso and both arms.  This also came and went.  She developed what she terms \"MS hug\" with a feeling of tightness around her torso and also significant fatigue.  She was initially treated with Copaxone [facility]  She has also had issues with her balance and diplopia over the years.  She switched several years ago to Aubagio and feels she has been stable over the past several years.  CHEM panel and CBC on January 4 were unremarkable.  A brain MRI scan in November of last year revealed stable T2 lesions with no new or enhancing lesions.  Cervical scan revealed no definite new lesions.  She has been followed recently by Dr. Oral Guzman.    Past medical history:    1.  Diagnosis of multiple sclerosis    2.  Type 2 diabetes total knee replacement    3.  Thyroid nodules.    Medications-Aubagio 14 mg daily, metformin, Wellbutrin  mg, modafinil 200 mg as needed which she rarely takes, Paxil 40 mg daily, Crestor 40 mg daily, Zanaflex 4 mg twice daily as needed.    Allergies-NKDA    Social history-she does not smoke or drink    Family history-negative for MS      ROS  In addition she reports chronic imbalance.  She has done physical therapy in the past and does not wish to repeat it.         Objective     /52   Pulse (!) 112   Temp (!) 35.4 °C (95.8 °F)   Resp 20   Ht 1.524 m (5')   Wt 58.5 kg (128 lb 15.5 oz)   SpO2 98%   BMI 25.19 kg/m²      Physical Exam  Patient is a cooperative woman who is alert and appears her stated age.  Speech is fluent and mental status is grossly intact.    HEENT " exam-reveals her to be normocephalic and atraumatic.  Pupils are equal round reactive.  EOMs are full without nystagmus visual fields are full and optic discs are flat.    Her neck is supple.    She stands without difficulty.  She has no drift and no dysmetria.  Rapid alternating movements of the hands are symmetric.    Motor testing reveals intact bulk tone and strength throughout.    Sensory testing is intact to temperature and vibration.    Reflexes are 2+ at the arms and knees absent at the ankles and she has downgoing toes.    Cranial nerves are unremarkable.        Neurological Exam  Mrs. Nishi Rodriguez is a 74-year-old woman who presented with what was diagnosed as multiple sclerosis in 1988 and 89.  She has had relatively an active disease for some time and tolerates Aubagio without difficulty.  She will consider whether to stop the medication but is advised there is no guarantee she will not have active disease although it is less likely when patients are in their 70s.  I will see her back in 6 months.           Assessment & Plan        Assessment & Plan  Multiple sclerosis (HCC)    Orders:    Teriflunomide (AUBAGIO) 14 MG Tab; Take 14 mg by mouth every day.    CBC WITH DIFFERENTIAL; Future

## 2025-01-10 ENCOUNTER — TELEPHONE (OUTPATIENT)
Dept: HEALTH INFORMATION MANAGEMENT | Facility: OTHER | Age: 75
End: 2025-01-10
Payer: MEDICARE

## 2025-01-13 ENCOUNTER — DOCUMENTATION (OUTPATIENT)
Dept: HEALTH INFORMATION MANAGEMENT | Facility: OTHER | Age: 75
End: 2025-01-13
Payer: MEDICARE

## 2025-01-13 ASSESSMENT — ACTIVITIES OF DAILY LIVING (ADL): BATHING_REQUIRES_ASSISTANCE: 0

## 2025-01-13 ASSESSMENT — ENCOUNTER SYMPTOMS: GENERAL WELL-BEING: FAIR

## 2025-01-13 ASSESSMENT — PATIENT HEALTH QUESTIONNAIRE - PHQ9
1. LITTLE INTEREST OR PLEASURE IN DOING THINGS: SEVERAL DAYS
2. FEELING DOWN, DEPRESSED, IRRITABLE, OR HOPELESS: SEVERAL DAYS

## 2025-01-16 ENCOUNTER — OFFICE VISIT (OUTPATIENT)
Dept: FAMILY PLANNING/WOMEN'S HEALTH CLINIC | Facility: PHYSICIAN GROUP | Age: 75
End: 2025-01-16
Payer: MEDICARE

## 2025-01-16 ENCOUNTER — OFFICE VISIT (OUTPATIENT)
Dept: ENDOCRINOLOGY | Facility: MEDICAL CENTER | Age: 75
End: 2025-01-16
Attending: INTERNAL MEDICINE
Payer: MEDICARE

## 2025-01-16 VITALS
SYSTOLIC BLOOD PRESSURE: 120 MMHG | WEIGHT: 126.9 LBS | TEMPERATURE: 98.4 F | HEART RATE: 73 BPM | RESPIRATION RATE: 14 BRPM | BODY MASS INDEX: 24.91 KG/M2 | OXYGEN SATURATION: 96 % | DIASTOLIC BLOOD PRESSURE: 60 MMHG | HEIGHT: 60 IN

## 2025-01-16 VITALS
BODY MASS INDEX: 24.94 KG/M2 | DIASTOLIC BLOOD PRESSURE: 50 MMHG | SYSTOLIC BLOOD PRESSURE: 110 MMHG | OXYGEN SATURATION: 95 % | HEIGHT: 60 IN | HEART RATE: 66 BPM | WEIGHT: 127 LBS

## 2025-01-16 DIAGNOSIS — R29.6 FALLS: ICD-10-CM

## 2025-01-16 DIAGNOSIS — E78.2 MIXED HYPERLIPIDEMIA: ICD-10-CM

## 2025-01-16 DIAGNOSIS — E11.9 CONTROLLED TYPE 2 DIABETES MELLITUS WITHOUT COMPLICATION, WITHOUT LONG-TERM CURRENT USE OF INSULIN (HCC): ICD-10-CM

## 2025-01-16 DIAGNOSIS — E55.9 VITAMIN D DEFICIENCY: ICD-10-CM

## 2025-01-16 DIAGNOSIS — E78.5 DYSLIPIDEMIA: ICD-10-CM

## 2025-01-16 DIAGNOSIS — K21.9 GASTROESOPHAGEAL REFLUX DISEASE WITHOUT ESOPHAGITIS: ICD-10-CM

## 2025-01-16 DIAGNOSIS — G35 MULTIPLE SCLEROSIS (HCC): ICD-10-CM

## 2025-01-16 DIAGNOSIS — Z79.84 LONG TERM (CURRENT) USE OF ORAL HYPOGLYCEMIC DRUGS: ICD-10-CM

## 2025-01-16 DIAGNOSIS — F13.20 SEDATIVE DEPENDENCE WITH CURRENT USE (HCC): ICD-10-CM

## 2025-01-16 DIAGNOSIS — M85.89 OSTEOPENIA OF MULTIPLE SITES: ICD-10-CM

## 2025-01-16 DIAGNOSIS — G31.9 CEREBRAL ATROPHY (HCC): ICD-10-CM

## 2025-01-16 DIAGNOSIS — R06.00 DYSPNEA, UNSPECIFIED TYPE: ICD-10-CM

## 2025-01-16 DIAGNOSIS — Z91.81 RISK FOR FALLS: ICD-10-CM

## 2025-01-16 DIAGNOSIS — E04.2 NON-TOXIC MULTINODULAR GOITER: ICD-10-CM

## 2025-01-16 DIAGNOSIS — F31.70 BIPOLAR DISORDER IN FULL REMISSION, MOST RECENT EPISODE UNSPECIFIED TYPE (HCC): ICD-10-CM

## 2025-01-16 DIAGNOSIS — E03.8 SUBCLINICAL HYPOTHYROIDISM: ICD-10-CM

## 2025-01-16 DIAGNOSIS — I47.10 SVT (SUPRAVENTRICULAR TACHYCARDIA) (HCC): ICD-10-CM

## 2025-01-16 DIAGNOSIS — E03.9 ACQUIRED HYPOTHYROIDISM: ICD-10-CM

## 2025-01-16 DIAGNOSIS — G47.33 OSA (OBSTRUCTIVE SLEEP APNEA): ICD-10-CM

## 2025-01-16 PROCEDURE — 3078F DIAST BP <80 MM HG: CPT | Performed by: INTERNAL MEDICINE

## 2025-01-16 PROCEDURE — G0439 PPPS, SUBSEQ VISIT: HCPCS | Performed by: PHYSICIAN ASSISTANT

## 2025-01-16 PROCEDURE — 1126F AMNT PAIN NOTED NONE PRSNT: CPT | Performed by: PHYSICIAN ASSISTANT

## 2025-01-16 PROCEDURE — 3074F SYST BP LT 130 MM HG: CPT | Performed by: PHYSICIAN ASSISTANT

## 2025-01-16 PROCEDURE — 3074F SYST BP LT 130 MM HG: CPT | Performed by: INTERNAL MEDICINE

## 2025-01-16 PROCEDURE — 83036 HEMOGLOBIN GLYCOSYLATED A1C: CPT | Performed by: INTERNAL MEDICINE

## 2025-01-16 PROCEDURE — 99212 OFFICE O/P EST SF 10 MIN: CPT | Performed by: INTERNAL MEDICINE

## 2025-01-16 PROCEDURE — 1126F AMNT PAIN NOTED NONE PRSNT: CPT | Performed by: INTERNAL MEDICINE

## 2025-01-16 PROCEDURE — 99215 OFFICE O/P EST HI 40 MIN: CPT | Performed by: INTERNAL MEDICINE

## 2025-01-16 PROCEDURE — 3078F DIAST BP <80 MM HG: CPT | Performed by: PHYSICIAN ASSISTANT

## 2025-01-16 RX ORDER — LEVOTHYROXINE SODIUM 75 UG/1
75 TABLET ORAL
Qty: 90 TABLET | Refills: 1 | Status: SHIPPED | OUTPATIENT
Start: 2025-01-16

## 2025-01-16 SDOH — ECONOMIC STABILITY: FOOD INSECURITY: WITHIN THE PAST 12 MONTHS, THE FOOD YOU BOUGHT JUST DIDN'T LAST AND YOU DIDN'T HAVE MONEY TO GET MORE.: NEVER TRUE

## 2025-01-16 SDOH — ECONOMIC STABILITY: FOOD INSECURITY: WITHIN THE PAST 12 MONTHS, YOU WORRIED THAT YOUR FOOD WOULD RUN OUT BEFORE YOU GOT THE MONEY TO BUY MORE.: NEVER TRUE

## 2025-01-16 SDOH — ECONOMIC STABILITY: FOOD INSECURITY: HOW HARD IS IT FOR YOU TO PAY FOR THE VERY BASICS LIKE FOOD, HOUSING, MEDICAL CARE, AND HEATING?: NOT HARD AT ALL

## 2025-01-16 SDOH — ECONOMIC STABILITY: TRANSPORTATION INSECURITY: IN THE PAST 12 MONTHS, HAS LACK OF TRANSPORTATION KEPT YOU FROM MEDICAL APPOINTMENTS OR FROM GETTING MEDICATIONS?: NO

## 2025-01-16 ASSESSMENT — FIBROSIS 4 INDEX
FIB4 SCORE: 1.4
FIB4 SCORE: 1.4

## 2025-01-16 ASSESSMENT — PATIENT HEALTH QUESTIONNAIRE - PHQ9
5. POOR APPETITE OR OVEREATING: 3 - NEARLY EVERY DAY
CLINICAL INTERPRETATION OF PHQ2 SCORE: 2
SUM OF ALL RESPONSES TO PHQ QUESTIONS 1-9: 9

## 2025-01-16 ASSESSMENT — PAIN SCALES - GENERAL: PAINLEVEL_OUTOF10: NO PAIN

## 2025-01-16 ASSESSMENT — ACTIVITIES OF DAILY LIVING (ADL): LACK_OF_TRANSPORTATION: NO

## 2025-01-16 NOTE — Clinical Note
Rachel Tse states she has been monitoring her SpO2 with a pulse oximeter at home and seeing this fluctuate dramatically from the 70s-90s. Sometimes this is associated with shortness of breath and/or tachycardia though not always. Stats were normal today in clinic. Advised her to monitor on alterative fingers, and attempt warming fingers before checking. Encouraged her to discuss with you further should she continue to appreciate these abnormal readings. (She does note she has appreciated these issues for the last couple years). She is set to get her CPAP in April as well.   Thanks,  Macie

## 2025-01-16 NOTE — LETTER
[facility]  Danae Pritchett M.D.  [address]  [place] [address]  Fax: [phone]   Authorization for Release/Disclosure of   Protected Health Information   Name: NEREYDA JIMENEZ : 1950 SSN: [id number]   [facility] [address]  [place] [address] Phone:    There are no phone numbers on file.   I authorize the entity listed below to release/disclose the PHI below to:   [facility]/Danae Pritchett M.D. and Macie Shaikh P.A.-C.   Provider or Entity Name:  [facility]   Address   [facility], [address]            [address], [place] [address] Phone:  [phone]      Fax:       [phone]        Reason for request: continuity of care   Information to be released:    [ X ] LAST COLONOSCOPY,  including any PATH REPORT and follow-up  [ X ] LAST FIT/COLOGUARD RESULT [  ] LAST DEXA  [  ] LAST MAMMOGRAM  [  ] LAST PAP  [  ] LAST LABS [  ] RETINA EXAM REPORT  [  ] IMMUNIZATION RECORDS  [  ] Release all info      [  ] Check here and initial the line next to each item to release ALL health information INCLUDING  _____ Care and treatment for drug and / or alcohol abuse  _____ HIV testing, infection status, or AIDS  _____ Genetic Testing    DATES OF SERVICE OR TIME PERIOD TO BE DISCLOSED: _____________  I understand and acknowledge that:  * This Authorization may be revoked at any time by you in writing, except if your health information has already been used or disclosed.  * Your health information that will be used or disclosed as a result of you signing this authorization could be re-disclosed by the recipient. If this occurs, your re-disclosed health information may no longer be protected by State or Federal laws.  * You may refuse to sign this Authorization. Your refusal will not affect your ability to obtain treatment.  * This Authorization becomes effective upon signing and will  on (date) __________.      If no date is indicated, this Authorization will  one (1)  year from the signature date.    Name: Melody Rodriguez    Signature:   Date:     1/16/2025       PLEASE FAX REQUESTED RECORDS BACK TO: [phone]

## 2025-01-16 NOTE — PROGRESS NOTES
Comprehensive Health Assessment Program     Melody Rodriguez is a 74 y.o. here for her comprehensive health assessment.    Patient Active Problem List    Diagnosis Date Noted    Dyspnea 01/16/2025    Falls 11/05/2024    Other specified attention deficit hyperactivity disorder (ADHD) 11/05/2024    HILARY (obstructive sleep apnea) 03/20/2024    Thyroid nodule 02/28/2024    Abnormal finding on lung imaging 02/28/2024    SVT (supraventricular tachycardia) (Former[facility]) 02/16/2024    Acquired hypothyroidism 06/15/2023    Vitamin D deficiency 06/15/2023    Family history of premature coronary artery disease 06/07/2023    History of skin cancer 03/22/2023    Gastroesophageal reflux disease without esophagitis 03/22/2023    Bipolar disorder in full remission ([facility]ly [facility]) 03/22/2023    Primary insomnia 03/22/2023    Sedative dependence with current use ([facility]) 03/22/2023    Total knee replacement status, right 12/14/2022    SI (sacroiliac) pain 06/09/2022    BMI 25.0-25.9,adult 05/20/2022    Osteopenia of multiple sites 05/20/2022    Cerebral atrophy ([facility]) 03/16/2022    Osteoarthritis of right knee 06/15/2021    Long term (current) use of oral hypoglycemic drugs 10/21/2020    Non-toxic multinodular goiter 01/29/2020    Risk for falls 07/12/2019    Dysphagia 03/29/2019    Controlled type 2 diabetes mellitus without complication, without long-term current use of insulin ([facility]) 01/06/2017    Mixed hyperlipidemia 01/06/2017    Multiple sclerosis ([facility]) 01/06/2017    Schatzki's ring 01/06/2017    Hiatal hernia 01/06/2017       Current Outpatient Medications   Medication Sig Dispense Refill    Teriflunomide (AUBAGIO) 14 MG Tab Take 14 mg by mouth every day. 30 Tablet 11    omeprazole (PRILOSEC) 20 MG delayed-release capsule Take 1 capsule by mouth every morning, 30 minutes before breakfast. 100 Capsule 3    Empagliflozin-metFORMIN HCl ER (SYNJARDY XR) 12.5-1000 MG TABLET SR 24 HR Take 1 tablet by mouth daily 100 Tablet 2    rosuvastatin  (CRESTOR) 40 MG tablet Take 1 Tablet by mouth every day. 100 Tablet 3    levothyroxine (SYNTHROID) 50 MCG Tab Take 1 Tablet by mouth every morning on an empty stomach. 90 Tablet 2    buPROPion (WELLBUTRIN XL) 300 MG XL tablet Take 1 tablet (300 mg) by mouth daily in the morning 90 Tablet 2    paroxetine (PAXIL) 40 MG tablet TAKE 1 TABLET(40 MG) BY MOUTH DAILY IN THE MORNING 90 Tablet 2    metoprolol SR (TOPROL XL) 25 MG TABLET SR 24 HR Take 0.5 Tablets by mouth every evening. 45 Tablet 3    tizanidine (ZANAFLEX) 4 MG Tab Take 1 Tablet by mouth 2 times a day as needed (spasm). 200 Tablet 0    zolpidem (AMBIEN) 5 MG Tab Take 1 tablet (5 mg) by mouth daily at bedtime as needed for insomnia 30 Tablet 0    Teriflunomide (AUBAGIO) 14 MG Tab Take 14 mg by mouth every day. Indications: Relapsing, Remitting Multiple Sclerosis      modafinil (PROVIGIL) 200 MG Tab Take 200 mg by mouth 1 time a day as needed. Indications: Tiredness associated with Multiple Sclerosis       No current facility-administered medications for this visit.          Current supplements as per medication list.     Allergies:   Patient has no known allergies.  Social History     Tobacco Use    Smoking status: Never    Smokeless tobacco: Never   Vaping Use    Vaping status: Never Used   Substance Use Topics    Alcohol use: Not Currently     Alcohol/week: 0.6 oz     Types: 1 Standard drinks or equivalent per week     Comment: Occasional glass of wine or april    Drug use: Not Currently     Family History   Problem Relation Age of Onset    Colon Cancer Mother     Heart Disease Mother         Cardiomyopathy    Lung Disease Mother         Emphysema from 2nd hand smoke    Cancer Mother         Colon    Colorectal Cancer Mother     Diabetes Mother     Heart Attack Father     Heart Disease Father         Numerous heart attacks    Diabetes Father     No Known Problems Sister     Cancer Brother         Skin, prostate    Prostate cancer Brother     Other Brother          Gaulbladder issues    Cancer Brother         Prostate, unknown but closely related to endometrial sarcoma. He is not transgender and is only one of a handful of known cases worldwide    Prostate cancer Brother     Respiratory Disease Brother         Asthma    Cancer Maternal Grandmother         Cancer but don’t know specific type. Didn’t talk anout it in the 1960’s    No Known Problems Maternal Grandfather     No Known Problems Paternal Grandmother     No Known Problems Paternal Grandfather     Other Daughter         Fibromyalgia    Bipolar disorder Daughter     ADHD Son     Depression Son     Hypertension Son      Melody  has a past medical history of Acquired hypothyroidism (06/15/2023), Acute nasopharyngitis, Arthritis, Breath shortness, Bronchitis, Cancer ([facility]), Cerebral atrophy ([facility]) (03/16/2022), Chickenpox, Chronic cough (03/15/2023), Cough, COVID-19 virus infection (03/15/2023), Daytime sleepiness, Depression, Diabetes (HCC), Difficulty swallowing, Disorder of thyroid, Dizziness, Eye drainage, Falls (11/05/2024), Fatigue, Groin lump (06/09/2022), Gynecological disorder, Hearing difficulty, Heart burn, Heart murmur, Heart valve disease, Heartburn, Hiatus hernia syndrome, High cholesterol, Hyperlipidemia, Hypokalemia (05/20/2022), Hyponatremia (12/19/2022), Hypothyroidism, Influenza, Morning headache, Mumps, Muscle disorder, HILARY (obstructive sleep apnea) (03/20/2024), HILARY (obstructive sleep apnea) (03/20/2024), Osteoarthritis of right knee (06/15/2021), Other specified attention deficit hyperactivity disorder (ADHD) (11/05/2024), Palpitations, Pneumonia, PONV (postoperative nausea and vomiting) (1970), Recurrent major depressive disorder, in remission ([facility]) (03/16/2022), Ringing in ears, Scarlet fever, Shortness of breath, SI (sacroiliac) pain (06/09/2022), Snoring, Tonsillitis, Toothache, Type 2 diabetes mellitus with stage 3 chronic kidney disease ([facility]) (06/15/2021), Urinary bladder disorder,  Urinary incontinence, Vitamin D deficiency (06/15/2023), and Wears glasses.    She has no past medical history of Anesthesia, Anginal syndrome (HCC), Arrhythmia, Asthma, Blood clotting disorder (HCC), Bowel habit changes, Carcinoma in situ of respiratory system, Cataract, Continuous ambulatory peritoneal dialysis status (HCC), Coughing blood, Dental disorder, Dialysis patient (HCC), Emphysema of lung (HCC), Glaucoma, Hemorrhagic disorder (HCC), Hepatitis A, Hepatitis B, Hepatitis C, Indigestion, Jaundice, Myocardial infarct (HCC), Pacemaker, Pregnant, Rheumatic fever, Seizure (HCC), Stroke (HCC), or Tuberculosis.   Past Surgical History:   Procedure Laterality Date    PB TOTAL KNEE ARTHROPLASTY Right 12/14/2022    Procedure: RIGHT TOTAL KNEE REPLACEMENT;  Surgeon: Britton Cooney M.D.;  Location: SURGERY [facility];  Service: Orthopedics    ARTHROSCOPY, KNEE      GYN SURGERY  1970    Cervical polyp       Screening:  In the last six months have you experienced any leakage of urine? No    Depression Screening  Little interest or pleasure in doing things?  1 - several days  Feeling down, depressed , or hopeless? 1 - several days  Trouble falling or staying asleep, or sleeping too much?     Feeling tired or having little energy?     Poor appetite or overeating?     Feeling bad about yourself - or that you are a failure or have let yourself or your family down?    Trouble concentrating on things, such as reading the newspaper or watching television?    Moving or speaking so slowly that other people could have noticed.  Or the opposite - being so fidgety or restless that you have been moving around a lot more than usual?     Thoughts that you would be better off dead, or of hurting yourself?     Patient Health Questionnaire Score:      If depressive symptoms identified deferred to follow up visit unless specifically addressed in assessment and plan.    Interpretation of PHQ-9 Total Score   Score Severity   1-4 No  Depression   5-9 Mild Depression   10-14 Moderate Depression   15-19 Moderately Severe Depression   20-27 Severe Depression    Screening for Cognitive Impairment  Do you or any of your friends or family members have any concern about your memory? Yes,   Three Minute Recall (Leader, Season, Table) 3/3    Tobi clock face with all 12 numbers and set the hands to show 10 minutes after 11.  Yes 5  Cognitive concerns identified deferred for follow up unless specifically addressed in assessment and plan.    Fall Risk Assessment  Has the patient had two or more falls in the last year or any fall with injury in the last year?  Yes    Safety Assessment  Do you always wear your seatbelt?  Yes  Any changes to home needed to function safely? No  Difficulty hearing.  Yes  Patient counseled about all safety risks that were identified.    Functional Assessment ADLs  Are there any barriers preventing you from cooking for yourself or meeting nutritional needs?  No.    Are there any barriers preventing you from driving safely or obtaining transportation?  No.    Are there any barriers preventing you from using a telephone or calling for help?  No    Are there any barriers preventing you from shopping?  No.    Are there any barriers preventing you from taking care of your own finances?  No    Are there any barriers preventing you from managing your medications?  No    Are there any barriers preventing you from showering, bathing or dressing yourself? No    Are there any barriers preventing you from doing housework or laundry? No    Are there any barriers preventing you from using the toilet?No    Are you currently engaging in any exercise or physical activity?  No.  Due to fatigue.     Self-Assessment of Health  What is your perception of your health? Fair    Do you sleep more than six hours a night? Yes    In the past 7 days, how much did pain keep you from doing your normal work? None    Do you spend quality time with family or  friends (virtually or in person)? Yes    Do you usually eat a heart healthy diet that constists of a variety of fruits, vegetables, whole grains and fiber? No    Do you eat foods high in fat and/or Fast Food more than three times per week? No    How concerned are you that your medical conditions are not being well managed? very    Are you worried that in the next 2 months, you may not have stable housing that you own, rent, or stay in as part of a household? No        Advance Care Planning  Do you have an Advance Directive, Living Will, Durable Power of , or POLST? Yes  Advance Directive Living Will     is on file      Health Maintenance Summary            Overdue - COVID-19 Vaccine (7 - 2024-25 season) Overdue since 9/1/2024      10/03/2023  Imm Admin: Covid-19 Mrna (Spikevax) Moderna 12+ Years    02/03/2023  Imm Admin: PFIZER BIVALENT SARS-COV-2 VACCINE (12+)    04/23/2022  Imm Admin: PFIZER PURPLE CAP SARS-COV-2 VACCINATION (12+)    12/17/2021  Imm Admin: PFIZER PURPLE CAP SARS-COV-2 VACCINATION (12+)    02/13/2021  Imm Admin: PFIZER PURPLE CAP SARS-COV-2 VACCINATION (12+)    Only the first 5 history entries have been loaded, but more history exists.              Overdue - A1c Screening (Every 6 Months) Overdue since 12/17/2024 06/17/2024  POCT  A1C    02/28/2024  POCT  A1C    08/29/2023  POCT  A1C    04/20/2023  POCT Hemoglobin A1C    12/07/2022  HEMOGLOBIN A1C    Only the first 5 history entries have been loaded, but more history exists.              Diabetes: Retinopathy Screening (Yearly) Next due on 4/17/2025 04/17/2024  AMB EXTERNAL RETINAL SCREENING RESULTS    10/25/2022  RETINAL SCREENING RESULTS    10/19/2022  POCT Retinal Eye Exam    07/22/2021  REFERRAL FOR RETINAL SCREENING EXAM    06/02/2020  REFERRAL FOR RETINAL SCREENING EXAM    Only the first 5 history entries have been loaded, but more history exists.              Diabetes: Monofilament / LE Exam (Yearly) Next due on 6/17/2025       06/17/2024  SmartData: WORKFLOW - DIABETES - DIABETIC FOOT EXAM PERFORMED    03/13/2024  SmartData: WORKFLOW - DIABETES - DIABETIC FOOT EXAM PERFORMED    08/29/2023  Diabetic Monofilament LE Exam    04/20/2023  SmartData: WORKFLOW - DIABETES - DIABETIC FOOT EXAM PERFORMED    10/19/2022  SmartData: WORKFLOW - DIABETES - DIABETIC FOOT EXAM PERFORMED    Only the first 5 history entries have been loaded, but more history exists.              Fasting Lipid Profile (Yearly) Tentatively due on 1/4/2026 01/04/2025  Lipid Profile    02/24/2024  Lipid Profile    02/24/2024  Lipid Profile    04/10/2023  Lipid Profile    03/11/2022  Lipid Profile    Only the first 5 history entries have been loaded, but more history exists.              Diabetes: Urine Protein Screening (Yearly) Next due on 1/4/2026 01/04/2025  MICROALBUMIN CREAT RATIO URINE    02/24/2024  MICROALBUMIN CREAT RATIO URINE    04/10/2023  MICROALBUMIN CREAT RATIO URINE    03/11/2022  MICROALBUMIN CREAT RATIO URINE    03/16/2021  MICROALBUMIN CREAT RATIO URINE    Only the first 5 history entries have been loaded, but more history exists.              SERUM CREATININE (Yearly) Next due on 1/4/2026 01/04/2025  Comp Metabolic Panel    06/01/2024  Comp Metabolic Panel    02/24/2024  Comp Metabolic Panel    01/05/2024  Basic Metabolic Panel    11/29/2023  COMP METABOLIC PANEL    Only the first 5 history entries have been loaded, but more history exists.              Annual Wellness Visit (Yearly) Next due on 1/16/2026 01/16/2025  Level of Service: RI ANNUAL WELLNESS VISIT-INCLUDES PPPS SUBSEQUE*    01/24/2024  Level of Service: RI ANNUAL WELLNESS VISIT-INCLUDES PPPS SUBSEQUE*    03/22/2023  Level of Service: RI ANNUAL WELLNESS VISIT-INCLUDES PPPS SUBSEQUE*    01/25/2023  Subsequent Annual Wellness Visit - Includes PPPS ()    05/20/2022  Level of Service: RI ANNUAL WELLNESS VISIT-INCLUDES PPPS SUBSEQUE*    Only the first 5 history entries  have been loaded, but more history exists.              Mammogram (Every 2 Years) Next due on 1/17/2026 01/17/2024  MA-SCREENING MAMMO BILAT W/TOMOSYNTHESIS W/CAD    01/11/2023  MA-SCREENING MAMMO BILAT W/TOMOSYNTHESIS W/CAD    10/04/2021  MA-SCREENING MAMMO BILAT W/TOMOSYNTHESIS W/CAD    10/03/2020  MA-SCREENING MAMMO BILAT W/TOMOSYNTHESIS W/CAD    07/16/2019  MA-SCREENING MAMMO BILAT W/TOMOSYNTHESIS W/CAD    Only the first 5 history entries have been loaded, but more history exists.              Colorectal Cancer Screening (Colonoscopy - Every 10 Years) Tentatively due on 2/9/2026 06/22/2022  OCCULT BLOOD FECES IMMUNOASSAY    02/09/2016  Colonoscopy (Done)              Bone Density Scan (Every 5 Years) Next due on 3/24/2028      03/24/2023  DS-BONE DENSITY STUDY (DEXA)    07/17/2019  DS-BONE DENSITY STUDY (DEXA)              IMM DTaP/Tdap/Td Vaccine (4 - Td or Tdap) Next due on 6/2/2033 06/02/2023  Imm Admin: Tdap Vaccine    06/26/2018  Imm Admin: Tdap Vaccine    08/17/2012  Imm Admin: Tdap Vaccine              Pneumococcal Vaccine: 65+ Years (Series Information) Completed      09/08/2016  Imm Admin: Pneumococcal polysaccharide vaccine (PPSV-23)    11/17/2015  Imm Admin: Pneumococcal Conjugate Vaccine (Prevnar/PCV-13)    01/01/2015  Imm Admin: Pneumococcal polysaccharide vaccine (PPSV-23)              Zoster (Shingles) Vaccines (Series Information) Completed      05/26/2020  Imm Admin: Zoster Vaccine Recombinant (RZV) (SHINGRIX)    06/26/2018  Imm Admin: Zoster Vaccine Recombinant (RZV) (SHINGRIX)    01/01/2014  Imm Admin: Zoster Vaccine Live (ZVL) (Zostavax) - HISTORICAL DATA    10/23/2013  Imm Admin: Zoster Vaccine Live (ZVL) (Zostavax) - HISTORICAL DATA              Hepatitis C Screening  Tentatively Complete      02/13/2023  Hepatitis C Antibody component of HEP C VIRUS ANTIBODY              Influenza Vaccine (Series Information) Completed      11/05/2024  Imm Admin: Influenza high-dose  trivalent (PF)    10/18/2023  Imm Admin: Influenza Vaccine Adult HD    01/25/2023  Imm Admin: Influenza Vaccine Adult HD    10/05/2021  Imm Admin: Influenza Vaccine Adult HD    10/01/2020  Imm Admin: Influenza Vaccine Adult HD    Only the first 5 history entries have been loaded, but more history exists.              Hepatitis A Vaccine (Hep A) (Series Information) Aged Out      No completion history exists for this topic.              HPV Vaccines (Series Information) Aged Out      No completion history exists for this topic.              Polio Vaccine (Inactivated Polio) (Series Information) Aged Out      No completion history exists for this topic.              Meningococcal Immunization (Series Information) Aged Out      No completion history exists for this topic.              Discontinued - Hepatitis B Vaccine (Hep B)  Discontinued      No completion history exists for this topic.                    Patient Care Team:  Danae Pritchett M.D. as PCP - General (Internal Medicine)  Oral Guzman M.D. as Consulting Physician (Neurology)  Lux Mcintosh Ass't as    [facility] (Orthopedic Surgery)  Rossy Sanchez O.D. (Optometry)  Swapnil Cat M.D. (Endocrinology)  Ashanti Bradford M.D. (Psychiatry & Neurology Psychiatry)  [facility] as Home Health Provider    Financial Resource Strain: Low Risk  (1/16/2025)    Overall Financial Resource Strain (CARDIA)     Difficulty of Paying Living Expenses: Not hard at all      Transportation Needs: No Transportation Needs (1/16/2025)    PRAPARE - Transportation     Lack of Transportation (Medical): No     Lack of Transportation (Non-Medical): No      Food Insecurity: No Food Insecurity (1/16/2025)    Hunger Vital Sign     Worried About Running Out of Food in the Last Year: Never true     Ran Out of Food in the Last Year: Never true        Encounter Vitals  Temperature: 36.9 °C (98.4 °F)  Temp src:  Temporal  Blood Pressure : 120/60  Pulse: 73  Respiration: 14  Pulse Oximetry: 96 %  Weight: 57.6 kg (126 lb 14.4 oz)  Height: 152.4 cm (5') (pt reported)  BMI (Calculated): 24.78  Pain Score: No pain     Assessment and Plan. The following treatment and monitoring plan is recommended:  Bipolar disorder in full remission (HCC)  Chronic, stable. Her PHQ today was 9/27. She feels this is elevated more so due to symptoms related to her MS rather than depression. She feels her bipolar disorder is well controlled on paroxetine 40mg daily, bupropion 300mg daily. She follows with psychiatry every six months. Follow up per psychiatry per routine.     Acquired hypothyroidism  Chronic, stable. Pt maintains on levothyroxine 50mcg daily with TSH WNL. Follow up with PCP at least annually for continued monitoring and management.      Latest Reference Range & Units 01/04/25 07:50   TSH 0.350 - 5.500 uIU/mL 3.830       Cerebral atrophy (HCC)  Chronic, stable. She has a history of a brain MRI that demonstrated cerebral atrophy, as early per pt report. Cognitive screening today was completed without error but she feels she notes some cognitive changes over the years. She denies this leading her into any trouble. Follow up with PCP per routine for continued monitoring and management.     Controlled type 2 diabetes mellitus without complication, without long-term current use of insulin (HCC)  Chronic, stable. Last A1c 6.1 as of June 2024. Latest foot exam: 6/2024 (normal), retinal exam:4/2024 (normal), urinalysis 1/2025 (normal). Continue to advise low carbohydrate, high protein diet with regular physical exercise. Continue current treatment regime: Synjardy XR 12-5-1000mg daily. Follow up with endocrinology at least annually for continued monitoring and management.     Gastroesophageal reflux disease without esophagitis  Chronic, stable. Continue with current defined treatment plan: omeprazole 20mg daily. Follow-up at least  annually.    Mixed hyperlipidemia  Chronic, stable. Maintains on statin therapy without issue. Most recent lipid panel from 01/2025.. She is admittedly not good about adhering to a healthy diet. Continue rosuvastatin 40mg daily. Recommend Mediterranean diet and regular physical activity. Follow up with PCP at least annually for continued monitoring and management.   Lab Results   Component Value Date/Time    CHOLSTRLTOT 129 01/04/2025 07:50 AM    LDL 62 01/04/2025 07:50 AM    HDL 41 01/04/2025 07:50 AM    TRIGLYCERIDE 130 01/04/2025 07:50 AM     Sedative dependence with current use (HCC)  Chronic, stable. Pt is dependent upon zolpidem 5mg due to insomnia. Denies any negative s/e of use. Follow up with PCP per routine for continued monitoring and management.     Multiple sclerosis (HCC)  Chronic, stable. Followed by neurology. Maintains on Aubagio as well as modafinil for fatigue. She takes tizanidine as needed for muscular spasms. Follow up with neurology at least annually for continued monitoring and management.     Osteopenia of multiple sites  Chronic, worsening. Last DEXA March 2023 demonstrating worsening osteopenia of the left proximal femur and new osteopenia of the lumbar spine with T scores of -2.4 and -1.1 respectively.  Denies any history of fragility fracture. Pt maintains on vitamin D supplementation and calcium rich foods. Continue to advise weightbearing exercises. Follow up with PCP at least annually for continued monitoring and management.     Falls  Risk for falls  Pt reports several falls over the last year, with three that she remembers today. She did do physical therapy 8 weeks but she feels given its secondary to MS. Continue to encourage minimizing episodes by slowing down and careful movement. She is aware of trip risks in her home and minimizes these. She doesn't feel assistive equipment would be of benefit. Follow up with PCP and/or neurology for continued management.     HERBERTH  (supraventricular tachycardia) (HCC)  Chronic, ongonig. Pt maintains on metoprolol 12.5mg daily, however she does note her heart continues to have periods where it races over 100 which presents with sensations of chest pressure. This can happen with exertional tasks such as taking the trash to the curb. She will be getting her CPAP in April and hopeful this may lessen these events. Follow up with cardiology for continued evaluation.    HILARY (obstructive sleep apnea)  Chronic, ongoing. Pt is in the process of getting CPAP equipment, scheduled for April. Follow up with sleep medicine per routine.     Dyspnea  Pt reports intermittent dyspnea associated with dizziness. She monitors this with an oximeter stating she sees SpO2 as low as 77%. She admits she hasn't checked her oxygen levels on opposite fingers but people have said her lips turn blue before. She notes her HR can be racing when she experiences this, but not always. She notes this has been an issue for her over the last couple years. SpO2 96% in clinic today. Encouraged further discussion with her PCP.    Services suggested: No services needed at this time  Health Care Screening: Age-appropriate preventive services recommended by USPTF and ACIP covered by Medicare were discussed today. Services ordered if indicated and agreed upon by the patient.  Referrals offered: Community-based lifestyle interventions to reduce health risks and promote self-management and wellness, fall prevention, nutrition, physical activity, tobacco-use cessation, weight loss, and mental health services as per orders if indicated.    Discussion today about general wellness and lifestyle habits:    Prevent falls and reduce trip hazards; Cautioned about securing or removing rugs.  Have a working fire alarm and carbon monoxide detector.  Engage in regular physical activity and social activities.    Follow-up: No follow-ups on file.

## 2025-01-16 NOTE — ASSESSMENT & PLAN NOTE
.  
Chronic, ongoing. Pt is in the process of getting CPAP equipment, scheduled for April. Follow up with sleep medicine per routine.   
Chronic, ongonig. Pt maintains on metoprolol 12.5mg daily, however she does note her heart continues to have periods where it races over 100 which presents with sensations of chest pressure. This can happen with exertional tasks such as taking the trash to the curb. She will be getting her CPAP in April and hopeful this may lessen these events. Follow up with cardiology for continued evaluation.  
Chronic, stable. Continue with current defined treatment plan: omeprazole 20mg daily. Follow-up at least annually.  
Chronic, stable. Followed by neurology. Maintains on Aubagio as well as modafinil for fatigue. She takes tizanidine as needed for muscular spasms. Follow up with neurology at least annually for continued monitoring and management.   
Chronic, stable. Her PHQ today was 9/27 denying SI. She feels this is elevated more so due to symptoms related to her MS (I.e. fatigue) rather than depression. She feels her bipolar disorder is well controlled on paroxetine 40mg daily, bupropion 300mg daily. She follows with psychiatry every six months. Follow up per psychiatry per routine.   
Chronic, stable. Last A1c 6.1 as of June 2024. Latest foot exam: 6/2024 (normal), retinal exam:4/2024 (normal), urinalysis 1/2025 (normal). Continue to advise low carbohydrate, high protein diet with regular physical exercise. Continue current treatment regime: Synjardy XR 12-5-1000mg daily. Follow up with endocrinology at least annually for continued monitoring and management.   
Chronic, stable. Maintains on statin therapy without issue. Most recent lipid panel from 01/2025.. She is admittedly not good about adhering to a healthy diet. Continue rosuvastatin 40mg daily. Recommend Mediterranean diet and regular physical activity. Follow up with PCP at least annually for continued monitoring and management.   Lab Results   Component Value Date/Time    CHOLSTRLTOT 129 01/04/2025 07:50 AM    LDL 62 01/04/2025 07:50 AM    HDL 41 01/04/2025 07:50 AM    TRIGLYCERIDE 130 01/04/2025 07:50 AM     
Chronic, stable. Pt is dependent upon zolpidem 5mg due to insomnia. Denies any negative s/e of use. Follow up with PCP per routine for continued monitoring and management.   
Chronic, stable. Pt maintains on levothyroxine 50mcg daily with TSH WNL. Follow up with PCP at least annually for continued monitoring and management.      Latest Reference Range & Units 01/04/25 07:50   TSH 0.350 - 5.500 uIU/mL 3.830     
Chronic, stable. She has a history of a brain MRI that demonstrated cerebral atrophy, as early per pt report. Cognitive screening today was completed without error but she feels she notes some cognitive changes over the years. She denies this leading her into any trouble. Follow up with PCP per routine for continued monitoring and management.   
Chronic, worsening. Last DEXA March 2023 demonstrating worsening osteopenia of the left proximal femur and new osteopenia of the lumbar spine with T scores of -2.4 and -1.1 respectively.  Denies any history of fragility fracture. Pt maintains on vitamin D supplementation and calcium rich foods. Continue to advise weightbearing exercises. Follow up with PCP at least annually for continued monitoring and management.   
Pt reports intermittent dyspnea associated with dizziness. She monitors this with an oximeter stating she sees SpO2 as low as 77%. She admits she hasn't checked her oxygen levels on opposite fingers but people have said her lips turn blue before. She notes her HR can be racing when she experiences this, but not always. She notes this has been an issue for her over the last couple years. SpO2 96% in clinic today. Encouraged further discussion with her PCP.  
Pt reports several falls over the last year, with three that she remembers today. She did do physical therapy 8 weeks but she feels given its secondary to MS. Continue to encourage minimizing episodes by slowing down and careful movement. She is aware of trip risks in her home and minimizes these. She doesn't feel assistive equipment would be of benefit. Follow up with PCP and/or neurology for continued management.   
1 pair

## 2025-01-16 NOTE — PROGRESS NOTES
CHIEF COMPLAINT: Patient is here for follow up of Type 2 Diabetes Mellitus.      HPI:     Melody Rodriguez is a 74 y.o. female with Type 2 Diabetes Mellitus here for follow up.    Labs from 1/16/2025 show A1c is 6.4%  Labs from 2/28/2024 show A1c is 6.4%  Labs from 4/20/2023 show a1c is 6.1%  Labs from 10/19/2022 show a1c was 5.8%  Labs from 9/10/2021 show a1c was 6.2%  Labs from 3/16/2021 show a1c was 6.9%  Labs from 10/9/2020 show a1c was 6.8%  Labs from 12/18/2019 show a1c was 6.5%.    She previously saw KALEB Brock  She was diagnosed with SVT and was placed on a BB (metoprolol XL 12.5mg daily)        She remains on Synjardy 12.5/1000 mg twice a day       She denies side effects with her medications        She has hyperlipidemia and is taking simvastatin 40 mg daily.    She denies a history of coronary artery disease and cerebrovascular disease  She denies myalgias and cramps   Latest Reference Range & Units 01/04/25 07:50   Cholesterol,Tot 100 - 199 mg/dL 129   Triglycerides 0 - 149 mg/dL 130   HDL >=40 mg/dL 41   LDL <100 mg/dL 62       She does not have signs of diabetic kidney disease    Latest Reference Range & Units 01/04/25 07:50   Micro Alb Creat Ratio 0 - 30 mg/g see below   Creatinine, Urine mg/dL 77.95   Microalbumin, Urine Random mg/dL <1.2           She had an eye exam on 4/2024      She has nontoxic multinodular goiter with   a 4 cm solid nodule in the right lower lobe;   a 2.0 cm solid nodule in the left upper lobe    a 1.5 cm solid nodule on the left mid to lower lobe and     Last ultrasound was on November 4, 2022 showed stable nodules      Biopsy of the 4 cm RML nodule on July 11, 2019 was nondiagnostic.  But repeat biopsy on January 12, 2021 came back benign    Biopsy of the 1.5 cm LML nodule on July 11, 2019  came back as benign  Biopsy of the 2 cm ISA nodule on January 2021 was benign      US on 3/28/2024  Nodule #1  Location:  Right  lower  Size:  3.70 x 2.31 x 2.28 cm.  Previously measured 3.9 x 2.5 x 2.7 cm.  Composition:  Solid-2  Echogenicity:  Isoechoic-1  Shape:  Wider than tall-0  Margins:  Smooth-0  Echogenic Foci:  None-0     ACR TIRADS points/category:  3 - TR3 - Mildly Suspicious     Nodule #2  Location:  Left  mid  Size:  2.19 x 1.78 x 0.86 cm. Previously measured 2.0 x 1.6 x 1.0 cm.  Composition:  Solid-2  Echogenicity:  Isoechoic-1  Shape:  Wider than tall-0  Margins:  Smooth-0  Echogenic Foci:  None-0     ACR TIRADS points/category:  3 - TR3 - Mildly Suspicious     Nodule #3  Location:  Left  lower  Size:  2.14 x 1.74 x 1.05 cm. Previously measured 1.9 x 1.5 x 1.1 cm.  Composition:  Mixed-1  Echogenicity:  Isoechoic-1  Shape:  Wider than tall-0  Margins:  Smooth-0  Echogenic Foci:  None-0     ACR TIRADS points/category:  2 - TR2 - Not Suspicious     Nodule #4  Location:  Left  lower  Size:  2.24 x 1.52 x 1.43 cm. Not previously demonstrated.  Composition:  Solid-2  Echogenicity:  Isoechoic-1  Shape:  Wider than tall-0  Margins:  Smooth-0  Echogenic Foci:  None-0     ACR TIRADS points/category:  3 - TR3 - Mildly Suspicious         She still reports periodic  difficulty swallowing (mostly when she swallows a handful of pills)  She has Schatzi ring in her esophagus which has been dilated twice previously by GI      Today we discussed that she is overdue for a follow-up ultrasound for monitoring of her multinodular goiter          We diagnosed with her subclinical hypothyroidism in 2023  TSH is 4.1 and free t4 is low at 0.83 on 4/10/2023  She is now on Levothyroxine 50mcg daily  Her labs  are uncontrolled   Latest Reference Range & Units 01/04/25 07:50   TSH 0.350 - 5.500 uIU/mL 3.830   Free T-4 0.93 - 1.70 ng/dL 0.73 (L)       Her vitamin D is improved   Latest Reference Range & Units 01/04/25 07:50   25-Hydroxy   Vitamin D 25 30 - 100 ng/mL 50           BG Diary:  Patient doesn't check her BGs    Weight has been stable    Diabetes Complications   Retinopathy: No known  retinopathy.  Last eye exam: She is overdue for an eye exam  Neuropathy: Denies paresthesias or numbness in hands or feet. Denies any foot wounds.  Exercise: Minimal.  Diet: Fair.  Patient's medications, allergies, and social histories were reviewed and updated as appropriate.    ROS:     CONS:     No fever, no chills   EYES:     No diplopia, no blurry vision   CV:           No chest pain, no palpitations   PULM:     No SOB, no cough, no hemoptysis.   GI:            No nausea, no vomiting, no diarrhea, no constipation   ENDO:     No polyuria, no polydipsia, no heat intolerance, no cold intolerance       Past Medical History:  Problem List:  2025-01: Dyspnea  2024-11: Falls  2024-11: Other specified attention deficit hyperactivity disorder   (ADHD)  2024-03: HILARY (obstructive sleep apnea)  2024-02: Thyroid nodule  2024-02: Acute cough  2024-02: Abnormal finding on lung imaging  2024-02: SVT (supraventricular tachycardia) ([facility])  2023-06: Acquired hypothyroidism  2023-06: Vitamin D deficiency  2023-06: Coronary artery calcification  2023-06: Family history of premature coronary artery disease  2023-03: History of skin cancer  2023-03: Gastroesophageal reflux disease without esophagitis  2023-03: Bipolar disorder in full remission ([facility])  2023-03: Primary insomnia  2023-03: Sedative dependence with current use ([facility])  2023-03: COVID-19 virus infection  2023-03: Chronic cough  2022-12: Hyponatremia  2022-12: Respiratory failure ([facility])  2022-12: Acute hypoxemic respiratory failure ([facility])  2022-12: Total knee replacement status, right  2022-12: Postoperative hypoxemia  2022-06: Groin lump  2022-06: SI (sacroiliac) pain  2022-05: BMI 25.0-25.9,adult  2022-05: Osteopenia of multiple sites  2022-05: Hypokalemia  2022-05: History of renal disease  2022-03: Cerebral atrophy ([facility])  2022-03: Recurrent major depressive disorder, in remission ([facility])  2021-06: Osteoarthritis of right knee  2021-06: Type 2 diabetes mellitus with stage 3  chronic kidney disease   (HCC)  2020-12: Stage 3a chronic kidney disease (HCC)  2020-10: Long term (current) use of oral hypoglycemic drugs  2020-01: Non-toxic multinodular goiter  2019-07: Risk for falls  2019-03: Dysphagia  2017-01: Controlled type 2 diabetes mellitus without complication,   without long-term current use of insulin (HCC)  2017-01: Mixed hyperlipidemia  2017-01: Multiple sclerosis (HCC)  2017-01: Schatzki's ring  2017-01: Hiatal hernia      Past Surgical History:  Past Surgical History:   Procedure Laterality Date    PB TOTAL KNEE ARTHROPLASTY Right 12/14/2022    Procedure: RIGHT TOTAL KNEE REPLACEMENT;  Surgeon: Britton Cooney M.D.;  Location: SURGERY [facility];  Service: Orthopedics    ARTHROSCOPY, KNEE      GYN SURGERY  1970    Cervical polyp        Allergies:  Patient has no known allergies.     Social History:  Social History     Tobacco Use    Smoking status: Never    Smokeless tobacco: Never   Vaping Use    Vaping status: Never Used   Substance Use Topics    Alcohol use: Not Currently     Alcohol/week: 0.6 oz     Types: 1 Standard drinks or equivalent per week     Comment: Occasional glass of wine or april    Drug use: Not Currently        Family History:   family history includes ADHD in her son; Bipolar disorder in her daughter; Cancer in her brother, brother, maternal grandmother, and mother; Colon Cancer in her mother; Colorectal Cancer in her mother; Depression in her son; Diabetes in her father and mother; Heart Attack in her father; Heart Disease in her father and mother; Hypertension in her son; Lung Disease in her mother; No Known Problems in her maternal grandfather, paternal grandfather, paternal grandmother, and sister; Other in her brother and daughter; Prostate cancer in her brother and brother; Respiratory Disease in her brother.      PHYSICAL EXAM:   OBJECTIVE:  Vital signs: /50 (BP Location: Left arm, Patient Position: Sitting, BP Cuff Size: Adult long)    Pulse 66   Ht 1.524 m (5')   Wt 57.6 kg (127 lb)   SpO2 95%   BMI 24.80 kg/m²   GENERAL: Well-developed, well-nourished in no apparent distress.   EYE:  No ocular asymmetry, PERRLA  HENT: Pink, moist mucous membranes.    NECK: Thyroid is diffusely enlarged with palpable nodules bilaterally  CARDIOVASCULAR:  No murmurs  LUNGS: Clear breath sounds  ABDOMEN: Soft, nontender   EXTREMITIES: No clubbing, cyanosis, or edema.   NEUROLOGICAL: No gross focal motor abnormalities   LYMPH: No cervical adenopathy seen  SKIN: No rashes, lesions.         Labs:  Lab Results   Component Value Date/Time    HBA1C 6.1 (A) 06/17/2024 02:08 PM        Lab Results   Component Value Date/Time    WBC 8.6 01/04/2025 07:50 AM    RBC 4.80 01/04/2025 07:50 AM    HEMOGLOBIN 14.0 01/04/2025 07:50 AM    MCV 95.6 01/04/2025 07:50 AM    MCH 29.2 01/04/2025 07:50 AM    MCHC 30.5 (L) 01/04/2025 07:50 AM    RDW 47.8 01/04/2025 07:50 AM    MPV 9.0 01/04/2025 07:50 AM       Lab Results   Component Value Date/Time    SODIUM 138 01/04/2025 07:50 AM    POTASSIUM 4.5 01/04/2025 07:50 AM    CHLORIDE 102 01/04/2025 07:50 AM    CO2 26 01/04/2025 07:50 AM    ANION 10.0 01/04/2025 07:50 AM    GLUCOSE 127 (H) 01/04/2025 07:50 AM    BUN 19 01/04/2025 07:50 AM    CREATININE 0.92 01/04/2025 07:50 AM    CALCIUM 9.4 01/04/2025 07:50 AM    ASTSGOT 23 01/04/2025 07:50 AM    ALTSGPT 15 01/04/2025 07:50 AM    TBILIRUBIN 0.5 01/04/2025 07:50 AM    ALBUMIN 4.3 01/04/2025 07:50 AM    TOTPROTEIN 7.1 01/04/2025 07:50 AM    GLOBULIN 2.8 01/04/2025 07:50 AM    AGRATIO 1.5 01/04/2025 07:50 AM       Lab Results   Component Value Date/Time    CHOLSTRLTOT 179 06/12/2019 0813    TRIGLYCERIDE 145 06/12/2019 0813    HDL 39 (A) 06/12/2019 0813     (H) 06/12/2019 0813       Lab Results   Component Value Date/Time    MALBCRT see below 01/04/2025 07:50 AM    MICROALBUR <1.2 01/04/2025 07:50 AM        Lab Results   Component Value Date/Time    TSHULTRASEN 3.680 06/12/2019 0813      No results found for: FREEDIR  No results found for: FREET3  No results found for: THYSTIMIG    IMAGIN/4/2022 9:50 AM     HISTORY/REASON FOR EXAM:  Mass/Lump  Thyroid goiter     TECHNIQUE/EXAM DESCRIPTION:  Ultrasound of the soft tissues of the head and neck.     COMPARISON:  Thyroid ultrasound 2020     FINDINGS:  The thyroid gland is heterogeneous.  Vascularity is normal.     The right lobe of the thyroid gland measures 2.47 cm x 5.44 cm x 2.75 cm. The contour and echogenicity are normal. No focal mass lesions are identified.  The left lobe of the thyroid gland measures 1.69 cm x 5.69 cm x 2.43 cm. The contour and echogenicity are normal. No focal mass lesions are identified.  The isthmus measures 0.36 cm.     Nodules >= 1cm:  3     Nodule #1  Location:  Right  lower  Size:  3.90 x 2.47 x 2.71 cm  Composition:  Mixed-1  Echogenicity:  Hypoechoic-2  Shape:  Wider than tall-0  Margins:  Smooth-0  Echogenic Foci:  None-0     ACR TIRADS points/category:  3 - TR3 - Mildly Suspicious     Nodule #2  Location:  Left  upper  Size:  1.99 x 1.61 x 0.98 cm  Composition:  Solid-2  Echogenicity:  Hypoechoic-2  Shape:  Wider than tall-0  Margins:  Smooth-0  Echogenic Foci:  None-0     ACR TIRADS points/category:  4 - TR4 - Moderately Suspicious     Nodule #3  Location:  Left  lower  Size:  1.53 x 1.48 x 1.12 cm  Composition:  Solid-2  Echogenicity:  Hypoechoic-2  Shape:  Wider than tall-0  Margins:  Smooth-0  Echogenic Foci:  None-0     ACR TIRADS points/category:  4 - TR4 - Moderately Suspicious     IMPRESSION:     Multinodular goiter with thyroid nodules x3.     ACR TI-RADS Recommendations  TR4 (>= 1.5cm) - Based solely on ultrasound findings, FNA could be considered     Recommendations based on the American College of Radiology Thyroid imaging, reporting and Data System (TI-RADS) 2017.     2020 4:42 PM     HISTORY/REASON FOR EXAM:  Follow up NTMNG 4 cm nodule RUL and 2 nodules LML LLL previous FNA r  lobe non dx     TECHNIQUE/EXAM DESCRIPTION:  Ultrasound of the soft tissues of the head and neck.     COMPARISON:  Thyroid ultrasound 6/17/2019     FINDINGS:  The thyroid gland is heterogeneous.  Vascularity is normal.     The right lobe of the thyroid gland measures 2.18 cm x 5.21 cm x 2.41 cm.  The left lobe of the thyroid gland measures 1.56 cm x 5.91 cm x 2.07 cm.  The isthmus measures 0.18 cm.     Nodules >= 1cm:     Nodule #1  Location: Right lower pole  Size:  3.94 x 2.40 x 1.97 cm from 4.0 x 2.4 x 2.9 cm  Composition:  Mixed-1  Echogenicity:  Isoechoic-1  Shape:  Wider than tall-0  Margins:  Smooth-0  Echogenic Foci:  Macroscopic-1     ACR TIRADS points/category:  3 - TR3 - Mildly Suspicious     Nodule #2  Location:  Left interpolar  Size:  1.49 x 1.19 x 1.44 cm from 1.9 x 1.0 x 0.7  Composition:  Solid-2  Echogenicity:  Isoechoic-1  Shape:  Taller than wide-3  Margins:  Smooth-0  Echogenic Foci:  None-0     ACR TIRADS points/category:  6 - TR4 - Moderately Suspicious     Nodule #3  Location:  Left lower pole  Size:  1.69 x 1.50 x 1.07 cm from 1.9 x 1.0 x 1.5  Composition:  Mixed-1  Echogenicity:  Isoechoic-1  Shape:  Wider than tall-0  Margins:  Smooth-0  Echogenic Foci:  None-0     ACR TIRADS points/category:  2 - TR2 - Not Suspicious     Nodule #4  Location:  Left upper  Size:  2.21 x 1.46 x 1.43 cm  Composition:  Mixed-1  Echogenicity:  Isoechoic-1  Shape:  Wider than tall-0  Margins:  Smooth-0  Echogenic Foci:  None-0     ACR TIRADS points/category:  2 - TR2 - Not Suspicious     IMPRESSION:     Left interpolar mass has slightly enlarged and is moderately suspicious     ACR TI-RADS Recommendations  TR4 - follow up ultrasound in 1,2,3 and 5 years     Recommendations based on the American College of Radiology Thyroid imaging, reporting and Data System (TI-RADS) 2017.          3/28/2024 3:50 PM     HISTORY/REASON FOR EXAM:  Follow up for NTMNG - all nodules biopsied and are benign     TECHNIQUE/EXAM  DESCRIPTION:  Ultrasound of the soft tissues of the head and neck.     COMPARISON:  Thyroid ultrasound 11/4/2022     FINDINGS:  The thyroid gland is heterogeneous.  Vascularity is normal.     The right lobe of the thyroid gland measures 2.45 cm x 5.22 cm x 2.45 cm.  The left lobe of the thyroid gland measures 1.95 cm x 5.36 cm x 2.03 cm.  The isthmus measures 0.17 cm.     Nodules >= 1cm:  4     Nodule #1  Location:  Right  lower  Size:  3.70 x 2.31 x 2.28 cm. Previously measured 3.9 x 2.5 x 2.7 cm.  Composition:  Solid-2  Echogenicity:  Isoechoic-1  Shape:  Wider than tall-0  Margins:  Smooth-0  Echogenic Foci:  None-0     ACR TIRADS points/category:  3 - TR3 - Mildly Suspicious     Nodule #2  Location:  Left  mid  Size:  2.19 x 1.78 x 0.86 cm. Previously measured 2.0 x 1.6 x 1.0 cm.  Composition:  Solid-2  Echogenicity:  Isoechoic-1  Shape:  Wider than tall-0  Margins:  Smooth-0  Echogenic Foci:  None-0     ACR TIRADS points/category:  3 - TR3 - Mildly Suspicious     Nodule #3  Location:  Left  lower  Size:  2.14 x 1.74 x 1.05 cm. Previously measured 1.9 x 1.5 x 1.1 cm.  Composition:  Mixed-1  Echogenicity:  Isoechoic-1  Shape:  Wider than tall-0  Margins:  Smooth-0  Echogenic Foci:  None-0     ACR TIRADS points/category:  2 - TR2 - Not Suspicious     Nodule #4  Location:  Left  lower  Size:  2.24 x 1.52 x 1.43 cm. Not previously demonstrated.  Composition:  Solid-2  Echogenicity:  Isoechoic-1  Shape:  Wider than tall-0  Margins:  Smooth-0  Echogenic Foci:  None-0     ACR TIRADS points/category:  3 - TR3 - Mildly Suspicious           IMPRESSION:     1. 3.7 cm nodule lower pole right lobe of the thyroid unchanged.     ACR TI-RADS Recommendations  TR3 (>2.5cm) - Based solely on ultrasound findings, FNA could be considered     2. 2.2 cm nodule mid left lobe of the thyroid similar to previous.     ACR TI-RADS Recommendations  TR2 - No FNA or follow up recommended.     3. 2.1 cm nodule lower pole left lobe of thyroid  similar to previous. Slight enlargement by measurement likely technical variation.     ACR TI-RADS Recommendations  TR2 - No FNA or follow up recommended.     4. 2.2 cm nodule lower pole left lobe of thyroid not previously demonstrated.     ACR TI-RADS Recommendations  TR3 (1.5 -2.4cm) - follow up ultrasound in 1, 3, and 5 years.     Recommendations based on the American College of Radiology Thyroid imaging, reporting and Data System (TI-RADS) 2017.        INTERPRETING LOCATION:  [facility], [id number]      ASSESSMENT/PLAN:     1. Controlled type 2 diabetes mellitus without complication, without long-term current use of insulin (HCC)  Stable controlled  Continue Synjardy XR 12.5/1000 mg twice a day  She is up-to-date with her labs  Follow-up with Lianet in 3 months with repeat thyroid labs    2. Non-toxic multinodular goiter  Stable  She is overdue for repeat ultrasound  I reviewed the results from her 2024 ultrasound which showed stable nontoxic multinodular goiter  I am scheduling her for repeat ultrasound with radiology      3. Subclinical hypothyroidism  Uncontrolled  TSH is suboptimal free T4 is low  Increase levothyroxine 75 mcg daily  Reviewed proper administration of thyroid hormone  Patient should take thyroid hormone 30-60 minutes before breakfast on an empty stomach plain water and not take it together with food, iron, calcium, and antacids.  Iron, calcium, and antacids should be taken at least 4 hours apart from thyroid hormone.  Repeat TSH levels in 3 months    4. Dyslipidemia  Fair control  Continue simvastatin  Repeat fasting lipids in 1 year    5. Vitamin D deficiency  Controlled  Vitamin D is very low  Continue ergocalciferol 50,000 units weekly  Continue monitoring    6. Long term (current) use of oral hypoglycemic drugs  Patient is on multiple oral agents for type 2 diabetes management      Return in about 3 months (around 4/16/2025).      Total time spent on day of service was over 60  minutes which included obtaining a detailed history and physical exam, ordering labs, coordinating care and scheduling future follow-up    Thank you kindly for allowing me to participate in the diabetes care plan for this patient.    Swapnil Cat MD, FACE, [facility]      CC:   Danae Pritchett M.D.

## 2025-01-17 LAB
HBA1C MFR BLD: 6.4 % (ref ?–5.8)
POCT INT CON NEG: NEGATIVE
POCT INT CON POS: POSITIVE

## 2025-01-22 DIAGNOSIS — I47.10 PAROXYSMAL SUPRAVENTRICULAR TACHYCARDIA (HCC): ICD-10-CM

## 2025-01-24 RX ORDER — METOPROLOL SUCCINATE 25 MG/1
12.5 TABLET, EXTENDED RELEASE ORAL NIGHTLY
Qty: 50 TABLET | Refills: 2 | Status: SHIPPED | OUTPATIENT
Start: 2025-01-24

## 2025-01-24 NOTE — TELEPHONE ENCOUNTER
Is the patient due for a refill? Yes    Was the patient seen the last 15 months? Yes    Date of last office visit: 08.09.2024    Does the patient have an upcoming appointment?  No       Provider to refill:MT    Does the patient have [facility] and need 100-day supply? (This applies to ALL medications) Yes, quantity updated to 100 days

## 2025-01-27 PROCEDURE — RXMED WILLOW AMBULATORY MEDICATION CHARGE: Performed by: NURSE PRACTITIONER

## 2025-01-29 ENCOUNTER — PHARMACY VISIT (OUTPATIENT)
Dept: PHARMACY | Facility: MEDICAL CENTER | Age: 75
End: 2025-01-29
Payer: COMMERCIAL

## 2025-02-14 DIAGNOSIS — E03.8 SUBCLINICAL HYPOTHYROIDISM: ICD-10-CM

## 2025-02-16 PROCEDURE — RXMED WILLOW AMBULATORY MEDICATION CHARGE: Performed by: PSYCHIATRY & NEUROLOGY

## 2025-02-17 ENCOUNTER — PHARMACY VISIT (OUTPATIENT)
Dept: PHARMACY | Facility: MEDICAL CENTER | Age: 75
End: 2025-02-17
Payer: COMMERCIAL

## 2025-02-17 RX ORDER — LEVOTHYROXINE SODIUM 75 UG/1
75 TABLET ORAL
Qty: 90 TABLET | Refills: 1 | Status: SHIPPED | OUTPATIENT
Start: 2025-02-17

## 2025-02-19 ENCOUNTER — HOSPITAL ENCOUNTER (OUTPATIENT)
Dept: RADIOLOGY | Facility: MEDICAL CENTER | Age: 75
End: 2025-02-19
Attending: INTERNAL MEDICINE
Payer: MEDICARE

## 2025-02-19 DIAGNOSIS — E04.2 NON-TOXIC MULTINODULAR GOITER: ICD-10-CM

## 2025-02-19 PROCEDURE — 76536 US EXAM OF HEAD AND NECK: CPT

## 2025-02-21 ENCOUNTER — RESULTS FOLLOW-UP (OUTPATIENT)
Dept: ENDOCRINOLOGY | Facility: MEDICAL CENTER | Age: 75
End: 2025-02-21

## 2025-02-21 DIAGNOSIS — E04.2 NON-TOXIC MULTINODULAR GOITER: ICD-10-CM

## 2025-02-26 ENCOUNTER — APPOINTMENT (OUTPATIENT)
Dept: SLEEP MEDICINE | Facility: MEDICAL CENTER | Age: 75
End: 2025-02-26
Payer: MEDICARE

## 2025-03-06 ENCOUNTER — TELEPHONE (OUTPATIENT)
Dept: PHARMACY | Facility: MEDICAL CENTER | Age: 75
End: 2025-03-06
Payer: MEDICARE

## 2025-03-06 PROCEDURE — RXMED WILLOW AMBULATORY MEDICATION CHARGE: Performed by: INTERNAL MEDICINE

## 2025-03-06 NOTE — TELEPHONE ENCOUNTER
Contact:  Phone number:[phone] (mobile)    Name of person spoken with and relationship to patient: Nishi patient   Patient’s Adherence:  How patient is doing on medication: Very Well    How many missed doses and reason: 0 N/A    Any new medications: No    Any new conditions: No    Any new allergies: No    Any new side effects: No    Any new diagnoses: No    How many doses remainin    Did patient want to speak with pharmacist: No   Delivery:  Delivery date and method: 03/10/2025 via Mail    Needs by Date: 03/10/2025    Signature required: No     Any additional details for : N/A   Teach Appointment Date:  N/A   Shipping Address:  [address] [place] [id number]   Medication(name,strength and dose):  Synjardy Tablet 12.5-1000 MG Rosuvastatin Calcium Tablet 40 MG    Copay:  $0   Payment Method:  n/a $0 copay   Supplies:  NONE   Additional Information:  NONE     Mariaelena Ibrahim, Pharmacy Liaison/ RX Coordinator

## 2025-03-07 ENCOUNTER — TELEMEDICINE (OUTPATIENT)
Dept: MEDICAL GROUP | Facility: PHYSICIAN GROUP | Age: 75
End: 2025-03-07
Payer: MEDICARE

## 2025-03-07 VITALS
HEART RATE: 102 BPM | BODY MASS INDEX: 23.74 KG/M2 | WEIGHT: 120.9 LBS | TEMPERATURE: 95.8 F | HEIGHT: 60 IN | OXYGEN SATURATION: 97 %

## 2025-03-07 DIAGNOSIS — R63.4 UNINTENTIONAL WEIGHT LOSS: ICD-10-CM

## 2025-03-07 DIAGNOSIS — U07.1 POSITIVE SELF-ADMINISTERED ANTIGEN TEST FOR COVID-19: ICD-10-CM

## 2025-03-07 DIAGNOSIS — G47.33 OSA (OBSTRUCTIVE SLEEP APNEA): ICD-10-CM

## 2025-03-07 DIAGNOSIS — G35 MULTIPLE SCLEROSIS (HCC): ICD-10-CM

## 2025-03-07 PROCEDURE — 99214 OFFICE O/P EST MOD 30 MIN: CPT | Mod: 95 | Performed by: INTERNAL MEDICINE

## 2025-03-07 ASSESSMENT — FIBROSIS 4 INDEX: FIB4 SCORE: 1.42

## 2025-03-10 ENCOUNTER — PHARMACY VISIT (OUTPATIENT)
Dept: PHARMACY | Facility: MEDICAL CENTER | Age: 75
End: 2025-03-10
Payer: COMMERCIAL

## 2025-03-24 ENCOUNTER — PROCEDURE VISIT (OUTPATIENT)
Dept: ENDOCRINOLOGY | Facility: MEDICAL CENTER | Age: 75
End: 2025-03-24
Attending: INTERNAL MEDICINE
Payer: MEDICARE

## 2025-03-24 ENCOUNTER — HOSPITAL ENCOUNTER (OUTPATIENT)
Facility: MEDICAL CENTER | Age: 75
End: 2025-03-24
Attending: INTERNAL MEDICINE
Payer: MEDICARE

## 2025-03-24 DIAGNOSIS — E04.2 NON-TOXIC MULTINODULAR GOITER: ICD-10-CM

## 2025-03-24 DIAGNOSIS — E03.8 SUBCLINICAL HYPOTHYROIDISM: ICD-10-CM

## 2025-03-24 DIAGNOSIS — E11.9 CONTROLLED TYPE 2 DIABETES MELLITUS WITHOUT COMPLICATION, WITHOUT LONG-TERM CURRENT USE OF INSULIN (HCC): ICD-10-CM

## 2025-03-24 DIAGNOSIS — E55.9 VITAMIN D DEFICIENCY: ICD-10-CM

## 2025-03-24 DIAGNOSIS — E78.5 DYSLIPIDEMIA: ICD-10-CM

## 2025-03-24 LAB — PATHOLOGY CONSULT NOTE: NORMAL

## 2025-03-24 PROCEDURE — 10005 FNA BX W/US GDN 1ST LES: CPT | Performed by: INTERNAL MEDICINE

## 2025-03-24 PROCEDURE — 88173 CYTOPATH EVAL FNA REPORT: CPT | Mod: 26 | Performed by: STUDENT IN AN ORGANIZED HEALTH CARE EDUCATION/TRAINING PROGRAM

## 2025-03-24 PROCEDURE — 88173 CYTOPATH EVAL FNA REPORT: CPT | Performed by: STUDENT IN AN ORGANIZED HEALTH CARE EDUCATION/TRAINING PROGRAM

## 2025-03-24 NOTE — PROGRESS NOTES
POC US guided FNA procedure was completed today for the 3.38 cm solid nodule on the RML  Please see endocrinologist procedure note  Patient tolerated procedure well without complaints.  Patient was advised that she will be contacted regarding the results and next plan  Patient was advised to follow up as planned with labs prior   Patient was advised to take tylenol or ibuprofen for pain  No restrictions were advised post procedure   Patient was advised to call for any issues post biopsy       Swapnil Cat M.D.

## 2025-03-25 ENCOUNTER — TELEPHONE (OUTPATIENT)
Dept: PHARMACY | Facility: MEDICAL CENTER | Age: 75
End: 2025-03-25
Payer: MEDICARE

## 2025-03-25 PROCEDURE — RXMED WILLOW AMBULATORY MEDICATION CHARGE: Performed by: INTERNAL MEDICINE

## 2025-03-25 NOTE — TELEPHONE ENCOUNTER
Contact:  Phone number:[phone] (mobile)    Name of person spoken with and relationship to patient: Nishi patient   Patient’s Adherence:  How patient is doing on medication: Very Well    How many missed doses and reason: 0 N/A    Any new medications: No    Any new conditions: No    Any new allergies: No    Any new side effects: No    Any new diagnoses: No    How many doses remainin    Did patient want to speak with pharmacist: No   Delivery:  Delivery date and method: 2025 via     Needs by Date: 2025    Signature required: No     Any additional details for : N/A   Teach Appointment Date:  N/A   Shipping Address:  [address] [place] [id number]   Medication(name,strength and dose):  LEVOTHYROXINE SODIUM TABLET 75 MCG   Copay:  $0   Payment Method:  n/a $0 copay   Supplies:  NONE   Additional Information:  NONE     Mariaelena Ibrahim, Pharmacy Liaison/ RX Coordinator

## 2025-03-26 ENCOUNTER — RESULTS FOLLOW-UP (OUTPATIENT)
Dept: ENDOCRINOLOGY | Facility: MEDICAL CENTER | Age: 75
End: 2025-03-26

## 2025-03-27 ENCOUNTER — PHARMACY VISIT (OUTPATIENT)
Dept: PHARMACY | Facility: MEDICAL CENTER | Age: 75
End: 2025-03-27
Payer: COMMERCIAL

## 2025-04-16 ENCOUNTER — APPOINTMENT (OUTPATIENT)
Dept: MEDICAL GROUP | Facility: PHYSICIAN GROUP | Age: 75
End: 2025-04-16
Payer: MEDICARE

## 2025-04-16 VITALS
HEART RATE: 67 BPM | TEMPERATURE: 97.3 F | WEIGHT: 123.4 LBS | SYSTOLIC BLOOD PRESSURE: 112 MMHG | OXYGEN SATURATION: 95 % | DIASTOLIC BLOOD PRESSURE: 60 MMHG | HEIGHT: 60 IN | BODY MASS INDEX: 24.23 KG/M2 | RESPIRATION RATE: 12 BRPM

## 2025-04-16 DIAGNOSIS — R63.4 WEIGHT LOSS: ICD-10-CM

## 2025-04-16 DIAGNOSIS — E11.9 CONTROLLED TYPE 2 DIABETES MELLITUS WITHOUT COMPLICATION, WITHOUT LONG-TERM CURRENT USE OF INSULIN (HCC): ICD-10-CM

## 2025-04-16 DIAGNOSIS — E04.2 NON-TOXIC MULTINODULAR GOITER: ICD-10-CM

## 2025-04-16 DIAGNOSIS — E03.9 ACQUIRED HYPOTHYROIDISM: ICD-10-CM

## 2025-04-16 DIAGNOSIS — G47.33 OSA (OBSTRUCTIVE SLEEP APNEA): ICD-10-CM

## 2025-04-16 DIAGNOSIS — G35 MULTIPLE SCLEROSIS (HCC): ICD-10-CM

## 2025-04-16 PROCEDURE — 3078F DIAST BP <80 MM HG: CPT | Performed by: INTERNAL MEDICINE

## 2025-04-16 PROCEDURE — 3074F SYST BP LT 130 MM HG: CPT | Performed by: INTERNAL MEDICINE

## 2025-04-16 PROCEDURE — 99214 OFFICE O/P EST MOD 30 MIN: CPT | Performed by: INTERNAL MEDICINE

## 2025-04-16 ASSESSMENT — FIBROSIS 4 INDEX: FIB4 SCORE: 1.42

## 2025-04-16 NOTE — PROGRESS NOTES
"    CC: Follow-up appointment, 6 months.    HPI:  Melody presents with the following    1. HILARY (obstructive sleep apnea)  Followed by pulmonology.  Patient completed a sleep study and has been using her CPAP however is only able to use it for 4 hours a night because it leads to dryness.    2. Controlled type 2 diabetes mellitus without complication, without long-term current use of insulin ([facility])  7/25/2024:Chronic. Stable. Followed by endocrinology. Patient currently treated with Synjardy and her hemoglobin A1c is 6.1 down from 6.4.      11/5/2024:.  Patient has an appointment with endocrinology in 2 months.  No recent medication changes.  CMP ordered along with A1c per endocrinology.     4/16/25: Patient continues on Synjardy.  Hemoglobin A1c 6.4.    3. Acquired hypothyroidism  Followed by endocrinology.  Levothyroxine recently increased to 75 mcg daily and she will be due for thyroid function panel before her next appointment in August.    4. Multiple sclerosis ([facility])  6/15/2023:Chronic.  Stable.  Patient continues to be followed by neurology, continues on Aubagio and Provigil.     10/18/2023:.  Patient continues on Aubagio.  Patient has been on Copaxone in the past.  Patient has not had a relapse in quite some time.  Patient does have complaints of more incontinence lately and still has complaints about this \"MS hug\" she describes it as a chest squeezing from the breastbone all the way to her back.  This has been going on for many years but now seems to be worsening.  Patient has a follow-up appointment with neurology in March with Dr. Guzman.      7/25/2024:.  Patient follows up with neurology every 6 months.  Patient continues to be treated with Aubagio and recently received funds through a katina for another years supply.  Patient continues to struggle with fatigue and urinary incontinence.  Also patient's balance and strength have also worsened.  She took a fall 10 days ago when she lost her balance.  She " feels most unstable when she takes a quick turn.  She is considering starting a yoga class.     11/5/2024:.  Patient continues with treatment with Aubagio.  Patient missed her appointment and is now scheduled for December of next year.  Patient not comfortable waiting that long.  On a waiting list.  Patient would like to consider referral to neurology with [facility].  Currently being seen by Dr. Guzman.    4/16/25: Patient continues on Aubagio and is up-to-date with CBC.  Patient currently does not have a follow-up appointment with neurology.  No new medication adjustments.  No new episodes.    5. Weight loss  Patient has noticed a progressive weight loss over the last 2 to 3 years.  Current weight is 123 pounds BMI of 24.1.  In looking back, her weight was 133 pounds in October 2023.    6. Non-toxic multinodular goiter  Followed by endocrinology.  Patient recently underwent a thyroid biopsy which was negative for malignancy.      Patient Active Problem List    Diagnosis Date Noted    Unintentional weight loss 03/07/2025    Dyspnea 01/16/2025    Falls 11/05/2024    Other specified attention deficit hyperactivity disorder (ADHD) 11/05/2024    HILARY (obstructive sleep apnea) 03/20/2024    Thyroid nodule 02/28/2024    Abnormal finding on lung imaging 02/28/2024    SVT (supraventricular tachycardia) ([facility]) 02/16/2024    Acquired hypothyroidism 06/15/2023    Vitamin D deficiency 06/15/2023    Family history of premature coronary artery disease 06/07/2023    History of skin cancer 03/22/2023    Gastroesophageal reflux disease without esophagitis 03/22/2023    Bipolar disorder in full remission ([facility]) 03/22/2023    Primary insomnia 03/22/2023    Sedative dependence with current use ([facility]) 03/22/2023    Total knee replacement status, right 12/14/2022    SI (sacroiliac) pain 06/09/2022    BMI 25.0-25.9,adult 05/20/2022    Osteopenia of multiple sites 05/20/2022    Cerebral atrophy (HCC) 03/16/2022    Osteoarthritis of right knee  06/15/2021    Long term (current) use of oral hypoglycemic drugs 10/21/2020    Non-toxic multinodular goiter 01/29/2020    Risk for falls 07/12/2019    Dysphagia 03/29/2019    Controlled type 2 diabetes mellitus without complication, without long-term current use of insulin (HCC) 01/06/2017    Mixed hyperlipidemia 01/06/2017    Multiple sclerosis (HCC) 01/06/2017    Schatzki's ring 01/06/2017    Hiatal hernia 01/06/2017       Current Outpatient Medications   Medication Sig Dispense Refill    levothyroxine (SYNTHROID) 75 MCG Tab Take 1 Tablet by mouth every morning on an empty stomach. 90 Tablet 1    buPROPion (WELLBUTRIN XL) 300 MG XL tablet Take 1 tablet (300 mg) by mouth daily in the morning 90 Tablet 0    paroxetine (PAXIL) 40 MG tablet TAKE 1 TABLET(40 MG) BY MOUTH DAILY IN THE MORNING 90 Tablet 0    metoprolol SR (TOPROL XL) 25 MG TABLET SR 24 HR Take 1/2 tablet by mouth every evening. 50 Tablet 2    Teriflunomide (AUBAGIO) 14 MG Tab Take 14 mg by mouth every day. 30 Tablet 11    omeprazole (PRILOSEC) 20 MG delayed-release capsule Take 1 capsule by mouth every morning, 30 minutes before breakfast. 100 Capsule 3    Empagliflozin-metFORMIN HCl ER (SYNJARDY XR) 12.5-1000 MG TABLET SR 24 HR Take 1 tablet by mouth daily 100 Tablet 2    rosuvastatin (CRESTOR) 40 MG tablet Take 1 Tablet by mouth every day. 100 Tablet 3    tizanidine (ZANAFLEX) 4 MG Tab Take 1 Tablet by mouth 2 times a day as needed (spasm). 200 Tablet 0    zolpidem (AMBIEN) 5 MG Tab Take 1 tablet (5 mg) by mouth daily at bedtime as needed for insomnia 30 Tablet 0    Teriflunomide (AUBAGIO) 14 MG Tab Take 14 mg by mouth every day. Indications: Relapsing, Remitting Multiple Sclerosis      modafinil (PROVIGIL) 200 MG Tab Take 200 mg by mouth 1 time a day as needed. Indications: Tiredness associated with Multiple Sclerosis       No current facility-administered medications for this visit.         Allergies as of 04/16/2025    (No Known Allergies)         Social History     Socioeconomic History    Marital status:      Spouse name: Not on file    Number of children: Not on file    Years of education: Not on file    Highest education level: 12th grade   Occupational History    Not on file   Tobacco Use    Smoking status: Never    Smokeless tobacco: Never   Vaping Use    Vaping status: Never Used   Substance and Sexual Activity    Alcohol use: Not Currently     Alcohol/week: 0.6 oz     Types: 1 Standard drinks or equivalent per week     Comment: Occasional glass of wine or april    Drug use: Not Currently    Sexual activity: Not Currently     Partners: Male   Other Topics Concern    Not on file   Social History Narrative    Not on file     Social Drivers of Health     Financial Resource Strain: Low Risk  (1/16/2025)    Overall Financial Resource Strain (CARDIA)     Difficulty of Paying Living Expenses: Not hard at all   Food Insecurity: No Food Insecurity (1/16/2025)    Hunger Vital Sign     Worried About Running Out of Food in the Last Year: Never true     Ran Out of Food in the Last Year: Never true   Transportation Needs: No Transportation Needs (1/16/2025)    PRAPARE - Transportation     Lack of Transportation (Medical): No     Lack of Transportation (Non-Medical): No   Physical Activity: Inactive (11/9/2022)    Exercise Vital Sign     Days of Exercise per Week: 0 days     Minutes of Exercise per Session: 0 min   Stress: Stress Concern Present (5/26/2020)    [employer] - Occupational Stress Questionnaire     Feeling of Stress : To some extent   Social Connections: Moderately Isolated (5/26/2020)    Social Connection and Isolation Panel [NHANES]     Frequency of Communication with Friends and Family: More than three times a week     Frequency of Social Gatherings with Friends and Family: More than three times a week     Attends Denominational Services: Never     Active Member of Clubs or Organizations: Yes     Attends Club  or Organization Meetings: More than 4 times per year     Marital Status:    Intimate Partner Violence: Not on file   Housing Stability: Low Risk  (11/9/2022)    Housing Stability Vital Sign     Unable to Pay for Housing in the Last Year: No     Number of Places Lived in the Last Year: 1     Unstable Housing in the Last Year: No       Family History   Problem Relation Age of Onset    Colon Cancer Mother     Heart Disease Mother         Cardiomyopathy    Lung Disease Mother         Emphysema from 2nd hand smoke    Cancer Mother         Colon    Colorectal Cancer Mother     Diabetes Mother     Heart Attack Father     Heart Disease Father         Numerous heart attacks    Diabetes Father     No Known Problems Sister     Cancer Brother         Skin, prostate    Prostate cancer Brother     Other Brother         Gaulbladder issues    Cancer Brother         Prostate, unknown but closely related to endometrial sarcoma. He is not transgender and is only one of a handful of known cases worldwide    Prostate cancer Brother     Respiratory Disease Brother         Asthma    Cancer Maternal Grandmother         Cancer but don’t know specific type. Didn’t talk anout it in the 1960’s    No Known Problems Maternal Grandfather     No Known Problems Paternal Grandmother     No Known Problems Paternal Grandfather     Other Daughter         Fibromyalgia    Bipolar disorder Daughter     ADHD Son     Depression Son     Hypertension Son        Past Surgical History:   Procedure Laterality Date    PB TOTAL KNEE ARTHROPLASTY Right 12/14/2022    Procedure: RIGHT TOTAL KNEE REPLACEMENT;  Surgeon: Britton Cooney M.D.;  [facility] SURGERY [facility];  Service: Orthopedics    ARTHROSCOPY, KNEE      GYN SURGERY  1970    Cervical polyp       ROS:  Denies any Headache,Chest pain,  Shortness of breath,  Abdominal pain, Changes of bowel or bladder, Lower ext edema, Fevers, Nights sweats, Focal weakness or numbness.  All other systems are  negative.    /60 (BP Location: Left arm, Patient Position: Sitting)   Pulse 67   Temp 36.3 °C (97.3 °F) (Temporal)   Resp 12   Ht 1.524 m (5')   Wt 56 kg (123 lb 6.4 oz)   SpO2 95%   BMI 24.10 kg/m²      Constitutional: Alert, no distress, well-groomed.  Skin: Warm, dry, good turgor, no rashes in visible areas.  Eye: Equal, round and reactive, conjunctiva clear, lids normal.  ENMT: Lips without lesions, good dentition, moist mucous membranes.  Neck: Trachea midline, no masses, no thyromegaly.  Respiratory: Unlabored respiratory effort, no cough.  Abdomen: Soft, no gross masses.  MSK: Normal gait, moves all extremities.  Neuro: Grossly non-focal. No cranial nerve deficit. Strength and sensation intact.   Psych: Alert and oriented x3, normal affect and mood.      Assessment and Plan.   75 y.o. female presenting with the following.     1. HILARY (obstructive sleep apnea)  Patient will continue CPAP.  Possibly would benefit from a chinstrap.  Follow-up with pulmonology.    2. Controlled type 2 diabetes mellitus without complication, without long-term current use of insulin (HCC)  Chronic.  Stable.  Follow-up with endocrinology and continue current plan of care.    3. Acquired hypothyroidism  Patient will continue Synthroid 75 mcg daily and follow-up with her thyroid function panel for August.    4. Multiple sclerosis (HCC)  Chronic.  Stable.  Continue Aubagio at current dose.  Labs to date.    5. Weight loss  Continue to monitor.  No red flags.    6. Non-toxic multinodular goiter  Stable.  Imaging reviewed with patient.

## 2025-04-17 NOTE — PROGRESS NOTES
"Virtual Visit: Established Patient   This visit was conducted via Teams using secure and encrypted videoconferencing technology.   The patient was in their home in the [facility].    The patient's identity was confirmed and verbal consent was obtained for this virtual visit.     Subjective:   CC:   Chief Complaint   Patient presents with    Follow-Up     Covid positive; recurring illness every other week, lightheadedness; continuing weightloss       Melody Rodriguez is a 75 y.o. female presenting for evaluation and management of:    1. Positive self-administered antigen test for COVID-19  Patient presents with complaints of viral infection, lightheadedness, mild shortness of breath.  Symptoms are slowly improving.  Symptoms present for a little over a week.  Patient was exposed to kids and her son who were also diagnosed with COVID.  Slowly improving.    2. Multiple sclerosis (HCC)  6/15/2023:Chronic.  Stable.  Patient continues to be followed by neurology, continues on Aubagio and Provigil.     10/18/2023:.  Patient continues on Aubagio.  Patient has been on Copaxone in the past.  Patient has not had a relapse in quite some time.  Patient does have complaints of more incontinence lately and still has complaints about this \"MS hug\" she describes it as a chest squeezing from the breastbone all the way to her back.  This has been going on for many years but now seems to be worsening.  Patient has a follow-up appointment with neurology in March with Dr. Guzman.      7/25/2024:.  Patient follows up with neurology every 6 months.  Patient continues to be treated with Aubagio and recently received funds through a katina for another years supply.  Patient continues to struggle with fatigue and urinary incontinence.  Also patient's balance and strength have also worsened.  She took a fall 10 days ago when she lost her balance.  She feels most unstable when she takes a quick turn.  She is considering starting a " yoga class.     11/5/2024:.  Patient continues with treatment with Aubagio.  Patient missed her appointment and is now scheduled for December of next year.  Patient not comfortable waiting that long.  On a waiting list.  Patient would like to consider referral to neurology with [facility].  Currently being seen by Dr. Guzman.     3/7/2025: Chronic.  Stable.  Patient was seen by neurology in January.  Patient was continued on Aubagio 14 mg dose daily.  Up-to-date with CBC.  This    3. HILARY (obstructive sleep apnea)  Patient has been using her CPAP for about 10 days now and is helping her with her quality of sleep.  No daytime somnolence.  Checks her pulse ox regularly.    4. Unintentional weight loss  Patient is noticing steady weight loss, current weight is 120 pounds, down from 127 in January 2025.  Patient states that her starting weight was 170 pounds a few years ago.  In looking back in the notes, patient's weight was 132 pounds in 2023.  Nonintentional.  Patient eats healthy, 3 meals but does not have much of an appetite.      ROS   Positive ROS per HPI.  Denies all other cardiopulmonary, GI, neurologic symptoms.    Current medicines (including changes today)  Current Outpatient Medications   Medication Sig Dispense Refill    levothyroxine (SYNTHROID) 75 MCG Tab Take 1 Tablet by mouth every morning on an empty stomach. 90 Tablet 1    buPROPion (WELLBUTRIN XL) 300 MG XL tablet Take 1 tablet (300 mg) by mouth daily in the morning 90 Tablet 0    paroxetine (PAXIL) 40 MG tablet TAKE 1 TABLET(40 MG) BY MOUTH DAILY IN THE MORNING 90 Tablet 0    metoprolol SR (TOPROL XL) 25 MG TABLET SR 24 HR Take 1/2 tablet by mouth every evening. 50 Tablet 2    Teriflunomide (AUBAGIO) 14 MG Tab Take 14 mg by mouth every day. 30 Tablet 11    omeprazole (PRILOSEC) 20 MG delayed-release capsule Take 1 capsule by mouth every morning, 30 minutes before breakfast. 100 Capsule 3    Empagliflozin-metFORMIN HCl ER (SYNJARDY XR) 12.5-1000 MG  TABLET SR 24 HR Take 1 tablet by mouth daily 100 Tablet 2    rosuvastatin (CRESTOR) 40 MG tablet Take 1 Tablet by mouth every day. 100 Tablet 3    tizanidine (ZANAFLEX) 4 MG Tab Take 1 Tablet by mouth 2 times a day as needed (spasm). 200 Tablet 0    zolpidem (AMBIEN) 5 MG Tab Take 1 tablet (5 mg) by mouth daily at bedtime as needed for insomnia 30 Tablet 0    Teriflunomide (AUBAGIO) 14 MG Tab Take 14 mg by mouth every day. Indications: Relapsing, Remitting Multiple Sclerosis      modafinil (PROVIGIL) 200 MG Tab Take 200 mg by mouth 1 time a day as needed. Indications: Tiredness associated with Multiple Sclerosis       No current facility-administered medications for this visit.       Patient Active Problem List    Diagnosis Date Noted    Unintentional weight loss 03/07/2025    Dyspnea 01/16/2025    Falls 11/05/2024    Other specified attention deficit hyperactivity disorder (ADHD) 11/05/2024    HILARY (obstructive sleep apnea) 03/20/2024    Thyroid nodule 02/28/2024    Abnormal finding on lung imaging 02/28/2024    SVT (supraventricular tachycardia) (HCC) 02/16/2024    Acquired hypothyroidism 06/15/2023    Vitamin D deficiency 06/15/2023    Family history of premature coronary artery disease 06/07/2023    History of skin cancer 03/22/2023    Gastroesophageal reflux disease without esophagitis 03/22/2023    Bipolar disorder in full remission (HCC) 03/22/2023    Primary insomnia 03/22/2023    Sedative dependence with current use (HCC) 03/22/2023    Total knee replacement status, right 12/14/2022    SI (sacroiliac) pain 06/09/2022    BMI 25.0-25.9,adult 05/20/2022    Osteopenia of multiple sites 05/20/2022    Cerebral atrophy (HCC) 03/16/2022    Osteoarthritis of right knee 06/15/2021    Long term (current) use of oral hypoglycemic drugs 10/21/2020    Non-toxic multinodular goiter 01/29/2020    Risk for falls 07/12/2019    Dysphagia 03/29/2019    Controlled type 2 diabetes mellitus without complication, without long-term  current use of insulin (HCC) 01/06/2017    Mixed hyperlipidemia 01/06/2017    Multiple sclerosis (HCC) 01/06/2017    Schatzki's ring 01/06/2017    Hiatal hernia 01/06/2017        Objective:   Pulse (!) 102   Temp (!) 35.4 °C (95.8 °F) (Temporal)   Ht 1.524 m (5')   Wt 54.8 kg (120 lb 14.4 oz)   SpO2 97%   BMI 23.61 kg/m²     Physical Exam: Via virtual visit  Constitutional: Alert, no distress, well-groomed.  Skin: No rashes in visible areas.  Eye: Round. Conjunctiva clear, lids normal. No icterus.   ENMT: Lips pink without lesions, good dentition, moist mucous membranes. Phonation normal.  Neck: No masses, no thyromegaly. Moves freely without pain.  Respiratory: Unlabored respiratory effort, no cough or audible wheeze  Psych: Alert and oriented x3, normal affect and mood.     Assessment and Plan:   The following treatment plan was discussed:     1. Positive self-administered antigen test for COVID-19  Slowly improving.  Recommend to keep well-hydrated.  Recommend vitamin C, vitamin D3, zinc.  Continue to monitor symptoms.  Monitor pulse ox.    2. Multiple sclerosis (HCC)  Chronic.  Stable.  Continue current plan of care per neurology.  Medications reviewed.    3. HILARY (obstructive sleep apnea)  Currently stable on CPAP.  Tolerating device well.    4. Unintentional weight loss  No red flags.  Continue to monitor.  Medications reviewed.    Follow-up: No follow-ups on file.

## 2025-05-01 PROCEDURE — RXMED WILLOW AMBULATORY MEDICATION CHARGE: Performed by: NURSE PRACTITIONER

## 2025-05-01 PROCEDURE — RXMED WILLOW AMBULATORY MEDICATION CHARGE: Performed by: INTERNAL MEDICINE

## 2025-05-05 ENCOUNTER — PHARMACY VISIT (OUTPATIENT)
Dept: PHARMACY | Facility: MEDICAL CENTER | Age: 75
End: 2025-05-05
Payer: COMMERCIAL

## 2025-05-16 ENCOUNTER — TELEPHONE (OUTPATIENT)
Dept: PHARMACY | Facility: MEDICAL CENTER | Age: 75
End: 2025-05-16
Payer: MEDICARE

## 2025-05-16 PROCEDURE — RXMED WILLOW AMBULATORY MEDICATION CHARGE: Performed by: PSYCHIATRY & NEUROLOGY

## 2025-05-16 NOTE — TELEPHONE ENCOUNTER
Contact:  Phone number:[phone] (mobile)    Name of person spoken with and relationship to patient: Nishi patient   Patient’s Adherence:  How patient is doing on medication: Very Well    How many missed doses and reason: 0 N/A    Any new medications: No    Any new conditions: No    Any new allergies: No    Any new side effects: No    Any new diagnoses: No    How many doses remainin    Did patient want to speak with pharmacist: No   Delivery:  Delivery date and method: 2025 via     Needs by Date: 2025    Signature required: No     Any additional details for : N/A   Teach Appointment Date:  N/A   Shipping Address:  [address] [place] [address]   Medication(name,strength and dose):  buPROPion HCl ER (XL) Tablet Extended Release 24 Hour 300 MG, PARoxetine HCl Tablet 40 MG   Copay:  $0   Payment Method:  n/a $0 copay   Supplies:  NONE   Additional Information:  NONE     Mariaelena Ibrahim, Pharmacy Liaison/ RX Coordinator

## 2025-05-20 ENCOUNTER — PHARMACY VISIT (OUTPATIENT)
Dept: PHARMACY | Facility: MEDICAL CENTER | Age: 75
End: 2025-05-20
Payer: COMMERCIAL

## 2025-06-11 ENCOUNTER — TELEPHONE (OUTPATIENT)
Dept: PHARMACY | Facility: MEDICAL CENTER | Age: 75
End: 2025-06-11
Payer: MEDICARE

## 2025-06-11 ENCOUNTER — PATIENT MESSAGE (OUTPATIENT)
Dept: MEDICAL GROUP | Facility: PHYSICIAN GROUP | Age: 75
End: 2025-06-11
Payer: MEDICARE

## 2025-06-11 DIAGNOSIS — R15.9 INCONTINENCE OF FECES, UNSPECIFIED FECAL INCONTINENCE TYPE: Primary | ICD-10-CM

## 2025-06-11 PROCEDURE — RXMED WILLOW AMBULATORY MEDICATION CHARGE: Performed by: INTERNAL MEDICINE

## 2025-06-11 NOTE — TELEPHONE ENCOUNTER
Contact:  Phone number:[phone] (mobile)    Name of person spoken with and relationship to patient: Nishi patient   Patient’s Adherence:  How patient is doing on medication: Very Well    How many missed doses and reason: 0 N/A    Any new medications: No    Any new conditions: No    Any new allergies: No    Any new side effects: No    Any new diagnoses: No    How many doses remainin    Did patient want to speak with pharmacist: No   Delivery:  Delivery date and method: 2025 via     Needs by Date: 2025    Signature required: No     Any additional details for : N/A   Teach Appointment Date:  N/A   Shipping Address:  [address] [place] [id number]   Medication(name,strength and dose):  Synjardy Tablet 12.5-1000 MG Rosuvastatin Calcium Tablet 40 MG    Copay:  $0   Payment Method:  n/a $0 copay   Supplies:  NONE   Additional Information:  NONE     Mariaelena Ibrahim, Pharmacy Liaison/ RX Coordinator

## 2025-06-19 ENCOUNTER — PHARMACY VISIT (OUTPATIENT)
Dept: PHARMACY | Facility: MEDICAL CENTER | Age: 75
End: 2025-06-19
Payer: COMMERCIAL

## 2025-06-24 NOTE — Clinical Note
REFERRAL APPROVAL NOTICE         Sent on June 24, 2025                   Nishi Rodriguez  [address] [place] [address]                   Dear Ms. Rodriguez,    After a careful review of the medical information and benefit coverage, [facility] has processed your referral. See below for additional details.    If applicable, you must be actively enrolled with your insurance for coverage of the authorized service. If you have any questions regarding your coverage, please contact your insurance directly.    REFERRAL INFORMATION   Referral #:  [id number]  Referred-To Provider    Referred-By Provider:  Gastroenterology    Danae Pritchett M.D.   [facility]      [address] [facility] [address]  [place] [address] [phone] [address] [facility]  [place] [address]  [phone]    Referral Start Date:  06/17/2025  Referral End Date:   06/12/2026             SCHEDULING  If you do not already have an appointment, please call [phone] to make an appointment.     MORE INFORMATION  If you do not already have a [employer] account, sign up at: [email or web address]  You can access your medical information, make appointments, see lab results, billing information, and more.  If you have questions regarding this referral, please contact  the [facility] Referrals department at:             [phone]. Monday - Friday 8:00AM - 5:00PM.     Sincerely,    [facility]

## 2025-07-07 ENCOUNTER — APPOINTMENT (OUTPATIENT)
Dept: NEUROLOGY | Facility: MEDICAL CENTER | Age: 75
End: 2025-07-07
Attending: SPECIALIST
Payer: MEDICARE

## 2025-07-12 ENCOUNTER — HOSPITAL ENCOUNTER (EMERGENCY)
Facility: MEDICAL CENTER | Age: 75
End: 2025-07-13
Attending: EMERGENCY MEDICINE
Payer: MEDICARE

## 2025-07-12 ENCOUNTER — APPOINTMENT (OUTPATIENT)
Dept: RADIOLOGY | Facility: MEDICAL CENTER | Age: 75
End: 2025-07-12
Attending: EMERGENCY MEDICINE
Payer: MEDICARE

## 2025-07-12 VITALS
TEMPERATURE: 97.9 F | HEART RATE: 70 BPM | OXYGEN SATURATION: 93 % | SYSTOLIC BLOOD PRESSURE: 119 MMHG | WEIGHT: 118.61 LBS | DIASTOLIC BLOOD PRESSURE: 62 MMHG | HEIGHT: 60 IN | RESPIRATION RATE: 18 BRPM | BODY MASS INDEX: 23.29 KG/M2

## 2025-07-12 DIAGNOSIS — S42.402A ELBOW FRACTURE, LEFT, CLOSED, INITIAL ENCOUNTER: Primary | ICD-10-CM

## 2025-07-12 PROCEDURE — 302874 HCHG BANDAGE ACE 2 OR 3""

## 2025-07-12 PROCEDURE — 99284 EMERGENCY DEPT VISIT MOD MDM: CPT

## 2025-07-12 PROCEDURE — 73080 X-RAY EXAM OF ELBOW: CPT | Mod: LT

## 2025-07-12 PROCEDURE — 302875 HCHG BANDAGE ACE 4 OR 6""

## 2025-07-12 PROCEDURE — 29105 APPLICATION LONG ARM SPLINT: CPT

## 2025-07-12 ASSESSMENT — PAIN DESCRIPTION - PAIN TYPE: TYPE: ACUTE PAIN

## 2025-07-12 ASSESSMENT — FIBROSIS 4 INDEX: FIB4 SCORE: 1.42

## 2025-07-12 ASSESSMENT — PAIN DESCRIPTION - DESCRIPTORS: DESCRIPTORS: MISERABLE

## 2025-07-13 RX ORDER — NAPROXEN 375 MG/1
375 TABLET ORAL
Qty: 20 TABLET | Refills: 0 | Status: SHIPPED | OUTPATIENT
Start: 2025-07-13

## 2025-07-13 NOTE — ED TRIAGE NOTES
Pt ambulates to Er with son with a chief complaint of left elbow pain. Pt states she tripped and fel on her left side. Pt denies and LOC, head injury, thinners, numbness or tingling. Pt AAO4, GCS 15. CMS intact.

## 2025-07-13 NOTE — ED NOTES
Discharge instructions given and discussed. patient educated to come back to ER for new or worsening symptoms. Instructed to follow up with Ortho. Patient verbalized understanding. GCS of 15. Pt walked out with steady gait. Sling in place with + cms in fingers

## 2025-07-13 NOTE — ED NOTES
Patient fitted for splint per ERP orders. Neurovascular checks intact distal to splint application.

## 2025-07-13 NOTE — ED PROVIDER NOTES
ED Provider Note    CHIEF COMPLAINT  Chief Complaint   Patient presents with    Arm Pain     C/o left arm pain pt states she tripped over a pile of trumpets landing on her left side. Denies head strike, LOC, or thinners. Pt states she only feels the pain in her arm when she rotates her arm.     Fall       EXTERNAL RECORDS REVIEWED  Outpatient Notes reviewed office visit progress note by Dr. Pritchett dated April 16, 2025.  History of type 2 diabetes and obstructive sleep apnea    HPI/ROS  LIMITATION TO HISTORY   Select: : None  OUTSIDE HISTORIAN(S):  Family at bedside notes patient also has abrasion to left palm    Melody Rodriguez is a 75 y.o. female who presents for evaluation of mechanical ground-level fall and upper extremity injury.  Patient relates tripped over object on the ground, fell landing on the left side unclear if arm flexed or extended.  Notes abrasion to palm but more concerning pain to the left elbow with swelling.  She notes minimal pain when still, she is holding the arm slightly flexed, she relates however pain is much worse when she pronates or supinates her forearm or flexes or extends at the elbow.  No numbness, no weakness.  She is right-hand dominant, no head injury.  Did not lose conscious, is not anticoagulated nor on antiplatelet therapy.  Notes abrasion as well to left hand without significant active bleeding.  Pain worse with attempted range of motion.  Alleviated when holding still.    PAST MEDICAL HISTORY   has a past medical history of Acquired hypothyroidism (06/15/2023), Acute nasopharyngitis, Arthritis, Breath shortness, Bronchitis, Cancer ([facility]), Cerebral atrophy (HCC) (03/16/2022), Chickenpox, Chronic cough (03/15/2023), Cough, COVID-19 virus infection (03/15/2023), Daytime sleepiness, Depression, Diabetes (HCC), Difficulty swallowing, Disorder of thyroid, Dizziness, Eye drainage, Falls (11/05/2024), Fatigue, Groin lump (06/09/2022), Gynecological disorder, Hearing  difficulty, Heart burn, Heart murmur, Heart valve disease, Heartburn, Hiatus hernia syndrome, High cholesterol, Hyperlipidemia, Hypokalemia (05/20/2022), Hyponatremia (12/19/2022), Hypothyroidism, Influenza, Morning headache, Mumps, Muscle disorder, HILARY (obstructive sleep apnea) (03/20/2024), HILARY (obstructive sleep apnea) (03/20/2024), Osteoarthritis of right knee (06/15/2021), Other specified attention deficit hyperactivity disorder (ADHD) (11/05/2024), Palpitations, Pneumonia, PONV (postoperative nausea and vomiting) (1970), Recurrent major depressive disorder, in remission (HCC) (03/16/2022), Ringing in ears, Scarlet fever, Shortness of breath, SI (sacroiliac) pain (06/09/2022), Snoring, Tonsillitis, Toothache, Type 2 diabetes mellitus with stage 3 chronic kidney disease (HCC) (06/15/2021), Unintentional weight loss (03/07/2025), Urinary bladder disorder, Urinary incontinence, Vitamin D deficiency (06/15/2023), and Wears glasses.    SURGICAL HISTORY   has a past surgical history that includes gyn surgery (1970); total knee arthroplasty (Right, 12/14/2022); and arthroscopy, knee.    FAMILY HISTORY  Family History   Problem Relation Age of Onset    Colon Cancer Mother     Heart Disease Mother         Cardiomyopathy    Lung Disease Mother         Emphysema from 2nd hand smoke    Cancer Mother         Colon    Colorectal Cancer Mother     Diabetes Mother     Heart Attack Father     Heart Disease Father         Numerous heart attacks    Diabetes Father     No Known Problems Sister     Cancer Brother         Skin, prostate    Prostate cancer Brother     Other Brother         Gaulbladder issues    Cancer Brother         Prostate, unknown but closely related to endometrial sarcoma. He is not transgender and is only one of a handful of known cases worldwide    Prostate cancer Brother     Respiratory Disease Brother         Asthma    Cancer Maternal Grandmother         Cancer but don’t know specific type. Didn’t talk anout  it in the 1960’s    No Known Problems Maternal Grandfather     No Known Problems Paternal Grandmother     No Known Problems Paternal Grandfather     Other Daughter         Fibromyalgia    Bipolar disorder Daughter     ADHD Son     Depression Son     Hypertension Son        SOCIAL HISTORY  Social History     Tobacco Use    Smoking status: Never    Smokeless tobacco: Never   Vaping Use    Vaping status: Never Used   Substance and Sexual Activity    Alcohol use: Not Currently     Alcohol/week: 0.6 oz     Types: 1 Standard drinks or equivalent per week     Comment: Occasional glass of wine or april    Drug use: Not Currently    Sexual activity: Not Currently     Partners: Male       CURRENT MEDICATIONS  Home Medications       Reviewed by Nathalie Walker R.N. (Registered Nurse) on 07/12/25 at 2237  Med List Status: Not Addressed     Medication Last Dose Status   buPROPion (WELLBUTRIN XL) 300 MG XL tablet  Active   Empagliflozin-metFORMIN HCl ER (SYNJARDY XR) 12.5-1000 MG TABLET SR 24 HR  Active   levothyroxine (SYNTHROID) 75 MCG Tab  Active   metoprolol SR (TOPROL XL) 25 MG TABLET SR 24 HR  Active   modafinil (PROVIGIL) 200 MG Tab  Active   omeprazole (PRILOSEC) 20 MG delayed-release capsule  Active   paroxetine (PAXIL) 40 MG tablet  Active   rosuvastatin (CRESTOR) 40 MG tablet  Active   Teriflunomide (AUBAGIO) 14 MG Tab  Active   Teriflunomide (AUBAGIO) 14 MG Tab  Active   tizanidine (ZANAFLEX) 4 MG Tab  Active   zolpidem (AMBIEN) 5 MG Tab  Active                    ALLERGIES  Allergies[1]    PHYSICAL EXAM  VITAL SIGNS: /62   Pulse 70   Temp 36.6 °C (97.9 °F) (Temporal)   Resp 18   Ht 1.524 m (5')   Wt 53.8 kg (118 lb 9.7 oz)   SpO2 93%   BMI 23.16 kg/m²    General: Alert, no acute distress  Skin: Warm, dry, normal for ethnicity.  Minor abrasion noted to left palm without laceration  Head: Normocephalic  Neck: Trachea midline  Eye: No conjunctival pallor  ENMT: Oral mucosa moist  Cardiovascular:  Regular rate and rhythm,  Normal peripheral perfusion  Respiratory: respirations are non-labored  Musculoskeletal: No nikhil deformity but patient does demonstrate swelling to lateral aspect of the left elbow overlying the head of the radius.  No tenderness and no step-off on exam of the supracondylar region of the distal humerus.  Neurological: Alert and oriented to person, place, time, and situation.  5 x 5 flexion extension of digits of the left hand, sensation light touch grossly intact  Lymphatics: No left lower extremity pharyngitis  Psychiatric: Cooperative, appropriate mood & affect         RADIOLOGY/PROCEDURES   I have independently interpreted the diagnostic imaging associated with this visit and am waiting the final reading from the radiologist.   My preliminary interpretation is as follows: Anterior fat-pad sign noted consistent with joint effusion and likely occult fracture    Radiologist interpretation:  DX-ELBOW-COMPLETE 3+ LEFT   Final Result      Possible anterior elbow joint effusion without definite fracture or malalignment. Occult fracture is not excluded.          COURSE & MEDICAL DECISION MAKING    ASSESSMENT, COURSE AND PLAN  Care Narrative: Well in appearance and neurovascularly intact 75-year-old presents for evaluation of blunt trauma to left upper extremity.  On the exam she is holding the arm slightly flexed at the elbow and has swelling at the radial head and decreased range of motion.  As such I am suspicious of a fracture, thankfully no nikhil deformity and otherwise strong pulses with intact distal function and no other distracting trauma beyond mild abrasion to the left palm.  X-ray demonstrates a joint effusion but thankfully no evidence of displaced fracture.  Clinically I am suspicious of an occult fracture and as such is splinted with the posterior splint and placed in arm sling.  Amenable to follow-up with orthopedics and will be referred to orthopedic clinic.  Written for  appropriate analgesia for home.  No indication for emergent surgical intervention or inpatient management at this time.    ED OBS: No; Patient does not meet criteria for ED Observation. 2245: Patient seen and examined at bedside.  Declines analgesia.  Have ordered x-ray imaging of left elbow.  Differential diagnosis includes but is not restricted to fracture of head of ulna, fracture of head of radius, contusion, sprain, supracondylar fracture    2326: Anterior sail sign noted consistent with joint effusion, likely occult fracture given exam.  I have ordered posterior splinting and sling.    2344: Patient reassessed, updated with x-ray results, tolerating sling well.      Patient Vitals for the past 24 hrs:   BP Temp Temp src Pulse Resp SpO2 Height Weight   07/12/25 2321 119/62 36.6 °C (97.9 °F) Temporal 70 18 93 % -- --   07/12/25 2225 (!) 146/78 (!) 35.7 °C (96.3 °F) Temporal 77 20 95 % 1.524 m (5') 53.8 kg (118 lb 9.7 oz)        ADDITIONAL PROBLEMS MANAGED  Occult fracture of radial head of left elbow, left hand abrasion    DISPOSITION AND DISCUSSIONS  I have discussed management of the patient with the following physicians and SAMAN's:  NA    Discussion of management with other QHP or appropriate source(s): None     Escalation of care considered, and ultimately not performed:NA    Barriers to care at this time, including but not limited to: NA.     Decision tools and prescription drugs considered including, but not limited to: NA.    The patient will return for new or worsening symptoms and is stable at the time of discharge.    Patient has had high blood pressure while in the emergency department, felt likely secondary to medical condition. Counseled patient to monitor blood pressure at home and follow up with primary care physician.      DISPOSITION:  Patient will be discharged home in stable condition.    FOLLOW UP:  Mabel Moseley M.D.  [id number]  [facility] [address]  [phone]    Schedule an  appointment as soon as possible for a visit         OUTPATIENT MEDICATIONS:  Discharge Medication List as of 7/13/2025 12:25 AM        START taking these medications    Details   naproxen (NAPROSYN) 375 MG Tab Take 1 Tablet by mouth 2 times daily with meals as needed (Pain)., Disp-20 Tablet, R-0, Normal                FINAL DIAGNOSIS  1. Elbow fracture, left, closed, initial encounter         Electronically signed by: Lonny Chisholm M.D., 7/12/2025 10:44 PM           [1] No Known Allergies

## 2025-07-13 NOTE — ED TRIAGE NOTES
Chief Complaint   Patient presents with    Arm Pain     C/o left arm pain pt states she tripped over a pile of trumpets landing on her left side. Denies head strike, LOC, or thinners. Pt states she only feels the pain in her arm when she rotates her arm.     Fall   BP (!) 146/78   Pulse 77   Temp (!) 35.7 °C (96.3 °F) (Temporal)   Resp 20   Ht 1.524 m (5')   Wt 53.8 kg (118 lb 9.7 oz)   SpO2 95%

## 2025-07-17 NOTE — Clinical Note
REFERRAL APPROVAL NOTICE         Sent on July 17, 2025                   Nishi Rodriguez  [address]  [place] [address]                   Dear Ms. Rodriguez,    After a careful review of the medical information and benefit coverage, [facility] has processed your referral. See below for additional details.    If applicable, you must be actively enrolled with your insurance for coverage of the authorized service. If you have any questions regarding your coverage, please contact your insurance directly.    REFERRAL INFORMATION   Referral #:  [id number]  Referred-To Provider    Referred-By Provider:  Orthopedic Surgery    Lonny Chisholm M.D.   [address] Emergency Room  [address]  [place] [address]  [phone] [address]  [place] [address]  [phone]    Referral Start Date:  07/13/2025  Referral End Date:   07/13/2026             SCHEDULING  If you do not already have an appointment, please call [phone] to make an appointment.     MORE INFORMATION  If you do not already have a [employer] account, sign up at: [email or web address]  You can access your medical information, make appointments, see lab results, billing information, and more.  If you have questions regarding this referral, please contact  the [facility] Referrals department at:             [phone]. Monday - Friday 8:00AM - 5:00PM.     Sincerely,    [facility]

## 2025-07-21 ENCOUNTER — PHARMACY VISIT (OUTPATIENT)
Dept: PHARMACY | Facility: MEDICAL CENTER | Age: 75
End: 2025-07-21
Payer: COMMERCIAL

## 2025-07-21 PROCEDURE — RXMED WILLOW AMBULATORY MEDICATION CHARGE: Performed by: NURSE PRACTITIONER

## 2025-07-31 ENCOUNTER — HOSPITAL ENCOUNTER (OUTPATIENT)
Dept: LAB | Facility: MEDICAL CENTER | Age: 75
End: 2025-07-31
Attending: INTERNAL MEDICINE
Payer: MEDICARE

## 2025-08-08 ENCOUNTER — OFFICE VISIT (OUTPATIENT)
Dept: CARDIOLOGY | Facility: MEDICAL CENTER | Age: 75
End: 2025-08-08
Attending: NURSE PRACTITIONER
Payer: MEDICARE

## 2025-08-08 ENCOUNTER — ANCILLARY PROCEDURE (OUTPATIENT)
Facility: MEDICAL CENTER | Age: 75
End: 2025-08-08
Attending: NURSE PRACTITIONER
Payer: MEDICARE

## 2025-08-08 VITALS
HEIGHT: 60 IN | DIASTOLIC BLOOD PRESSURE: 70 MMHG | HEART RATE: 65 BPM | WEIGHT: 127 LBS | RESPIRATION RATE: 16 BRPM | BODY MASS INDEX: 24.94 KG/M2 | SYSTOLIC BLOOD PRESSURE: 130 MMHG | OXYGEN SATURATION: 94 %

## 2025-08-08 DIAGNOSIS — R53.83 OTHER FATIGUE: ICD-10-CM

## 2025-08-08 DIAGNOSIS — R01.1 UNDIAGNOSED CARDIAC MURMURS: ICD-10-CM

## 2025-08-08 DIAGNOSIS — R00.0 TACHYCARDIA: ICD-10-CM

## 2025-08-08 DIAGNOSIS — I47.10 SVT (SUPRAVENTRICULAR TACHYCARDIA) (HCC): Primary | ICD-10-CM

## 2025-08-08 LAB
EKG IMPRESSION: NORMAL
LV EJECT FRACT  99904: 70
LV EJECT FRACT MOD 2C 99903: 66.34
LV EJECT FRACT MOD 4C 99902: 57.27
LV EJECT FRACT MOD BP 99901: 59.7

## 2025-08-08 PROCEDURE — 3075F SYST BP GE 130 - 139MM HG: CPT | Performed by: NURSE PRACTITIONER

## 2025-08-08 PROCEDURE — 99214 OFFICE O/P EST MOD 30 MIN: CPT | Mod: 25

## 2025-08-08 PROCEDURE — 99213 OFFICE O/P EST LOW 20 MIN: CPT | Performed by: NURSE PRACTITIONER

## 2025-08-08 PROCEDURE — 93306 TTE W/DOPPLER COMPLETE: CPT | Mod: 26 | Performed by: INTERNAL MEDICINE

## 2025-08-08 PROCEDURE — 93306 TTE W/DOPPLER COMPLETE: CPT

## 2025-08-08 PROCEDURE — 3078F DIAST BP <80 MM HG: CPT | Performed by: NURSE PRACTITIONER

## 2025-08-08 PROCEDURE — 93005 ELECTROCARDIOGRAM TRACING: CPT | Mod: TC | Performed by: NURSE PRACTITIONER

## 2025-08-08 ASSESSMENT — ENCOUNTER SYMPTOMS
NERVOUS/ANXIOUS: 0
ORTHOPNEA: 0
GASTROINTESTINAL NEGATIVE: 1
EYES NEGATIVE: 1
WHEEZING: 0
CONSTITUTIONAL NEGATIVE: 1
SENSORY CHANGE: 0
CLAUDICATION: 0
NAUSEA: 0
BRUISES/BLEEDS EASILY: 0
DIZZINESS: 0
SHORTNESS OF BREATH: 0
NEUROLOGICAL NEGATIVE: 1
PND: 0
HALLUCINATIONS: 0
RESPIRATORY NEGATIVE: 1
DEPRESSION: 0
MUSCULOSKELETAL NEGATIVE: 1

## 2025-08-08 ASSESSMENT — FIBROSIS 4 INDEX: FIB4 SCORE: 1.21

## 2025-08-11 ENCOUNTER — RESULTS FOLLOW-UP (OUTPATIENT)
Dept: CARDIOLOGY | Facility: MEDICAL CENTER | Age: 75
End: 2025-08-11
Payer: MEDICARE

## 2025-08-12 ENCOUNTER — OFFICE VISIT (OUTPATIENT)
Dept: ENDOCRINOLOGY | Facility: MEDICAL CENTER | Age: 75
End: 2025-08-12
Attending: INTERNAL MEDICINE
Payer: MEDICARE

## 2025-08-12 VITALS
WEIGHT: 129 LBS | HEART RATE: 74 BPM | BODY MASS INDEX: 25.32 KG/M2 | SYSTOLIC BLOOD PRESSURE: 122 MMHG | DIASTOLIC BLOOD PRESSURE: 70 MMHG | HEIGHT: 60 IN | OXYGEN SATURATION: 96 %

## 2025-08-12 DIAGNOSIS — Z79.84 LONG TERM (CURRENT) USE OF ORAL HYPOGLYCEMIC DRUGS: ICD-10-CM

## 2025-08-12 DIAGNOSIS — E03.8 SUBCLINICAL HYPOTHYROIDISM: ICD-10-CM

## 2025-08-12 DIAGNOSIS — E78.5 DYSLIPIDEMIA: ICD-10-CM

## 2025-08-12 DIAGNOSIS — E11.9 CONTROLLED TYPE 2 DIABETES MELLITUS WITHOUT COMPLICATION, WITHOUT LONG-TERM CURRENT USE OF INSULIN (HCC): Primary | ICD-10-CM

## 2025-08-12 DIAGNOSIS — E55.9 VITAMIN D DEFICIENCY: ICD-10-CM

## 2025-08-12 DIAGNOSIS — E04.2 NON-TOXIC MULTINODULAR GOITER: ICD-10-CM

## 2025-08-12 LAB
HBA1C MFR BLD: 6 % (ref ?–5.8)
POCT INT CON NEG: NEGATIVE
POCT INT CON POS: POSITIVE

## 2025-08-12 PROCEDURE — 3074F SYST BP LT 130 MM HG: CPT | Performed by: INTERNAL MEDICINE

## 2025-08-12 PROCEDURE — 83036 HEMOGLOBIN GLYCOSYLATED A1C: CPT | Performed by: INTERNAL MEDICINE

## 2025-08-12 PROCEDURE — 99213 OFFICE O/P EST LOW 20 MIN: CPT | Performed by: INTERNAL MEDICINE

## 2025-08-12 PROCEDURE — 3078F DIAST BP <80 MM HG: CPT | Performed by: INTERNAL MEDICINE

## 2025-08-12 PROCEDURE — 99214 OFFICE O/P EST MOD 30 MIN: CPT | Performed by: INTERNAL MEDICINE

## 2025-08-12 RX ORDER — EMPAGLIFLOZIN, METFORMIN HYDROCHLORIDE 12.5; 1 MG/1; MG/1
TABLET, EXTENDED RELEASE ORAL
Qty: 100 TABLET | Refills: 2 | Status: SHIPPED | OUTPATIENT
Start: 2025-08-12

## 2025-08-12 RX ORDER — LEVOTHYROXINE SODIUM 75 UG/1
75 TABLET ORAL
Qty: 90 TABLET | Refills: 2 | Status: SHIPPED | OUTPATIENT
Start: 2025-08-12

## 2025-08-12 ASSESSMENT — FIBROSIS 4 INDEX: FIB4 SCORE: 1.21

## 2025-08-14 ENCOUNTER — HOSPITAL ENCOUNTER (OUTPATIENT)
Dept: RADIOLOGY | Facility: MEDICAL CENTER | Age: 75
End: 2025-08-14
Attending: PSYCHIATRY & NEUROLOGY
Payer: MEDICARE

## 2025-08-14 DIAGNOSIS — G35 MULTIPLE SCLEROSIS (HCC): ICD-10-CM

## 2025-08-14 DIAGNOSIS — R51.9 GENERALIZED HEADACHE: ICD-10-CM

## 2025-08-14 PROCEDURE — 72156 MRI NECK SPINE W/O & W/DYE: CPT

## 2025-08-14 PROCEDURE — 700117 HCHG RX CONTRAST REV CODE 255: Mod: JZ | Performed by: PSYCHIATRY & NEUROLOGY

## 2025-08-14 PROCEDURE — A9579 GAD-BASE MR CONTRAST NOS,1ML: HCPCS | Mod: JZ | Performed by: PSYCHIATRY & NEUROLOGY

## 2025-08-14 PROCEDURE — 72157 MRI CHEST SPINE W/O & W/DYE: CPT

## 2025-08-14 PROCEDURE — 70553 MRI BRAIN STEM W/O & W/DYE: CPT

## 2025-08-14 RX ORDER — GADOTERIDOL 279.3 MG/ML
10 INJECTION INTRAVENOUS ONCE
Status: COMPLETED | OUTPATIENT
Start: 2025-08-14 | End: 2025-08-14

## 2025-08-14 RX ADMIN — GADOTERIDOL 10 ML: 279.3 INJECTION, SOLUTION INTRAVENOUS at 09:03

## 2025-08-21 ENCOUNTER — SPECIALTY PHARMACY (OUTPATIENT)
Dept: ENDOCRINOLOGY | Facility: MEDICAL CENTER | Age: 75
End: 2025-08-21
Payer: MEDICARE

## 2025-08-27 ENCOUNTER — SPECIALTY PHARMACY (OUTPATIENT)
Dept: ENDOCRINOLOGY | Facility: MEDICAL CENTER | Age: 75
End: 2025-08-27
Payer: MEDICARE

## 2025-08-27 PROCEDURE — RXMED WILLOW AMBULATORY MEDICATION CHARGE: Performed by: INTERNAL MEDICINE

## 2025-08-29 ENCOUNTER — PHARMACY VISIT (OUTPATIENT)
Dept: PHARMACY | Facility: MEDICAL CENTER | Age: 75
End: 2025-08-29
Payer: COMMERCIAL

## 2025-09-05 ENCOUNTER — APPOINTMENT (OUTPATIENT)
Dept: CARDIOLOGY | Facility: MEDICAL CENTER | Age: 75
End: 2025-09-05
Attending: NURSE PRACTITIONER
Payer: MEDICARE

## 2025-09-10 ENCOUNTER — APPOINTMENT (OUTPATIENT)
Dept: MEDICAL GROUP | Facility: PHYSICIAN GROUP | Age: 75
End: 2025-09-10
Payer: MEDICARE

## 2025-11-26 ENCOUNTER — APPOINTMENT (OUTPATIENT)
Dept: MEDICAL GROUP | Facility: PHYSICIAN GROUP | Age: 75
End: 2025-11-26
Payer: MEDICARE

## (undated) DEVICE — LACTATED RINGERS INJ 1000 ML - (14EA/CA 60CA/PF)

## (undated) DEVICE — PACK TOTAL KNEE  (1/CA)

## (undated) DEVICE — TUBING CLEARLINK DUO-VENT - C-FLO (48EA/CA)

## (undated) DEVICE — CANISTER SUCTION RIGID RED 1500CC (40EA/CA)

## (undated) DEVICE — SET EXTENSION WITH 2 PORTS (48EA/CA) ***PART #2C8610 IS A SUBSTITUTE*****

## (undated) DEVICE — GLOVE BIOGEL INDICATOR SZ 8 SURGICAL PF LTX - (50/BX 4BX/CA)

## (undated) DEVICE — TIP INTPLS HFLO ML ORFC BTRY - (12/CS)  FOR SURGILAV

## (undated) DEVICE — SODIUM CHL IRRIGATION 0.9% 1000ML (12EA/CA)

## (undated) DEVICE — GLOVE, LITE (PAIR)

## (undated) DEVICE — HANDPIECE 10FT INTPLS SCT PLS IRRIGATION HAND CONTROL SET (6/PK)

## (undated) DEVICE — SUCTION INSTRUMENT YANKAUER BULBOUS TIP W/O VENT (50EA/CA)

## (undated) DEVICE — SODIUM CHL. IRRIGATION 0.9% 3000ML (4EA/CA 65CA/PF)

## (undated) DEVICE — MIXER BONE CEMENT REVOLUTION - W/FEMORAL PRESSURIZER (6/CA)

## (undated) DEVICE — HUMID-VENT HEAT AND MOISTURE EXCHANGE- (50/BX)

## (undated) DEVICE — SUTURE 3-0 ETHILON FS-1 - (36/BX) 30 INCH

## (undated) DEVICE — BLADE 90X18X1.27MM SAW SAGITTAL

## (undated) DEVICE — Device

## (undated) DEVICE — SENSOR OXIMETER ADULT SPO2 RD SET (20EA/BX)

## (undated) DEVICE — SUTURE GENERAL

## (undated) DEVICE — DRESSING AQUACEL AG ADVANTAGE 3.5 X 10" (10EA/BX)"

## (undated) DEVICE — DRAPE LARGE 3 QUARTER - (20/CA)

## (undated) DEVICE — STAPLER SKIN DISP - (6/BX 10BX/CA) VISISTAT

## (undated) DEVICE — SUTURE 1 VICRYL PLUS CTX - 8 X 18 INCH (12/BX)

## (undated) DEVICE — BLADE SAGITTAL SAW DUAL CUT 25.0 X 90.0 X 1.27MM (1/EA)

## (undated) DEVICE — TOWEL STOP TIMEOUT SAFETY FLAG (40EA/CA)

## (undated) DEVICE — SUTURE 2-0 MONOCRYL PLUS UNDYED CT-1 1 X 36 (36EA/BX)"

## (undated) DEVICE — PAD PREP 24 X 48 CUFFED - (100/CA)

## (undated) DEVICE — PACK MAJOR ORTHO - (2EA/CA)

## (undated) DEVICE — GLOVE SURGICAL PROTEXIS PI 8.0 LF - (50PR/BX)

## (undated) DEVICE — STOCKINETTE IMPERVIOUS 12X48 - STERILELF (10/CA)"

## (undated) DEVICE — LENS/HOOD FOR SPACESUIT - (32/PK) PEEL AWAY FACE